# Patient Record
Sex: FEMALE | Race: WHITE | NOT HISPANIC OR LATINO | Employment: UNEMPLOYED | URBAN - NONMETROPOLITAN AREA
[De-identification: names, ages, dates, MRNs, and addresses within clinical notes are randomized per-mention and may not be internally consistent; named-entity substitution may affect disease eponyms.]

---

## 2017-03-31 RX ORDER — BLOOD-GLUCOSE METER
KIT MISCELLANEOUS
Qty: 50 EACH | Refills: 5 | Status: SHIPPED | OUTPATIENT
Start: 2017-03-31 | End: 2017-09-14

## 2017-09-19 ENCOUNTER — APPOINTMENT (OUTPATIENT)
Dept: GENERAL RADIOLOGY | Facility: HOSPITAL | Age: 62
End: 2017-09-19

## 2017-09-19 ENCOUNTER — APPOINTMENT (OUTPATIENT)
Dept: CT IMAGING | Facility: HOSPITAL | Age: 62
End: 2017-09-19

## 2017-09-19 ENCOUNTER — HOSPITAL ENCOUNTER (EMERGENCY)
Facility: HOSPITAL | Age: 62
Discharge: ANOTHER HOSPITAL | End: 2017-09-19
Attending: EMERGENCY MEDICINE | Admitting: EMERGENCY MEDICINE

## 2017-09-19 VITALS
TEMPERATURE: 98.9 F | HEART RATE: 75 BPM | WEIGHT: 170 LBS | HEIGHT: 62 IN | BODY MASS INDEX: 31.28 KG/M2 | OXYGEN SATURATION: 95 % | RESPIRATION RATE: 23 BRPM | DIASTOLIC BLOOD PRESSURE: 53 MMHG | SYSTOLIC BLOOD PRESSURE: 114 MMHG

## 2017-09-19 DIAGNOSIS — D70.9 NEUTROPENIA, UNSPECIFIED TYPE (HCC): ICD-10-CM

## 2017-09-19 DIAGNOSIS — D61.818 PANCYTOPENIA (HCC): ICD-10-CM

## 2017-09-19 DIAGNOSIS — R18.8 OTHER ASCITES: ICD-10-CM

## 2017-09-19 DIAGNOSIS — R18.8 CIRRHOSIS OF LIVER WITH ASCITES, UNSPECIFIED HEPATIC CIRRHOSIS TYPE (HCC): Primary | ICD-10-CM

## 2017-09-19 DIAGNOSIS — K74.60 CIRRHOSIS OF LIVER WITH ASCITES, UNSPECIFIED HEPATIC CIRRHOSIS TYPE (HCC): Primary | ICD-10-CM

## 2017-09-19 DIAGNOSIS — R17 ELEVATED BILIRUBIN: ICD-10-CM

## 2017-09-19 LAB
ALBUMIN SERPL-MCNC: 2.8 G/DL (ref 3.5–5)
ALBUMIN/GLOB SERPL: 0.9 G/DL (ref 1–2)
ALP SERPL-CCNC: 128 U/L (ref 38–126)
ALT SERPL W P-5'-P-CCNC: 33 U/L (ref 13–69)
AMYLASE SERPL-CCNC: 84 U/L (ref 30–110)
ANION GAP SERPL CALCULATED.3IONS-SCNC: 11.8 MMOL/L
AST SERPL-CCNC: 36 U/L (ref 15–46)
BILIRUB SERPL-MCNC: 3.5 MG/DL (ref 0.2–1.3)
BUN BLD-MCNC: 20 MG/DL (ref 7–20)
BUN/CREAT SERPL: 18.2 (ref 7.1–23.5)
CALCIUM SPEC-SCNC: 8 MG/DL (ref 8.4–10.2)
CHLORIDE SERPL-SCNC: 109 MMOL/L (ref 98–107)
CO2 SERPL-SCNC: 20 MMOL/L (ref 26–30)
CREAT BLD-MCNC: 1.1 MG/DL (ref 0.6–1.3)
DEPRECATED RDW RBC AUTO: 65.2 FL (ref 37–54)
ERYTHROCYTE [DISTWIDTH] IN BLOOD BY AUTOMATED COUNT: 18.5 % (ref 11.5–14.5)
GFR SERPL CREATININE-BSD FRML MDRD: 51 ML/MIN/1.73
GLOBULIN UR ELPH-MCNC: 3.2 GM/DL
GLUCOSE BLD-MCNC: 247 MG/DL (ref 74–98)
HCT VFR BLD AUTO: 25.5 % (ref 37–47)
HEMOCCULT STL QL: NEGATIVE
HGB BLD-MCNC: 8.8 G/DL (ref 12–16)
LIPASE SERPL-CCNC: 397 U/L (ref 23–300)
MCH RBC QN AUTO: 33.5 PG (ref 27–31)
MCHC RBC AUTO-ENTMCNC: 34.5 G/DL (ref 30–37)
MCV RBC AUTO: 97 FL (ref 81–99)
PLATELET # BLD AUTO: 139 10*3/MM3 (ref 130–400)
PMV BLD AUTO: 9.7 FL (ref 6–12)
POTASSIUM BLD-SCNC: 3.8 MMOL/L (ref 3.5–5.1)
PROT SERPL-MCNC: 6 G/DL (ref 6.3–8.2)
RBC # BLD AUTO: 2.63 10*6/MM3 (ref 4.2–5.4)
SCAN SLIDE: NORMAL
SODIUM BLD-SCNC: 137 MMOL/L (ref 137–145)
TROPONIN I SERPL-MCNC: <0.012 NG/ML (ref 0–0.03)
WBC NRBC COR # BLD: 1.64 10*3/MM3 (ref 4.8–10.8)

## 2017-09-19 PROCEDURE — 80053 COMPREHEN METABOLIC PANEL: CPT | Performed by: EMERGENCY MEDICINE

## 2017-09-19 PROCEDURE — 85007 BL SMEAR W/DIFF WBC COUNT: CPT | Performed by: EMERGENCY MEDICINE

## 2017-09-19 PROCEDURE — 71010 HC CHEST PA OR AP: CPT

## 2017-09-19 PROCEDURE — 85025 COMPLETE CBC W/AUTO DIFF WBC: CPT | Performed by: EMERGENCY MEDICINE

## 2017-09-19 PROCEDURE — 85060 BLOOD SMEAR INTERPRETATION: CPT | Performed by: EMERGENCY MEDICINE

## 2017-09-19 PROCEDURE — 99284 EMERGENCY DEPT VISIT MOD MDM: CPT

## 2017-09-19 PROCEDURE — 82272 OCCULT BLD FECES 1-3 TESTS: CPT | Performed by: EMERGENCY MEDICINE

## 2017-09-19 PROCEDURE — 93005 ELECTROCARDIOGRAM TRACING: CPT | Performed by: EMERGENCY MEDICINE

## 2017-09-19 PROCEDURE — 82150 ASSAY OF AMYLASE: CPT | Performed by: EMERGENCY MEDICINE

## 2017-09-19 PROCEDURE — 74176 CT ABD & PELVIS W/O CONTRAST: CPT

## 2017-09-19 PROCEDURE — 83690 ASSAY OF LIPASE: CPT | Performed by: EMERGENCY MEDICINE

## 2017-09-19 PROCEDURE — 84484 ASSAY OF TROPONIN QUANT: CPT | Performed by: EMERGENCY MEDICINE

## 2017-09-19 RX ORDER — ASPIRIN 325 MG
325 TABLET ORAL ONCE
Status: COMPLETED | OUTPATIENT
Start: 2017-09-19 | End: 2017-09-19

## 2017-09-19 RX ORDER — SODIUM CHLORIDE 0.9 % (FLUSH) 0.9 %
10 SYRINGE (ML) INJECTION AS NEEDED
Status: DISCONTINUED | OUTPATIENT
Start: 2017-09-19 | End: 2017-09-20 | Stop reason: HOSPADM

## 2017-09-19 RX ORDER — NITROGLYCERIN 0.4 MG/1
0.4 TABLET SUBLINGUAL
Status: DISCONTINUED | OUTPATIENT
Start: 2017-09-19 | End: 2017-09-20 | Stop reason: HOSPADM

## 2017-09-19 RX ORDER — CARBIDOPA, LEVODOPA AND ENTACAPONE 37.5; 200; 15 MG/1; MG/1; MG/1
1 TABLET, FILM COATED ORAL 3 TIMES DAILY
COMMUNITY

## 2017-09-19 RX ADMIN — NITROGLYCERIN 0.4 MG: 0.4 TABLET SUBLINGUAL at 15:35

## 2017-09-19 RX ADMIN — ASPIRIN 325 MG ORAL TABLET 325 MG: 325 PILL ORAL at 15:35

## 2017-09-19 NOTE — ED PROVIDER NOTES
Subjective   Patient is a 61 y.o. female presenting with chest pain.   History provided by:  Patient   used: No    Chest Pain   Pain location:  L chest  Pain quality: aching    Pain radiates to:  Does not radiate  Onset quality:  Gradual  Duration:  1 month  Timing:  Intermittent  Progression:  Waxing and waning  Chronicity:  New  Context: at rest    Relieved by:  Nothing  Worsened by:  Nothing  Ineffective treatments:  None tried  Associated symptoms: anxiety    Associated symptoms: no abdominal pain, no back pain, no cough, no diaphoresis, no dizziness, no dysphagia, no fever, no headache, no heartburn, no lower extremity edema, no nausea, no near-syncope, no numbness, no palpitations, no shortness of breath, no syncope, no vomiting and no weakness    Risk factors: diabetes mellitus, hypertension and smoking    Risk factors: no high cholesterol        Review of Systems   Constitutional: Negative for chills, diaphoresis and fever.   HENT: Negative for congestion, rhinorrhea, sore throat and trouble swallowing.    Eyes: Negative for discharge and visual disturbance.   Respiratory: Negative for cough, chest tightness, shortness of breath and wheezing.    Cardiovascular: Positive for chest pain. Negative for palpitations, leg swelling, syncope and near-syncope.   Gastrointestinal: Negative for abdominal pain, constipation, diarrhea, heartburn, nausea and vomiting.   Genitourinary: Negative for dysuria, flank pain and hematuria.   Musculoskeletal: Negative for back pain, myalgias and neck pain.   Skin: Negative for color change and rash.   Neurological: Negative for dizziness, weakness, numbness and headaches.   Psychiatric/Behavioral: Negative for self-injury and suicidal ideas.       Past Medical History:   Diagnosis Date   • Diabetes mellitus    • Hypertension    • Injury of back        Allergies   Allergen Reactions   • Iodinated Diagnostic Agents Anaphylaxis   • Lisinopril Shortness Of Breath        History reviewed. No pertinent surgical history.    History reviewed. No pertinent family history.    Social History     Social History   • Marital status:      Spouse name: N/A   • Number of children: N/A   • Years of education: N/A     Social History Main Topics   • Smoking status: Light Tobacco Smoker     Types: Cigarettes   • Smokeless tobacco: None   • Alcohol use No   • Drug use: No   • Sexual activity: Not Asked     Other Topics Concern   • None     Social History Narrative   • None           Objective   Physical Exam   Constitutional: She is oriented to person, place, and time. She appears well-developed and well-nourished.   HENT:   Head: Normocephalic and atraumatic.   Nose: Nose normal.   Mouth/Throat: Oropharynx is clear and moist.   Eyes: Conjunctivae and EOM are normal. Pupils are equal, round, and reactive to light.   Neck: Normal range of motion. Neck supple.   Cardiovascular: Normal rate, regular rhythm, normal heart sounds and intact distal pulses.    Pulmonary/Chest: Effort normal and breath sounds normal. No respiratory distress. She has no wheezes. She exhibits no tenderness.   Abdominal: Soft. Bowel sounds are normal. There is no tenderness. There is no rebound and no guarding.   Musculoskeletal: Normal range of motion. She exhibits no edema, tenderness or deformity.   Neurological: She is alert and oriented to person, place, and time. No cranial nerve deficit. Coordination normal.   Skin: Skin is warm and dry. No rash noted. No erythema. No pallor.   Psychiatric: She has a normal mood and affect. Her behavior is normal. Judgment and thought content normal.   Nursing note and vitals reviewed.      Procedures         ED Course  ED Course                  MDM  Number of Diagnoses or Management Options  Cirrhosis of liver with ascites, unspecified hepatic cirrhosis type:   Elevated bilirubin:   Other ascites:   Diagnosis management comments: 61-year-old female presents emergency  Department with complaints of chest pain.  She says it's been intermittent over the last month.  Patient states that she's also had fatigue.  She does have a history of hypertension and diabetes.  Patient does smoke.  There is a family history of coronary artery disease.  Patient denies a history of coronary artery disease in herself.  Vital signs are stable.  Physical exam is unremarkable.    EKG: Ventricular rate 82, MA interval 136, QTc 435, castration 96, sinus rhythm, left axis deviation.     8:00 PM I received patient in sign out. Her labs are notable for pancytopenia with a significant neutropenia. Her CT shows large ascites and massive hepatosplenomegaly. We do not have GI coverage here and I think she needs evaluation by the same. Discussed with [facility] for transfer.        Amount and/or Complexity of Data Reviewed  Decide to obtain previous medical records or to obtain history from someone other than the patient: yes        Final diagnoses:   Cirrhosis of liver with ascites, unspecified hepatic cirrhosis type   Elevated bilirubin   Other ascites   Pancytopenia   Neutropenia, unspecified type            Nayan Jimenez MD  09/19/17 2002

## 2017-09-20 NOTE — ED NOTES
Schlera of both eyes, yellowed. Skin is slightly jaundiced as well.  states patients skin is not normally this color.      Julianne Gutierrez RN  09/19/17 1597

## 2017-09-20 NOTE — ED NOTES
2126:   MD'S CALLED REGARDING BED ASSIGNMENT FOR PT. GIVEN BED @ THIS TIME, INFORMED PT IS GOING TO [facility] . RN INFORMED OF THIS.         Margarita Trevizo  09/19/17 2131

## 2017-09-20 NOTE — ED NOTES
1946: [facility] MD'S CALLED PER DR. EDWARDS REQUESTING NENITA WILKINS, FOR TRANSFER OF NEW ONSET CIRRHOSIS.   1953: CALL SENT TO DR. EDWARDS @ THIS TIME TO SPEAK WITH RAINER CHAUHAN @  [facility].     Margarita Trevizo  09/19/17 2015

## 2017-09-26 LAB
ANISOCYTOSIS BLD QL: ABNORMAL
BASOPHILS # BLD MANUAL: 0.03 10*3/MM3 (ref 0–0.2)
BASOPHILS NFR BLD AUTO: 2 % (ref 0–2.5)
CYTOLOGIST CVX/VAG CYTO: NORMAL
EOSINOPHIL # BLD MANUAL: 0.05 10*3/MM3 (ref 0–0.7)
EOSINOPHIL NFR BLD MANUAL: 3 % (ref 0–7)
LYMPHOCYTES # BLD MANUAL: 1.21 10*3/MM3 (ref 0.6–3.4)
LYMPHOCYTES NFR BLD MANUAL: 13 % (ref 0–12)
LYMPHOCYTES NFR BLD MANUAL: 74 % (ref 10–50)
MONOCYTES # BLD AUTO: 0.21 10*3/MM3 (ref 0–0.9)
NEUTROPHILS # BLD AUTO: 0.08 10*3/MM3 (ref 2–6.9)
NEUTROPHILS NFR BLD MANUAL: 3 % (ref 37–80)
NEUTS BAND NFR BLD MANUAL: 2 % (ref 0–6)
OVALOCYTES BLD QL SMEAR: ABNORMAL
PATH INTERP BLD-IMP: NORMAL
POIKILOCYTOSIS BLD QL SMEAR: ABNORMAL
SMALL PLATELETS BLD QL SMEAR: ADEQUATE
VARIANT LYMPHS NFR BLD MANUAL: 3 % (ref 0–0)
WBC MORPH BLD: NORMAL

## 2017-09-28 ENCOUNTER — TELEPHONE (OUTPATIENT)
Dept: PRIMARY CARE CLINIC | Age: 62
End: 2017-09-28

## 2018-04-10 ENCOUNTER — HOSPITAL ENCOUNTER (OUTPATIENT)
Dept: OTHER | Age: 63
Discharge: HOME | End: 2018-04-10
Attending: PEDIATRICS | Admitting: PEDIATRICS

## 2018-04-10 ENCOUNTER — OFFICE VISIT (OUTPATIENT)
Dept: PRIMARY CARE CLINIC | Age: 63
End: 2018-04-10
Payer: COMMERCIAL

## 2018-04-10 VITALS
OXYGEN SATURATION: 100 % | DIASTOLIC BLOOD PRESSURE: 72 MMHG | HEART RATE: 69 BPM | WEIGHT: 155.4 LBS | HEIGHT: 63 IN | SYSTOLIC BLOOD PRESSURE: 154 MMHG | TEMPERATURE: 97.9 F | BODY MASS INDEX: 27.54 KG/M2

## 2018-04-10 DIAGNOSIS — K74.60 CIRRHOSIS OF LIVER WITH ASCITES, UNSPECIFIED HEPATIC CIRRHOSIS TYPE (HCC): ICD-10-CM

## 2018-04-10 DIAGNOSIS — R63.4 WEIGHT LOSS: ICD-10-CM

## 2018-04-10 DIAGNOSIS — R79.89 ELEVATED LFTS: ICD-10-CM

## 2018-04-10 DIAGNOSIS — Z13.31 POSITIVE DEPRESSION SCREENING: ICD-10-CM

## 2018-04-10 DIAGNOSIS — R10.84 GENERALIZED ABDOMINAL PAIN: ICD-10-CM

## 2018-04-10 DIAGNOSIS — R18.8 CIRRHOSIS OF LIVER WITH ASCITES, UNSPECIFIED HEPATIC CIRRHOSIS TYPE (HCC): ICD-10-CM

## 2018-04-10 DIAGNOSIS — R11.0 NAUSEA: ICD-10-CM

## 2018-04-10 DIAGNOSIS — F41.8 DEPRESSION WITH ANXIETY: ICD-10-CM

## 2018-04-10 DIAGNOSIS — E11.9 TYPE 2 DIABETES MELLITUS WITHOUT COMPLICATION, WITHOUT LONG-TERM CURRENT USE OF INSULIN (HCC): Primary | ICD-10-CM

## 2018-04-10 DIAGNOSIS — I10 ESSENTIAL HYPERTENSION: ICD-10-CM

## 2018-04-10 DIAGNOSIS — M54.42 CHRONIC BILATERAL LOW BACK PAIN WITH BILATERAL SCIATICA: ICD-10-CM

## 2018-04-10 DIAGNOSIS — Z12.31 ENCOUNTER FOR SCREENING MAMMOGRAM FOR BREAST CANCER: ICD-10-CM

## 2018-04-10 DIAGNOSIS — R19.7 DIARRHEA, UNSPECIFIED TYPE: ICD-10-CM

## 2018-04-10 DIAGNOSIS — M54.41 CHRONIC BILATERAL LOW BACK PAIN WITH BILATERAL SCIATICA: ICD-10-CM

## 2018-04-10 DIAGNOSIS — E11.9 TYPE 2 DIABETES MELLITUS WITHOUT COMPLICATION, WITHOUT LONG-TERM CURRENT USE OF INSULIN (HCC): ICD-10-CM

## 2018-04-10 DIAGNOSIS — G89.29 CHRONIC BILATERAL LOW BACK PAIN WITH BILATERAL SCIATICA: ICD-10-CM

## 2018-04-10 DIAGNOSIS — R18.8 OTHER ASCITES: ICD-10-CM

## 2018-04-10 LAB
A/G RATIO: 1 (ref 0.8–2)
ALBUMIN SERPL-MCNC: 3.5 G/DL (ref 3.4–4.8)
ALP BLD-CCNC: 202 U/L (ref 25–100)
ALT SERPL-CCNC: 24 U/L (ref 4–36)
ANION GAP SERPL CALCULATED.3IONS-SCNC: 12 MMOL/L (ref 3–16)
AST SERPL-CCNC: 38 U/L (ref 8–33)
BASOPHILS ABSOLUTE: 0 K/UL (ref 0–0.1)
BASOPHILS RELATIVE PERCENT: 1.7 %
BILIRUB SERPL-MCNC: 2.4 MG/DL (ref 0.3–1.2)
BUN BLDV-MCNC: 18 MG/DL (ref 6–20)
CALCIUM SERPL-MCNC: 8.8 MG/DL (ref 8.5–10.5)
CHLORIDE BLD-SCNC: 106 MMOL/L (ref 98–107)
CHOLESTEROL, TOTAL: 159 MG/DL (ref 0–200)
CO2: 24 MMOL/L (ref 20–30)
CREAT SERPL-MCNC: 0.9 MG/DL (ref 0.4–1.2)
CREATININE URINE: 146.4 MG/DL (ref 1.5–300)
EOSINOPHILS ABSOLUTE: 0.1 K/UL (ref 0–0.4)
EOSINOPHILS RELATIVE PERCENT: 5.9 %
GFR AFRICAN AMERICAN: >59
GFR NON-AFRICAN AMERICAN: >60
GLOBULIN: 3.6 G/DL
GLUCOSE BLD-MCNC: 137 MG/DL (ref 74–106)
HAV IGM SER IA-ACNC: NORMAL
HBA1C MFR BLD: 4.6 %
HCT VFR BLD CALC: 37.8 % (ref 37–47)
HDLC SERPL-MCNC: 45 MG/DL (ref 40–60)
HEMOGLOBIN: 12.1 G/DL (ref 11.5–16.5)
HEPATITIS B CORE IGM ANTIBODY: NORMAL
HEPATITIS B SURFACE ANTIGEN INTERPRETATION: NORMAL
HEPATITIS C ANTIBODY INTERPRETATION: NORMAL
IMMATURE GRANULOCYTES #: 0 K/UL
IMMATURE GRANULOCYTES %: 0 % (ref 0–5)
INR BLD: 1.1 (ref 0.86–1.14)
LDL CHOLESTEROL CALCULATED: 94 MG/DL
LYMPHOCYTES ABSOLUTE: 0.7 K/UL (ref 1.5–4)
LYMPHOCYTES RELATIVE PERCENT: 62.7 %
MCH RBC QN AUTO: 30.3 PG (ref 27–32)
MCHC RBC AUTO-ENTMCNC: 32 G/DL (ref 31–35)
MCV RBC AUTO: 94.5 FL (ref 80–100)
MICROALBUMIN UR-MCNC: 7 MG/DL (ref 0–22)
MICROALBUMIN/CREAT UR-RTO: 47.8 MG/G (ref 0–30)
MONOCYTES ABSOLUTE: 0.2 K/UL (ref 0.2–0.8)
MONOCYTES RELATIVE PERCENT: 18.6 %
NEUTROPHILS ABSOLUTE: 0.1 K/UL (ref 2–7.5)
NEUTROPHILS RELATIVE PERCENT: 11.1 %
PDW BLD-RTO: 15.5 % (ref 11–16)
PLATELET # BLD: 190 K/UL (ref 150–400)
PMV BLD AUTO: 10 FL (ref 6–10)
POTASSIUM SERPL-SCNC: 4.2 MMOL/L (ref 3.4–5.1)
PROTHROMBIN TIME: 11.5 SEC (ref 9.5–10.9)
RBC # BLD: 4 M/UL (ref 3.8–5.8)
SODIUM BLD-SCNC: 142 MMOL/L (ref 136–145)
TOTAL PROTEIN: 7.1 G/DL (ref 6.4–8.3)
TRIGL SERPL-MCNC: 100 MG/DL (ref 0–249)
TSH REFLEX: 1.95 UIU/ML (ref 0.35–5.5)
VLDLC SERPL CALC-MCNC: 20 MG/DL
WBC # BLD: 1.2 K/UL (ref 4–11)

## 2018-04-10 PROCEDURE — G0444 DEPRESSION SCREEN ANNUAL: HCPCS | Performed by: PEDIATRICS

## 2018-04-10 PROCEDURE — G8431 POS CLIN DEPRES SCRN F/U DOC: HCPCS | Performed by: PEDIATRICS

## 2018-04-10 PROCEDURE — 99215 OFFICE O/P EST HI 40 MIN: CPT | Performed by: PEDIATRICS

## 2018-04-10 PROCEDURE — 83036 HEMOGLOBIN GLYCOSYLATED A1C: CPT | Performed by: PEDIATRICS

## 2018-04-10 RX ORDER — SPIRONOLACTONE 50 MG/1
50 TABLET, FILM COATED ORAL 3 TIMES DAILY
Qty: 90 TABLET | Refills: 1 | Status: SHIPPED | OUTPATIENT
Start: 2018-04-10 | End: 2018-05-24 | Stop reason: SDUPTHER

## 2018-04-10 RX ORDER — FUROSEMIDE 20 MG/1
20 TABLET ORAL 3 TIMES DAILY
Qty: 90 TABLET | Refills: 1 | Status: SHIPPED | OUTPATIENT
Start: 2018-04-10 | End: 2018-05-24 | Stop reason: SDUPTHER

## 2018-04-10 RX ORDER — GABAPENTIN 300 MG/1
300 CAPSULE ORAL 3 TIMES DAILY
Qty: 90 CAPSULE | Refills: 1 | Status: SHIPPED | OUTPATIENT
Start: 2018-04-10 | End: 2018-05-24 | Stop reason: SDUPTHER

## 2018-04-10 RX ORDER — ONDANSETRON 4 MG/1
4 TABLET, FILM COATED ORAL EVERY 8 HOURS PRN
Qty: 20 TABLET | Refills: 0 | Status: SHIPPED | OUTPATIENT
Start: 2018-04-10 | End: 2018-05-08 | Stop reason: SDUPTHER

## 2018-04-10 RX ORDER — CITALOPRAM 20 MG/1
20 TABLET ORAL DAILY
Qty: 30 TABLET | Refills: 3 | Status: SHIPPED | OUTPATIENT
Start: 2018-04-10 | End: 2018-07-24 | Stop reason: ALTCHOICE

## 2018-04-10 ASSESSMENT — ENCOUNTER SYMPTOMS
SORE THROAT: 0
SHORTNESS OF BREATH: 0
ABDOMINAL DISTENTION: 1
ABDOMINAL PAIN: 1
VOMITING: 0
DIARRHEA: 1
NAUSEA: 1
WHEEZING: 0
COUGH: 0
BACK PAIN: 1
EYE DISCHARGE: 0
SINUS PRESSURE: 0

## 2018-04-10 ASSESSMENT — PATIENT HEALTH QUESTIONNAIRE - PHQ9
6. FEELING BAD ABOUT YOURSELF - OR THAT YOU ARE A FAILURE OR HAVE LET YOURSELF OR YOUR FAMILY DOWN: 1
1. LITTLE INTEREST OR PLEASURE IN DOING THINGS: 2
4. FEELING TIRED OR HAVING LITTLE ENERGY: 3
SUM OF ALL RESPONSES TO PHQ9 QUESTIONS 1 & 2: 3
7. TROUBLE CONCENTRATING ON THINGS, SUCH AS READING THE NEWSPAPER OR WATCHING TELEVISION: 0
5. POOR APPETITE OR OVEREATING: 3
8. MOVING OR SPEAKING SO SLOWLY THAT OTHER PEOPLE COULD HAVE NOTICED. OR THE OPPOSITE, BEING SO FIGETY OR RESTLESS THAT YOU HAVE BEEN MOVING AROUND A LOT MORE THAN USUAL: 0
9. THOUGHTS THAT YOU WOULD BE BETTER OFF DEAD, OR OF HURTING YOURSELF: 0
3. TROUBLE FALLING OR STAYING ASLEEP: 3
10. IF YOU CHECKED OFF ANY PROBLEMS, HOW DIFFICULT HAVE THESE PROBLEMS MADE IT FOR YOU TO DO YOUR WORK, TAKE CARE OF THINGS AT HOME, OR GET ALONG WITH OTHER PEOPLE: 0
2. FEELING DOWN, DEPRESSED OR HOPELESS: 1
SUM OF ALL RESPONSES TO PHQ QUESTIONS 1-9: 13

## 2018-04-11 ASSESSMENT — ENCOUNTER SYMPTOMS: CRAMPS: 1

## 2018-04-19 ENCOUNTER — TELEPHONE (OUTPATIENT)
Dept: PRIMARY CARE CLINIC | Age: 63
End: 2018-04-19

## 2018-04-19 ENCOUNTER — CARE COORDINATION (OUTPATIENT)
Dept: CARE COORDINATION | Age: 63
End: 2018-04-19

## 2018-04-23 PROBLEM — F41.8 DEPRESSION WITH ANXIETY: Status: ACTIVE | Noted: 2018-04-23

## 2018-04-23 PROBLEM — R10.84 GENERALIZED ABDOMINAL PAIN: Status: ACTIVE | Noted: 2018-04-23

## 2018-04-23 PROBLEM — R19.7 DIARRHEA: Status: ACTIVE | Noted: 2018-04-23

## 2018-04-23 PROBLEM — R18.8 OTHER ASCITES: Status: ACTIVE | Noted: 2018-04-23

## 2018-04-23 PROBLEM — R63.4 WEIGHT LOSS: Status: ACTIVE | Noted: 2018-04-23

## 2018-04-23 PROBLEM — M54.41 CHRONIC BILATERAL LOW BACK PAIN WITH BILATERAL SCIATICA: Status: ACTIVE | Noted: 2018-04-23

## 2018-04-23 PROBLEM — M54.42 CHRONIC BILATERAL LOW BACK PAIN WITH BILATERAL SCIATICA: Status: ACTIVE | Noted: 2018-04-23

## 2018-04-23 PROBLEM — K74.60 CIRRHOSIS OF LIVER WITH ASCITES (HCC): Status: ACTIVE | Noted: 2018-04-23

## 2018-04-23 PROBLEM — R18.8 CIRRHOSIS OF LIVER WITH ASCITES (HCC): Status: ACTIVE | Noted: 2018-04-23

## 2018-04-23 PROBLEM — G89.29 CHRONIC BILATERAL LOW BACK PAIN WITH BILATERAL SCIATICA: Status: ACTIVE | Noted: 2018-04-23

## 2018-04-23 PROBLEM — R79.89 ELEVATED LFTS: Status: ACTIVE | Noted: 2018-04-23

## 2018-04-25 ENCOUNTER — HOSPITAL ENCOUNTER (OUTPATIENT)
Dept: GENERAL RADIOLOGY | Age: 63
Discharge: HOME | End: 2018-04-25

## 2018-04-25 ENCOUNTER — OFFICE VISIT (OUTPATIENT)
Dept: PRIMARY CARE CLINIC | Age: 63
End: 2018-04-25
Payer: COMMERCIAL

## 2018-04-25 VITALS
DIASTOLIC BLOOD PRESSURE: 76 MMHG | BODY MASS INDEX: 27.83 KG/M2 | OXYGEN SATURATION: 100 % | TEMPERATURE: 97.8 F | SYSTOLIC BLOOD PRESSURE: 152 MMHG | HEART RATE: 68 BPM | WEIGHT: 154.6 LBS

## 2018-04-25 DIAGNOSIS — R10.84 GENERALIZED ABDOMINAL PAIN: ICD-10-CM

## 2018-04-25 DIAGNOSIS — R63.4 WEIGHT LOSS: ICD-10-CM

## 2018-04-25 DIAGNOSIS — R79.89 ELEVATED LFTS: ICD-10-CM

## 2018-04-25 DIAGNOSIS — F41.8 DEPRESSION WITH ANXIETY: ICD-10-CM

## 2018-04-25 DIAGNOSIS — R63.4 ABNORMAL WEIGHT LOSS: ICD-10-CM

## 2018-04-25 DIAGNOSIS — R19.7 DIARRHEA, UNSPECIFIED TYPE: ICD-10-CM

## 2018-04-25 DIAGNOSIS — R18.8 OTHER ASCITES: ICD-10-CM

## 2018-04-25 DIAGNOSIS — R18.8 CIRRHOSIS OF LIVER WITH ASCITES, UNSPECIFIED HEPATIC CIRRHOSIS TYPE (HCC): ICD-10-CM

## 2018-04-25 DIAGNOSIS — R18.8 OTHER ASCITES: Primary | ICD-10-CM

## 2018-04-25 DIAGNOSIS — K74.60 CIRRHOSIS OF LIVER WITH ASCITES, UNSPECIFIED HEPATIC CIRRHOSIS TYPE (HCC): ICD-10-CM

## 2018-04-25 PROCEDURE — 99213 OFFICE O/P EST LOW 20 MIN: CPT | Performed by: PEDIATRICS

## 2018-05-08 ENCOUNTER — OFFICE VISIT (OUTPATIENT)
Dept: PRIMARY CARE CLINIC | Age: 63
End: 2018-05-08
Payer: COMMERCIAL

## 2018-05-08 VITALS
WEIGHT: 155 LBS | BODY MASS INDEX: 29.27 KG/M2 | RESPIRATION RATE: 20 BRPM | OXYGEN SATURATION: 98 % | DIASTOLIC BLOOD PRESSURE: 66 MMHG | HEART RATE: 79 BPM | SYSTOLIC BLOOD PRESSURE: 138 MMHG | HEIGHT: 61 IN

## 2018-05-08 DIAGNOSIS — R18.8 CIRRHOSIS OF LIVER WITH ASCITES, UNSPECIFIED HEPATIC CIRRHOSIS TYPE (HCC): Primary | ICD-10-CM

## 2018-05-08 DIAGNOSIS — F41.9 ANXIETY: ICD-10-CM

## 2018-05-08 DIAGNOSIS — R11.0 NAUSEA: ICD-10-CM

## 2018-05-08 DIAGNOSIS — R10.9 ABDOMINAL PAIN, UNSPECIFIED ABDOMINAL LOCATION: ICD-10-CM

## 2018-05-08 DIAGNOSIS — R13.10 DYSPHAGIA, UNSPECIFIED TYPE: ICD-10-CM

## 2018-05-08 DIAGNOSIS — K74.60 CIRRHOSIS OF LIVER WITH ASCITES, UNSPECIFIED HEPATIC CIRRHOSIS TYPE (HCC): Primary | ICD-10-CM

## 2018-05-08 PROCEDURE — 99214 OFFICE O/P EST MOD 30 MIN: CPT | Performed by: PEDIATRICS

## 2018-05-08 RX ORDER — ALPRAZOLAM 0.5 MG/1
0.5 TABLET ORAL DAILY PRN
Qty: 15 TABLET | Refills: 0 | Status: SHIPPED | OUTPATIENT
Start: 2018-05-08 | End: 2018-05-24 | Stop reason: SDUPTHER

## 2018-05-08 RX ORDER — ONDANSETRON 4 MG/1
4 TABLET, FILM COATED ORAL EVERY 8 HOURS PRN
Qty: 20 TABLET | Refills: 0 | Status: SHIPPED | OUTPATIENT
Start: 2018-05-08 | End: 2018-05-24 | Stop reason: SDUPTHER

## 2018-05-08 ASSESSMENT — ENCOUNTER SYMPTOMS
SINUS PRESSURE: 0
SORE THROAT: 0
BACK PAIN: 0
NAUSEA: 0
ABDOMINAL PAIN: 0
EYE DISCHARGE: 0
COUGH: 0
WHEEZING: 0
SHORTNESS OF BREATH: 0
VOMITING: 0

## 2018-05-23 ASSESSMENT — ENCOUNTER SYMPTOMS
SHORTNESS OF BREATH: 0
WHEEZING: 0
BACK PAIN: 1
SORE THROAT: 0
COUGH: 0
SINUS PRESSURE: 0
ABDOMINAL PAIN: 1
VOMITING: 0
NAUSEA: 1
ABDOMINAL DISTENTION: 1
DIARRHEA: 1
EYE DISCHARGE: 0

## 2018-05-24 ENCOUNTER — OFFICE VISIT (OUTPATIENT)
Dept: PRIMARY CARE CLINIC | Age: 63
End: 2018-05-24
Payer: COMMERCIAL

## 2018-05-24 VITALS
OXYGEN SATURATION: 94 % | DIASTOLIC BLOOD PRESSURE: 78 MMHG | WEIGHT: 138 LBS | SYSTOLIC BLOOD PRESSURE: 124 MMHG | HEIGHT: 61 IN | BODY MASS INDEX: 26.06 KG/M2 | HEART RATE: 77 BPM | RESPIRATION RATE: 20 BRPM

## 2018-05-24 DIAGNOSIS — G89.29 CHRONIC BILATERAL LOW BACK PAIN WITH BILATERAL SCIATICA: ICD-10-CM

## 2018-05-24 DIAGNOSIS — R18.8 CIRRHOSIS OF LIVER WITH ASCITES, UNSPECIFIED HEPATIC CIRRHOSIS TYPE (HCC): Primary | ICD-10-CM

## 2018-05-24 DIAGNOSIS — M54.41 CHRONIC BILATERAL LOW BACK PAIN WITH BILATERAL SCIATICA: ICD-10-CM

## 2018-05-24 DIAGNOSIS — R18.8 OTHER ASCITES: ICD-10-CM

## 2018-05-24 DIAGNOSIS — M54.42 CHRONIC BILATERAL LOW BACK PAIN WITH BILATERAL SCIATICA: ICD-10-CM

## 2018-05-24 DIAGNOSIS — R11.0 NAUSEA: ICD-10-CM

## 2018-05-24 DIAGNOSIS — F41.9 ANXIETY: ICD-10-CM

## 2018-05-24 DIAGNOSIS — K74.60 CIRRHOSIS OF LIVER WITH ASCITES, UNSPECIFIED HEPATIC CIRRHOSIS TYPE (HCC): Primary | ICD-10-CM

## 2018-05-24 DIAGNOSIS — I10 ESSENTIAL HYPERTENSION: ICD-10-CM

## 2018-05-24 PROCEDURE — 99214 OFFICE O/P EST MOD 30 MIN: CPT | Performed by: PEDIATRICS

## 2018-05-24 RX ORDER — GABAPENTIN 300 MG/1
300 CAPSULE ORAL 3 TIMES DAILY
Qty: 90 CAPSULE | Refills: 1 | Status: SHIPPED | OUTPATIENT
Start: 2018-05-24 | End: 2018-07-24 | Stop reason: SDUPTHER

## 2018-05-24 RX ORDER — SPIRONOLACTONE 50 MG/1
50 TABLET, FILM COATED ORAL 3 TIMES DAILY
Qty: 90 TABLET | Refills: 1 | Status: SHIPPED | OUTPATIENT
Start: 2018-05-24 | End: 2018-07-24 | Stop reason: SDUPTHER

## 2018-05-24 RX ORDER — ALPRAZOLAM 0.5 MG/1
0.5 TABLET ORAL DAILY PRN
Qty: 30 TABLET | Refills: 1 | Status: SHIPPED | OUTPATIENT
Start: 2018-05-24 | End: 2018-06-23

## 2018-05-24 RX ORDER — ONDANSETRON 4 MG/1
4 TABLET, FILM COATED ORAL EVERY 8 HOURS PRN
Qty: 20 TABLET | Refills: 0 | Status: SHIPPED | OUTPATIENT
Start: 2018-05-24 | End: 2018-07-24 | Stop reason: ALTCHOICE

## 2018-05-24 RX ORDER — OXYCODONE HYDROCHLORIDE 5 MG/1
TABLET ORAL
Refills: 0 | COMMUNITY
Start: 2018-05-21 | End: 2018-07-24 | Stop reason: ALTCHOICE

## 2018-05-24 RX ORDER — PROMETHAZINE HYDROCHLORIDE 25 MG/1
25 TABLET ORAL EVERY 6 HOURS PRN
Qty: 12 TABLET | Refills: 0 | Status: SHIPPED | OUTPATIENT
Start: 2018-05-24 | End: 2018-07-24 | Stop reason: SINTOL

## 2018-05-24 RX ORDER — FUROSEMIDE 20 MG/1
20 TABLET ORAL 3 TIMES DAILY
Qty: 90 TABLET | Refills: 1 | Status: SHIPPED | OUTPATIENT
Start: 2018-05-24 | End: 2018-07-24 | Stop reason: SDUPTHER

## 2018-05-24 ASSESSMENT — ENCOUNTER SYMPTOMS
SORE THROAT: 0
NAUSEA: 0
COUGH: 0
ABDOMINAL DISTENTION: 1
BACK PAIN: 0
EYE DISCHARGE: 0
DIARRHEA: 1
SHORTNESS OF BREATH: 0
WHEEZING: 0
VOMITING: 0
DIARRHEA: 1
ABDOMINAL DISTENTION: 1
SINUS PRESSURE: 0
ABDOMINAL PAIN: 0

## 2018-07-24 ENCOUNTER — OFFICE VISIT (OUTPATIENT)
Dept: PRIMARY CARE CLINIC | Age: 63
End: 2018-07-24
Payer: COMMERCIAL

## 2018-07-24 VITALS
HEART RATE: 69 BPM | DIASTOLIC BLOOD PRESSURE: 66 MMHG | SYSTOLIC BLOOD PRESSURE: 136 MMHG | RESPIRATION RATE: 20 BRPM | WEIGHT: 146 LBS | HEIGHT: 61 IN | BODY MASS INDEX: 27.56 KG/M2 | OXYGEN SATURATION: 98 %

## 2018-07-24 DIAGNOSIS — F41.8 DEPRESSION WITH ANXIETY: ICD-10-CM

## 2018-07-24 DIAGNOSIS — Z23 NEED FOR PNEUMOCOCCAL VACCINATION: ICD-10-CM

## 2018-07-24 DIAGNOSIS — R18.8 OTHER ASCITES: ICD-10-CM

## 2018-07-24 DIAGNOSIS — M54.41 CHRONIC BILATERAL LOW BACK PAIN WITH BILATERAL SCIATICA: ICD-10-CM

## 2018-07-24 DIAGNOSIS — R11.0 NAUSEA: ICD-10-CM

## 2018-07-24 DIAGNOSIS — Z12.39 BREAST CANCER SCREENING: ICD-10-CM

## 2018-07-24 DIAGNOSIS — G89.29 CHRONIC BILATERAL LOW BACK PAIN WITH BILATERAL SCIATICA: ICD-10-CM

## 2018-07-24 DIAGNOSIS — I10 ESSENTIAL HYPERTENSION: Primary | ICD-10-CM

## 2018-07-24 DIAGNOSIS — K74.60 CIRRHOSIS OF LIVER WITH ASCITES, UNSPECIFIED HEPATIC CIRRHOSIS TYPE (HCC): ICD-10-CM

## 2018-07-24 DIAGNOSIS — R18.8 CIRRHOSIS OF LIVER WITH ASCITES, UNSPECIFIED HEPATIC CIRRHOSIS TYPE (HCC): ICD-10-CM

## 2018-07-24 DIAGNOSIS — Z23 NEED FOR HEPATITIS A IMMUNIZATION: ICD-10-CM

## 2018-07-24 DIAGNOSIS — M54.42 CHRONIC BILATERAL LOW BACK PAIN WITH BILATERAL SCIATICA: ICD-10-CM

## 2018-07-24 PROCEDURE — 90472 IMMUNIZATION ADMIN EACH ADD: CPT | Performed by: PEDIATRICS

## 2018-07-24 PROCEDURE — 90471 IMMUNIZATION ADMIN: CPT | Performed by: PEDIATRICS

## 2018-07-24 PROCEDURE — 90632 HEPA VACCINE ADULT IM: CPT | Performed by: PEDIATRICS

## 2018-07-24 PROCEDURE — 90670 PCV13 VACCINE IM: CPT | Performed by: PEDIATRICS

## 2018-07-24 PROCEDURE — 99213 OFFICE O/P EST LOW 20 MIN: CPT | Performed by: PEDIATRICS

## 2018-07-24 RX ORDER — GABAPENTIN 300 MG/1
300 CAPSULE ORAL 3 TIMES DAILY
Qty: 90 CAPSULE | Refills: 2 | Status: SHIPPED | OUTPATIENT
Start: 2018-07-24 | End: 2018-10-09 | Stop reason: SDUPTHER

## 2018-07-24 RX ORDER — FUROSEMIDE 20 MG/1
20 TABLET ORAL 3 TIMES DAILY
Qty: 90 TABLET | Refills: 1 | Status: SHIPPED | OUTPATIENT
Start: 2018-07-24 | End: 2018-08-30 | Stop reason: SDUPTHER

## 2018-07-24 RX ORDER — SPIRONOLACTONE 50 MG/1
50 TABLET, FILM COATED ORAL 3 TIMES DAILY
Qty: 90 TABLET | Refills: 1 | Status: SHIPPED | OUTPATIENT
Start: 2018-07-24 | End: 2018-08-30 | Stop reason: SDUPTHER

## 2018-07-24 RX ORDER — ALPRAZOLAM 0.5 MG/1
0.5 TABLET ORAL DAILY PRN
COMMUNITY
End: 2018-07-24 | Stop reason: SDUPTHER

## 2018-07-24 RX ORDER — ALPRAZOLAM 0.5 MG/1
0.5 TABLET ORAL DAILY PRN
Qty: 30 TABLET | Refills: 2 | Status: SHIPPED | OUTPATIENT
Start: 2018-07-24 | End: 2018-09-24 | Stop reason: SDUPTHER

## 2018-07-24 RX ORDER — ONDANSETRON 4 MG/1
4 TABLET, FILM COATED ORAL EVERY 8 HOURS PRN
Qty: 60 TABLET | Refills: 0 | Status: SHIPPED | OUTPATIENT
Start: 2018-07-24 | End: 2018-09-24 | Stop reason: SDUPTHER

## 2018-07-24 ASSESSMENT — ENCOUNTER SYMPTOMS
SORE THROAT: 0
NAUSEA: 0
BACK PAIN: 0
EYE DISCHARGE: 0
COUGH: 0
VOMITING: 0
SINUS PRESSURE: 0
WHEEZING: 0
SHORTNESS OF BREATH: 0
ABDOMINAL PAIN: 0

## 2018-07-24 NOTE — PROGRESS NOTES
Pt as mammogram ordered. Pt had colonoscopy May 2018 by Dr. Ivelisse Paniagua. Pt will get pneumo shot when she gets the rest of her vaccines for transplant.

## 2018-07-24 NOTE — PROGRESS NOTES
1. Have you seen another provider since your last visit? Yes    2. Have you had any other diagnostic tests since your last visit? Yes    3. Have you changed or stopped any medications since your last visit?  No

## 2018-07-31 ENCOUNTER — HOSPITAL ENCOUNTER (OUTPATIENT)
Facility: HOSPITAL | Age: 63
Discharge: HOME | End: 2018-07-31
Payer: COMMERCIAL

## 2018-07-31 ENCOUNTER — HOSPITAL ENCOUNTER (OUTPATIENT)
Dept: MAMMOGRAPHY | Facility: HOSPITAL | Age: 63
Discharge: HOME | End: 2018-07-31
Payer: COMMERCIAL

## 2018-07-31 DIAGNOSIS — Z12.39 BREAST CANCER SCREENING: ICD-10-CM

## 2018-07-31 LAB
ANION GAP SERPL CALCULATED.3IONS-SCNC: 12 MMOL/L (ref 3–16)
BUN BLDV-MCNC: 22 MG/DL (ref 6–20)
CALCIUM SERPL-MCNC: 9.1 MG/DL (ref 8.5–10.5)
CHLORIDE BLD-SCNC: 104 MMOL/L (ref 98–107)
CO2: 25 MMOL/L (ref 20–30)
CREAT SERPL-MCNC: 1.1 MG/DL (ref 0.4–1.2)
GFR AFRICAN AMERICAN: >59
GFR NON-AFRICAN AMERICAN: 50
GLUCOSE BLD-MCNC: 157 MG/DL (ref 74–106)
POTASSIUM SERPL-SCNC: 4 MMOL/L (ref 3.4–5.1)
SODIUM BLD-SCNC: 141 MMOL/L (ref 136–145)

## 2018-07-31 PROCEDURE — 80048 BASIC METABOLIC PNL TOTAL CA: CPT

## 2018-07-31 PROCEDURE — 36415 COLL VENOUS BLD VENIPUNCTURE: CPT

## 2018-07-31 PROCEDURE — 77063 BREAST TOMOSYNTHESIS BI: CPT

## 2018-08-02 ENCOUNTER — TELEPHONE (OUTPATIENT)
Dept: PRIMARY CARE CLINIC | Age: 63
End: 2018-08-02

## 2018-08-02 NOTE — TELEPHONE ENCOUNTER
----- Message from Rose Marie Cervantes DO sent at 8/1/2018 10:51 PM EDT -----  The patient's recent lab(s) and/or xray(s) were normal.  Please notify patient and let them know to return or call if any new problems.

## 2018-08-02 NOTE — TELEPHONE ENCOUNTER
Called and informed the patient of the results. Patient verbalized understanding. soft/nondistended/obese

## 2018-08-14 ENCOUNTER — OFFICE VISIT (OUTPATIENT)
Dept: PRIMARY CARE CLINIC | Age: 63
End: 2018-08-14
Payer: COMMERCIAL

## 2018-08-14 VITALS
HEART RATE: 71 BPM | SYSTOLIC BLOOD PRESSURE: 110 MMHG | WEIGHT: 150 LBS | DIASTOLIC BLOOD PRESSURE: 60 MMHG | BODY MASS INDEX: 28.34 KG/M2 | OXYGEN SATURATION: 97 %

## 2018-08-14 DIAGNOSIS — Z00.00 ENCOUNTER FOR ANNUAL PHYSICAL EXAM: Primary | ICD-10-CM

## 2018-08-14 LAB
BILIRUBIN, POC: NORMAL
BLOOD URINE, POC: NORMAL
CLARITY, POC: CLEAR
COLOR, POC: NORMAL
GLUCOSE URINE, POC: NORMAL
KETONES, POC: NORMAL
LEUKOCYTE EST, POC: NORMAL
NITRITE, POC: NORMAL
PH, POC: 5
PROTEIN, POC: NORMAL
SPECIFIC GRAVITY, POC: 1.01
UROBILINOGEN, POC: 0.2

## 2018-08-14 PROCEDURE — 99396 PREV VISIT EST AGE 40-64: CPT | Performed by: NURSE PRACTITIONER

## 2018-08-14 PROCEDURE — 81002 URINALYSIS NONAUTO W/O SCOPE: CPT | Performed by: NURSE PRACTITIONER

## 2018-08-14 PROCEDURE — 90746 HEPB VACCINE 3 DOSE ADULT IM: CPT | Performed by: NURSE PRACTITIONER

## 2018-08-14 PROCEDURE — 90471 IMMUNIZATION ADMIN: CPT | Performed by: NURSE PRACTITIONER

## 2018-08-14 ASSESSMENT — ENCOUNTER SYMPTOMS
ABDOMINAL PAIN: 0
NAUSEA: 0
COUGH: 0
EYE PAIN: 0
SHORTNESS OF BREATH: 0
VOMITING: 0
SORE THROAT: 0

## 2018-08-30 DIAGNOSIS — R18.8 CIRRHOSIS OF LIVER WITH ASCITES, UNSPECIFIED HEPATIC CIRRHOSIS TYPE (HCC): ICD-10-CM

## 2018-08-30 DIAGNOSIS — K74.60 CIRRHOSIS OF LIVER WITH ASCITES, UNSPECIFIED HEPATIC CIRRHOSIS TYPE (HCC): ICD-10-CM

## 2018-08-30 DIAGNOSIS — I10 ESSENTIAL HYPERTENSION: ICD-10-CM

## 2018-08-30 DIAGNOSIS — R18.8 OTHER ASCITES: ICD-10-CM

## 2018-08-31 RX ORDER — FUROSEMIDE 20 MG/1
20 TABLET ORAL 3 TIMES DAILY
Qty: 90 TABLET | Refills: 1 | Status: SHIPPED | OUTPATIENT
Start: 2018-08-31 | End: 2018-10-30 | Stop reason: SDUPTHER

## 2018-08-31 RX ORDER — SPIRONOLACTONE 50 MG/1
50 TABLET, FILM COATED ORAL 3 TIMES DAILY
Qty: 90 TABLET | Refills: 1 | Status: SHIPPED | OUTPATIENT
Start: 2018-08-31 | End: 2018-10-30 | Stop reason: SDUPTHER

## 2018-09-24 ENCOUNTER — OFFICE VISIT (OUTPATIENT)
Dept: PRIMARY CARE CLINIC | Age: 63
End: 2018-09-24
Payer: COMMERCIAL

## 2018-09-24 VITALS
OXYGEN SATURATION: 98 % | BODY MASS INDEX: 28.32 KG/M2 | HEART RATE: 61 BPM | DIASTOLIC BLOOD PRESSURE: 68 MMHG | RESPIRATION RATE: 16 BRPM | SYSTOLIC BLOOD PRESSURE: 130 MMHG | HEIGHT: 61 IN | WEIGHT: 150 LBS

## 2018-09-24 DIAGNOSIS — E11.9 TYPE 2 DIABETES MELLITUS WITHOUT COMPLICATION, WITHOUT LONG-TERM CURRENT USE OF INSULIN (HCC): Primary | ICD-10-CM

## 2018-09-24 DIAGNOSIS — F41.9 ANXIETY: ICD-10-CM

## 2018-09-24 DIAGNOSIS — K74.60 CIRRHOSIS OF LIVER WITH ASCITES, UNSPECIFIED HEPATIC CIRRHOSIS TYPE (HCC): ICD-10-CM

## 2018-09-24 DIAGNOSIS — R11.0 NAUSEA: ICD-10-CM

## 2018-09-24 DIAGNOSIS — R18.8 CIRRHOSIS OF LIVER WITH ASCITES, UNSPECIFIED HEPATIC CIRRHOSIS TYPE (HCC): ICD-10-CM

## 2018-09-24 LAB — HBA1C MFR BLD: 5.3 %

## 2018-09-24 PROCEDURE — 99214 OFFICE O/P EST MOD 30 MIN: CPT | Performed by: PEDIATRICS

## 2018-09-24 PROCEDURE — 83036 HEMOGLOBIN GLYCOSYLATED A1C: CPT | Performed by: PEDIATRICS

## 2018-09-24 RX ORDER — ONDANSETRON 4 MG/1
4 TABLET, FILM COATED ORAL EVERY 8 HOURS PRN
Qty: 60 TABLET | Refills: 3 | Status: SHIPPED | OUTPATIENT
Start: 2018-09-24 | End: 2018-10-30 | Stop reason: SDUPTHER

## 2018-09-24 RX ORDER — ALPRAZOLAM 0.5 MG/1
0.5 TABLET ORAL 2 TIMES DAILY PRN
Qty: 60 TABLET | Refills: 2 | Status: SHIPPED | OUTPATIENT
Start: 2018-09-24 | End: 2018-10-09 | Stop reason: SDUPTHER

## 2018-09-24 ASSESSMENT — ENCOUNTER SYMPTOMS
NAUSEA: 0
VOMITING: 0
COUGH: 0
SINUS PRESSURE: 0
WHEEZING: 0
SORE THROAT: 0
ABDOMINAL PAIN: 0
EYE DISCHARGE: 0
BACK PAIN: 0

## 2018-09-24 NOTE — PROGRESS NOTES
SUBJECTIVE:    Patient ID: Doreen Welsh is a 65 y.o. female. Chief Complaint   Patient presents with    Cirrhosis     f/u    Hypertension     f/u       HPI:    Patient's medications, allergies, past medical, surgical, social and family histories were reviewed and updated as appropriate. The patient is here to follow up. She has chronic liver disease with cirrhosis. She has not had a recent paracentesis. She says she is doing well. She has seen transplant doctor. She had a stress test.   She is concerned her diabetes may come back and wants to be retested for that. She is still waiting to be on the list for a liver transplant. She says she has needed more of her xanax due to anxiety. She also is having a lot of zofran for nausea and is about to run out of this medications. She also says she feels so tired. She has pain in her hips. She says she fell and was seen in the ER and they said they found something in her back. Mental Health Problem   The primary symptoms include dysphoric mood. The primary symptoms do not include delusions, hallucinations, bizarre behavior or disorganized speech. The current episode started more than 1 month ago. This is a chronic problem. The degree of incapacity that she is experiencing as a consequence of her illness is moderate. Sequelae of the illness include harmed interpersonal relations. Additional symptoms of the illness include insomnia, unexpected weight change and fatigue. Additional symptoms of the illness do not include agitation or abdominal pain. She does not admit to suicidal ideas. She does not have a plan to commit suicide. She does not contemplate harming herself. She has not already injured self. She does not contemplate injuring another person. She has not already  injured another person. Review of Systems   Constitutional: Positive for fatigue and unexpected weight change. Negative for chills and fever.    HENT: Negative for congestion, medical history, physical exam, medications, labs, radiographic studies and consultations. In addition, I have assessed detailed aspects of the patient's anxiety/panic disorder and/or sleep disorder, the affects of the problem on the patient's functional and psychological function, coexisting conditions, possible alternative treatment modalities, risks for addiction and abuse and risks and benefits of the given medication. After review and discussion with the patient regarding the above medication, the patient and myself have agreed the use of the controlled medication is an integral part of improving the patient's ability to perform ADL's, and sleep through the night. The over all goal would  be improvement of the patient's quality of life with the minimum and safest medication. The above information, including response and treatment goals, will be assessed and updated on a regular basis. The patient is also agreeable to have routine VIVEK reports, random urine drug screens and pill counts. The patient has agreed to only use the medication as prescribed and will notify me of any problems or change in condition that occurs. Return in about 2 months (around 11/24/2018) for anxiety, end stage liver disease.     Controlled Substances Monitoring:

## 2018-09-24 NOTE — PROGRESS NOTES
Pt states she had a colonoscopy May 14, 2018 by Dr. Eliseo Arevalo. Pt to discuss pneumo inj with provider.

## 2018-10-09 DIAGNOSIS — M54.41 CHRONIC BILATERAL LOW BACK PAIN WITH BILATERAL SCIATICA: ICD-10-CM

## 2018-10-09 DIAGNOSIS — G89.29 CHRONIC BILATERAL LOW BACK PAIN WITH BILATERAL SCIATICA: ICD-10-CM

## 2018-10-09 DIAGNOSIS — M54.42 CHRONIC BILATERAL LOW BACK PAIN WITH BILATERAL SCIATICA: ICD-10-CM

## 2018-10-09 DIAGNOSIS — F41.9 ANXIETY: ICD-10-CM

## 2018-10-09 RX ORDER — GABAPENTIN 300 MG/1
CAPSULE ORAL
Qty: 90 CAPSULE | Refills: 2 | Status: SHIPPED | OUTPATIENT
Start: 2018-10-09 | End: 2018-11-26

## 2018-10-09 RX ORDER — ALPRAZOLAM 0.5 MG/1
TABLET ORAL
Qty: 30 TABLET | Refills: 2 | Status: SHIPPED | OUTPATIENT
Start: 2018-10-09 | End: 2018-10-30 | Stop reason: SDUPTHER

## 2018-10-30 ENCOUNTER — OFFICE VISIT (OUTPATIENT)
Dept: PRIMARY CARE CLINIC | Age: 63
End: 2018-10-30
Payer: COMMERCIAL

## 2018-10-30 ENCOUNTER — HOSPITAL ENCOUNTER (OUTPATIENT)
Facility: HOSPITAL | Age: 63
Discharge: HOME | End: 2018-10-30
Payer: COMMERCIAL

## 2018-10-30 VITALS
HEIGHT: 61 IN | HEART RATE: 64 BPM | SYSTOLIC BLOOD PRESSURE: 110 MMHG | RESPIRATION RATE: 16 BRPM | OXYGEN SATURATION: 99 % | DIASTOLIC BLOOD PRESSURE: 64 MMHG | WEIGHT: 157 LBS | BODY MASS INDEX: 29.64 KG/M2

## 2018-10-30 DIAGNOSIS — R18.8 CIRRHOSIS OF LIVER WITH ASCITES, UNSPECIFIED HEPATIC CIRRHOSIS TYPE (HCC): Primary | ICD-10-CM

## 2018-10-30 DIAGNOSIS — R18.8 OTHER ASCITES: ICD-10-CM

## 2018-10-30 DIAGNOSIS — I10 ESSENTIAL HYPERTENSION: ICD-10-CM

## 2018-10-30 DIAGNOSIS — Z23 NEED FOR HEPATITIS B VACCINATION: ICD-10-CM

## 2018-10-30 DIAGNOSIS — F41.9 ANXIETY: ICD-10-CM

## 2018-10-30 DIAGNOSIS — Z23 NEED FOR INFLUENZA VACCINATION: ICD-10-CM

## 2018-10-30 DIAGNOSIS — R18.8 CIRRHOSIS OF LIVER WITH ASCITES, UNSPECIFIED HEPATIC CIRRHOSIS TYPE (HCC): ICD-10-CM

## 2018-10-30 DIAGNOSIS — Z86.39 HISTORY OF DIABETES MELLITUS: ICD-10-CM

## 2018-10-30 DIAGNOSIS — K74.60 CIRRHOSIS OF LIVER WITH ASCITES, UNSPECIFIED HEPATIC CIRRHOSIS TYPE (HCC): ICD-10-CM

## 2018-10-30 DIAGNOSIS — R11.0 NAUSEA: ICD-10-CM

## 2018-10-30 DIAGNOSIS — D72.819 LEUKOPENIA, UNSPECIFIED TYPE: ICD-10-CM

## 2018-10-30 DIAGNOSIS — K74.60 CIRRHOSIS OF LIVER WITH ASCITES, UNSPECIFIED HEPATIC CIRRHOSIS TYPE (HCC): Primary | ICD-10-CM

## 2018-10-30 LAB
A/G RATIO: 0.9 (ref 0.8–2)
ALBUMIN SERPL-MCNC: 3.5 G/DL (ref 3.4–4.8)
ALP BLD-CCNC: 209 U/L (ref 25–100)
ALT SERPL-CCNC: 23 U/L (ref 4–36)
ANION GAP SERPL CALCULATED.3IONS-SCNC: 10 MMOL/L (ref 3–16)
AST SERPL-CCNC: 32 U/L (ref 8–33)
BASOPHILS ABSOLUTE: 0 K/UL (ref 0–0.1)
BASOPHILS RELATIVE PERCENT: 1.7 %
BILIRUB SERPL-MCNC: 3.1 MG/DL (ref 0.3–1.2)
BUN BLDV-MCNC: 20 MG/DL (ref 6–20)
CALCIUM SERPL-MCNC: 9.3 MG/DL (ref 8.5–10.5)
CHLORIDE BLD-SCNC: 104 MMOL/L (ref 98–107)
CO2: 25 MMOL/L (ref 20–30)
CREAT SERPL-MCNC: 1 MG/DL (ref 0.4–1.2)
EOSINOPHILS ABSOLUTE: 0.1 K/UL (ref 0–0.4)
EOSINOPHILS RELATIVE PERCENT: 5.2 %
GFR AFRICAN AMERICAN: >59
GFR NON-AFRICAN AMERICAN: 56
GLOBULIN: 4 G/DL
GLUCOSE BLD-MCNC: 215 MG/DL (ref 74–106)
HBA1C MFR BLD: 5.3 %
HCT VFR BLD CALC: 32 % (ref 37–47)
HEMOGLOBIN: 10.6 G/DL (ref 11.5–16.5)
IMMATURE GRANULOCYTES #: 0 K/UL
IMMATURE GRANULOCYTES %: 0.9 % (ref 0–5)
LYMPHOCYTES ABSOLUTE: 0.6 K/UL (ref 1.5–4)
LYMPHOCYTES RELATIVE PERCENT: 50.9 %
MCH RBC QN AUTO: 31.9 PG (ref 27–32)
MCHC RBC AUTO-ENTMCNC: 33.1 G/DL (ref 31–35)
MCV RBC AUTO: 96.4 FL (ref 80–100)
MONOCYTES ABSOLUTE: 0.3 K/UL (ref 0.2–0.8)
MONOCYTES RELATIVE PERCENT: 21.6 %
NEUTROPHILS ABSOLUTE: 0.2 K/UL (ref 2–7.5)
NEUTROPHILS RELATIVE PERCENT: 19.7 %
PDW BLD-RTO: 16.5 % (ref 11–16)
PLATELET # BLD: 154 K/UL (ref 150–400)
PMV BLD AUTO: 9.8 FL (ref 6–10)
POTASSIUM SERPL-SCNC: 4.1 MMOL/L (ref 3.4–5.1)
RBC # BLD: 3.32 M/UL (ref 3.8–5.8)
SODIUM BLD-SCNC: 139 MMOL/L (ref 136–145)
TOTAL PROTEIN: 7.5 G/DL (ref 6.4–8.3)
WBC # BLD: 1.2 K/UL (ref 4–11)

## 2018-10-30 PROCEDURE — 90746 HEPB VACCINE 3 DOSE ADULT IM: CPT | Performed by: PEDIATRICS

## 2018-10-30 PROCEDURE — 83036 HEMOGLOBIN GLYCOSYLATED A1C: CPT

## 2018-10-30 PROCEDURE — 90688 IIV4 VACCINE SPLT 0.5 ML IM: CPT | Performed by: PEDIATRICS

## 2018-10-30 PROCEDURE — 90472 IMMUNIZATION ADMIN EACH ADD: CPT | Performed by: PEDIATRICS

## 2018-10-30 PROCEDURE — 90471 IMMUNIZATION ADMIN: CPT | Performed by: PEDIATRICS

## 2018-10-30 PROCEDURE — 99214 OFFICE O/P EST MOD 30 MIN: CPT | Performed by: PEDIATRICS

## 2018-10-30 PROCEDURE — 80053 COMPREHEN METABOLIC PANEL: CPT

## 2018-10-30 PROCEDURE — 36415 COLL VENOUS BLD VENIPUNCTURE: CPT

## 2018-10-30 PROCEDURE — 85025 COMPLETE CBC W/AUTO DIFF WBC: CPT

## 2018-10-30 RX ORDER — ONDANSETRON 4 MG/1
4 TABLET, FILM COATED ORAL EVERY 8 HOURS PRN
Qty: 60 TABLET | Refills: 3 | Status: SHIPPED | OUTPATIENT
Start: 2018-10-30 | End: 2019-02-18 | Stop reason: SDUPTHER

## 2018-10-30 RX ORDER — SPIRONOLACTONE 50 MG/1
50 TABLET, FILM COATED ORAL 3 TIMES DAILY
Qty: 90 TABLET | Refills: 3 | Status: SHIPPED | OUTPATIENT
Start: 2018-10-30 | End: 2018-10-30 | Stop reason: SDUPTHER

## 2018-10-30 RX ORDER — SPIRONOLACTONE 50 MG/1
100 TABLET, FILM COATED ORAL 2 TIMES DAILY
Qty: 120 TABLET | Refills: 3 | Status: SHIPPED | OUTPATIENT
Start: 2018-10-30 | End: 2019-01-24 | Stop reason: SDUPTHER

## 2018-10-30 RX ORDER — FUROSEMIDE 20 MG/1
40 TABLET ORAL 2 TIMES DAILY
Qty: 120 TABLET | Refills: 3 | Status: SHIPPED | OUTPATIENT
Start: 2018-10-30 | End: 2019-01-24 | Stop reason: SDUPTHER

## 2018-10-30 RX ORDER — ALPRAZOLAM 0.5 MG/1
0.5 TABLET ORAL 2 TIMES DAILY PRN
Qty: 60 TABLET | Refills: 2 | Status: SHIPPED | OUTPATIENT
Start: 2018-10-30 | End: 2018-11-29

## 2018-10-30 RX ORDER — FUROSEMIDE 20 MG/1
20 TABLET ORAL 3 TIMES DAILY
Qty: 90 TABLET | Refills: 3 | Status: SHIPPED | OUTPATIENT
Start: 2018-10-30 | End: 2018-10-30 | Stop reason: SDUPTHER

## 2018-10-30 ASSESSMENT — ENCOUNTER SYMPTOMS
ABDOMINAL PAIN: 0
EYE REDNESS: 0
NAUSEA: 0
COUGH: 0
SHORTNESS OF BREATH: 0
DIARRHEA: 0
EYE PAIN: 0
BACK PAIN: 0
CONSTIPATION: 0
VOMITING: 0
WHEEZING: 0
SORE THROAT: 0

## 2018-10-30 NOTE — PROGRESS NOTES
Pt had colonoscopy in May with Dr. Joan Santos.     Immunizations     Name Date Dose Route    Hepatitis B Adult (Engerix-B) 10/30/2018 1 mL Intramuscular    Site: Right arm    Lot: [id number]    NDC: 56581-653-99    Influenza, Quadv, 3 Years and older, IM (Fluzone 3 yrs and older or Afluria 5 yrs and older) 10/30/2018 0.5 mL Intramuscular    Site: Deltoid- Left    LotConi Other    NDC: 33758-400-88
2 times daily 120 tablet 3    gabapentin (NEURONTIN) 300 MG capsule TAKE ONE CAPSULE BY MOUTH THREE TIMES A DAY 90 capsule 2     No current facility-administered medications for this visit. During this visit the following were done:  Labs reviewed [x]    Labs ordered[x]    Radiology reports Reviewed[]    Radiology ordered[]    EKG, echo, and/or stress testreviewed []    EEG resultsreviewed  []    EEG reviewed and interpretedper myself   []    Previousprovider/old records requested  []    Previous provider Records Reviewed []    ER records Reviewed []    Hospitalrecords Reviewed []    Historyobtained From Family []    Radiologicalimages view and Interpreted per myself []      ASSESSMENT/PLAN:    Mumtaz Belcher was seen today for anxiety and immunizations. Diagnoses and all orders for this visit:    Cirrhosis of liver with ascites, unspecified hepatic cirrhosis type ([facility] [address]  -     Comprehensive Metabolic Panel; Future  -     Discontinue: spironolactone (ALDACTONE) 50 MG tablet; Take 1 tablet by mouth 3 times daily  -     Discontinue: furosemide (LASIX) 20 MG tablet; Take 1 tablet by mouth 3 times daily  -     furosemide (LASIX) 20 MG tablet; Take 2 tablets by mouth 2 times daily  -     spironolactone (ALDACTONE) 50 MG tablet; Take 2 tablets by mouth 2 times daily    Anxiety  -     ALPRAZolam (XANAX) 0.5 MG tablet; Take 1 tablet by mouth 2 times daily as needed for Sleep for up to 30 days. .    Nausea  -     ondansetron (ZOFRAN) 4 MG tablet; Take 1 tablet by mouth every 8 hours as needed for Nausea or Vomiting    Leukopenia, unspecified type  -     CBC Auto Differential; Future    History of diabetes mellitus  -     Hemoglobin A1C; Future    Need for hepatitis B vaccination  -     Hep B Vaccine Ped/Adol 3-Dose (ENGERIX-B)    Need for influenza vaccination  -     INFLUENZA, QUADV, 3 YRS AND OLDER, IM, MDV, 0.5ML (FLUZONE QUADV)    Essential hypertension  -     Discontinue: spironolactone (ALDACTONE) 50 MG tablet;  Take 1

## 2018-10-31 ENCOUNTER — TELEPHONE (OUTPATIENT)
Dept: PRIMARY CARE CLINIC | Age: 63
End: 2018-10-31

## 2018-11-14 ENCOUNTER — TELEPHONE (OUTPATIENT)
Dept: PRIMARY CARE CLINIC | Age: 63
End: 2018-11-14

## 2018-11-14 RX ORDER — AMOXICILLIN 500 MG/1
500 CAPSULE ORAL 2 TIMES DAILY
Qty: 20 CAPSULE | Refills: 0 | Status: SHIPPED | OUTPATIENT
Start: 2018-11-14 | End: 2018-11-24

## 2018-11-21 ENCOUNTER — TELEPHONE (OUTPATIENT)
Dept: PRIMARY CARE CLINIC | Age: 63
End: 2018-11-21

## 2018-11-26 ENCOUNTER — OFFICE VISIT (OUTPATIENT)
Dept: PRIMARY CARE CLINIC | Age: 63
End: 2018-11-26
Payer: COMMERCIAL

## 2018-11-26 ENCOUNTER — HOSPITAL ENCOUNTER (OUTPATIENT)
Facility: HOSPITAL | Age: 63
Discharge: HOME | End: 2018-11-26
Payer: COMMERCIAL

## 2018-11-26 VITALS
BODY MASS INDEX: 30.58 KG/M2 | DIASTOLIC BLOOD PRESSURE: 74 MMHG | HEIGHT: 61 IN | WEIGHT: 162 LBS | HEART RATE: 76 BPM | OXYGEN SATURATION: 98 % | SYSTOLIC BLOOD PRESSURE: 136 MMHG | RESPIRATION RATE: 16 BRPM

## 2018-11-26 DIAGNOSIS — R18.8 CIRRHOSIS OF LIVER WITH ASCITES, UNSPECIFIED HEPATIC CIRRHOSIS TYPE (HCC): ICD-10-CM

## 2018-11-26 DIAGNOSIS — K74.60 CIRRHOSIS OF LIVER WITH ASCITES, UNSPECIFIED HEPATIC CIRRHOSIS TYPE (HCC): ICD-10-CM

## 2018-11-26 DIAGNOSIS — M54.42 CHRONIC BILATERAL LOW BACK PAIN WITH BILATERAL SCIATICA: Primary | ICD-10-CM

## 2018-11-26 DIAGNOSIS — M54.41 CHRONIC BILATERAL LOW BACK PAIN WITH BILATERAL SCIATICA: Primary | ICD-10-CM

## 2018-11-26 DIAGNOSIS — Z23 NEED FOR PNEUMOCOCCAL VACCINATION: ICD-10-CM

## 2018-11-26 DIAGNOSIS — M54.10 RADICULOPATHY OF LEG: ICD-10-CM

## 2018-11-26 DIAGNOSIS — D64.9 ANEMIA, UNSPECIFIED TYPE: ICD-10-CM

## 2018-11-26 DIAGNOSIS — G89.29 CHRONIC BILATERAL LOW BACK PAIN WITH BILATERAL SCIATICA: Primary | ICD-10-CM

## 2018-11-26 PROCEDURE — 83540 ASSAY OF IRON: CPT

## 2018-11-26 PROCEDURE — 90732 PPSV23 VACC 2 YRS+ SUBQ/IM: CPT | Performed by: PEDIATRICS

## 2018-11-26 PROCEDURE — 36415 COLL VENOUS BLD VENIPUNCTURE: CPT

## 2018-11-26 PROCEDURE — 85025 COMPLETE CBC W/AUTO DIFF WBC: CPT

## 2018-11-26 PROCEDURE — 82746 ASSAY OF FOLIC ACID SERUM: CPT

## 2018-11-26 PROCEDURE — 82607 VITAMIN B-12: CPT

## 2018-11-26 PROCEDURE — 83550 IRON BINDING TEST: CPT

## 2018-11-26 PROCEDURE — 99214 OFFICE O/P EST MOD 30 MIN: CPT | Performed by: PEDIATRICS

## 2018-11-26 PROCEDURE — 80053 COMPREHEN METABOLIC PANEL: CPT

## 2018-11-26 PROCEDURE — 85610 PROTHROMBIN TIME: CPT

## 2018-11-26 PROCEDURE — 90471 IMMUNIZATION ADMIN: CPT | Performed by: PEDIATRICS

## 2018-11-26 RX ORDER — GABAPENTIN 600 MG/1
600 TABLET ORAL 3 TIMES DAILY
Qty: 90 TABLET | Refills: 2 | Status: SHIPPED | OUTPATIENT
Start: 2018-11-26 | End: 2019-02-18 | Stop reason: DRUGHIGH

## 2018-11-26 ASSESSMENT — ENCOUNTER SYMPTOMS
NAUSEA: 0
BACK PAIN: 1
SORE THROAT: 0
COUGH: 0
SINUS PRESSURE: 0
EYE DISCHARGE: 0
ABDOMINAL PAIN: 0
WHEEZING: 0
SHORTNESS OF BREATH: 0
VOMITING: 0

## 2018-11-26 NOTE — PROGRESS NOTES
Liver cirrhosis (HCC)     Osteoarthritis     Type II or unspecified type diabetes mellitus without mention of complication, not stated as uncontrolled     Unspecified sleep apnea        Past Surgical History:   Procedure Laterality Date    BONE MARROW BIOPSY      DILATION AND CURETTAGE OF UTERUS      DILATION AND CURETTAGE OF UTERUS  1998 & 2000    TUBAL LIGATION  1982       Family History   Problem Relation Age of Onset    High Blood Pressure Mother     Alzheimer's Disease Mother     Heart Disease Father     High Blood Pressure Father     Heart Disease Maternal Grandmother     Heart Disease Maternal Grandfather     Stroke Maternal Grandfather     Diabetes Paternal Grandmother     High Blood Pressure Brother        Social History     Social History    Marital status:      Spouse name: N/A    Number of children: N/A    Years of education: N/A     Social History Main Topics    Smoking status: Never Smoker    Smokeless tobacco: Never Used    Alcohol use No    Drug use: No    Sexual activity: Not Asked     Other Topics Concern    None     Social History Narrative    None       OBJECTIVE:    Vitals:    11/26/18 1543   BP: 136/74   Site: Right Upper Arm   Position: Sitting   Pulse: 76   Resp: 16   SpO2: 98%   Weight: 162 lb (73.5 kg)   Height: 5' 1\" (1.549 m)     Physical Exam   Constitutional: She is oriented to person, place, and time. She appears well-developed and well-nourished. No distress. HENT:   Head: Normocephalic and atraumatic. Right Ear: External ear normal.   Left Ear: External ear normal.   Nose: Nose normal.   Mouth/Throat: Oropharynx is clear and moist.   Eyes: Pupils are equal, round, and reactive to light. Conjunctivae and EOM are normal. Right eye exhibits no discharge. Left eye exhibits no discharge. No scleral icterus. Neck: Normal range of motion. Neck supple. No JVD present. No tracheal deviation present. No thyromegaly present.    Cardiovascular: Normal rate, regular rhythm, normal heart sounds and intact distal pulses. Exam reveals no gallop and no friction rub. No murmur heard. Pulmonary/Chest: Effort normal and breath sounds normal. No stridor. No respiratory distress. She has no wheezes. She has no rales. She exhibits no tenderness. Abdominal: Soft. Bowel sounds are normal. She exhibits distension. She exhibits no mass. There is no tenderness. There is no rebound and no guarding. ascites   Musculoskeletal: Normal range of motion. She exhibits no edema or tenderness. Lumbar back: She exhibits pain and spasm. She exhibits no bony tenderness, no swelling and no edema. Lymphadenopathy:     She has no cervical adenopathy. Neurological: She is alert and oriented to person, place, and time. She has normal reflexes. No cranial nerve deficit. She exhibits normal muscle tone. Coordination normal.   Reflex Scores:       Patellar reflexes are 2+ on the right side and 2+ on the left side. Achilles reflexes are 2+ on the right side and 2+ on the left side. Skin: Skin is warm and dry. No rash noted. She is not diaphoretic. Yellowing of skin and eyes   Psychiatric: She has a normal mood and affect. Nursing note and vitals reviewed.     Results in Past 30 Days  Result Component Current Result Ref Range Previous Result Ref Range   Alb 3.5 (10/30/2018) 3.4 - 4.8 g/dL Not in Time Range    Albumin/Globulin Ratio 0.9 (10/30/2018) 0.8 - 2.0 Not in Time Range    Alkaline Phosphatase 209 (H) (10/30/2018) 25 - 100 U/L Not in Time Range    ALT 23 (10/30/2018) 4 - 36 U/L Not in Time Range    AST 32 (10/30/2018) 8 - 33 U/L Not in Time Range    BUN 20 (10/30/2018) 6 - 20 mg/dL Not in Time Range    Calcium 9.3 (10/30/2018) 8.5 - 10.5 mg/dL Not in Time Range    Chloride 104 (10/30/2018) 98 - 107 mmol/L Not in Time Range    CO2 25 (10/30/2018) 20 - 30 mmol/L Not in Time Range    CREATININE 1.0 (10/30/2018) 0.4 - 1.2 mg/dL Not in Time Range    GFR  >59 the affects of the pain on the patient's functional and psychological function, coexisting conditions, possible alternative treatment modalities, risks for addiction and abuse and risks and benefits of the given medication. After review and discussion with the patient regarding the above medication, the patient and myself have agreed the use of the controlled medication is an integral part of improving the patient's ability to perform ADL's, and sleep through the night without excessive pain. The over all goal would  be improvement of the patient's quality of life with the minimum and safest medication. The above information, including response and treatment goals, will be assessed and updated on a regular basis. The patient is also agreeable to have routine VIVEK reports, random urine drug screens and pill counts. The patient has agreed to only use the medication as prescribed and will notify me of any problems or change in condition that occurs. Return in about 2 months (around 1/26/2019) for back pain, cirrhosis.     Controlled Substances Monitoring:

## 2018-11-27 ENCOUNTER — TELEPHONE (OUTPATIENT)
Dept: PRIMARY CARE CLINIC | Age: 63
End: 2018-11-27

## 2018-11-27 LAB
A/G RATIO: 0.7 (ref 0.8–2)
ALBUMIN SERPL-MCNC: 3.3 G/DL (ref 3.4–4.8)
ALP BLD-CCNC: 177 U/L (ref 25–100)
ALT SERPL-CCNC: 23 U/L (ref 4–36)
ANION GAP SERPL CALCULATED.3IONS-SCNC: 10 MMOL/L (ref 3–16)
AST SERPL-CCNC: 35 U/L (ref 8–33)
BASOPHILS ABSOLUTE: 0 K/UL (ref 0–0.1)
BASOPHILS RELATIVE PERCENT: 1 %
BILIRUB SERPL-MCNC: 3.1 MG/DL (ref 0.3–1.2)
BUN BLDV-MCNC: 19 MG/DL (ref 6–20)
CALCIUM SERPL-MCNC: 8.8 MG/DL (ref 8.5–10.5)
CHLORIDE BLD-SCNC: 104 MMOL/L (ref 98–107)
CO2: 25 MMOL/L (ref 20–30)
CREAT SERPL-MCNC: 1 MG/DL (ref 0.4–1.2)
EOSINOPHILS ABSOLUTE: 0.1 K/UL (ref 0–0.4)
EOSINOPHILS RELATIVE PERCENT: 6.3 %
FOLATE: 10.28 NG/ML
GFR AFRICAN AMERICAN: >59
GFR NON-AFRICAN AMERICAN: 56
GLOBULIN: 4.5 G/DL
GLUCOSE BLD-MCNC: 206 MG/DL (ref 74–106)
HCT VFR BLD CALC: 28.3 % (ref 37–47)
HEMOGLOBIN: 9.4 G/DL (ref 11.5–16.5)
IMMATURE GRANULOCYTES #: 0 K/UL
IMMATURE GRANULOCYTES %: 1 % (ref 0–5)
INR BLD: 1.1 (ref 0.86–1.14)
IRON: 90 UG/DL (ref 37–145)
LYMPHOCYTES ABSOLUTE: 0.6 K/UL (ref 1.5–4)
LYMPHOCYTES RELATIVE PERCENT: 65.6 %
MCH RBC QN AUTO: 32.6 PG (ref 27–32)
MCHC RBC AUTO-ENTMCNC: 33.2 G/DL (ref 31–35)
MCV RBC AUTO: 98.3 FL (ref 80–100)
MONOCYTES ABSOLUTE: 0.2 K/UL (ref 0.2–0.8)
MONOCYTES RELATIVE PERCENT: 18.8 %
NEUTROPHILS ABSOLUTE: 0.1 K/UL (ref 2–7.5)
NEUTROPHILS RELATIVE PERCENT: 7.3 %
PDW BLD-RTO: 17.3 % (ref 11–16)
PLATELET # BLD: 152 K/UL (ref 150–400)
PMV BLD AUTO: 10.4 FL (ref 6–10)
POTASSIUM SERPL-SCNC: 4.2 MMOL/L (ref 3.4–5.1)
PROTHROMBIN TIME: 11 SEC (ref 9.5–10.9)
RBC # BLD: 2.88 M/UL (ref 3.8–5.8)
SODIUM BLD-SCNC: 139 MMOL/L (ref 136–145)
TOTAL IRON BINDING CAPACITY: 199 UG/DL (ref 250–450)
TOTAL PROTEIN: 7.8 G/DL (ref 6.4–8.3)
VITAMIN B-12: 909 PG/ML (ref 211–911)
WBC # BLD: 1 K/UL (ref 4–11)

## 2018-12-20 ENCOUNTER — OFFICE VISIT (OUTPATIENT)
Dept: PRIMARY CARE CLINIC | Age: 63
End: 2018-12-20
Payer: COMMERCIAL

## 2018-12-20 VITALS
HEIGHT: 61 IN | BODY MASS INDEX: 31.15 KG/M2 | RESPIRATION RATE: 16 BRPM | OXYGEN SATURATION: 98 % | SYSTOLIC BLOOD PRESSURE: 128 MMHG | HEART RATE: 73 BPM | DIASTOLIC BLOOD PRESSURE: 70 MMHG | WEIGHT: 165 LBS

## 2018-12-20 DIAGNOSIS — Z79.899 MEDICATION MANAGEMENT: ICD-10-CM

## 2018-12-20 DIAGNOSIS — K74.60 CHRONIC LIVER DISEASE AND CIRRHOSIS (HCC): ICD-10-CM

## 2018-12-20 DIAGNOSIS — F41.8 DEPRESSION WITH ANXIETY: Primary | ICD-10-CM

## 2018-12-20 DIAGNOSIS — K76.9 CHRONIC LIVER DISEASE AND CIRRHOSIS (HCC): ICD-10-CM

## 2018-12-20 DIAGNOSIS — F41.0 PANIC: ICD-10-CM

## 2018-12-20 PROCEDURE — 99213 OFFICE O/P EST LOW 20 MIN: CPT | Performed by: PEDIATRICS

## 2018-12-20 RX ORDER — ALPRAZOLAM 0.5 MG/1
0.5 TABLET ORAL NIGHTLY PRN
Qty: 90 TABLET | Refills: 0 | Status: SHIPPED | OUTPATIENT
Start: 2018-12-20 | End: 2018-12-20

## 2018-12-20 RX ORDER — ALPRAZOLAM 0.5 MG/1
0.5 TABLET ORAL 2 TIMES DAILY
COMMUNITY
Start: 2018-12-01 | End: 2018-12-20 | Stop reason: SDUPTHER

## 2018-12-20 RX ORDER — ALPRAZOLAM 0.5 MG/1
0.5 TABLET ORAL 3 TIMES DAILY PRN
Qty: 90 TABLET | Refills: 1 | Status: SHIPPED | OUTPATIENT
Start: 2018-12-20 | End: 2019-02-18 | Stop reason: SDUPTHER

## 2018-12-20 ASSESSMENT — ENCOUNTER SYMPTOMS
BACK PAIN: 0
NAUSEA: 0
ABDOMINAL PAIN: 0
EYE DISCHARGE: 0
SORE THROAT: 0
WHEEZING: 0
COUGH: 0
SHORTNESS OF BREATH: 0
SINUS PRESSURE: 0
VOMITING: 0

## 2018-12-20 NOTE — PROGRESS NOTES
abdominal pain, nausea and vomiting. Endocrine: Negative for cold intolerance and heat intolerance. Genitourinary: Negative for dysuria, frequency and urgency. Musculoskeletal: Negative for arthralgias and back pain. Skin: Negative for rash and wound. Neurological: Negative for syncope, numbness and headaches. Hematological: Negative. Psychiatric/Behavioral: Positive for dysphoric mood. Negative for agitation, hallucinations and sleep disturbance. The patient has insomnia. The patient is not nervous/anxious.         Past Medical History:   Diagnosis Date    Arthritis, rheumatoid (HCC)     Cirrhosis ([facility] [address]     Colitis     Hypertension     IIH (idiopathic intracranial hypertension)     Liver cirrhosis (HCC)     Osteoarthritis     Type II or unspecified type diabetes mellitus without mention of complication, not stated as uncontrolled     Unspecified sleep apnea        Past Surgical History:   Procedure Laterality Date    BONE MARROW BIOPSY      DILATION AND CURETTAGE OF UTERUS      DILATION AND CURETTAGE OF UTERUS  1998 & 2000    TUBAL LIGATION  1982       Family History   Problem Relation Age of Onset    High Blood Pressure Mother     Alzheimer's Disease Mother     Heart Disease Father     High Blood Pressure Father     Heart Disease Maternal Grandmother     Heart Disease Maternal Grandfather     Stroke Maternal Grandfather     Diabetes Paternal Grandmother     High Blood Pressure Brother        Social History     Social History    Marital status:      Spouse name: N/A    Number of children: N/A    Years of education: N/A     Social History Main Topics    Smoking status: Never Smoker    Smokeless tobacco: Never Used    Alcohol use No    Drug use: No    Sexual activity: Not Asked     Other Topics Concern    None     Social History Narrative    None       OBJECTIVE:    Vitals:    12/20/18 1554   BP: 128/70   Site: Left Upper Arm   Position: Sitting   Pulse: 73   Resp: days..  -     External Referral To Psychology    Ch[facility]  -     ALPRAZolam (XANAX) 0.5 MG tablet; Take 1 tablet by mouth nightly as needed for Sleep for up to 30 days. .  -     External Referral To Psychology    Medication management  -     Urine Drug Screen; Future       Additional plan relatedto above diagnosis:    Return in about 1 month (around 1/20/2019) for anemia, anxiety.     Controlled Substances Monitoring:

## 2019-01-15 DIAGNOSIS — Z79.899 MEDICATION MANAGEMENT: ICD-10-CM

## 2019-01-19 ENCOUNTER — HOSPITAL ENCOUNTER (EMERGENCY)
Facility: HOSPITAL | Age: 64
Discharge: HOME | End: 2019-01-19
Attending: EMERGENCY MEDICINE
Payer: COMMERCIAL

## 2019-01-19 VITALS
SYSTOLIC BLOOD PRESSURE: 138 MMHG | RESPIRATION RATE: 18 BRPM | HEART RATE: 73 BPM | HEIGHT: 61 IN | DIASTOLIC BLOOD PRESSURE: 68 MMHG | WEIGHT: 162 LBS | TEMPERATURE: 98.2 F | BODY MASS INDEX: 30.58 KG/M2 | OXYGEN SATURATION: 96 %

## 2019-01-19 DIAGNOSIS — M54.32 SCIATICA OF LEFT SIDE: ICD-10-CM

## 2019-01-19 DIAGNOSIS — M79.605 LEFT LEG PAIN: Primary | ICD-10-CM

## 2019-01-19 PROCEDURE — 6360000002 HC RX W HCPCS: Performed by: EMERGENCY MEDICINE

## 2019-01-19 PROCEDURE — 99282 EMERGENCY DEPT VISIT SF MDM: CPT

## 2019-01-19 PROCEDURE — 96372 THER/PROPH/DIAG INJ SC/IM: CPT

## 2019-01-19 RX ORDER — MELOXICAM 15 MG/1
15 TABLET ORAL DAILY
Qty: 30 TABLET | Refills: 0 | Status: SHIPPED | OUTPATIENT
Start: 2019-01-19 | End: 2019-02-18

## 2019-01-19 RX ORDER — KETOROLAC TROMETHAMINE 30 MG/ML
60 INJECTION, SOLUTION INTRAMUSCULAR; INTRAVENOUS ONCE
Status: COMPLETED | OUTPATIENT
Start: 2019-01-19 | End: 2019-01-19

## 2019-01-19 RX ORDER — DEXAMETHASONE SODIUM PHOSPHATE 10 MG/ML
10 INJECTION, SOLUTION INTRAMUSCULAR; INTRAVENOUS ONCE
Status: COMPLETED | OUTPATIENT
Start: 2019-01-19 | End: 2019-01-19

## 2019-01-19 RX ADMIN — KETOROLAC TROMETHAMINE 60 MG: 30 INJECTION, SOLUTION INTRAMUSCULAR at 14:33

## 2019-01-19 RX ADMIN — DEXAMETHASONE SODIUM PHOSPHATE 10 MG: 10 INJECTION, SOLUTION INTRAMUSCULAR; INTRAVENOUS at 14:30

## 2019-01-19 ASSESSMENT — ENCOUNTER SYMPTOMS
RHINORRHEA: 0
COUGH: 0
CONSTIPATION: 0
EYE DISCHARGE: 0
SORE THROAT: 0
CHEST TIGHTNESS: 0
EYE REDNESS: 0
SINUS PRESSURE: 0
NAUSEA: 0
TROUBLE SWALLOWING: 0
DIARRHEA: 0
WHEEZING: 0
BACK PAIN: 1
ABDOMINAL PAIN: 0
SHORTNESS OF BREATH: 0
VOMITING: 0
EYE PAIN: 0

## 2019-01-19 ASSESSMENT — PAIN DESCRIPTION - FREQUENCY: FREQUENCY: CONTINUOUS

## 2019-01-19 ASSESSMENT — PAIN DESCRIPTION - ORIENTATION: ORIENTATION: RIGHT

## 2019-01-19 ASSESSMENT — PAIN SCALES - GENERAL
PAINLEVEL_OUTOF10: 10
PAINLEVEL_OUTOF10: 10

## 2019-01-19 ASSESSMENT — PAIN DESCRIPTION - LOCATION: LOCATION: LEG

## 2019-01-19 ASSESSMENT — PAIN DESCRIPTION - DESCRIPTORS: DESCRIPTORS: SHARP;STABBING

## 2019-01-19 ASSESSMENT — PAIN DESCRIPTION - PAIN TYPE: TYPE: ACUTE PAIN

## 2019-01-21 ENCOUNTER — TELEPHONE (OUTPATIENT)
Dept: PRIMARY CARE CLINIC | Age: 64
End: 2019-01-21

## 2019-01-24 DIAGNOSIS — R18.8 CIRRHOSIS OF LIVER WITH ASCITES, UNSPECIFIED HEPATIC CIRRHOSIS TYPE (HCC): ICD-10-CM

## 2019-01-24 DIAGNOSIS — K74.60 CIRRHOSIS OF LIVER WITH ASCITES, UNSPECIFIED HEPATIC CIRRHOSIS TYPE (HCC): ICD-10-CM

## 2019-01-24 DIAGNOSIS — I10 ESSENTIAL HYPERTENSION: ICD-10-CM

## 2019-01-24 RX ORDER — SPIRONOLACTONE 50 MG/1
TABLET, FILM COATED ORAL
Qty: 120 TABLET | Refills: 3 | Status: SHIPPED | OUTPATIENT
Start: 2019-01-24 | End: 2019-02-18 | Stop reason: SDUPTHER

## 2019-01-24 RX ORDER — FUROSEMIDE 20 MG/1
TABLET ORAL
Qty: 120 TABLET | Refills: 3 | Status: SHIPPED | OUTPATIENT
Start: 2019-01-24 | End: 2019-02-18 | Stop reason: SDUPTHER

## 2019-02-18 ENCOUNTER — OFFICE VISIT (OUTPATIENT)
Dept: PRIMARY CARE CLINIC | Age: 64
End: 2019-02-18
Payer: COMMERCIAL

## 2019-02-18 VITALS
HEART RATE: 69 BPM | WEIGHT: 164 LBS | SYSTOLIC BLOOD PRESSURE: 130 MMHG | RESPIRATION RATE: 16 BRPM | BODY MASS INDEX: 30.96 KG/M2 | OXYGEN SATURATION: 98 % | HEIGHT: 61 IN | DIASTOLIC BLOOD PRESSURE: 80 MMHG

## 2019-02-18 DIAGNOSIS — M54.41 CHRONIC BILATERAL LOW BACK PAIN WITH BILATERAL SCIATICA: Primary | ICD-10-CM

## 2019-02-18 DIAGNOSIS — F41.0 PANIC: ICD-10-CM

## 2019-02-18 DIAGNOSIS — G89.29 CHRONIC BILATERAL LOW BACK PAIN WITH BILATERAL SCIATICA: Primary | ICD-10-CM

## 2019-02-18 DIAGNOSIS — F41.8 DEPRESSION WITH ANXIETY: ICD-10-CM

## 2019-02-18 DIAGNOSIS — R18.8 CIRRHOSIS OF LIVER WITH ASCITES, UNSPECIFIED HEPATIC CIRRHOSIS TYPE (HCC): ICD-10-CM

## 2019-02-18 DIAGNOSIS — K76.9 CHRONIC LIVER DISEASE AND CIRRHOSIS (HCC): ICD-10-CM

## 2019-02-18 DIAGNOSIS — K74.60 CIRRHOSIS OF LIVER WITH ASCITES, UNSPECIFIED HEPATIC CIRRHOSIS TYPE (HCC): ICD-10-CM

## 2019-02-18 DIAGNOSIS — I10 ESSENTIAL HYPERTENSION: ICD-10-CM

## 2019-02-18 DIAGNOSIS — R11.0 NAUSEA: ICD-10-CM

## 2019-02-18 DIAGNOSIS — M54.42 CHRONIC BILATERAL LOW BACK PAIN WITH BILATERAL SCIATICA: Primary | ICD-10-CM

## 2019-02-18 DIAGNOSIS — K74.60 CHRONIC LIVER DISEASE AND CIRRHOSIS (HCC): ICD-10-CM

## 2019-02-18 PROCEDURE — 99214 OFFICE O/P EST MOD 30 MIN: CPT | Performed by: PEDIATRICS

## 2019-02-18 RX ORDER — MELOXICAM 7.5 MG/1
7.5 TABLET ORAL DAILY
Qty: 30 TABLET | Refills: 3 | Status: SHIPPED | OUTPATIENT
Start: 2019-02-18 | End: 2019-03-07 | Stop reason: ALTCHOICE

## 2019-02-18 RX ORDER — FUROSEMIDE 20 MG/1
40 TABLET ORAL 2 TIMES DAILY
Qty: 120 TABLET | Refills: 3 | Status: SHIPPED | OUTPATIENT
Start: 2019-02-18 | End: 2019-07-29 | Stop reason: SDUPTHER

## 2019-02-18 RX ORDER — SPIRONOLACTONE 50 MG/1
50 TABLET, FILM COATED ORAL 2 TIMES DAILY
Qty: 120 TABLET | Refills: 3 | Status: SHIPPED | OUTPATIENT
Start: 2019-02-18 | End: 2019-05-21 | Stop reason: DRUGHIGH

## 2019-02-18 RX ORDER — ALPRAZOLAM 0.5 MG/1
0.5 TABLET ORAL 3 TIMES DAILY PRN
Qty: 90 TABLET | Refills: 1 | Status: SHIPPED | OUTPATIENT
Start: 2019-02-18 | End: 2019-04-16 | Stop reason: SDUPTHER

## 2019-02-18 RX ORDER — GABAPENTIN 300 MG/1
300 CAPSULE ORAL
Refills: 0 | COMMUNITY
Start: 2019-01-24 | End: 2019-02-18 | Stop reason: SDUPTHER

## 2019-02-18 RX ORDER — ONDANSETRON 4 MG/1
4 TABLET, FILM COATED ORAL EVERY 8 HOURS PRN
Qty: 60 TABLET | Refills: 3 | Status: SHIPPED | OUTPATIENT
Start: 2019-02-18 | End: 2019-07-29 | Stop reason: SDUPTHER

## 2019-02-18 RX ORDER — GABAPENTIN 300 MG/1
300 CAPSULE ORAL 3 TIMES DAILY
Qty: 90 CAPSULE | Refills: 2 | Status: SHIPPED | OUTPATIENT
Start: 2019-02-18 | End: 2019-05-21 | Stop reason: SDUPTHER

## 2019-02-18 ASSESSMENT — ENCOUNTER SYMPTOMS
EYE DISCHARGE: 0
COUGH: 0
NAUSEA: 0
WHEEZING: 0
VOMITING: 0
SORE THROAT: 0
SINUS PRESSURE: 0
BACK PAIN: 1
ABDOMINAL PAIN: 0
ABDOMINAL DISTENTION: 1

## 2019-02-18 ASSESSMENT — PATIENT HEALTH QUESTIONNAIRE - PHQ9
SUM OF ALL RESPONSES TO PHQ9 QUESTIONS 1 & 2: 0
1. LITTLE INTEREST OR PLEASURE IN DOING THINGS: 0
SUM OF ALL RESPONSES TO PHQ QUESTIONS 1-9: 0
SUM OF ALL RESPONSES TO PHQ QUESTIONS 1-9: 0
2. FEELING DOWN, DEPRESSED OR HOPELESS: 0

## 2019-03-07 ENCOUNTER — OFFICE VISIT (OUTPATIENT)
Dept: PRIMARY CARE CLINIC | Age: 64
End: 2019-03-07
Payer: COMMERCIAL

## 2019-03-07 VITALS
RESPIRATION RATE: 16 BRPM | SYSTOLIC BLOOD PRESSURE: 130 MMHG | WEIGHT: 157 LBS | DIASTOLIC BLOOD PRESSURE: 80 MMHG | BODY MASS INDEX: 29.64 KG/M2 | HEART RATE: 78 BPM | OXYGEN SATURATION: 97 % | HEIGHT: 61 IN

## 2019-03-07 DIAGNOSIS — E11.9 TYPE 2 DIABETES MELLITUS WITHOUT COMPLICATION, WITHOUT LONG-TERM CURRENT USE OF INSULIN (HCC): Primary | ICD-10-CM

## 2019-03-07 DIAGNOSIS — R06.09 OTHER FORM OF DYSPNEA: ICD-10-CM

## 2019-03-07 DIAGNOSIS — K74.60 CIRRHOSIS OF LIVER WITH ASCITES, UNSPECIFIED HEPATIC CIRRHOSIS TYPE (HCC): ICD-10-CM

## 2019-03-07 DIAGNOSIS — K12.1 ORAL ULCER: ICD-10-CM

## 2019-03-07 DIAGNOSIS — R07.89 CHEST WALL PAIN: ICD-10-CM

## 2019-03-07 DIAGNOSIS — R18.8 CIRRHOSIS OF LIVER WITH ASCITES, UNSPECIFIED HEPATIC CIRRHOSIS TYPE (HCC): ICD-10-CM

## 2019-03-07 PROCEDURE — 99213 OFFICE O/P EST LOW 20 MIN: CPT | Performed by: PEDIATRICS

## 2019-03-07 RX ORDER — HYDROCODONE BITARTRATE AND ACETAMINOPHEN 5; 325 MG/1; MG/1
1 TABLET ORAL EVERY 4 HOURS PRN
Qty: 18 TABLET | Refills: 0 | Status: SHIPPED | OUTPATIENT
Start: 2019-03-07 | End: 2019-06-24 | Stop reason: SDUPTHER

## 2019-03-07 ASSESSMENT — ENCOUNTER SYMPTOMS
ABDOMINAL PAIN: 0
ABDOMINAL DISTENTION: 1
WHEEZING: 0
SORE THROAT: 0
COUGH: 0
SHORTNESS OF BREATH: 0
VOMITING: 0
SINUS PRESSURE: 0
EYE DISCHARGE: 0
BACK PAIN: 0
NAUSEA: 0

## 2019-04-16 DIAGNOSIS — F41.0 PANIC: ICD-10-CM

## 2019-04-16 DIAGNOSIS — K76.9 CHRONIC LIVER DISEASE AND CIRRHOSIS (HCC): ICD-10-CM

## 2019-04-16 DIAGNOSIS — K74.60 CHRONIC LIVER DISEASE AND CIRRHOSIS (HCC): ICD-10-CM

## 2019-04-16 DIAGNOSIS — F41.8 DEPRESSION WITH ANXIETY: ICD-10-CM

## 2019-04-16 RX ORDER — ALPRAZOLAM 0.5 MG/1
TABLET ORAL
Qty: 90 TABLET | Refills: 0 | Status: SHIPPED | OUTPATIENT
Start: 2019-04-16 | End: 2019-05-15 | Stop reason: SDUPTHER

## 2019-05-12 ENCOUNTER — HOSPITAL ENCOUNTER (EMERGENCY)
Facility: HOSPITAL | Age: 64
Discharge: ANOTHER HOSPITAL | End: 2019-05-12
Attending: HOSPITALIST
Payer: COMMERCIAL

## 2019-05-12 ENCOUNTER — APPOINTMENT (OUTPATIENT)
Dept: CT IMAGING | Facility: HOSPITAL | Age: 64
End: 2019-05-12
Payer: COMMERCIAL

## 2019-05-12 ENCOUNTER — APPOINTMENT (OUTPATIENT)
Dept: GENERAL RADIOLOGY | Facility: HOSPITAL | Age: 64
End: 2019-05-12
Payer: COMMERCIAL

## 2019-05-12 VITALS
WEIGHT: 150 LBS | SYSTOLIC BLOOD PRESSURE: 143 MMHG | RESPIRATION RATE: 25 BRPM | OXYGEN SATURATION: 94 % | HEART RATE: 104 BPM | BODY MASS INDEX: 28.32 KG/M2 | HEIGHT: 61 IN | DIASTOLIC BLOOD PRESSURE: 50 MMHG | TEMPERATURE: 99.3 F

## 2019-05-12 DIAGNOSIS — R07.9 CHEST PAIN, UNSPECIFIED TYPE: ICD-10-CM

## 2019-05-12 DIAGNOSIS — J98.11 COLLAPSE OF LEFT LUNG: Primary | ICD-10-CM

## 2019-05-12 DIAGNOSIS — R06.00 DYSPNEA, UNSPECIFIED TYPE: ICD-10-CM

## 2019-05-12 DIAGNOSIS — J90 RECURRENT LEFT PLEURAL EFFUSION: ICD-10-CM

## 2019-05-12 LAB
A/G RATIO: 0.8 (ref 0.8–2)
ALBUMIN SERPL-MCNC: 3.1 G/DL (ref 3.4–4.8)
ALP BLD-CCNC: 122 U/L (ref 25–100)
ALT SERPL-CCNC: 19 U/L (ref 4–36)
ANION GAP SERPL CALCULATED.3IONS-SCNC: 12 MMOL/L (ref 3–16)
APTT: 27.2 SEC (ref 22.2–27.5)
AST SERPL-CCNC: 23 U/L (ref 8–33)
BASE EXCESS ARTERIAL: -3.7 MMOL/L (ref -3–3)
BASOPHILS ABSOLUTE: 0 K/UL (ref 0–0.1)
BASOPHILS RELATIVE PERCENT: 0.3 %
BILIRUB SERPL-MCNC: 3.5 MG/DL (ref 0.3–1.2)
BUN BLDV-MCNC: 36 MG/DL (ref 6–20)
CALCIUM SERPL-MCNC: 8.7 MG/DL (ref 8.5–10.5)
CHLORIDE BLD-SCNC: 102 MMOL/L (ref 98–107)
CO2: 19 MMOL/L (ref 20–30)
CREAT SERPL-MCNC: 1.4 MG/DL (ref 0.4–1.2)
EOSINOPHILS ABSOLUTE: 0 K/UL (ref 0–0.4)
EOSINOPHILS RELATIVE PERCENT: 0.1 %
FIO2: 0.21 %
GFR AFRICAN AMERICAN: 46
GFR NON-AFRICAN AMERICAN: 38
GLOBULIN: 4 G/DL
GLUCOSE BLD-MCNC: 152 MG/DL (ref 74–106)
HCO3 ARTERIAL: 19.5 MMOL/L (ref 22–26)
HCT VFR BLD CALC: 39.2 % (ref 37–47)
HEMOGLOBIN: 12.6 G/DL (ref 11.5–16.5)
IMMATURE GRANULOCYTES #: 0 K/UL
IMMATURE GRANULOCYTES %: 0.3 % (ref 0–5)
INR BLD: 1.4 (ref 0.94–1.06)
LACTIC ACID: 4.4 MMOL/L (ref 0.4–2)
LYMPHOCYTES ABSOLUTE: 0.6 K/UL (ref 1.5–4)
LYMPHOCYTES RELATIVE PERCENT: 6.8 %
MCH RBC QN AUTO: 30.2 PG (ref 27–32)
MCHC RBC AUTO-ENTMCNC: 32.1 G/DL (ref 31–35)
MCV RBC AUTO: 94 FL (ref 80–100)
MONOCYTES ABSOLUTE: 0.8 K/UL (ref 0.2–0.8)
MONOCYTES RELATIVE PERCENT: 9.5 %
NEUTROPHILS ABSOLUTE: 7.4 K/UL (ref 2–7.5)
NEUTROPHILS RELATIVE PERCENT: 83 %
O2 SAT, ARTERIAL: 92.3 %
O2 THERAPY: ABNORMAL
PCO2 ARTERIAL: 29.7 MMHG (ref 35–45)
PDW BLD-RTO: 16.8 % (ref 11–16)
PH ARTERIAL: 7.43 (ref 7.35–7.45)
PLATELET # BLD: 168 K/UL (ref 150–400)
PMV BLD AUTO: 10.1 FL (ref 6–10)
PO2 ARTERIAL: 64.6 MMHG (ref 80–100)
POTASSIUM REFLEX MAGNESIUM: 4.4 MMOL/L (ref 3.4–5.1)
PROTHROMBIN TIME: 13.4 SEC (ref 9.4–10.6)
RBC # BLD: 4.17 M/UL (ref 3.8–5.8)
SODIUM BLD-SCNC: 133 MMOL/L (ref 136–145)
TCO2 ARTERIAL: 20.4 MMOL/L (ref 24–30)
TOTAL PROTEIN: 7.1 G/DL (ref 6.4–8.3)
TROPONIN: <0.3 NG/ML
WBC # BLD: 8.9 K/UL (ref 4–11)

## 2019-05-12 PROCEDURE — 6360000002 HC RX W HCPCS: Performed by: HOSPITALIST

## 2019-05-12 PROCEDURE — 36600 WITHDRAWAL OF ARTERIAL BLOOD: CPT

## 2019-05-12 PROCEDURE — 71250 CT THORAX DX C-: CPT

## 2019-05-12 PROCEDURE — 80053 COMPREHEN METABOLIC PANEL: CPT

## 2019-05-12 PROCEDURE — 85610 PROTHROMBIN TIME: CPT

## 2019-05-12 PROCEDURE — 96361 HYDRATE IV INFUSION ADD-ON: CPT

## 2019-05-12 PROCEDURE — 85730 THROMBOPLASTIN TIME PARTIAL: CPT

## 2019-05-12 PROCEDURE — 36415 COLL VENOUS BLD VENIPUNCTURE: CPT

## 2019-05-12 PROCEDURE — 82803 BLOOD GASES ANY COMBINATION: CPT

## 2019-05-12 PROCEDURE — 2580000003 HC RX 258: Performed by: HOSPITALIST

## 2019-05-12 PROCEDURE — 71045 X-RAY EXAM CHEST 1 VIEW: CPT

## 2019-05-12 PROCEDURE — 96374 THER/PROPH/DIAG INJ IV PUSH: CPT

## 2019-05-12 PROCEDURE — 99285 EMERGENCY DEPT VISIT HI MDM: CPT

## 2019-05-12 PROCEDURE — 87040 BLOOD CULTURE FOR BACTERIA: CPT

## 2019-05-12 PROCEDURE — 84484 ASSAY OF TROPONIN QUANT: CPT

## 2019-05-12 PROCEDURE — 85025 COMPLETE CBC W/AUTO DIFF WBC: CPT

## 2019-05-12 PROCEDURE — 83605 ASSAY OF LACTIC ACID: CPT

## 2019-05-12 PROCEDURE — 93005 ELECTROCARDIOGRAM TRACING: CPT

## 2019-05-12 RX ORDER — 0.9 % SODIUM CHLORIDE 0.9 %
1000 INTRAVENOUS SOLUTION INTRAVENOUS ONCE
Status: COMPLETED | OUTPATIENT
Start: 2019-05-12 | End: 2019-05-12

## 2019-05-12 RX ORDER — MORPHINE SULFATE 4 MG/ML
4 INJECTION, SOLUTION INTRAMUSCULAR; INTRAVENOUS EVERY 30 MIN PRN
Status: DISCONTINUED | OUTPATIENT
Start: 2019-05-12 | End: 2019-05-12 | Stop reason: HOSPADM

## 2019-05-12 RX ADMIN — MORPHINE SULFATE 4 MG: 4 INJECTION INTRAVENOUS at 13:25

## 2019-05-12 RX ADMIN — SODIUM CHLORIDE 1000 ML: 9 INJECTION, SOLUTION INTRAVENOUS at 13:25

## 2019-05-12 ASSESSMENT — PAIN DESCRIPTION - LOCATION
LOCATION: CHEST
LOCATION: CHEST

## 2019-05-12 ASSESSMENT — PAIN DESCRIPTION - ORIENTATION
ORIENTATION: LEFT
ORIENTATION: LEFT

## 2019-05-12 ASSESSMENT — PAIN DESCRIPTION - PROGRESSION
CLINICAL_PROGRESSION: GRADUALLY IMPROVING
CLINICAL_PROGRESSION: GRADUALLY WORSENING

## 2019-05-12 ASSESSMENT — PAIN DESCRIPTION - PAIN TYPE
TYPE: ACUTE PAIN
TYPE: ACUTE PAIN

## 2019-05-12 ASSESSMENT — PAIN DESCRIPTION - FREQUENCY
FREQUENCY: CONTINUOUS
FREQUENCY: CONTINUOUS

## 2019-05-12 ASSESSMENT — PAIN SCALES - GENERAL
PAINLEVEL_OUTOF10: 10
PAINLEVEL_OUTOF10: 6

## 2019-05-12 ASSESSMENT — PAIN DESCRIPTION - DESCRIPTORS
DESCRIPTORS: TIGHTNESS
DESCRIPTORS: TIGHTNESS

## 2019-05-12 ASSESSMENT — PAIN DESCRIPTION - ONSET
ONSET: GRADUAL
ONSET: GRADUAL

## 2019-05-12 NOTE — ED NOTES
Pt report given to Charge EMELIA Weber at [facility].       Marcus Marquez, EMELIA  05/12/19 7770

## 2019-05-12 NOTE — ED NOTES
Mick Salazar  93. EMS here to transport patient to [facility] ER.       Carline Jon RN  05/12/19 6169

## 2019-05-12 NOTE — ED PROVIDER NOTES
palpitations  Respiratory:  + shortness of breath, +cough, no wheezing  Gastrointestinal:  No pain, no nausea, no vomiting, no diarrhea  Musculoskeletal:  No muscle pain, no joint pain  Skin:  No rash, no easy bruising  Neurologic:  No speech problems, no headache, no extremity weakness  Psychiatric:  No anxiety  Genitourinary:  No dysuria, no hematuria    Except as noted above the remainder of the review of systems was reviewed and negative. PAST MEDICAL HISTORY     Past Medical History:   Diagnosis Date    Arthritis, rheumatoid (HCC)     Cirrhosis ([facility] [address]     Colitis     Hypertension     IIH (idiopathic intracranial hypertension)     Liver cirrhosis (HCC)     Osteoarthritis     Type II or unspecified type diabetes mellitus without mention of complication, not stated as uncontrolled     Unspecified sleep apnea          SURGICAL HISTORY       Past Surgical History:   Procedure Laterality Date    BONE MARROW BIOPSY      DILATION AND CURETTAGE OF UTERUS      DILATION AND CURETTAGE OF UTERUS  1998 & 2000    TUBAL LIGATION  1982         CURRENT MEDICATIONS       Previous Medications    ALPRAZOLAM (XANAX) 0.5 MG TABLET    TAKE ONE TABLET BY MOUTH THREE TIMES A DAY AS NEEDED FOR SLEEP OR ANXIETY    FUROSEMIDE (LASIX) 20 MG TABLET    Take 2 tablets by mouth 2 times daily    GABAPENTIN (NEURONTIN) 300 MG CAPSULE    Take 1 capsule by mouth 3 times daily for 90 days. Two caps TID.     LIDOCAINE VISCOUS (XYLOCAINE) 2 % SOLUTION    Take 15 mLs by mouth 2 times daily as needed for Irritation or Pain    ONDANSETRON (ZOFRAN) 4 MG TABLET    Take 1 tablet by mouth every 8 hours as needed for Nausea or Vomiting    SPIRONOLACTONE (ALDACTONE) 50 MG TABLET    Take 1 tablet by mouth 2 times daily       ALLERGIES     Lisinopril and Dye [iodides]    FAMILY HISTORY       Family History   Problem Relation Age of Onset    High Blood Pressure Mother     Alzheimer's Disease Mother     Heart Disease Father     High Blood Pressure Father     Heart Disease Maternal Grandmother     Heart Disease Maternal Grandfather     Stroke Maternal Grandfather     Diabetes Paternal Grandmother     High Blood Pressure Brother           SOCIAL HISTORY       Social History     Socioeconomic History    Marital status: Legally      Spouse name: None    Number of children: None    Years of education: None    Highest education level: None   Occupational History    None   Social Needs    Financial resource strain: None    Food insecurity:     Worry: None     Inability: None    Transportation needs:     Medical: None     Non-medical: None   Tobacco Use    Smoking status: Current Every Day Smoker    Smokeless tobacco: Never Used   Substance and Sexual Activity    Alcohol use: No    Drug use: No    Sexual activity: None   Lifestyle    Physical activity:     Days per week: None     Minutes per session: None    Stress: None   Relationships    Social connections:     Talks on phone: None     Gets together: None     Attends Church service: None     Active member of club or organization: None     Attends meetings of clubs or organizations: None     Relationship status: None    Intimate partner violence:     Fear of current or ex partner: None     Emotionally abused: None     Physically abused: None     Forced sexual activity: None   Other Topics Concern    None   Social History Narrative    None         PHYSICAL EXAM    (up to 7 for level 4, 8 or more for level 5)     ED Triage Vitals   BP Temp Temp src Pulse Resp SpO2 Height Weight   -- -- -- -- -- -- -- --       Physical Exam  General :Patient is awake, alert, oriented, in mild/mod resp distress  HEENT: Pupils are equally round and reactive to light, EOMI, conjunctivae clear. Oral mucosa is moist, no exudate.  Uvula is midline  Cardiac: Heart regular rate, rhythm, no murmurs, rubs, or gallops  Lungs:  Wheezing and noted to the right however there are decreased lung sounds completely throughout the left side of the chest. There is no use of accessory muscles. Chest wall: There is no tenderness to palpation over the chest wall or over ribs  Abdomen: Abdomen is soft, nontender, nondistended. There is no firm or pulsatile masses, no rebound rigidity or guarding. Musculoskeletal: 5 out of 5 strength in all 4 extremities. No focal muscle deficits are appreciated  Neuro: Motor intact, sensory intact, level of consciousness is normal, cerebellar function is normal, reflexes are grossly normal.   Dermatology: Skin is warm and dry  Psych: Mentation is grossly normal, cognition is grossly normal. Affect is appropriate. DIAGNOSTIC RESULTS     EKG: All EKG's are interpreted by the Emergency Department Physician who either signs or Co-signs this chart in the [address] a cardiologist.    The EKG interpreted by me shows sinus tachycardia. Ventricular rates 103 bpm, CT interval is 146 ms, QRS durations 178 ms, QT/QTc interval is 334/438 ms. There is poor R-wave progression. No acute T-wave inversions concerning for acute micro-ischemia. No ST elevations concerning for acute myocardial infarction. RADIOLOGY:   Non-plain film images such as CT, Ultrasound and MRI are read by the radiologist. Plain radiographic images are visualized and preliminarily interpreted by the emergency physician with the below findings:      ? Radiologist's Report Reviewed:  CT Chest WO Contrast   Final Result   1. Very large left pleural effusion with complete left lung collapse. 2. Small right pleural effusion. 3. Nodular opacity at the right lung apex favored to be scarring but follow-up is suggested. 4. Cirrhosis of the liver with splenomegaly and large volume ascites. There are also an increased number of vessels in the upper abdomen which likely reflect portosystemic collaterals from portal hypertension. 5. Mediastinal and internal mammary adenopathy nonspecific, reactive versus malignant.    6. Cholelithiasis. XR CHEST PORTABLE   Final Result   1. Complete opacification of the left hemithorax due to a large pleural effusion. 2. Small right pleural effusion.                   ED BEDSIDE ULTRASOUND:   Performed by ED Physician - none    LABS:    I have reviewed and interpreted all of the currently available lab results from this visit (ifapplicable):  Results for orders placed or performed during the hospital encounter of 05/12/19   CBC Auto Differential   Result Value Ref Range    WBC 8.9 4.0 - 11.0 K/uL    RBC 4.17 3.80 - 5.80 M/uL    Hemoglobin 12.6 11.5 - 16.5 g/dL    Hematocrit 39.2 37.0 - 47.0 %    MCV 94.0 80.0 - 100.0 fL    MCH 30.2 27.0 - 32.0 pg    MCHC 32.1 31.0 - 35.0 g/dL    RDW 16.8 (H) 11.0 - 16.0 %    Platelets 432 492 - 925 K/uL    MPV 10.1 (H) 6.0 - 10.0 fL    Neutrophils % 83.0 %    Immature Granulocytes % 0.3 0.0 - 5.0 %    Lymphocytes % 6.8 %    Monocytes % 9.5 %    Eosinophils % 0.1 %    Basophils % 0.3 %    Neutrophils # 7.4 2.0 - 7.5 K/uL    Immature Granulocytes # 0.0 K/uL    Lymphocytes # 0.6 (L) 1.5 - 4.0 K/uL    Monocytes # 0.8 0.2 - 0.8 K/uL    Eosinophils # 0.0 0.0 - 0.4 K/uL    Basophils # 0.0 0.0 - 0.1 K/uL   Comprehensive Metabolic Panel w/ Reflex to MG   Result Value Ref Range    Sodium 133 (L) 136 - 145 mmol/L    Potassium reflex Magnesium 4.4 3.4 - 5.1 mmol/L    Chloride 102 98 - 107 mmol/L    CO2 19 (L) 20 - 30 mmol/L    Anion Gap 12 3 - 16    Glucose 152 (H) 74 - 106 mg/dL    BUN 36 (H) 6 - 20 mg/dL    CREATININE 1.4 (H) 0.4 - 1.2 mg/dL    GFR Non- 38 (L) >59    GFR  46 (L) >59    Calcium 8.7 8.5 - 10.5 mg/dL    Total Protein 7.1 6.4 - 8.3 g/dL    Alb 3.1 (L) 3.4 - 4.8 g/dL    Albumin/Globulin Ratio 0.8 0.8 - 2.0    Total Bilirubin 3.5 (H) 0.3 - 1.2 mg/dL    Alkaline Phosphatase 122 (H) 25 - 100 U/L    ALT 19 4 - 36 U/L    AST 23 8 - 33 U/L    Globulin 4.0 g/dL   Troponin   Result Value Ref Range    Troponin <0.30 <0.30 ng/mL   Blood Gas, Arterial   Result Value Ref Range    pH, Arterial 7.434 7.350 - 7.450    pCO2, Arterial 29.7 (L) 35.0 - 45.0 mmHg    pO2, Arterial 64.6 (L) 80.0 - 100.0 mmHg    HCO3, Arterial 19.5 (L) 22.0 - 26.0 mmol/L    Base Excess, Arterial -3.7 (L) -3.0 - 3.0 mmol/L    O2 Sat, Arterial 92.3 >92 %    TCO2, Arterial 20.4 (L) 24.0 - 30.0 mmol/L    O2 Therapy Unknown     FIO2 0.21 Not Established %   Lactic Acid, Plasma   Result Value Ref Range    Lactic Acid 4.4 (HH) 0.4 - 2.0 mmol/L   Protime-INR   Result Value Ref Range    Protime 13.4 (H) 9.4 - 10.6 sec    INR 1.40 (H) 0.94 - 1.06   APTT   Result Value Ref Range    aPTT 27.2 22.2 - 27.5 sec        All other labs were within normal range or not returned as of this dictation. EMERGENCY DEPARTMENT COURSE and DIFFERENTIAL DIAGNOSIS/MDM:   Vitals:    Vitals:    05/12/19 1322 05/12/19 1325 05/12/19 1340 05/12/19 1345   BP:  119/66 128/73 128/73   Pulse:   103 103   Resp:    (!) 35   Temp:       TempSrc:       SpO2: 94%  95% 95%   Weight:       Height:           MEDICATIONS ADMINISTERED IN ED:  Medications   morphine injection 4 mg (4 mg Intravenous Given 5/12/19 1325)   0.9 % sodium chloride bolus (1,000 mLs Intravenous New Bag 5/12/19 1325)       After initial evaluation and examination I did have a conversation with the patient about upcoming plan, treatment and possible disposition agreeable to the time of this dictation. Patient denies we would need to perform a stat portable chest radiograph to see if this is actually another white out of the left lung where she has a pneumothorax. Patient advised that depending on the results of the upper chest radiograph she may require CT scan the chest. Patient be a fluid bolus of normal saline 1 L. We'll also offer her morphine for her pain. We will also assess a CBC, CMP, troponin, blood gas, lactic acid, blood cultures ×2, ProTime/INR, APTT and a 12-lead EKG.  Agents final disposition be determined once her radiological diagnostic studies be performed and reviewed however I do suspect this patient will need to be transferred on to a facility with higher level of care. She is agreeable with this plan. Blood work showed white count 8900, hemoglobin 12.6, Hemoccult is 39.2, platelet counts 598. Comprehensive metabolic panel was benign except for sodium 133 slightly low, CO2 of 19 also one point lower than normal, glucose 152, elevated, BUN of 36 and a creatinine of 1.4. Alk phos phosphatases 122. Albumin is also slightly low at 3.1. Total bilirubin elevated at 3.5. Troponin was nondetectable less than 0.30. Pro time was 13.4, INR was 1.4, APTT was 27.2. Lactic acid elevated at 4.4. Arterial blood gas showed a pH of 7.434, PCO2 is 29.7, PO2 was 64.6, bicarb was 19.5, base excess is -3.7. Portable chest radiograph read by radiology as complete opacification of the left hemothorax due to a large pleural effusion. Small right pleural effusion. CT scan the chest without contrast read by radiology as a large left pleural effusion with complete left lung collapse. Small right pleural effusion. Nodular opacity in the right lung apex favored to be scarring but follow-up is suggested. Cirrhosis of the liver with splenomegaly and large volume ascites. There is also an increased number of vessels in the upper abdomen which likely reflect portosystemic collaterals from portal hypertension. Mediastinal and inferior mammary adenopathy nonspecific, reactive versus malignant. Cholelithiasis. Spoke with Andi Braun  for the [facility] emergency department. She did accept the patient there for transfer under Dr. Ubaldo Mcpherson protocol. Patient's radiological diagnostic studies were coming her time of transfer.  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             CONSULTS:  None    PROCEDURES:  Procedures    CRITICAL CARE TIME    Total Critical Care time was 20 minutes, excluding separately reportable procedures. There was a high probability of clinically significant/life threatening deterioration in the patient's condition which required my urgent intervention. FINAL IMPRESSION      1. Collapse of left lung    2. Recurrent left pleural effusion    3. Dyspnea, unspecified type    4. Chest pain, unspecified type          DISPOSITION/PLAN   DISPOSITION        PATIENT REFERRED TO:  No follow-up provider specified. DISCHARGE MEDICATIONS:  New Prescriptions    No medications on file       Comment: Please note this report has been produced using speech recognition software and may contain errorsrelated to that system including errors in grammar, punctuation, and spelling, as well as words and phrases that may be inappropriate. If there are any questions or concerns please feel free to contact the dictating providerfor clarification.     Kate Chan DO  Attending Emergency Physician              Kate Chan DO  05/12/19 5069

## 2019-05-12 NOTE — ED NOTES
[facility] [address] [place] [address] [facility]  05/12/19 1794

## 2019-05-15 DIAGNOSIS — R07.89 CHEST WALL PAIN: ICD-10-CM

## 2019-05-15 DIAGNOSIS — F41.8 DEPRESSION WITH ANXIETY: ICD-10-CM

## 2019-05-15 DIAGNOSIS — F41.0 PANIC: ICD-10-CM

## 2019-05-15 DIAGNOSIS — K74.60 CHRONIC LIVER DISEASE AND CIRRHOSIS (HCC): ICD-10-CM

## 2019-05-15 DIAGNOSIS — K76.9 CHRONIC LIVER DISEASE AND CIRRHOSIS (HCC): ICD-10-CM

## 2019-05-15 RX ORDER — ALPRAZOLAM 0.5 MG/1
TABLET ORAL
Qty: 90 TABLET | Refills: 2 | Status: SHIPPED | OUTPATIENT
Start: 2019-05-15 | End: 2019-05-21 | Stop reason: SDUPTHER

## 2019-05-15 RX ORDER — HYDROCODONE BITARTRATE AND ACETAMINOPHEN 5; 325 MG/1; MG/1
1 TABLET ORAL EVERY 4 HOURS PRN
Qty: 18 TABLET | Refills: 0 | OUTPATIENT
Start: 2019-05-15 | End: 2019-05-18

## 2019-05-18 LAB — BLOOD CULTURE, ROUTINE: NORMAL

## 2019-05-20 ENCOUNTER — CARE COORDINATION (OUTPATIENT)
Dept: CARE COORDINATION | Age: 64
End: 2019-05-20

## 2019-05-21 ENCOUNTER — OFFICE VISIT (OUTPATIENT)
Dept: PRIMARY CARE CLINIC | Age: 64
End: 2019-05-21
Payer: COMMERCIAL

## 2019-05-21 VITALS
HEART RATE: 74 BPM | DIASTOLIC BLOOD PRESSURE: 60 MMHG | BODY MASS INDEX: 29.29 KG/M2 | WEIGHT: 155 LBS | RESPIRATION RATE: 16 BRPM | OXYGEN SATURATION: 98 % | SYSTOLIC BLOOD PRESSURE: 148 MMHG

## 2019-05-21 DIAGNOSIS — Z23 NEED FOR HEPATITIS A IMMUNIZATION: Primary | ICD-10-CM

## 2019-05-21 DIAGNOSIS — F41.0 PANIC: ICD-10-CM

## 2019-05-21 DIAGNOSIS — Z09 HOSPITAL DISCHARGE FOLLOW-UP: ICD-10-CM

## 2019-05-21 DIAGNOSIS — M54.42 CHRONIC BILATERAL LOW BACK PAIN WITH BILATERAL SCIATICA: ICD-10-CM

## 2019-05-21 DIAGNOSIS — M54.41 CHRONIC BILATERAL LOW BACK PAIN WITH BILATERAL SCIATICA: ICD-10-CM

## 2019-05-21 DIAGNOSIS — G89.29 CHRONIC BILATERAL LOW BACK PAIN WITH BILATERAL SCIATICA: ICD-10-CM

## 2019-05-21 DIAGNOSIS — K65.2 SBP (SPONTANEOUS BACTERIAL PERITONITIS) (HCC): ICD-10-CM

## 2019-05-21 DIAGNOSIS — K74.60 CHRONIC LIVER DISEASE AND CIRRHOSIS (HCC): ICD-10-CM

## 2019-05-21 DIAGNOSIS — K21.9 GASTROESOPHAGEAL REFLUX DISEASE WITHOUT ESOPHAGITIS: ICD-10-CM

## 2019-05-21 DIAGNOSIS — K76.9 CHRONIC LIVER DISEASE AND CIRRHOSIS (HCC): ICD-10-CM

## 2019-05-21 DIAGNOSIS — F41.8 DEPRESSION WITH ANXIETY: ICD-10-CM

## 2019-05-21 PROCEDURE — 1111F DSCHRG MED/CURRENT MED MERGE: CPT | Performed by: PEDIATRICS

## 2019-05-21 PROCEDURE — 99495 TRANSJ CARE MGMT MOD F2F 14D: CPT | Performed by: PEDIATRICS

## 2019-05-21 PROCEDURE — 90471 IMMUNIZATION ADMIN: CPT | Performed by: PEDIATRICS

## 2019-05-21 PROCEDURE — 90632 HEPA VACCINE ADULT IM: CPT | Performed by: PEDIATRICS

## 2019-05-21 RX ORDER — SPIRONOLACTONE 100 MG/1
100 TABLET, FILM COATED ORAL 2 TIMES DAILY
COMMUNITY
End: 2019-07-29 | Stop reason: SDUPTHER

## 2019-05-21 RX ORDER — SULFAMETHOXAZOLE AND TRIMETHOPRIM 800; 160 MG/1; MG/1
1 TABLET ORAL DAILY
Qty: 30 TABLET | Refills: 5 | Status: SHIPPED | OUTPATIENT
Start: 2019-05-21 | End: 2019-07-29 | Stop reason: SDUPTHER

## 2019-05-21 RX ORDER — ALPRAZOLAM 0.5 MG/1
TABLET ORAL
Qty: 90 TABLET | Refills: 2 | Status: SHIPPED | OUTPATIENT
Start: 2019-05-21 | End: 2019-09-11 | Stop reason: SDUPTHER

## 2019-05-21 RX ORDER — GABAPENTIN 300 MG/1
300 CAPSULE ORAL 3 TIMES DAILY
Qty: 90 CAPSULE | Refills: 2 | Status: SHIPPED | OUTPATIENT
Start: 2019-05-21 | End: 2019-06-24

## 2019-05-21 RX ORDER — DULOXETIN HYDROCHLORIDE 30 MG/1
30 CAPSULE, DELAYED RELEASE ORAL DAILY
Qty: 30 CAPSULE | Refills: 3 | Status: SHIPPED | OUTPATIENT
Start: 2019-05-21 | End: 2019-07-29 | Stop reason: SDUPTHER

## 2019-05-21 RX ORDER — PANTOPRAZOLE SODIUM 20 MG/1
20 TABLET, DELAYED RELEASE ORAL DAILY
Qty: 30 TABLET | Refills: 3 | Status: SHIPPED | OUTPATIENT
Start: 2019-05-21 | End: 2019-07-29 | Stop reason: SDUPTHER

## 2019-05-21 ASSESSMENT — ENCOUNTER SYMPTOMS
WHEEZING: 0
EYE DISCHARGE: 0
NAUSEA: 0
SORE THROAT: 0
VOMITING: 0
BACK PAIN: 0
SINUS PRESSURE: 0
SHORTNESS OF BREATH: 0
ABDOMINAL PAIN: 0
COUGH: 0

## 2019-05-21 NOTE — PROGRESS NOTES
reviewed- see chart. Interval history/Current status: She reports that cipro has caused her to have recurrent diarrhea. She says that [facility] told her she shouldn't be on gabapentin and xanax related to her mental status changes. She is suppose to see liver transplant team in the future but told by them she was not on the transplant list yet. Review of Systems   Constitutional: Negative for chills and fever. HENT: Negative for congestion, sinus pressure and sore throat. Eyes: Negative for discharge and visual disturbance. Respiratory: Negative for cough, shortness of breath and wheezing. Cardiovascular: Negative for chest pain and palpitations. Gastrointestinal: Negative for abdominal pain, nausea and vomiting. Endocrine: Negative for cold intolerance and heat intolerance. Genitourinary: Negative for dysuria, frequency and urgency. Musculoskeletal: Negative for arthralgias and back pain. Skin: Negative for rash and wound. Neurological: Negative for syncope, numbness and headaches. Hematological: Negative. Psychiatric/Behavioral: Negative for agitation and sleep disturbance. The patient is not nervous/anxious. Vitals:    05/21/19 1433   BP: (!) 148/60   Site: Left Upper Arm   Position: Sitting   Pulse: 74   Resp: 16   SpO2: 98%   Weight: 155 lb (70.3 kg)     Body mass index is 29.29 kg/m². Wt Readings from Last 3 Encounters:   05/21/19 155 lb (70.3 kg)   05/12/19 150 lb (68 kg)   03/07/19 157 lb (71.2 kg)     BP Readings from Last 3 Encounters:   05/21/19 (!) 148/60   05/12/19 (!) 143/50   03/07/19 130/80       Physical Exam   Constitutional: She is oriented to person, place, and time. She appears well-developed and well-nourished. No distress. HENT:   Head: Normocephalic and atraumatic. Nose: Nose normal.   Mouth/Throat: Oropharynx is clear and moist.   Eyes: Pupils are equal, round, and reactive to light. Conjunctivae and EOM are normal. Right eye exhibits no discharge. Left eye exhibits no discharge. No scleral icterus. Neck: Normal range of motion. Neck supple. No JVD present. No tracheal deviation present. No thyromegaly present. Cardiovascular: Normal rate, regular rhythm and normal heart sounds. No murmur heard. Pulmonary/Chest: Effort normal and breath sounds normal. No stridor. No respiratory distress. She has no wheezes. She has no rales. She exhibits no tenderness. Abdominal: Soft. Bowel sounds are normal. She exhibits no distension and no mass. There is no tenderness. There is no rebound and no guarding. Musculoskeletal: Normal range of motion. She exhibits no edema or tenderness. Lymphadenopathy:     She has no cervical adenopathy. Neurological: She is alert and oriented to person, place, and time. Skin: Skin is warm and dry. No rash noted. She is not diaphoretic. Psychiatric: She has a normal mood and affect. Nursing note and vitals reviewed. Assessment/Plan:  1. Need for hepatitis A immunization    - Hep A Vaccine Adult (HAVRIX)    2. SBP (spontaneous bacterial peritonitis) (HCC)    - sulfamethoxazole-trimethoprim (BACTRIM DS) 800-160 MG per tablet; Take 1 tablet by mouth daily  Dispense: 30 tablet; Refill: 5    3. Chronic bilateral low back pain with bilateral sciatica    - gabapentin (NEURONTIN) 300 MG capsule; Take 1 capsule by mouth 3 times daily for 90 days. Two caps TID  Dispense: 90 capsule; Refill: 2    4. Depression with anxiety    - ALPRAZolam (XANAX) 0.5 MG tablet; TAKE ONE TABLET BY MOUTH THREE TIMES A DAY AS NEEDED FOR SLEEP OR ANXIETY  Dispense: 90 tablet; Refill: 2    5. Panic    - ALPRAZolam (XANAX) 0.5 MG tablet; TAKE ONE TABLET BY MOUTH THREE TIMES A DAY AS NEEDED FOR SLEEP OR ANXIETY  Dispense: 90 tablet; Refill: 2    6. Chronic liver disease and cirrhosis (HCC)    - ALPRAZolam (XANAX) 0.5 MG tablet; TAKE ONE TABLET BY MOUTH THREE TIMES A DAY AS NEEDED FOR SLEEP OR ANXIETY  Dispense: 90 tablet; Refill: 2    7.  Hospital discharge follow-up    8. GERD    Protonix    9. Depression with anxiety    Start cymbalta    The patient has quit smoking. Counseled on importance of not smoking.       Medical Decision Making: moderate complexity

## 2019-05-21 NOTE — PROGRESS NOTES
Immunizations     Name Date Dose Route    Hepatitis A Adult (Havarix) 5/21/2019 1 mL Intramuscular    Site: Right arm    Lot: [id number]    NDC: 76150-963-04

## 2019-06-24 ENCOUNTER — OFFICE VISIT (OUTPATIENT)
Dept: PRIMARY CARE CLINIC | Age: 64
End: 2019-06-24
Payer: COMMERCIAL

## 2019-06-24 VITALS
BODY MASS INDEX: 28.32 KG/M2 | WEIGHT: 150 LBS | OXYGEN SATURATION: 93 % | HEART RATE: 79 BPM | DIASTOLIC BLOOD PRESSURE: 70 MMHG | RESPIRATION RATE: 16 BRPM | HEIGHT: 61 IN | SYSTOLIC BLOOD PRESSURE: 130 MMHG

## 2019-06-24 DIAGNOSIS — R18.8 CIRRHOSIS OF LIVER WITH ASCITES, UNSPECIFIED HEPATIC CIRRHOSIS TYPE (HCC): ICD-10-CM

## 2019-06-24 DIAGNOSIS — K74.60 CIRRHOSIS OF LIVER WITH ASCITES, UNSPECIFIED HEPATIC CIRRHOSIS TYPE (HCC): ICD-10-CM

## 2019-06-24 DIAGNOSIS — R06.00 DYSPNEA, UNSPECIFIED TYPE: ICD-10-CM

## 2019-06-24 DIAGNOSIS — J90 PLEURAL EFFUSION: Primary | ICD-10-CM

## 2019-06-24 DIAGNOSIS — H53.9 VISION CHANGES: ICD-10-CM

## 2019-06-24 DIAGNOSIS — G93.2 IIH (IDIOPATHIC INTRACRANIAL HYPERTENSION): ICD-10-CM

## 2019-06-24 DIAGNOSIS — R18.8 OTHER ASCITES: ICD-10-CM

## 2019-06-24 DIAGNOSIS — R07.89 CHEST WALL PAIN: ICD-10-CM

## 2019-06-24 DIAGNOSIS — M79.2 NEUROPATHIC PAIN: ICD-10-CM

## 2019-06-24 PROCEDURE — 99214 OFFICE O/P EST MOD 30 MIN: CPT | Performed by: PEDIATRICS

## 2019-06-24 RX ORDER — GABAPENTIN 400 MG/1
400 CAPSULE ORAL 4 TIMES DAILY
Qty: 120 CAPSULE | Refills: 3 | Status: SHIPPED | OUTPATIENT
Start: 2019-06-24 | End: 2019-07-29 | Stop reason: SDUPTHER

## 2019-06-24 RX ORDER — ACETAZOLAMIDE 125 MG/1
125 TABLET ORAL 3 TIMES DAILY
Qty: 90 TABLET | Refills: 3 | Status: SHIPPED | OUTPATIENT
Start: 2019-06-24 | End: 2019-07-29 | Stop reason: SDUPTHER

## 2019-06-24 RX ORDER — HYDROCODONE BITARTRATE AND ACETAMINOPHEN 5; 325 MG/1; MG/1
1 TABLET ORAL EVERY 4 HOURS PRN
Qty: 18 TABLET | Refills: 0 | Status: SHIPPED | OUTPATIENT
Start: 2019-06-24 | End: 2019-06-27

## 2019-06-24 ASSESSMENT — ENCOUNTER SYMPTOMS
NAUSEA: 0
ABDOMINAL DISTENTION: 1
SHORTNESS OF BREATH: 0
SINUS PRESSURE: 0
BACK PAIN: 0
SORE THROAT: 0
VOMITING: 0
HEMOPTYSIS: 0
EYE DISCHARGE: 0
ABDOMINAL PAIN: 0
WHEEZING: 0
COUGH: 0

## 2019-06-24 NOTE — PROGRESS NOTES
1. Have you seen another provider since your last visit? No    2. Have you had any other diagnostic tests since your last visit? No    3. Have you changed or stopped any medications since your last visit?  No    Chief Complaint   Patient presents with    Edema    Referral - General     thoracentesis and paracentesis

## 2019-06-27 ENCOUNTER — TELEPHONE (OUTPATIENT)
Dept: PRIMARY CARE CLINIC | Age: 64
End: 2019-06-27

## 2019-06-28 ENCOUNTER — TELEPHONE (OUTPATIENT)
Dept: PRIMARY CARE CLINIC | Age: 64
End: 2019-06-28

## 2019-07-12 DIAGNOSIS — F41.8 DEPRESSION WITH ANXIETY: ICD-10-CM

## 2019-07-12 DIAGNOSIS — K76.9 CHRONIC LIVER DISEASE AND CIRRHOSIS (HCC): ICD-10-CM

## 2019-07-12 DIAGNOSIS — K74.60 CHRONIC LIVER DISEASE AND CIRRHOSIS (HCC): ICD-10-CM

## 2019-07-12 DIAGNOSIS — F41.0 PANIC: ICD-10-CM

## 2019-07-12 RX ORDER — ALPRAZOLAM 0.5 MG/1
TABLET ORAL
Qty: 90 TABLET | Refills: 0 | Status: SHIPPED | OUTPATIENT
Start: 2019-07-12 | End: 2019-07-29 | Stop reason: SDUPTHER

## 2019-07-16 DIAGNOSIS — M79.2 NEUROPATHIC PAIN: Primary | ICD-10-CM

## 2019-07-16 DIAGNOSIS — K12.1 ORAL ULCER: ICD-10-CM

## 2019-07-17 RX ORDER — LIDOCAINE HYDROCHLORIDE 20 MG/ML
SOLUTION OROPHARYNGEAL
Qty: 100 ML | Refills: 0 | Status: SHIPPED | OUTPATIENT
Start: 2019-07-17 | End: 2021-04-07 | Stop reason: ALTCHOICE

## 2019-07-17 RX ORDER — GABAPENTIN 300 MG/1
CAPSULE ORAL
Qty: 90 CAPSULE | Refills: 0 | OUTPATIENT
Start: 2019-07-17 | End: 2019-08-16

## 2019-07-29 ENCOUNTER — OFFICE VISIT (OUTPATIENT)
Dept: PRIMARY CARE CLINIC | Age: 64
End: 2019-07-29
Payer: COMMERCIAL

## 2019-07-29 VITALS
BODY MASS INDEX: 27.19 KG/M2 | WEIGHT: 144 LBS | SYSTOLIC BLOOD PRESSURE: 128 MMHG | RESPIRATION RATE: 16 BRPM | OXYGEN SATURATION: 98 % | HEART RATE: 78 BPM | DIASTOLIC BLOOD PRESSURE: 70 MMHG | HEIGHT: 61 IN

## 2019-07-29 DIAGNOSIS — K21.9 GASTROESOPHAGEAL REFLUX DISEASE WITHOUT ESOPHAGITIS: ICD-10-CM

## 2019-07-29 DIAGNOSIS — I10 ESSENTIAL HYPERTENSION: ICD-10-CM

## 2019-07-29 DIAGNOSIS — G93.2 IIH (IDIOPATHIC INTRACRANIAL HYPERTENSION): ICD-10-CM

## 2019-07-29 DIAGNOSIS — R11.0 NAUSEA: ICD-10-CM

## 2019-07-29 DIAGNOSIS — M79.2 NEUROPATHIC PAIN: ICD-10-CM

## 2019-07-29 DIAGNOSIS — K74.60 CIRRHOSIS OF LIVER WITH ASCITES, UNSPECIFIED HEPATIC CIRRHOSIS TYPE (HCC): ICD-10-CM

## 2019-07-29 DIAGNOSIS — K76.9 CHRONIC LIVER DISEASE AND CIRRHOSIS (HCC): ICD-10-CM

## 2019-07-29 DIAGNOSIS — K74.60 CHRONIC LIVER DISEASE AND CIRRHOSIS (HCC): ICD-10-CM

## 2019-07-29 DIAGNOSIS — F41.8 DEPRESSION WITH ANXIETY: ICD-10-CM

## 2019-07-29 DIAGNOSIS — R18.8 CIRRHOSIS OF LIVER WITH ASCITES, UNSPECIFIED HEPATIC CIRRHOSIS TYPE (HCC): ICD-10-CM

## 2019-07-29 DIAGNOSIS — F41.0 PANIC: ICD-10-CM

## 2019-07-29 DIAGNOSIS — K65.2 SBP (SPONTANEOUS BACTERIAL PERITONITIS) (HCC): ICD-10-CM

## 2019-07-29 PROCEDURE — 99214 OFFICE O/P EST MOD 30 MIN: CPT | Performed by: PEDIATRICS

## 2019-07-29 RX ORDER — SULFAMETHOXAZOLE AND TRIMETHOPRIM 800; 160 MG/1; MG/1
1 TABLET ORAL DAILY
Qty: 30 TABLET | Refills: 5 | Status: SHIPPED | OUTPATIENT
Start: 2019-07-29 | End: 2019-09-11

## 2019-07-29 RX ORDER — FUROSEMIDE 20 MG/1
40 TABLET ORAL 2 TIMES DAILY
Qty: 120 TABLET | Refills: 3 | Status: SHIPPED | OUTPATIENT
Start: 2019-07-29 | End: 2019-12-30

## 2019-07-29 RX ORDER — SPIRONOLACTONE 100 MG/1
100 TABLET, FILM COATED ORAL 2 TIMES DAILY
Qty: 60 TABLET | Refills: 3 | Status: SHIPPED | OUTPATIENT
Start: 2019-07-29 | End: 2020-06-04 | Stop reason: SDUPTHER

## 2019-07-29 RX ORDER — PANTOPRAZOLE SODIUM 20 MG/1
20 TABLET, DELAYED RELEASE ORAL DAILY
Qty: 30 TABLET | Refills: 3 | Status: SHIPPED | OUTPATIENT
Start: 2019-07-29 | End: 2019-10-02

## 2019-07-29 RX ORDER — ONDANSETRON 4 MG/1
4 TABLET, FILM COATED ORAL EVERY 8 HOURS PRN
Qty: 60 TABLET | Refills: 3 | Status: SHIPPED | OUTPATIENT
Start: 2019-07-29 | End: 2019-11-26 | Stop reason: SDUPTHER

## 2019-07-29 RX ORDER — DULOXETIN HYDROCHLORIDE 30 MG/1
30 CAPSULE, DELAYED RELEASE ORAL DAILY
Qty: 30 CAPSULE | Refills: 3 | Status: SHIPPED | OUTPATIENT
Start: 2019-07-29 | End: 2019-12-05 | Stop reason: SDUPTHER

## 2019-07-29 RX ORDER — ACETAZOLAMIDE 125 MG/1
125 TABLET ORAL 3 TIMES DAILY
Qty: 90 TABLET | Refills: 3 | Status: SHIPPED | OUTPATIENT
Start: 2019-07-29 | End: 2019-10-08 | Stop reason: SDUPTHER

## 2019-07-29 RX ORDER — GABAPENTIN 400 MG/1
400 CAPSULE ORAL 4 TIMES DAILY
Qty: 120 CAPSULE | Refills: 3 | Status: SHIPPED | OUTPATIENT
Start: 2019-07-29 | End: 2019-09-11

## 2019-07-29 RX ORDER — ALPRAZOLAM 0.5 MG/1
0.5 TABLET ORAL 3 TIMES DAILY PRN
Qty: 90 TABLET | Refills: 2 | Status: SHIPPED | OUTPATIENT
Start: 2019-07-29 | End: 2020-01-02 | Stop reason: SDUPTHER

## 2019-07-29 ASSESSMENT — ENCOUNTER SYMPTOMS
SINUS PRESSURE: 0
EYE DISCHARGE: 0
NAUSEA: 0
VOMITING: 0
SORE THROAT: 0
WHEEZING: 0
SHORTNESS OF BREATH: 0
BACK PAIN: 0
COUGH: 0
ABDOMINAL PAIN: 0

## 2019-07-29 NOTE — PROGRESS NOTES
SUBJECTIVE:    Patient ID: Marie Ramirez is a 59 y.o. female. Chief Complaint   Patient presents with    Diabetes     f/u    Hypertension     f/u    Cirrhosis     f/u       HPI:    Patient's medications, allergies, past medical, surgical,social and family histories were reviewed and updated as appropriate. She has been doing ok. Says she bruises easily and bleeds easily. She had 4600 ml of ascites removed 1 month ago. Says this helped quite a bit. Starting to get swelling back now. Hypertension   This is a chronic problem. The current episode started more than 1 year ago. The problem is unchanged. The problem is controlled. Associated symptoms include anxiety. Pertinent negatives include no palpitations or shortness of breath. There are no associated agents to hypertension. Risk factors for coronary artery disease include diabetes mellitus and dyslipidemia. Past treatments include diuretics. The current treatment provides significant improvement. There are no compliance problems. Back Pain   This is a chronic problem. The current episode started more than 1 year ago. The problem occurs constantly. The problem has been rapidly worsening since onset. The pain is present in the lumbar spine. The quality of the pain is described as burning, aching and stabbing. The pain radiates to the left knee, right knee, left thigh and right thigh. The pain is at a severity of 9/10. The pain is severe. The pain is the same all the time. The symptoms are aggravated by standing, sitting and twisting. Stiffness is present all day. Pertinent negatives include no abdominal pain, dysuria, fever or numbness. Mental Health Problem   The primary symptoms include dysphoric mood. The primary symptoms do not include delusions or hallucinations. Primary symptoms comment: anxiety. The current episode started more than 1 month ago. This is a chronic problem.    The degree of incapacity that she is experiencing as a consequence

## 2019-08-28 ENCOUNTER — TELEPHONE (OUTPATIENT)
Dept: PRIMARY CARE CLINIC | Age: 64
End: 2019-08-28

## 2019-08-29 NOTE — TELEPHONE ENCOUNTER
Call patient to come in for pill count and UDS also an appt so we can discuss any change in her medication

## 2019-09-11 ENCOUNTER — HOSPITAL ENCOUNTER (OUTPATIENT)
Facility: HOSPITAL | Age: 64
Discharge: HOME | End: 2019-09-11
Payer: COMMERCIAL

## 2019-09-11 ENCOUNTER — OFFICE VISIT (OUTPATIENT)
Dept: PRIMARY CARE CLINIC | Age: 64
End: 2019-09-11
Payer: COMMERCIAL

## 2019-09-11 VITALS
BODY MASS INDEX: 30.96 KG/M2 | SYSTOLIC BLOOD PRESSURE: 138 MMHG | RESPIRATION RATE: 16 BRPM | WEIGHT: 164 LBS | DIASTOLIC BLOOD PRESSURE: 60 MMHG | HEART RATE: 80 BPM | HEIGHT: 61 IN | OXYGEN SATURATION: 98 %

## 2019-09-11 DIAGNOSIS — M54.41 CHRONIC BILATERAL LOW BACK PAIN WITH BILATERAL SCIATICA: ICD-10-CM

## 2019-09-11 DIAGNOSIS — Z12.39 BREAST CANCER SCREENING: Primary | ICD-10-CM

## 2019-09-11 DIAGNOSIS — K74.60 CIRRHOSIS OF LIVER WITH ASCITES, UNSPECIFIED HEPATIC CIRRHOSIS TYPE (HCC): ICD-10-CM

## 2019-09-11 DIAGNOSIS — I10 ESSENTIAL HYPERTENSION: ICD-10-CM

## 2019-09-11 DIAGNOSIS — M54.42 CHRONIC BILATERAL LOW BACK PAIN WITH BILATERAL SCIATICA: ICD-10-CM

## 2019-09-11 DIAGNOSIS — M79.2 NEUROPATHIC PAIN: ICD-10-CM

## 2019-09-11 DIAGNOSIS — G89.29 CHRONIC BILATERAL LOW BACK PAIN WITH BILATERAL SCIATICA: ICD-10-CM

## 2019-09-11 DIAGNOSIS — L03.113 CELLULITIS OF RIGHT HAND: ICD-10-CM

## 2019-09-11 DIAGNOSIS — R18.8 CIRRHOSIS OF LIVER WITH ASCITES, UNSPECIFIED HEPATIC CIRRHOSIS TYPE (HCC): ICD-10-CM

## 2019-09-11 LAB
A/G RATIO: 1 (ref 0.8–2)
ALBUMIN SERPL-MCNC: 3.2 G/DL (ref 3.4–4.8)
ALP BLD-CCNC: 173 U/L (ref 25–100)
ALT SERPL-CCNC: 18 U/L (ref 4–36)
AMMONIA: 79 MCG/DL (ref 19–87)
ANION GAP SERPL CALCULATED.3IONS-SCNC: 7 MMOL/L (ref 3–16)
AST SERPL-CCNC: 35 U/L (ref 8–33)
BASOPHILS ABSOLUTE: 0 K/UL (ref 0–0.1)
BASOPHILS RELATIVE PERCENT: 0.9 %
BILIRUB SERPL-MCNC: 4.4 MG/DL (ref 0.3–1.2)
BUN BLDV-MCNC: 21 MG/DL (ref 6–20)
CALCIUM SERPL-MCNC: 8.4 MG/DL (ref 8.5–10.5)
CHLORIDE BLD-SCNC: 110 MMOL/L (ref 98–107)
CO2: 24 MMOL/L (ref 20–30)
CREAT SERPL-MCNC: 1.1 MG/DL (ref 0.4–1.2)
EOSINOPHILS ABSOLUTE: 0.1 K/UL (ref 0–0.4)
EOSINOPHILS RELATIVE PERCENT: 6.1 %
GFR AFRICAN AMERICAN: >59
GFR NON-AFRICAN AMERICAN: 50
GLOBULIN: 3.1 G/DL
GLUCOSE BLD-MCNC: 108 MG/DL (ref 74–106)
HBA1C MFR BLD: 4.6 %
HCT VFR BLD CALC: 28.6 % (ref 37–47)
HEMOGLOBIN: 9.8 G/DL (ref 11.5–16.5)
IMMATURE GRANULOCYTES #: 0 K/UL
IMMATURE GRANULOCYTES %: 0 % (ref 0–5)
INR BLD: 1.1 (ref 0.9–1.1)
LYMPHOCYTES ABSOLUTE: 0.5 K/UL (ref 1.5–4)
LYMPHOCYTES RELATIVE PERCENT: 43 %
MCH RBC QN AUTO: 32.5 PG (ref 27–32)
MCHC RBC AUTO-ENTMCNC: 34.3 G/DL (ref 31–35)
MCV RBC AUTO: 94.7 FL (ref 80–100)
MONOCYTES ABSOLUTE: 0.2 K/UL (ref 0.2–0.8)
MONOCYTES RELATIVE PERCENT: 20.2 %
NEUTROPHILS ABSOLUTE: 0.3 K/UL (ref 2–7.5)
NEUTROPHILS RELATIVE PERCENT: 29.8 %
PDW BLD-RTO: 16.3 % (ref 11–16)
PLATELET # BLD: 145 K/UL (ref 150–400)
PMV BLD AUTO: 9 FL (ref 6–10)
POTASSIUM SERPL-SCNC: 4.2 MMOL/L (ref 3.4–5.1)
PROTHROMBIN TIME: 11.1 SEC (ref 8.9–10.7)
RBC # BLD: 3.02 M/UL (ref 3.8–5.8)
SODIUM BLD-SCNC: 141 MMOL/L (ref 136–145)
TOTAL PROTEIN: 6.3 G/DL (ref 6.4–8.3)
WBC # BLD: 1.1 K/UL (ref 4–11)

## 2019-09-11 PROCEDURE — 99214 OFFICE O/P EST MOD 30 MIN: CPT | Performed by: PEDIATRICS

## 2019-09-11 PROCEDURE — 80053 COMPREHEN METABOLIC PANEL: CPT

## 2019-09-11 PROCEDURE — 82140 ASSAY OF AMMONIA: CPT

## 2019-09-11 PROCEDURE — 36415 COLL VENOUS BLD VENIPUNCTURE: CPT

## 2019-09-11 PROCEDURE — 85025 COMPLETE CBC W/AUTO DIFF WBC: CPT

## 2019-09-11 PROCEDURE — 83036 HEMOGLOBIN GLYCOSYLATED A1C: CPT

## 2019-09-11 PROCEDURE — 85610 PROTHROMBIN TIME: CPT

## 2019-09-11 RX ORDER — CLINDAMYCIN HYDROCHLORIDE 300 MG/1
300 CAPSULE ORAL 3 TIMES DAILY
Qty: 30 CAPSULE | Refills: 0 | Status: SHIPPED | OUTPATIENT
Start: 2019-09-11 | End: 2019-09-21

## 2019-09-11 RX ORDER — GABAPENTIN 300 MG/1
300 CAPSULE ORAL 4 TIMES DAILY
Qty: 120 CAPSULE | Refills: 2 | Status: SHIPPED | OUTPATIENT
Start: 2019-09-11 | End: 2019-10-02

## 2019-09-11 ASSESSMENT — ENCOUNTER SYMPTOMS
SHORTNESS OF BREATH: 0
SINUS PRESSURE: 0
ABDOMINAL DISTENTION: 1
EYE DISCHARGE: 0
WHEEZING: 0

## 2019-09-11 NOTE — PROGRESS NOTES
Surgical History:   Procedure Laterality Date    BONE MARROW BIOPSY      DILATION AND CURETTAGE OF UTERUS      DILATION AND CURETTAGE OF UTERUS   &     TUBAL LIGATION  1982       Family History   Problem Relation Age of Onset    High Blood Pressure Mother     Alzheimer's Disease Mother     Heart Disease Father     High Blood Pressure Father     Heart Disease Maternal Grandmother     Heart Disease Maternal Grandfather     Stroke Maternal Grandfather     Diabetes Paternal Grandmother     High Blood Pressure Brother        Social History     Socioeconomic History    Marital status: Legally      Spouse name: None    Number of children: None    Years of education: None    Highest education level: None   Occupational History    None   Social Needs    Financial resource strain: None    Food insecurity:     Worry: None     Inability: None    Transportation needs:     Medical: None     Non-medical: None   Tobacco Use    Smoking status: Former Smoker     Packs/day: 0.10     Years: 1.00     Pack years: 0.10     Types: Cigarettes     Last attempt to quit: 2019     Years since quittin.3    Smokeless tobacco: Never Used   Substance and Sexual Activity    Alcohol use: No    Drug use: No    Sexual activity: None   Lifestyle    Physical activity:     Days per week: None     Minutes per session: None    Stress: None   Relationships    Social connections:     Talks on phone: None     Gets together: None     Attends [employer] service: None     Active member of club or organization: None     Attends meetings of clubs or organizations: None     Relationship status: None    Intimate partner violence:     Fear of current or ex partner: None     Emotionally abused: None     Physically abused: None     Forced sexual activity: None   Other Topics Concern    None   Social History Narrative    None       OBJECTIVE:    Vitals:    19 1524   BP: 138/60   Site: Left Upper Arm sulfADIAZINE (SILVADENE) 1 % cream Apply topically daily. 50 g 0    gabapentin (NEURONTIN) 300 MG capsule Take 1 capsule by mouth 4 times daily for 90 days. 120 capsule 2    furosemide (LASIX) 20 MG tablet Take 2 tablets by mouth 2 times daily 120 tablet 3    ondansetron (ZOFRAN) 4 MG tablet Take 1 tablet by mouth every 8 hours as needed for Nausea or Vomiting 60 tablet 3    pantoprazole (PROTONIX) 20 MG tablet Take 1 tablet by mouth daily 30 tablet 3    DULoxetine (CYMBALTA) 30 MG extended release capsule Take 1 capsule by mouth daily 30 capsule 3    spironolactone (ALDACTONE) 100 MG tablet Take 1 tablet by mouth 2 times daily 60 tablet 3    acetaZOLAMIDE (DIAMOX) 125 MG tablet Take 1 tablet by mouth 3 times daily 90 tablet 3    ALPRAZolam (XANAX) 0.5 MG tablet Take 1 tablet by mouth 3 times daily as needed for Sleep for up to 30 days. 90 tablet 2    lidocaine viscous hcl (XYLOCAINE) 2 % SOLN solution TAKE 15 MLS (1 TABLESPOONFUL) ONCE DAILY AS NEEDED FOR IRRITATION OR PAIN 100 mL 0     No current facility-administered medications for this visit. During this visit the following were done:  Labs reviewed [x]    Labs ordered[x]    Radiology reports Reviewed[]    Radiology ordered[]    EKG, echo, and/or stress testreviewed []    EEG resultsreviewed  []    EEG reviewed and interpretedper myself   []    Previousprovider/old records requested  []    Previous provider Records Reviewed []    ER records Reviewed []    Hospitalrecords Reviewed []    Historyobtained From Family []    Radiologicalimages view and Interpreted per myself []      ASSESSMENT/PLAN:    Shiraz Martines was seen today for hypertension, gastroesophageal reflux, cirrhosis, pain and other. Diagnoses and all orders for this visit:    Breast cancer screening  -     [facility] DIGITAL SCREEN W OR WO CAD BILATERAL; Future    Cirrhosis of liver with ascites, unspecified hepatic cirrhosis type ([facility] [address]  -     Comprehensive Metabolic Panel;  Future  -     CBC

## 2019-09-11 NOTE — PROGRESS NOTES
1. Have you seen another provider since your last visit? Yes    2. Have you had any other diagnostic tests since your last visit? Paracentesis. 3. Have you changed or stopped any medications since your last visit? No      Chief Complaint   Patient presents with    Hypertension     f/u    Gastroesophageal Reflux    Cirrhosis    Pain    Other     places on right hand, swelled, red. hx of staph       Will get eye note.   Will order mammogram.

## 2019-09-12 ENCOUNTER — TELEPHONE (OUTPATIENT)
Dept: PRIMARY CARE CLINIC | Age: 64
End: 2019-09-12

## 2019-09-12 NOTE — TELEPHONE ENCOUNTER
----- Message from Shirley Carrasquillo DO sent at 9/11/2019  8:20 PM EDT -----  Call the patient and tell her to stop the Bactrim. This could be contributing to her low blood count. She should come back in this weekend and have a wound check. She also needs to make an appt with hepatology so they can discuss her medication regimen. WBC is very low, which means you are at increased risk of infection.  Your labs also showed that you have anemia and decreased kidney filtration though this has improved.   Your bleeding risk numbers look a slight bit better than last time we check.  Keep any follow up appts.  Come to the doctor with any sign of infection.

## 2019-09-16 ENCOUNTER — APPOINTMENT (OUTPATIENT)
Dept: GENERAL RADIOLOGY | Facility: HOSPITAL | Age: 64
End: 2019-09-16
Payer: COMMERCIAL

## 2019-09-16 ENCOUNTER — HOSPITAL ENCOUNTER (EMERGENCY)
Facility: HOSPITAL | Age: 64
Discharge: ANOTHER HOSPITAL | End: 2019-09-16
Attending: HOSPITALIST
Payer: COMMERCIAL

## 2019-09-16 VITALS
TEMPERATURE: 99.4 F | HEART RATE: 73 BPM | HEIGHT: 61 IN | DIASTOLIC BLOOD PRESSURE: 47 MMHG | RESPIRATION RATE: 24 BRPM | BODY MASS INDEX: 30.96 KG/M2 | SYSTOLIC BLOOD PRESSURE: 129 MMHG | OXYGEN SATURATION: 96 % | WEIGHT: 164 LBS

## 2019-09-16 DIAGNOSIS — J90: ICD-10-CM

## 2019-09-16 DIAGNOSIS — K74.60 LIVER CIRRHOSIS SECONDARY TO NASH (HCC): ICD-10-CM

## 2019-09-16 DIAGNOSIS — R18.8 OTHER ASCITES: Primary | ICD-10-CM

## 2019-09-16 DIAGNOSIS — K75.81 LIVER CIRRHOSIS SECONDARY TO NASH (HCC): ICD-10-CM

## 2019-09-16 DIAGNOSIS — J18.9 PNEUMONITIS: ICD-10-CM

## 2019-09-16 LAB
A/G RATIO: 0.9 (ref 0.8–2)
ALBUMIN SERPL-MCNC: 3 G/DL (ref 3.4–4.8)
ALP BLD-CCNC: 181 U/L (ref 25–100)
ALT SERPL-CCNC: 17 U/L (ref 4–36)
AMMONIA: 120 MCG/DL (ref 19–87)
ANION GAP SERPL CALCULATED.3IONS-SCNC: 7 MMOL/L (ref 3–16)
APTT: 29.3 SEC (ref 22.9–31.3)
AST SERPL-CCNC: 35 U/L (ref 8–33)
BASOPHILS ABSOLUTE: 0 K/UL (ref 0–0.1)
BASOPHILS RELATIVE PERCENT: 0.8 %
BILIRUB SERPL-MCNC: 3.4 MG/DL (ref 0.3–1.2)
BUN BLDV-MCNC: 15 MG/DL (ref 6–20)
CALCIUM SERPL-MCNC: 8.4 MG/DL (ref 8.5–10.5)
CHLORIDE BLD-SCNC: 108 MMOL/L (ref 98–107)
CO2: 23 MMOL/L (ref 20–30)
CREAT SERPL-MCNC: 1 MG/DL (ref 0.4–1.2)
EOSINOPHILS ABSOLUTE: 0.1 K/UL (ref 0–0.4)
EOSINOPHILS RELATIVE PERCENT: 6.5 %
GFR AFRICAN AMERICAN: >59
GFR NON-AFRICAN AMERICAN: 56
GLOBULIN: 3.4 G/DL
GLUCOSE BLD-MCNC: 161 MG/DL (ref 74–106)
HCT VFR BLD CALC: 27.4 % (ref 37–47)
HEMOGLOBIN: 9.4 G/DL (ref 11.5–16.5)
IMMATURE GRANULOCYTES #: 0 K/UL
IMMATURE GRANULOCYTES %: 0 % (ref 0–5)
INR BLD: 1.1 (ref 0.9–1.1)
LYMPHOCYTES ABSOLUTE: 0.4 K/UL (ref 1.5–4)
LYMPHOCYTES RELATIVE PERCENT: 29.8 %
MCH RBC QN AUTO: 32.4 PG (ref 27–32)
MCHC RBC AUTO-ENTMCNC: 34.3 G/DL (ref 31–35)
MCV RBC AUTO: 94.5 FL (ref 80–100)
MONOCYTES ABSOLUTE: 0.2 K/UL (ref 0.2–0.8)
MONOCYTES RELATIVE PERCENT: 15.3 %
NEUTROPHILS ABSOLUTE: 0.6 K/UL (ref 2–7.5)
NEUTROPHILS RELATIVE PERCENT: 47.6 %
PDW BLD-RTO: 16.9 % (ref 11–16)
PLATELET # BLD: 157 K/UL (ref 150–400)
PMV BLD AUTO: 9.6 FL (ref 6–10)
POTASSIUM REFLEX MAGNESIUM: 4 MMOL/L (ref 3.4–5.1)
PRO-BNP: 363 PG/ML (ref 0–1800)
PROTHROMBIN TIME: 11 SEC (ref 8.9–10.7)
RBC # BLD: 2.9 M/UL (ref 3.8–5.8)
SODIUM BLD-SCNC: 138 MMOL/L (ref 136–145)
TOTAL PROTEIN: 6.4 G/DL (ref 6.4–8.3)
TROPONIN: <0.3 NG/ML
WBC # BLD: 1.2 K/UL (ref 4–11)

## 2019-09-16 PROCEDURE — 71045 X-RAY EXAM CHEST 1 VIEW: CPT

## 2019-09-16 PROCEDURE — 96365 THER/PROPH/DIAG IV INF INIT: CPT

## 2019-09-16 PROCEDURE — 85730 THROMBOPLASTIN TIME PARTIAL: CPT

## 2019-09-16 PROCEDURE — 6370000000 HC RX 637 (ALT 250 FOR IP): Performed by: HOSPITALIST

## 2019-09-16 PROCEDURE — 83880 ASSAY OF NATRIURETIC PEPTIDE: CPT

## 2019-09-16 PROCEDURE — 36415 COLL VENOUS BLD VENIPUNCTURE: CPT

## 2019-09-16 PROCEDURE — 99284 EMERGENCY DEPT VISIT MOD MDM: CPT

## 2019-09-16 PROCEDURE — 6360000002 HC RX W HCPCS: Performed by: HOSPITALIST

## 2019-09-16 PROCEDURE — 85025 COMPLETE CBC W/AUTO DIFF WBC: CPT

## 2019-09-16 PROCEDURE — 80053 COMPREHEN METABOLIC PANEL: CPT

## 2019-09-16 PROCEDURE — 96375 TX/PRO/DX INJ NEW DRUG ADDON: CPT

## 2019-09-16 PROCEDURE — 82140 ASSAY OF AMMONIA: CPT

## 2019-09-16 PROCEDURE — 84484 ASSAY OF TROPONIN QUANT: CPT

## 2019-09-16 PROCEDURE — 85610 PROTHROMBIN TIME: CPT

## 2019-09-16 PROCEDURE — 2580000003 HC RX 258: Performed by: HOSPITALIST

## 2019-09-16 PROCEDURE — 93005 ELECTROCARDIOGRAM TRACING: CPT

## 2019-09-16 RX ORDER — MORPHINE SULFATE 2 MG/ML
2 INJECTION, SOLUTION INTRAMUSCULAR; INTRAVENOUS EVERY 30 MIN PRN
Status: DISCONTINUED | OUTPATIENT
Start: 2019-09-16 | End: 2019-09-17 | Stop reason: HOSPADM

## 2019-09-16 RX ORDER — OXYCODONE HYDROCHLORIDE 5 MG/1
5 TABLET ORAL ONCE
Status: COMPLETED | OUTPATIENT
Start: 2019-09-16 | End: 2019-09-16

## 2019-09-16 RX ADMIN — MORPHINE SULFATE 2 MG: 2 INJECTION, SOLUTION INTRAMUSCULAR; INTRAVENOUS at 20:08

## 2019-09-16 RX ADMIN — CEFTRIAXONE 1 G: 1 INJECTION, POWDER, FOR SOLUTION INTRAMUSCULAR; INTRAVENOUS at 18:20

## 2019-09-16 RX ADMIN — OXYCODONE HYDROCHLORIDE 5 MG: 5 TABLET ORAL at 17:40

## 2019-09-16 ASSESSMENT — PAIN SCALES - GENERAL
PAINLEVEL_OUTOF10: 10
PAINLEVEL_OUTOF10: 8
PAINLEVEL_OUTOF10: 5
PAINLEVEL_OUTOF10: 10

## 2019-09-16 ASSESSMENT — PAIN DESCRIPTION - DESCRIPTORS: DESCRIPTORS: SHARP;DULL

## 2019-09-16 ASSESSMENT — PAIN DESCRIPTION - ORIENTATION: ORIENTATION: LOWER

## 2019-09-16 ASSESSMENT — PAIN DESCRIPTION - PAIN TYPE
TYPE: ACUTE PAIN
TYPE: ACUTE PAIN

## 2019-09-16 ASSESSMENT — PAIN - FUNCTIONAL ASSESSMENT: PAIN_FUNCTIONAL_ASSESSMENT: 0-10

## 2019-09-16 ASSESSMENT — PAIN DESCRIPTION - LOCATION
LOCATION: ABDOMEN
LOCATION: BACK

## 2019-09-16 NOTE — ED PROVIDER NOTES
times daily for 10 days    DULOXETINE (CYMBALTA) 30 MG EXTENDED RELEASE CAPSULE    Take 1 capsule by mouth daily    FUROSEMIDE (LASIX) 20 MG TABLET    Take 2 tablets by mouth 2 times daily    GABAPENTIN (NEURONTIN) 300 MG CAPSULE    Take 1 capsule by mouth 4 times daily for 90 days. LIDOCAINE VISCOUS HCL (XYLOCAINE) 2 % SOLN SOLUTION    TAKE 15 MLS (1 TABLESPOONFUL) ONCE DAILY AS NEEDED FOR IRRITATION OR PAIN    ONDANSETRON (ZOFRAN) 4 MG TABLET    Take 1 tablet by mouth every 8 hours as needed for Nausea or Vomiting    PANTOPRAZOLE (PROTONIX) 20 MG TABLET    Take 1 tablet by mouth daily    SILVER SULFADIAZINE (SILVADENE) 1 % CREAM    Apply topically daily.     SPIRONOLACTONE (ALDACTONE) 100 MG TABLET    Take 1 tablet by mouth 2 times daily       ALLERGIES     Lisinopril; Ciprofloxacin; and Dye [iodides]    FAMILY HISTORY       Family History   Problem Relation Age of Onset    High Blood Pressure Mother     Alzheimer's Disease Mother     Heart Disease Father     High Blood Pressure Father     Heart Disease Maternal Grandmother     Heart Disease Maternal Grandfather     Stroke Maternal Grandfather     Diabetes Paternal Grandmother     High Blood Pressure Brother           SOCIAL HISTORY       Social History     Socioeconomic History    Marital status: Legally      Spouse name: None    Number of children: None    Years of education: None    Highest education level: None   Occupational History    None   Social Needs    Financial resource strain: None    Food insecurity:     Worry: None     Inability: None    Transportation needs:     Medical: None     Non-medical: None   Tobacco Use    Smoking status: Former Smoker     Packs/day: 0.10     Years: 1.00     Pack years: 0.10     Types: Cigarettes     Last attempt to quit: 2019     Years since quittin.3    Smokeless tobacco: Never Used   Substance and Sexual Activity    Alcohol use: No    Drug use: No    Sexual activity: None Chloride 108 (H) 98 - 107 mmol/L    CO2 23 20 - 30 mmol/L    Anion Gap 7 3 - 16    Glucose 161 (H) 74 - 106 mg/dL    BUN 15 6 - 20 mg/dL    CREATININE 1.0 0.4 - 1.2 mg/dL    GFR Non-African American 56 (L) >59    GFR African American >59 >59    Calcium 8.4 (L) 8.5 - 10.5 mg/dL    Total Protein 6.4 6.4 - 8.3 g/dL    Alb 3.0 (L) 3.4 - 4.8 g/dL    Albumin/Globulin Ratio 0.9 0.8 - 2.0    Total Bilirubin 3.4 (H) 0.3 - 1.2 mg/dL    Alkaline Phosphatase 181 (H) 25 - 100 U/L    ALT 17 4 - 36 U/L    AST 35 (H) 8 - 33 U/L    Globulin 3.4 g/dL   Troponin   Result Value Ref Range    Troponin <0.30 <0.30 ng/mL   Brain Natriuretic Peptide   Result Value Ref Range    Pro- 0 - 1,800 pg/mL   Protime-INR   Result Value Ref Range    Protime 11.0 (H) 8.9 - 10.7 sec    INR 1.10 0.90 - 1.10   APTT   Result Value Ref Range    aPTT 29.3 22.9 - 31.3 sec   Ammonia   Result Value Ref Range    Ammonia 120 (H) 19 - 87 mcg/dL        All other labs were within normal range or not returned as of this dictation. EMERGENCY DEPARTMENT COURSE and DIFFERENTIAL DIAGNOSIS/MDM:   Vitals:    Vitals:    09/16/19 1915 09/16/19 1930 09/16/19 1945 09/16/19 2000   BP: (!) 154/69 (!) 132/59 (!) 144/66 (!) 148/57   Pulse: 81 77 84 78   Resp: 17 29 22 22   Temp:       TempSrc:       SpO2: 95% 94% 95% 95%   Weight:       Height:           MEDICATIONS ADMINISTERED IN ED:  Medications   morphine (PF) injection 2 mg (2 mg Intravenous Given 9/16/19 2008)   oxyCODONE (ROXICODONE) immediate release tablet 5 mg (5 mg Oral Given 9/16/19 1740)   cefTRIAXone (ROCEPHIN) 1 g IVPB in 50 mL D5W minibag (0 g Intravenous Stopped 9/16/19 1850)       After initial evaluation and examination I did have a conversation with the patient and her family about the upcoming plan, treatment and possible disposition stable agreeable to the time of this dictation. Patient advised that we will give her Roxicodone 5 mg for the pain from the cough is radiating into her back.  She'll also threatening deterioration in the patient's condition which required my urgent intervention. FINAL IMPRESSION      1. Other ascites    2. Medium-sized pleural effusion    3. Pneumonitis    4. Liver cirrhosis secondary to RE[facility]Z ([facility] [address]          DISPOSITION/PLAN   DISPOSITION        PATIENT REFERRED TO:  No follow-up provider specified. DISCHARGE MEDICATIONS:  New Prescriptions    No medications on file       Comment: Please note this report has been produced using speech recognition software and may contain errorsrelated to that system including errors in grammar, punctuation, and spelling, as well as words and phrases that may be inappropriate. If there are any questions or concerns please feel free to contact the dictating providerfor clarification.     Darline Tan DO  Attending Emergency Physician              Darline Tan DO  09/16/19 2017

## 2019-09-16 NOTE — ED NOTES
Patient stated that hand hurt from IV, Dr. Poonam Rivers gave me permission to unhook the IV. Patient stated that it feels better and I wrapped a warm blanket over her hand.      Angelo Nelson  09/16/19 1951

## 2019-09-23 ENCOUNTER — CARE COORDINATION (OUTPATIENT)
Dept: CARE COORDINATION | Age: 64
End: 2019-09-23

## 2019-10-02 ENCOUNTER — OFFICE VISIT (OUTPATIENT)
Dept: PRIMARY CARE CLINIC | Age: 64
End: 2019-10-02
Payer: COMMERCIAL

## 2019-10-02 ENCOUNTER — HOSPITAL ENCOUNTER (OUTPATIENT)
Facility: HOSPITAL | Age: 64
Discharge: HOME | End: 2019-10-02
Payer: COMMERCIAL

## 2019-10-02 VITALS
WEIGHT: 162 LBS | HEIGHT: 61 IN | HEART RATE: 86 BPM | SYSTOLIC BLOOD PRESSURE: 138 MMHG | RESPIRATION RATE: 16 BRPM | BODY MASS INDEX: 30.58 KG/M2 | OXYGEN SATURATION: 98 % | DIASTOLIC BLOOD PRESSURE: 68 MMHG

## 2019-10-02 DIAGNOSIS — M79.2 NEUROPATHIC PAIN: ICD-10-CM

## 2019-10-02 DIAGNOSIS — G89.29 CHRONIC BILATERAL LOW BACK PAIN WITH BILATERAL SCIATICA: ICD-10-CM

## 2019-10-02 DIAGNOSIS — K74.60 CIRRHOSIS OF LIVER WITH ASCITES, UNSPECIFIED HEPATIC CIRRHOSIS TYPE (HCC): ICD-10-CM

## 2019-10-02 DIAGNOSIS — R18.8 CIRRHOSIS OF LIVER WITH ASCITES, UNSPECIFIED HEPATIC CIRRHOSIS TYPE (HCC): ICD-10-CM

## 2019-10-02 DIAGNOSIS — L03.113 CELLULITIS OF RIGHT HAND: ICD-10-CM

## 2019-10-02 DIAGNOSIS — M54.41 CHRONIC BILATERAL LOW BACK PAIN WITH BILATERAL SCIATICA: ICD-10-CM

## 2019-10-02 DIAGNOSIS — E11.9 TYPE 2 DIABETES MELLITUS WITHOUT COMPLICATION, WITHOUT LONG-TERM CURRENT USE OF INSULIN (HCC): ICD-10-CM

## 2019-10-02 DIAGNOSIS — K76.82 HEPATIC ENCEPHALOPATHY: ICD-10-CM

## 2019-10-02 DIAGNOSIS — M54.42 CHRONIC BILATERAL LOW BACK PAIN WITH BILATERAL SCIATICA: ICD-10-CM

## 2019-10-02 DIAGNOSIS — E11.9 TYPE 2 DIABETES MELLITUS WITHOUT COMPLICATION, WITHOUT LONG-TERM CURRENT USE OF INSULIN (HCC): Primary | ICD-10-CM

## 2019-10-02 PROCEDURE — 82043 UR ALBUMIN QUANTITATIVE: CPT

## 2019-10-02 PROCEDURE — 82570 ASSAY OF URINE CREATININE: CPT

## 2019-10-02 PROCEDURE — 99214 OFFICE O/P EST MOD 30 MIN: CPT | Performed by: PEDIATRICS

## 2019-10-02 RX ORDER — GABAPENTIN 300 MG/1
600 CAPSULE ORAL 3 TIMES DAILY
Qty: 180 CAPSULE | Refills: 2 | Status: SHIPPED | OUTPATIENT
Start: 2019-10-02 | End: 2020-01-02 | Stop reason: SDUPTHER

## 2019-10-02 ASSESSMENT — ENCOUNTER SYMPTOMS
ABDOMINAL DISTENTION: 1
SINUS PRESSURE: 0
EYE DISCHARGE: 0
COUGH: 0
ABDOMINAL PAIN: 0
WHEEZING: 0
BACK PAIN: 0
SORE THROAT: 0
VOMITING: 0
SHORTNESS OF BREATH: 0
NAUSEA: 0

## 2019-10-03 ENCOUNTER — TELEPHONE (OUTPATIENT)
Dept: PRIMARY CARE CLINIC | Age: 64
End: 2019-10-03

## 2019-10-03 DIAGNOSIS — R18.8 CIRRHOSIS OF LIVER WITH ASCITES, UNSPECIFIED HEPATIC CIRRHOSIS TYPE (HCC): ICD-10-CM

## 2019-10-03 DIAGNOSIS — K74.60 CIRRHOSIS OF LIVER WITH ASCITES, UNSPECIFIED HEPATIC CIRRHOSIS TYPE (HCC): ICD-10-CM

## 2019-10-03 DIAGNOSIS — K76.82 HEPATIC ENCEPHALOPATHY: ICD-10-CM

## 2019-10-03 LAB
CREATININE URINE: 212.8 MG/DL (ref 1.5–300)
MICROALBUMIN UR-MCNC: 2.7 MG/DL (ref 0–22)
MICROALBUMIN/CREAT UR-RTO: 12.7 MG/G (ref 0–30)

## 2019-10-08 DIAGNOSIS — G93.2 IIH (IDIOPATHIC INTRACRANIAL HYPERTENSION): ICD-10-CM

## 2019-10-08 RX ORDER — ACETAZOLAMIDE 125 MG/1
TABLET ORAL
Qty: 90 TABLET | Refills: 3 | Status: SHIPPED | OUTPATIENT
Start: 2019-10-08 | End: 2020-04-21 | Stop reason: SDUPTHER

## 2019-10-09 ENCOUNTER — TELEPHONE (OUTPATIENT)
Dept: PRIMARY CARE CLINIC | Age: 64
End: 2019-10-09

## 2019-11-06 ENCOUNTER — TELEPHONE (OUTPATIENT)
Dept: PRIMARY CARE CLINIC | Age: 64
End: 2019-11-06

## 2019-11-23 ENCOUNTER — APPOINTMENT (OUTPATIENT)
Dept: ULTRASOUND IMAGING | Facility: HOSPITAL | Age: 64
End: 2019-11-23

## 2019-11-23 ENCOUNTER — HOSPITAL ENCOUNTER (EMERGENCY)
Facility: HOSPITAL | Age: 64
Discharge: HOME | End: 2019-11-23
Attending: EMERGENCY MEDICINE | Admitting: EMERGENCY MEDICINE

## 2019-11-23 VITALS
HEART RATE: 67 BPM | OXYGEN SATURATION: 98 % | BODY MASS INDEX: 29.44 KG/M2 | RESPIRATION RATE: 18 BRPM | DIASTOLIC BLOOD PRESSURE: 74 MMHG | SYSTOLIC BLOOD PRESSURE: 152 MMHG | HEIGHT: 62 IN | WEIGHT: 160 LBS | TEMPERATURE: 98.2 F

## 2019-11-23 DIAGNOSIS — T80.1XXA PHLEBITIS AFTER INFUSION, INITIAL ENCOUNTER: Primary | ICD-10-CM

## 2019-11-23 DIAGNOSIS — I80.9 PHLEBITIS AFTER INFUSION, INITIAL ENCOUNTER: Primary | ICD-10-CM

## 2019-11-23 PROCEDURE — 93971 EXTREMITY STUDY: CPT

## 2019-11-23 PROCEDURE — 99282 EMERGENCY DEPT VISIT SF MDM: CPT

## 2019-11-23 NOTE — ED PROVIDER NOTES
-- Message is from the [facility]--    Referral Request  Name of Specialist: ROD THOMAS   Provider's specialty: Podiatry  Reason for referral: FOLLOW UP APPOINTMENT 5/28/19    Is this a NEW request?: yes      Referral ordered by: TOYA CALI      Insurance type:    Payor: [employer]AGE O - [facility] [employer] / Plan: [employer] MEDICARE ADVANTAGE HMO - [facility] [employer] / Product Type: [employer] Risk      Preferred Delivery Method   Call when ready for pickup - phone number to notify: WILL CALL WITH FAX NUMBER         Alternative phone number:     Turnaround time given to caller:   \"This message will be sent to [state Provider's full name]. The clinical team will return your call as soon as they review your message. Typically, it takes 3 business days to process referral requests.\"   Subjective   64-year-old female presents with swelling in her left arm, she had an infiltrated IV to days ago at [facility].  She is been having some swelling in the left arm by the left elbow, it feels tight especially with bending the elbow.         History provided by:  Patient   used: No        Review of Systems   Musculoskeletal:        Left arm swelling   All other systems reviewed and are negative.      Past Medical History:   Diagnosis Date   • Cirrhosis (CMS/HCC)    • Diabetes mellitus (CMS/HCC)    • Hypertension    • Injury of back        Allergies   Allergen Reactions   • Iodinated Diagnostic Agents Anaphylaxis   • Lisinopril Shortness Of Breath       History reviewed. No pertinent surgical history.    History reviewed. No pertinent family history.    Social History     Socioeconomic History   • Marital status:      Spouse name: Not on file   • Number of children: Not on file   • Years of education: Not on file   • Highest education level: Not on file   Tobacco Use   • Smoking status: Light Tobacco Smoker     Types: Cigarettes   Substance and Sexual Activity   • Alcohol use: No   • Drug use: No           Objective   Physical Exam   Constitutional: She is oriented to person, place, and time. She appears well-developed and well-nourished.   HENT:   Head: Normocephalic and atraumatic.   Eyes: EOM are normal.   Neck: Normal range of motion. Neck supple.   Cardiovascular: Normal rate and regular rhythm.   Pulmonary/Chest: Effort normal and breath sounds normal.   Abdominal: Soft. Bowel sounds are normal.   Musculoskeletal: Normal range of motion.   Neurological: She is alert and oriented to person, place, and time. She has normal reflexes.   Skin: Skin is warm and dry.   Psychiatric: She has a normal mood and affect. Her behavior is normal.   Nursing note and vitals reviewed.      Procedures           ED Course                  MDM  Number of Diagnoses or Management  Options  Phlebitis after infusion, initial encounter: new and requires workup     Amount and/or Complexity of Data Reviewed  Tests in the radiology section of CPT®: reviewed    Risk of Complications, Morbidity, and/or Mortality  Presenting problems: minimal  Diagnostic procedures: minimal  Management options: minimal    Patient Progress  Patient progress: stable      Final diagnoses:   Phlebitis after infusion, initial encounter              Chris Dunaway Jr., PA-C  11/23/19 1825

## 2019-11-26 DIAGNOSIS — R11.0 NAUSEA: ICD-10-CM

## 2019-11-27 RX ORDER — ONDANSETRON 4 MG/1
TABLET, FILM COATED ORAL
Qty: 60 TABLET | Refills: 3 | Status: SHIPPED | OUTPATIENT
Start: 2019-11-27 | End: 2020-03-23 | Stop reason: SDUPTHER

## 2019-12-10 ENCOUNTER — APPOINTMENT (OUTPATIENT)
Dept: CT IMAGING | Facility: HOSPITAL | Age: 64
End: 2019-12-10
Payer: COMMERCIAL

## 2019-12-10 ENCOUNTER — HOSPITAL ENCOUNTER (EMERGENCY)
Facility: HOSPITAL | Age: 64
Discharge: HOME | End: 2019-12-10
Attending: EMERGENCY MEDICINE
Payer: COMMERCIAL

## 2019-12-10 VITALS
OXYGEN SATURATION: 95 % | HEART RATE: 67 BPM | SYSTOLIC BLOOD PRESSURE: 120 MMHG | TEMPERATURE: 99 F | DIASTOLIC BLOOD PRESSURE: 61 MMHG | RESPIRATION RATE: 16 BRPM | BODY MASS INDEX: 30.21 KG/M2 | WEIGHT: 160 LBS | HEIGHT: 61 IN

## 2019-12-10 DIAGNOSIS — M54.50 ACUTE BILATERAL LOW BACK PAIN WITHOUT SCIATICA: Primary | ICD-10-CM

## 2019-12-10 LAB
BILIRUBIN URINE: ABNORMAL
BLOOD, URINE: NEGATIVE
CLARITY: CLEAR
COLOR: ABNORMAL
GLUCOSE URINE: NEGATIVE MG/DL
KETONES, URINE: ABNORMAL MG/DL
LEUKOCYTE ESTERASE, URINE: NEGATIVE
MICROSCOPIC EXAMINATION: ABNORMAL
NITRITE, URINE: NEGATIVE
PH UA: 6 (ref 5–8)
PROTEIN UA: NEGATIVE MG/DL
SPECIFIC GRAVITY UA: 1.02 (ref 1–1.03)
URINE REFLEX TO CULTURE: ABNORMAL
URINE TYPE: ABNORMAL
UROBILINOGEN, URINE: 4 E.U./DL

## 2019-12-10 PROCEDURE — 96375 TX/PRO/DX INJ NEW DRUG ADDON: CPT

## 2019-12-10 PROCEDURE — 96374 THER/PROPH/DIAG INJ IV PUSH: CPT

## 2019-12-10 PROCEDURE — 72131 CT LUMBAR SPINE W/O DYE: CPT

## 2019-12-10 PROCEDURE — 6360000002 HC RX W HCPCS: Performed by: EMERGENCY MEDICINE

## 2019-12-10 PROCEDURE — 81003 URINALYSIS AUTO W/O SCOPE: CPT

## 2019-12-10 PROCEDURE — 99283 EMERGENCY DEPT VISIT LOW MDM: CPT

## 2019-12-10 RX ORDER — MORPHINE SULFATE 10 MG/ML
6 INJECTION, SOLUTION INTRAMUSCULAR; INTRAVENOUS ONCE
Status: COMPLETED | OUTPATIENT
Start: 2019-12-10 | End: 2019-12-10

## 2019-12-10 RX ORDER — DEXAMETHASONE SODIUM PHOSPHATE 10 MG/ML
10 INJECTION INTRAMUSCULAR; INTRAVENOUS ONCE
Status: COMPLETED | OUTPATIENT
Start: 2019-12-10 | End: 2019-12-10

## 2019-12-10 RX ADMIN — DEXAMETHASONE SODIUM PHOSPHATE 10 MG: 10 INJECTION INTRAMUSCULAR; INTRAVENOUS at 01:30

## 2019-12-10 RX ADMIN — MORPHINE SULFATE 6 MG: 10 INJECTION INTRAVENOUS at 01:30

## 2019-12-10 ASSESSMENT — PAIN SCALES - GENERAL: PAINLEVEL_OUTOF10: 10

## 2019-12-28 DIAGNOSIS — K74.60 CIRRHOSIS OF LIVER WITH ASCITES, UNSPECIFIED HEPATIC CIRRHOSIS TYPE (HCC): ICD-10-CM

## 2019-12-28 DIAGNOSIS — R18.8 CIRRHOSIS OF LIVER WITH ASCITES, UNSPECIFIED HEPATIC CIRRHOSIS TYPE (HCC): ICD-10-CM

## 2019-12-28 DIAGNOSIS — I10 ESSENTIAL HYPERTENSION: ICD-10-CM

## 2019-12-30 RX ORDER — FUROSEMIDE 20 MG/1
TABLET ORAL
Qty: 120 TABLET | Refills: 3 | Status: SHIPPED | OUTPATIENT
Start: 2019-12-30 | End: 2020-04-21 | Stop reason: SDUPTHER

## 2020-01-02 ENCOUNTER — OFFICE VISIT (OUTPATIENT)
Dept: PRIMARY CARE CLINIC | Age: 65
End: 2020-01-02
Payer: COMMERCIAL

## 2020-01-02 VITALS
BODY MASS INDEX: 28.51 KG/M2 | OXYGEN SATURATION: 98 % | WEIGHT: 151 LBS | HEIGHT: 61 IN | HEART RATE: 73 BPM | SYSTOLIC BLOOD PRESSURE: 136 MMHG | DIASTOLIC BLOOD PRESSURE: 80 MMHG | RESPIRATION RATE: 16 BRPM

## 2020-01-02 LAB — HBA1C MFR BLD: 5.1 %

## 2020-01-02 PROCEDURE — 83036 HEMOGLOBIN GLYCOSYLATED A1C: CPT | Performed by: PEDIATRICS

## 2020-01-02 PROCEDURE — 99214 OFFICE O/P EST MOD 30 MIN: CPT | Performed by: PEDIATRICS

## 2020-01-02 RX ORDER — HYDROCODONE BITARTRATE AND ACETAMINOPHEN 5; 325 MG/1; MG/1
1 TABLET ORAL EVERY 8 HOURS PRN
Qty: 45 TABLET | Refills: 0 | Status: SHIPPED | OUTPATIENT
Start: 2020-01-02 | End: 2020-02-01

## 2020-01-02 RX ORDER — ALPRAZOLAM 0.5 MG/1
0.5 TABLET ORAL 3 TIMES DAILY PRN
Qty: 90 TABLET | Refills: 2 | Status: SHIPPED | OUTPATIENT
Start: 2020-01-02 | End: 2020-03-23 | Stop reason: SDUPTHER

## 2020-01-02 RX ORDER — GABAPENTIN 300 MG/1
600 CAPSULE ORAL 3 TIMES DAILY
Qty: 180 CAPSULE | Refills: 2 | Status: SHIPPED | OUTPATIENT
Start: 2020-01-02 | End: 2020-03-23 | Stop reason: SDUPTHER

## 2020-01-02 ASSESSMENT — ENCOUNTER SYMPTOMS
SHORTNESS OF BREATH: 1
NAUSEA: 1
SORE THROAT: 0
ABDOMINAL PAIN: 0
COUGH: 0
BACK PAIN: 0
ABDOMINAL DISTENTION: 1
WHEEZING: 0
EYE DISCHARGE: 0
SINUS PRESSURE: 0
VOMITING: 0

## 2020-01-02 NOTE — PROGRESS NOTES
symptoms include dysphoric mood. The primary symptoms do not include delusions or hallucinations. Primary symptoms comment: anxiety. The current episode started more than 1 month ago. This is a chronic problem. The degree of incapacity that she is experiencing as a consequence of her illness is severe. Sequelae of the illness include harmed interpersonal relations and an inability to care for self. Additional symptoms of the illness include anhedonia, insomnia, feelings of worthlessness, attention impairment and distractible. Additional symptoms of the illness do not include agitation, headaches or abdominal pain. She does not admit to suicidal ideas. She does not have a plan to commit suicide. She does not contemplate harming herself. She has not already injured self. She does not contemplate injuring another person. She has not already  injured another person. Risk factors that are present for mental illness include a history of mental illness. Review of Systems   Constitutional: Negative for chills and fever. HENT: Negative for congestion, sinus pressure and sore throat. Eyes: Negative for discharge and visual disturbance. Respiratory: Positive for shortness of breath. Negative for cough and wheezing. Cardiovascular: Negative for chest pain and palpitations. Gastrointestinal: Positive for abdominal distention and nausea. Negative for abdominal pain and vomiting. Endocrine: Negative for cold intolerance and heat intolerance. Genitourinary: Negative for dysuria, frequency and urgency. Musculoskeletal: Negative for arthralgias and back pain. Skin: Negative for rash and wound. Neurological: Negative for syncope, numbness and headaches. Hematological: Negative. Psychiatric/Behavioral: Positive for dysphoric mood. Negative for agitation, hallucinations and sleep disturbance. The patient has insomnia. The patient is not nervous/anxious.         Past Medical History:   Diagnosis Date    Arthritis, rheumatoid (HCC)     Cirrhosis ([facility] [address]     Colitis     Hypertension     IIH (idiopathic intracranial hypertension)     Liver cirrhosis (HCC)     Osteoarthritis     Type II or unspecified type diabetes mellitus without mention of complication, not stated as uncontrolled     Unspecified sleep apnea        Past Surgical History:   Procedure Laterality Date    BONE MARROW BIOPSY      DILATION AND CURETTAGE OF UTERUS      DILATION AND CURETTAGE OF UTERUS   &     TUBAL LIGATION  1982       Family History   Problem Relation Age of Onset    High Blood Pressure Mother     Alzheimer's Disease Mother     Heart Disease Father     High Blood Pressure Father     Heart Disease Maternal Grandmother     Heart Disease Maternal Grandfather     Stroke Maternal Grandfather     Diabetes Paternal Grandmother     High Blood Pressure Brother        Social History     Socioeconomic History    Marital status: Legally      Spouse name: None    Number of children: None    Years of education: None    Highest education level: None   Occupational History    None   Social Needs    Financial resource strain: None    Food insecurity:     Worry: None     Inability: None    Transportation needs:     Medical: None     Non-medical: None   Tobacco Use    Smoking status: Former Smoker     Packs/day: 0.10     Years: 1.00     Pack years: 0.10     Types: Cigarettes     Last attempt to quit: 2019     Years since quittin.6    Smokeless tobacco: Never Used   Substance and Sexual Activity    Alcohol use: No    Drug use: No    Sexual activity: None   Lifestyle    Physical activity:     Days per week: None     Minutes per session: None    Stress: None   Relationships    Social connections:     Talks on phone: None     Gets together: None     Attends Nondenominational service: None     Active member of club or organization: None     Attends meetings of clubs or organizations: None     Relationship Hemoglobin A1C (%)   Date Value   09/11/2019 4.6     Microscopic Examination (no units)   Date Value   12/10/2019 Not Indicated     LDL Calculated (mg/dL)   Date Value   04/10/2018 94         Lab Results   Component Value Date    WBC 1.2 (L) 09/16/2019    HGB 9.4 (L) 09/16/2019    HCT 27.4 (L) 09/16/2019    MCV 94.5 09/16/2019     09/16/2019    LYMPHOPCT 29.8 09/16/2019    RBC 2.90 (L) 09/16/2019    MCH 32.4 (H) 09/16/2019    MCHC 34.3 09/16/2019    RDW 16.9 (H) 09/16/2019       Lab Results   Component Value Date     09/16/2019    K 4.0 09/16/2019     (H) 09/16/2019    CO2 23 09/16/2019    BUN 15 09/16/2019    CREATININE 1.0 09/16/2019    GLUCOSE 161 (H) 09/16/2019    CALCIUM 8.4 (L) 09/16/2019    PROT 6.4 09/16/2019    LABALBU 3.0 (L) 09/16/2019    BILITOT 3.4 (H) 09/16/2019    ALKPHOS 181 (H) 09/16/2019    AST 35 (H) 09/16/2019    ALT 17 09/16/2019    LABGLOM 56 (L) 09/16/2019    GFRAA >59 09/16/2019    AGRATIO 0.9 09/16/2019    GLOB 3.4 09/16/2019       Lab Results   Component Value Date    TSH 1.46 10/29/2015       Current Outpatient Medications   Medication Sig Dispense Refill    HYDROcodone-acetaminophen (LORTAB) 5-325 MG per tablet Take 1 tablet by mouth every 8 hours as needed for Pain for up to 30 days. Intended supply: 3 days. Take lowest dose possible to manage pain 45 tablet 0    ALPRAZolam (XANAX) 0.5 MG tablet Take 1 tablet by mouth 3 times daily as needed for Sleep for up to 30 days. 90 tablet 2    gabapentin (NEURONTIN) 300 MG capsule Take 2 capsules by mouth 3 times daily for 90 days.  180 capsule 2    silver sulfADIAZINE (SILVADENE) 1 % cream APPLY TO AFFECTED AREA EVERY DAY 50 g 0    furosemide (LASIX) 20 MG tablet TAKE TWO TABLETS BY MOUTH TWO TIMES A  tablet 3    DULoxetine (CYMBALTA) 30 MG extended release capsule TAKE ONE CAPSULE BY MOUTH EVERY DAY 30 capsule 3    ondansetron (ZOFRAN) 4 MG tablet TAKE ONE TABLET BY MOUTH EVERY 8 HOURS AS NEEDED FOR NAUSEA AND VOMITING 60 tablet 3    acetaZOLAMIDE (DIAMOX) 125 MG tablet TAKE ONE TABLET BY MOUTH THREE TIMES A DAY 90 tablet 3    rifaximin (XIFAXAN) 550 MG tablet Take 1 tablet by mouth 2 times daily 15 tablet 0    Sulfamethoxazole-Trimethoprim (SMZ-TMP DS PO) Take by mouth      spironolactone (ALDACTONE) 100 MG tablet Take 1 tablet by mouth 2 times daily 60 tablet 3    lidocaine viscous hcl (XYLOCAINE) 2 % SOLN solution TAKE 15 MLS (1 TABLESPOONFUL) ONCE DAILY AS NEEDED FOR IRRITATION OR PAIN 100 mL 0     No current facility-administered medications for this visit. During this visit the following were done:  Labs reviewed []    Labs ordered[]    Radiology reports Reviewed[]    Radiology ordered[]    EKG, echo, and/or stress testreviewed []    EEG resultsreviewed  []    EEG reviewed and interpretedper myself   []    Previousprovider/old records requested  []    Previous provider Records Reviewed []    ER records Reviewed []    Hospitalrecords Reviewed []    Historyobtained From Family []    Radiologicalimages view and Interpreted per myself []      ASSESSMENT/PLAN:    Daysi Guerrero was seen today for diabetes, hypertension and cirrhosis. Diagnoses and all orders for this visit:    Type 2 diabetes mellitus without complication, without long-term current use of insulin (HCC)  -     POCT glycosylated hemoglobin (Hb A1C)    Depression with anxiety  -     ALPRAZolam (XANAX) 0.5 MG tablet; Take 1 tablet by mouth 3 times daily as needed for Sleep for up to 30 days. Panic  -     ALPRAZolam (XANAX) 0.5 MG tablet; Take 1 tablet by mouth 3 times daily as needed for Sleep for up to 30 days. Chronic liver disease and cirrhosis (HCC)  -     ALPRAZolam (XANAX) 0.5 MG tablet; Take 1 tablet by mouth 3 times daily as needed for Sleep for up to 30 days. Chronic bilateral low back pain with bilateral sciatica  -     HYDROcodone-acetaminophen (LORTAB) 5-325 MG per tablet;  Take 1 tablet by mouth every 8 hours as needed for Pain for up to 30 days. Intended supply: 3 days. Take lowest dose possible to manage pain  -     gabapentin (NEURONTIN) 300 MG capsule; Take 2 capsules by mouth 3 times daily for 90 days. Neuropathic pain  -     gabapentin (NEURONTIN) 300 MG capsule; Take 2 capsules by mouth 3 times daily for 90 days. Cellulitis of right hand  -     silver sulfADIAZINE (SILVADENE) 1 % cream; APPLY TO AFFECTED AREA EVERY DAY       Additional plan relatedto above diagnosis:    Return in about 3 months (around 4/2/2020) for chronic conditions.     Controlled Substances Monitoring:

## 2020-01-02 NOTE — PATIENT INSTRUCTIONS
· Keep a list of your medicines with you. List all of the prescription medicines, nonprescription medicines, supplements, natural remedies, and vitamins that you take. Tell your healthcare providers who treat you about all of the products you are taking. Your provider can provide you with a form to keep track of them. Just ask. · Follow the directions that come with your medicine, including information about food or alcohol. Make sure you know how and when to take your medicine. Do not take more or less than you are supposed to take. · Keep all medicines out of the reach of children. · Store medicines according to the directions on the label. · Monitor yourself. Learn to know how your body reacts to your new medicine and keep track of how it makes you feel before attempting (If your provider has allowed you to do so) to drive or go to work. · Seek emergency medical attention if you think you have used too much of this medicine. An overdose of any prescription medicine can be fatal. Overdose symptoms may include extreme drowsiness, muscle weakness, confusion, cold and clammy skin, pinpoint pupils, shallow breathing, slow heart rate, fainting, or coma. · Don't share prescription medicines with others, even when they seem to have the same symptoms. What may be good for you may be harmful to others. · If you are no longer taking a prescribed medication and you have pills left please take your pills out of their original containers. Mix crushed pills with an undesirable substance, such as cat litter or used coffee grounds. Put the mixture into a disposable container with a lid, such as an empty margarine tub, or into a sealable bag. Cover up or remove any of your personal information on the empty containers by covering it with black permanent marker or duct tape. Place the sealed container with the mixture, and the empty drug containers, in the trash.    · If you use a medication that is in the form of a patch, dispose of used patches by folding them in half so that the sticky sides meet, and then flushing them down a toilet. They should not be placed in the household trash where children or pets can find them. · If you have any questions, ask your provider or pharmacist for more information. · Be sure to keep all appointments for provider visits or tests. We are committed to providing you with the best care possible. In order to help us achieve these goals please remember to bring all medications, herbal products, and over the counter supplements with you to each visit. If your provider has ordered testing for you, please be sure to follow up with our office if you have not received results within 7 days after the testing took place. *If you receive a survey after visiting one of our offices, please take time to share your experience concerning your physician office visit. These surveys are confidential and no health information about you is shared. We are eager to improve for you and we are counting on your feedback to help make that happen. ips to Help You Stop Smoking       Cigarette smoking is a preventable cause of death in the [place]. If you have thought about quitting but haven't been able to, here are some reasons why you should and some ways to do it. Here's Why   Quitting smoking now can decrease your risk of getting smoking-related illnesses like:   Heart disease   Stroke   Several types of cancer, including:   Lung   Mouth   Esophagus   Larynx   Bladder   Pancreas   Kidney   Chronic lung diseases:   Bronchitis   Emphysema   Asthma   Cataracts   Macular degeneration   Thyroid conditions   Hearing loss   Erectile dysfunction   Dementia   Osteoporosis   Here's How   Once you've decided to quit smoking, set your target quit date a few weeks away.  In the time leading up to your quit day, try some of these ideas offered by the [address] [place] [address] [place] to help you successfully quit smoking. For the best results, work with your doctor. Together, you can test your lung function and compare the results to those of a nonsmoking person. The results can be given to you as your lung age. Finding out your lung age right after having the test done may help you to stop smoking. Your doctor can also discuss with you all of your options and refer you to smoking-cessation support groups. You may wish to use nicotine replacement (gum, patches, inhaler) or one of the prescription medications that have been shown to increase quit rates and prolong abstinence from smoking. But whatever you and your doctor decide on these matters, it will still be you who decides when an how to quit. Here are some techniques:   Switch Brands   Switch to a brand you find distasteful. Change to a brand that is low in tar and nicotine a couple of weeks before your target quit date. This will help change your smoking behavior. However, do not smoke more cigarettes, inhale them more often or more deeply, or place your fingertips over the holes in the filters. All of these actions will increase your nicotine intake, and the idea is to get your body used to functioning without nicotine. Cut Down the Number of Cigarettes You Smoke   Smoke only half of each cigarette. Each day, postpone the lighting of your first cigarette by one hour. Decide you'll only smoke during odd or even hours of the day. Decide beforehand how many cigarettes you'll smoke during the day. For each additional cigarette, give a dollar to your favorite leticia. Change your eating habits to help you cut down. For example, drink milk, which many people consider incompatible with smoking. End meals or snacks with something that won't lead to a cigarette. Reach for a glass of juice instead of a cigarette for a \"pick-me-up. \"   Remember: Cutting down can help you quit, but it's not a substitute for quitting.  If you're down to about seven cigarettes a day, it's time to set your target quit date, and get ready to stick to it. Don't Smoke \"Automatically\"   Smoke only those cigarettes you really want. Catch yourself before you light up a cigarette out of pure habit. Don't empty your ashtrays. This will remind you of how many cigarettes you've smoked each day, and the sight and the smell of stale cigarettes butts will be very unpleasant. Make yourself aware of each cigarette by using the opposite hand or putting cigarettes in an unfamiliar location or a different pocket to break the automatic reach. If you light up many times during the day without even thinking about it, try to look in a mirror each time you put a match to your cigarette. You may decide you don't need it. Make Smoking Inconvenient   Stop buying cigarettes by the carton. Wait until one pack is empty before you buy another. Stop carrying cigarettes with you at home or at work. Make them difficult to get to. Make Smoking Unpleasant   Smoke only under circumstances that aren't especially pleasurable for you. If you like to smoke with others, smoke alone. Turn your chair to an empty corner and focus only on the cigarette you are smoking and all its many negative effects. Collect all your cigarette butts in one large glass container as a visual reminder of the filth made by smoking. Just Before Quitting   Practice going without cigarettes. Don't think of never smoking again. Think of quitting in terms of one day at a time . Tell yourself you won't smoke today, and then don't. Clean your clothes to rid them of the cigarette smell, which can linger a long time. On the Day You Quit   Throw away all your cigarettes and matches. Hide your lighters and ashtrays. Visit the dentist and have your teeth cleaned to get rid of tobacco stains. Notice how nice they look and resolve to keep them that way.    Make a list of things you'd like to buy for yourself or someone else. Estimate the cost in terms of packs of cigarettes, and put the money aside to buy these presents. Keep very busy on the big day. Go to the movies, exercise, take long walks, or go bike riding. Remind your family and friends that this is your quit date, and ask them to help you over the rough spots of the first couple of days and weeks. Buy yourself a treat or do something special to celebrate. Telephone and Internet Support   Telephone, web-, and computer-based programs can offer you the support that you need to quit and to stay smoke-free. You can find many programs online, like the American Lung Association's [place] from Smoking . Immediately After Quitting   Develop a clean, fresh, nonsmoking environment around yourselfat work and at home. Buy yourself flowersyou may be surprised how much you can enjoy their scent now. The first few days after you quit, spend as much free time as possible in places where smoking isn't allowed, such as [address] [place] [address], museums, theaters, department stores, and churches. Drink large quantities of water and fruit juice (but avoid sodas that contain caffeine). Try to avoid alcohol, coffee, and other beverages that you associate with cigarette smoking. Strike up conversation instead of a match for a cigarette. If you miss the sensation of having a cigarette in your hand, play with something elsea pencil, a paper clip, a marble. If you miss having something in your mouth, try toothpicks or a fake cigarette. Thank you for requesting your Continuity of Care Document (CCD) electronically. Please follow the instructions below to securely access your online medical record. [employer] allows you to send messages to your doctor, view your test results, renew your prescriptions, schedule appointments, and more. How Do I Access my CCD? In your Internet browser, go to [email or web address]   Enter your user name and password Click on My medical Record  --> Download Summary --> Enter Password --> Download --> Save or Open Document    Additional Information  If you have questions, please contact your physician practice where you receive care. Remember, [employer] is NOT to be used for urgent needs. For medical emergencies, dial 911.

## 2020-01-03 ENCOUNTER — TELEPHONE (OUTPATIENT)
Dept: PRIMARY CARE CLINIC | Age: 65
End: 2020-01-03

## 2020-01-03 NOTE — TELEPHONE ENCOUNTER
Norco not covered on insurance pharmacy told her it would need to be PA'd. Informed we do not PA narcotics she asked if it could be switched to something else?

## 2020-02-01 ENCOUNTER — APPOINTMENT (OUTPATIENT)
Dept: GENERAL RADIOLOGY | Facility: HOSPITAL | Age: 65
End: 2020-02-01
Payer: COMMERCIAL

## 2020-02-01 ENCOUNTER — HOSPITAL ENCOUNTER (EMERGENCY)
Facility: HOSPITAL | Age: 65
Discharge: ANOTHER HOSPITAL | End: 2020-02-02
Attending: EMERGENCY MEDICINE
Payer: COMMERCIAL

## 2020-02-01 LAB
A/G RATIO: 1 (ref 0.8–2)
ALBUMIN SERPL-MCNC: 3.4 G/DL (ref 3.4–4.8)
ALP BLD-CCNC: 171 U/L (ref 25–100)
ALT SERPL-CCNC: 18 U/L (ref 4–36)
AMMONIA: 88 MCG/DL (ref 19–87)
ANION GAP SERPL CALCULATED.3IONS-SCNC: 11 MMOL/L (ref 3–16)
AST SERPL-CCNC: 35 U/L (ref 8–33)
BASOPHILS ABSOLUTE: 0 K/UL (ref 0–0.1)
BASOPHILS RELATIVE PERCENT: 2 %
BILIRUB SERPL-MCNC: 4 MG/DL (ref 0.3–1.2)
BUN BLDV-MCNC: 17 MG/DL (ref 6–20)
CALCIUM SERPL-MCNC: 8.2 MG/DL (ref 8.5–10.5)
CHLORIDE BLD-SCNC: 105 MMOL/L (ref 98–107)
CO2: 21 MMOL/L (ref 20–30)
CREAT SERPL-MCNC: 1 MG/DL (ref 0.4–1.2)
EOSINOPHILS ABSOLUTE: 0.2 K/UL (ref 0–0.4)
EOSINOPHILS RELATIVE PERCENT: 11.8 %
GFR AFRICAN AMERICAN: >59
GFR NON-AFRICAN AMERICAN: 56
GLOBULIN: 3.3 G/DL
GLUCOSE BLD-MCNC: 140 MG/DL (ref 74–106)
HCT VFR BLD CALC: 30.8 % (ref 37–47)
HEMOGLOBIN: 10.4 G/DL (ref 11.5–16.5)
IMMATURE GRANULOCYTES #: 0 K/UL
IMMATURE GRANULOCYTES %: 0.7 % (ref 0–5)
INR BLD: 1.1 (ref 0.9–1.1)
LACTIC ACID: 4.1 MMOL/L (ref 0.4–2)
LIPASE: 58 U/L (ref 5.6–51.3)
LYMPHOCYTES ABSOLUTE: 0.5 K/UL (ref 1.5–4)
LYMPHOCYTES RELATIVE PERCENT: 34 %
MCH RBC QN AUTO: 33.4 PG (ref 27–32)
MCHC RBC AUTO-ENTMCNC: 33.8 G/DL (ref 31–35)
MCV RBC AUTO: 99 FL (ref 80–100)
MONOCYTES ABSOLUTE: 0.4 K/UL (ref 0.2–0.8)
MONOCYTES RELATIVE PERCENT: 26.8 %
NEUTROPHILS ABSOLUTE: 0.4 K/UL (ref 2–7.5)
NEUTROPHILS RELATIVE PERCENT: 24.7 %
PDW BLD-RTO: 18.7 % (ref 11–16)
PLATELET # BLD: 200 K/UL (ref 150–400)
PMV BLD AUTO: 9.4 FL (ref 6–10)
POTASSIUM REFLEX MAGNESIUM: 3.1 MMOL/L (ref 3.4–5.1)
PROTHROMBIN TIME: 11.1 SEC (ref 8.9–10.7)
RBC # BLD: 3.11 M/UL (ref 3.8–5.8)
SODIUM BLD-SCNC: 137 MMOL/L (ref 136–145)
TOTAL PROTEIN: 6.7 G/DL (ref 6.4–8.3)
TROPONIN: <0.3 NG/ML
WBC # BLD: 1.5 K/UL (ref 4–11)

## 2020-02-01 PROCEDURE — 84484 ASSAY OF TROPONIN QUANT: CPT

## 2020-02-01 PROCEDURE — 93005 ELECTROCARDIOGRAM TRACING: CPT

## 2020-02-01 PROCEDURE — 96375 TX/PRO/DX INJ NEW DRUG ADDON: CPT

## 2020-02-01 PROCEDURE — 71045 X-RAY EXAM CHEST 1 VIEW: CPT

## 2020-02-01 PROCEDURE — 83735 ASSAY OF MAGNESIUM: CPT

## 2020-02-01 PROCEDURE — 83880 ASSAY OF NATRIURETIC PEPTIDE: CPT

## 2020-02-01 PROCEDURE — 36415 COLL VENOUS BLD VENIPUNCTURE: CPT

## 2020-02-01 PROCEDURE — 99284 EMERGENCY DEPT VISIT MOD MDM: CPT

## 2020-02-01 PROCEDURE — 6360000002 HC RX W HCPCS: Performed by: EMERGENCY MEDICINE

## 2020-02-01 PROCEDURE — 83605 ASSAY OF LACTIC ACID: CPT

## 2020-02-01 PROCEDURE — 85610 PROTHROMBIN TIME: CPT

## 2020-02-01 PROCEDURE — 82140 ASSAY OF AMMONIA: CPT

## 2020-02-01 PROCEDURE — 80053 COMPREHEN METABOLIC PANEL: CPT

## 2020-02-01 PROCEDURE — 83690 ASSAY OF LIPASE: CPT

## 2020-02-01 PROCEDURE — 85025 COMPLETE CBC W/AUTO DIFF WBC: CPT

## 2020-02-01 RX ORDER — ONDANSETRON 2 MG/ML
4 INJECTION INTRAMUSCULAR; INTRAVENOUS EVERY 30 MIN PRN
Status: DISCONTINUED | OUTPATIENT
Start: 2020-02-01 | End: 2020-02-02 | Stop reason: HOSPADM

## 2020-02-01 RX ORDER — FENTANYL CITRATE 50 UG/ML
25 INJECTION, SOLUTION INTRAMUSCULAR; INTRAVENOUS ONCE
Status: COMPLETED | OUTPATIENT
Start: 2020-02-01 | End: 2020-02-01

## 2020-02-01 RX ADMIN — ONDANSETRON 4 MG: 2 INJECTION INTRAMUSCULAR; INTRAVENOUS at 23:42

## 2020-02-01 RX ADMIN — FENTANYL CITRATE 25 MCG: 50 INJECTION INTRAMUSCULAR; INTRAVENOUS at 23:43

## 2020-02-01 ASSESSMENT — PAIN SCALES - GENERAL: PAINLEVEL_OUTOF10: 10

## 2020-02-01 ASSESSMENT — PAIN DESCRIPTION - PAIN TYPE: TYPE: ACUTE PAIN

## 2020-02-01 ASSESSMENT — PAIN DESCRIPTION - LOCATION: LOCATION: CHEST

## 2020-02-01 ASSESSMENT — PAIN DESCRIPTION - DESCRIPTORS: DESCRIPTORS: DULL;SHARP

## 2020-02-01 ASSESSMENT — PAIN DESCRIPTION - FREQUENCY: FREQUENCY: CONTINUOUS

## 2020-02-01 ASSESSMENT — PAIN DESCRIPTION - ORIENTATION: ORIENTATION: RIGHT

## 2020-02-02 VITALS
HEART RATE: 76 BPM | RESPIRATION RATE: 27 BRPM | DIASTOLIC BLOOD PRESSURE: 47 MMHG | OXYGEN SATURATION: 95 % | SYSTOLIC BLOOD PRESSURE: 110 MMHG | HEIGHT: 61 IN | WEIGHT: 150 LBS | BODY MASS INDEX: 28.32 KG/M2 | TEMPERATURE: 98.3 F

## 2020-02-02 LAB
BILIRUBIN URINE: NEGATIVE
BLOOD, URINE: NEGATIVE
CLARITY: CLEAR
COLOR: YELLOW
GLUCOSE URINE: NEGATIVE MG/DL
KETONES, URINE: NEGATIVE MG/DL
LACTIC ACID: 1.8 MMOL/L (ref 0.4–2)
LEUKOCYTE ESTERASE, URINE: NEGATIVE
MAGNESIUM: 1.9 MG/DL (ref 1.7–2.4)
MICROSCOPIC EXAMINATION: NORMAL
NITRITE, URINE: NEGATIVE
PH UA: 5 (ref 5–8)
PRO-BNP: 302 PG/ML (ref 0–1800)
PROTEIN UA: NEGATIVE MG/DL
SPECIFIC GRAVITY UA: 1.01 (ref 1–1.03)
TROPONIN: <0.3 NG/ML
URINE REFLEX TO CULTURE: NORMAL
URINE TYPE: NORMAL
UROBILINOGEN, URINE: 1 E.U./DL

## 2020-02-02 PROCEDURE — 6370000000 HC RX 637 (ALT 250 FOR IP): Performed by: EMERGENCY MEDICINE

## 2020-02-02 PROCEDURE — 2580000003 HC RX 258: Performed by: EMERGENCY MEDICINE

## 2020-02-02 PROCEDURE — 36415 COLL VENOUS BLD VENIPUNCTURE: CPT

## 2020-02-02 PROCEDURE — 96365 THER/PROPH/DIAG IV INF INIT: CPT

## 2020-02-02 PROCEDURE — 81003 URINALYSIS AUTO W/O SCOPE: CPT

## 2020-02-02 PROCEDURE — 96376 TX/PRO/DX INJ SAME DRUG ADON: CPT

## 2020-02-02 PROCEDURE — 83605 ASSAY OF LACTIC ACID: CPT

## 2020-02-02 PROCEDURE — 84484 ASSAY OF TROPONIN QUANT: CPT

## 2020-02-02 PROCEDURE — 6360000002 HC RX W HCPCS: Performed by: EMERGENCY MEDICINE

## 2020-02-02 PROCEDURE — 87040 BLOOD CULTURE FOR BACTERIA: CPT

## 2020-02-02 RX ORDER — POTASSIUM CHLORIDE 20 MEQ/1
20 TABLET, EXTENDED RELEASE ORAL ONCE
Status: COMPLETED | OUTPATIENT
Start: 2020-02-02 | End: 2020-02-02

## 2020-02-02 RX ORDER — FENTANYL CITRATE 50 UG/ML
25 INJECTION, SOLUTION INTRAMUSCULAR; INTRAVENOUS ONCE
Status: COMPLETED | OUTPATIENT
Start: 2020-02-02 | End: 2020-02-02

## 2020-02-02 RX ADMIN — FENTANYL CITRATE 25 MCG: 50 INJECTION INTRAMUSCULAR; INTRAVENOUS at 04:11

## 2020-02-02 RX ADMIN — POTASSIUM CHLORIDE 20 MEQ: 1500 TABLET, EXTENDED RELEASE ORAL at 02:01

## 2020-02-02 RX ADMIN — PIPERACILLIN AND TAZOBACTAM 3.38 G: 3; .375 INJECTION, POWDER, FOR SOLUTION INTRAVENOUS at 01:15

## 2020-02-02 RX ADMIN — FENTANYL CITRATE 25 MCG: 50 INJECTION INTRAMUSCULAR; INTRAVENOUS at 01:14

## 2020-02-02 ASSESSMENT — HEART SCORE: ECG: 0

## 2020-02-02 ASSESSMENT — PAIN SCALES - GENERAL: PAINLEVEL_OUTOF10: 8

## 2020-02-02 NOTE — ED TRIAGE NOTES
Pt has been having severe rt side cp since thurs, some soa, no n/v, pt shaking, hx of cirrhosis of liver and is having fluid removed weekly from abd.  Pt  Also jaundice

## 2020-02-02 NOTE — ED PROVIDER NOTES
Supple. No meningismus. Trachea midline. No JVD. No bruits. HEART: RRR. Radial pulses 2+. Heart without murmur. LUNGS: Respirations unlabored. CTAB  ABDOMEN: Abdomen is distended from ascites. Non-tender. Decreased bowel sounds. No guarding or rebound. No CVAT. EXTREMITIES: No acute deformities. Hand  are equal.  No joint swelling. No lower extremity calf pain or edema. SKIN: Warm and dry. NEUROLOGICAL: Moves all 4 extremities spontaneously. No focal motor or sensory abnormalities of the upper or lower extremities. PSYCHIATRIC: Normal mood.     I have reviewed and interpreted all of the currently available lab results from this visit (if applicable):  Results for orders placed or performed during the hospital encounter of 02/01/20   CBC Auto Differential   Result Value Ref Range    WBC 1.5 (L) 4.0 - 11.0 K/uL    RBC 3.11 (L) 3.80 - 5.80 M/uL    Hemoglobin 10.4 (L) 11.5 - 16.5 g/dL    Hematocrit 30.8 (L) 37.0 - 47.0 %    MCV 99.0 80.0 - 100.0 fL    MCH 33.4 (H) 27.0 - 32.0 pg    MCHC 33.8 31.0 - 35.0 g/dL    RDW 18.7 (H) 11.0 - 16.0 %    Platelets 866 200 - 808 K/uL    MPV 9.4 6.0 - 10.0 fL    Neutrophils % 24.7 %    Immature Granulocytes % 0.7 0.0 - 5.0 %    Lymphocytes % 34.0 %    Monocytes % 26.8 %    Eosinophils % 11.8 %    Basophils % 2.0 %    Neutrophils Absolute 0.4 (L) 2.0 - 7.5 K/uL    Immature Granulocytes # 0.0 K/uL    Lymphocytes Absolute 0.5 (L) 1.5 - 4.0 K/uL    Monocytes Absolute 0.4 0.2 - 0.8 K/uL    Eosinophils Absolute 0.2 0.0 - 0.4 K/uL    Basophils Absolute 0.0 0.0 - 0.1 K/uL   Comprehensive Metabolic Panel w/ Reflex to MG   Result Value Ref Range    Sodium 137 136 - 145 mmol/L    Potassium reflex Magnesium 3.1 (L) 3.4 - 5.1 mmol/L    Chloride 105 98 - 107 mmol/L    CO2 21 20 - 30 mmol/L    Anion Gap 11 3 - 16    Glucose 140 (H) 74 - 106 mg/dL    BUN 17 6 - 20 mg/dL    CREATININE 1.0 0.4 - 1.2 mg/dL    GFR Non-African American 56 (L) >59    GFR  >59 >59

## 2020-02-02 NOTE — ED NOTES
Dr merino returned call, talked with dr Daniel Cottrell and declined admission,  md's called for possible transfer     Jesse Kong RN  02/02/20 0053

## 2020-02-02 NOTE — ED NOTES
Pt's repeat labs drawn and pt freda well, pt repositioned for comfort, states heplock in left ac is hurting.  Iv flushes easily, no bleeding noted at site, dressing intact and clean, arm propped for comfort     Vik Robb RN  02/02/20 0178

## 2020-02-06 LAB
BLOOD CULTURE, ROUTINE: NORMAL
BLOOD CULTURE, ROUTINE: NORMAL

## 2020-02-11 ENCOUNTER — CARE COORDINATION (OUTPATIENT)
Dept: CARE COORDINATION | Age: 65
End: 2020-02-11

## 2020-02-11 NOTE — CARE COORDINATION
[facility] Transitions Initial Follow Up Call    Call within 2 business days of discharge: Yes     Patient: Jonathan Tinajero Patient : 1955 MRN: <[id number]>    Last Discharge [facility]       Complaint Diagnosis Description Type Department Provider    20 Chest Pain Chest pain, unspecified type . .. ED (TRANSFER) [address] [place] [facility] Yumiko Garcia MD   [address]   2/10/20   Decompensated liver disease       RARS: Readmission Risk Score: 0       Spoke with: Attempted HFU call, unsuccessful. Unable to leave message. Needs a fu appt.      Discharge department/facility: [facility]    Non-face-to-face services provided:  Obtained and reviewed discharge summary and/or continuity of care documents    Follow Up  Future Appointments   Date Time Provider Fly Michael   2020  3:00 PM Berto Black, [address] [facility]] [facility]       Leesa Vidal RN

## 2020-03-23 ENCOUNTER — TELEPHONE (OUTPATIENT)
Dept: PRIMARY CARE CLINIC | Age: 65
End: 2020-03-23

## 2020-03-23 RX ORDER — ALPRAZOLAM 0.5 MG/1
0.5 TABLET ORAL 3 TIMES DAILY PRN
Qty: 90 TABLET | Refills: 2 | Status: CANCELLED | OUTPATIENT
Start: 2020-03-23 | End: 2020-04-22

## 2020-03-23 RX ORDER — ALPRAZOLAM 0.5 MG/1
0.5 TABLET ORAL 3 TIMES DAILY PRN
Qty: 90 TABLET | Refills: 2 | Status: SHIPPED | OUTPATIENT
Start: 2020-03-23 | End: 2020-03-25 | Stop reason: SDUPTHER

## 2020-03-23 RX ORDER — GABAPENTIN 300 MG/1
600 CAPSULE ORAL 3 TIMES DAILY
Qty: 180 CAPSULE | Refills: 2 | Status: CANCELLED | OUTPATIENT
Start: 2020-03-23 | End: 2020-06-21

## 2020-03-23 RX ORDER — GABAPENTIN 300 MG/1
600 CAPSULE ORAL 3 TIMES DAILY
Qty: 180 CAPSULE | Refills: 2 | Status: SHIPPED | OUTPATIENT
Start: 2020-03-23 | End: 2020-03-25 | Stop reason: SDUPTHER

## 2020-03-23 RX ORDER — ONDANSETRON 4 MG/1
TABLET, FILM COATED ORAL
Qty: 60 TABLET | Refills: 3 | Status: CANCELLED | OUTPATIENT
Start: 2020-03-23

## 2020-03-23 RX ORDER — ONDANSETRON 4 MG/1
4 TABLET, FILM COATED ORAL EVERY 8 HOURS PRN
Qty: 60 TABLET | Refills: 3 | Status: SHIPPED | OUTPATIENT
Start: 2020-03-23 | End: 2020-04-21 | Stop reason: SDUPTHER

## 2020-03-23 NOTE — TELEPHONE ENCOUNTER
Called to reschedule patient she stated she would be out of medications soon.   Xanax, zofran gabapentin patient also stated she would like to go up on her xanax

## 2020-03-23 NOTE — TELEPHONE ENCOUNTER
Sent xanax, gabapentin and zofran, no increase of xanax, she would need to see psychiatry for any increase in dose.

## 2020-03-24 ENCOUNTER — TELEPHONE (OUTPATIENT)
Dept: PRIMARY CARE CLINIC | Age: 65
End: 2020-03-24

## 2020-03-24 NOTE — TELEPHONE ENCOUNTER
Ginger Citizen called stating that their gabeprntin medication was not received by pharmacy Please call her to advise.     Last Appt:  1/2/2020  Next Appt:   6/1/2020  Preferred Pharmacy: was originally sent to [facility]/ pt has prefers [facility] as new pharmacy

## 2020-03-25 RX ORDER — GABAPENTIN 300 MG/1
600 CAPSULE ORAL 3 TIMES DAILY
Qty: 180 CAPSULE | Refills: 2 | Status: SHIPPED | OUTPATIENT
Start: 2020-03-25 | End: 2020-04-21 | Stop reason: SDUPTHER

## 2020-03-25 RX ORDER — ALPRAZOLAM 0.5 MG/1
TABLET ORAL
Qty: 90 TABLET | OUTPATIENT
Start: 2020-03-25

## 2020-03-25 RX ORDER — ALPRAZOLAM 0.5 MG/1
0.5 TABLET ORAL 3 TIMES DAILY PRN
Qty: 90 TABLET | Refills: 2 | Status: SHIPPED | OUTPATIENT
Start: 2020-03-25 | End: 2020-04-21

## 2020-04-20 ENCOUNTER — TELEPHONE (OUTPATIENT)
Dept: PRIMARY CARE CLINIC | Age: 65
End: 2020-04-20

## 2020-04-21 ENCOUNTER — VIRTUAL VISIT (OUTPATIENT)
Dept: PRIMARY CARE CLINIC | Age: 65
End: 2020-04-21
Payer: COMMERCIAL

## 2020-04-21 PROCEDURE — 99214 OFFICE O/P EST MOD 30 MIN: CPT | Performed by: PEDIATRICS

## 2020-04-21 RX ORDER — ACETAZOLAMIDE 125 MG/1
125 TABLET ORAL 3 TIMES DAILY
Qty: 90 TABLET | Refills: 3 | Status: SHIPPED | OUTPATIENT
Start: 2020-04-21 | End: 2021-04-07 | Stop reason: ALTCHOICE

## 2020-04-21 RX ORDER — ONDANSETRON 4 MG/1
4 TABLET, FILM COATED ORAL EVERY 8 HOURS PRN
Qty: 60 TABLET | Refills: 3 | Status: SHIPPED | OUTPATIENT
Start: 2020-04-21 | End: 2021-04-07 | Stop reason: ALTCHOICE

## 2020-04-21 RX ORDER — HYDROCODONE BITARTRATE AND ACETAMINOPHEN 5; 325 MG/1; MG/1
1 TABLET ORAL EVERY 8 HOURS PRN
Qty: 90 TABLET | Refills: 0 | Status: SHIPPED | OUTPATIENT
Start: 2020-04-21 | End: 2020-04-22 | Stop reason: SDUPTHER

## 2020-04-21 RX ORDER — ALPRAZOLAM 1 MG/1
1 TABLET ORAL 3 TIMES DAILY PRN
Qty: 90 TABLET | Refills: 2 | Status: SHIPPED | OUTPATIENT
Start: 2020-04-21 | End: 2020-06-04 | Stop reason: SDUPTHER

## 2020-04-21 RX ORDER — FUROSEMIDE 20 MG/1
20 TABLET ORAL 3 TIMES DAILY
Qty: 90 TABLET | Refills: 3 | Status: SHIPPED | OUTPATIENT
Start: 2020-04-21 | End: 2020-06-04 | Stop reason: SDUPTHER

## 2020-04-21 RX ORDER — ESCITALOPRAM OXALATE 20 MG/1
20 TABLET ORAL DAILY
Qty: 30 TABLET | Refills: 3 | Status: SHIPPED | OUTPATIENT
Start: 2020-04-21 | End: 2020-06-04

## 2020-04-21 RX ORDER — GABAPENTIN 300 MG/1
600 CAPSULE ORAL 4 TIMES DAILY
Qty: 210 CAPSULE | Refills: 2 | Status: SHIPPED | OUTPATIENT
Start: 2020-04-21 | End: 2020-06-16

## 2020-04-21 ASSESSMENT — ENCOUNTER SYMPTOMS
SHORTNESS OF BREATH: 0
SORE THROAT: 0
DIARRHEA: 1
SINUS PRESSURE: 0
ABDOMINAL PAIN: 1
EYE DISCHARGE: 0
COUGH: 0
NAUSEA: 0
BACK PAIN: 1
WHEEZING: 0
VOMITING: 0

## 2020-04-21 NOTE — PROGRESS NOTES
8 hours as needed for Pain for up to 30 days. Yes Ginger Garber, DO   gabapentin (NEURONTIN) 300 MG capsule Take 2 capsules by mouth 4 times daily for 90 days. Yes Ginger Garber, DO   ALPRAZolam (XANAX) 1 MG tablet Take 1 tablet by mouth 3 times daily as needed for Sleep or Anxiety for up to 30 days.  Yes Ginger Garber, DO   escitalopram (LEXAPRO) 20 MG tablet Take 1 tablet by mouth daily Yes Ginger Garber, DO   acetaZOLAMIDE (DIAMOX) 125 MG tablet Take 1 tablet by mouth 3 times daily Yes Ginger Garber, DO   furosemide (LASIX) 20 MG tablet Take 1 tablet by mouth 3 times daily Yes Ginger Garber, DO   ondansetron (ZOFRAN) 4 MG tablet Take 1 tablet by mouth every 8 hours as needed for Nausea or Vomiting Yes Ginger Garber, DO   rifaximin (XIFAXAN) 550 MG tablet Take 1 tablet by mouth 2 times daily  Jolene eBll, DO   Sulfamethoxazole-Trimethoprim (SMZ-TMP DS PO) Take by mouth  Historical Provider, MD   spironolactone (ALDACTONE) 100 MG tablet Take 1 tablet by mouth 2 times daily  Jolene Bell DO   lidocaine viscous hcl (XYLOCAINE) 2 % SOLN solution TAKE 15 MLS (1 TABLESPOONFUL) ONCE DAILY AS NEEDED FOR IRRITATION OR PAIN  Beth Sands MD       Social History     Tobacco Use    Smoking status: Current Some Day Smoker     Packs/day: 0.10     Years: 1.00     Pack years: 0.10     Types: Cigarettes     Last attempt to quit: 2019     Years since quittin.9    Smokeless tobacco: Never Used   Substance Use Topics    Alcohol use: No    Drug use: No        Allergies   Allergen Reactions    Lisinopril Other (See Comments)     Chest pain    Ciprofloxacin Other (See Comments)     AMS    Dye [Iodides]    ,   Past Medical History:   Diagnosis Date    Arthritis, rheumatoid (HCC)     Cirrhosis ([address]     Colitis     Diabetes mellitus [address] [address]     Hypertension     IIH (idiopathic intracranial hypertension)     Liver cirrhosis (HCC)     Osteoarthritis     Unspecified sleep apnea    ,   Past Surgical History: Abnormal-       Neurological:        [x] No Facial Asymmetry (Cranial nerve 7 motor function) (limited exam to video visit)          [x] No gaze palsy        [] Abnormal-         Skin:        [] No significant exanthematous lesions or discoloration noted on facial skin         [] Abnormal-            Psychiatric:       [x] Normal Affect [x] No Hallucinations        [x] Abnormal-  Crying, depressed mood    Other pertinent observable physical exam findings-     ASSESSMENT/PLAN:  1. Chronic bilateral low back pain with bilateral sciatica    - HYDROcodone-acetaminophen (LORTAB) 5-325 MG per tablet; Take 1 tablet by mouth every 8 hours as needed for Pain for up to 30 days. Dispense: 90 tablet; Refill: 0  - gabapentin (NEURONTIN) 300 MG capsule; Take 2 capsules by mouth 4 times daily for 90 days. Dispense: 210 capsule; Refill: 2    2. Neuropathic pain    - HYDROcodone-acetaminophen (LORTAB) 5-325 MG per tablet; Take 1 tablet by mouth every 8 hours as needed for Pain for up to 30 days. Dispense: 90 tablet; Refill: 0  - gabapentin (NEURONTIN) 300 MG capsule; Take 2 capsules by mouth 4 times daily for 90 days. Dispense: 210 capsule; Refill: 2    3. Depression with anxiety    - ALPRAZolam (XANAX) 1 MG tablet; Take 1 tablet by mouth 3 times daily as needed for Sleep or Anxiety for up to 30 days. Dispense: 90 tablet; Refill: 2  - escitalopram (LEXAPRO) 20 MG tablet; Take 1 tablet by mouth daily  Dispense: 30 tablet; Refill: 3    4. IIH (idiopathic intracranial hypertension)    - acetaZOLAMIDE (DIAMOX) 125 MG tablet; Take 1 tablet by mouth 3 times daily  Dispense: 90 tablet; Refill: 3    5. Essential hypertension    - furosemide (LASIX) 20 MG tablet; Take 1 tablet by mouth 3 times daily  Dispense: 90 tablet; Refill: 3    6. Cirrhosis of liver with ascites, unspecified hepatic cirrhosis type (HCC)    - furosemide (LASIX) 20 MG tablet; Take 1 tablet by mouth 3 times daily  Dispense: 90 tablet; Refill: 3    7.  Nausea    -

## 2020-04-22 ENCOUNTER — TELEPHONE (OUTPATIENT)
Dept: PRIMARY CARE CLINIC | Age: 65
End: 2020-04-22

## 2020-04-22 RX ORDER — HYDROCODONE BITARTRATE AND ACETAMINOPHEN 5; 325 MG/1; MG/1
1 TABLET ORAL EVERY 8 HOURS PRN
Qty: 45 TABLET | Refills: 0 | Status: SHIPPED | OUTPATIENT
Start: 2020-04-22 | End: 2020-05-13 | Stop reason: SDUPTHER

## 2020-04-22 RX ORDER — GABAPENTIN 300 MG/1
600 CAPSULE ORAL 4 TIMES DAILY
Qty: 210 CAPSULE | Refills: 2 | OUTPATIENT
Start: 2020-04-22 | End: 2020-07-21

## 2020-04-24 ENCOUNTER — TELEPHONE (OUTPATIENT)
Dept: PRIMARY CARE CLINIC | Age: 65
End: 2020-04-24

## 2020-04-28 NOTE — TELEPHONE ENCOUNTER
I want this patient to see a hepatologist/GI and see if they will order this.   I dont think that my plan is appropriate unless she is following with some specialist.

## 2020-04-28 NOTE — TELEPHONE ENCOUNTER
Pt given fax number and stated she would call them in [address] for them to take care of the order.

## 2020-05-07 RX ORDER — SULFAMETHOXAZOLE AND TRIMETHOPRIM 800; 160 MG/1; MG/1
TABLET ORAL
Qty: 30 TABLET | OUTPATIENT
Start: 2020-05-07

## 2020-05-13 RX ORDER — HYDROCODONE BITARTRATE AND ACETAMINOPHEN 5; 325 MG/1; MG/1
1 TABLET ORAL EVERY 8 HOURS PRN
Qty: 45 TABLET | Refills: 0 | Status: SHIPPED | OUTPATIENT
Start: 2020-05-13 | End: 2020-06-04 | Stop reason: SDUPTHER

## 2020-05-20 RX ORDER — SULFAMETHOXAZOLE AND TRIMETHOPRIM 800; 160 MG/1; MG/1
1 TABLET ORAL DAILY
Qty: 30 TABLET | Refills: 5 | OUTPATIENT
Start: 2020-05-20

## 2020-05-20 NOTE — TELEPHONE ENCOUNTER
Bactrim can cause blood dyscrasia.  (low and abnormal effects on blood cells) This medication - if she needs for long term use should be written by either a hepatologist or at least a GI or infectious disease specialist.

## 2020-06-04 ENCOUNTER — VIRTUAL VISIT (OUTPATIENT)
Dept: PRIMARY CARE CLINIC | Age: 65
End: 2020-06-04
Payer: COMMERCIAL

## 2020-06-04 PROCEDURE — 99214 OFFICE O/P EST MOD 30 MIN: CPT | Performed by: PEDIATRICS

## 2020-06-04 RX ORDER — FUROSEMIDE 20 MG/1
20 TABLET ORAL 2 TIMES DAILY
Qty: 60 TABLET | Refills: 3 | Status: SHIPPED | OUTPATIENT
Start: 2020-06-04 | End: 2021-04-07 | Stop reason: ALTCHOICE

## 2020-06-04 RX ORDER — SPIRONOLACTONE 100 MG/1
100 TABLET, FILM COATED ORAL 2 TIMES DAILY
Qty: 60 TABLET | Refills: 3 | Status: SHIPPED | OUTPATIENT
Start: 2020-06-04 | End: 2021-04-07 | Stop reason: ALTCHOICE

## 2020-06-04 RX ORDER — ALPRAZOLAM 1 MG/1
1 TABLET ORAL 3 TIMES DAILY PRN
Qty: 90 TABLET | Refills: 2 | Status: SHIPPED | OUTPATIENT
Start: 2020-06-04 | End: 2020-06-22 | Stop reason: SDUPTHER

## 2020-06-04 RX ORDER — HYDROCODONE BITARTRATE AND ACETAMINOPHEN 5; 325 MG/1; MG/1
1 TABLET ORAL EVERY 8 HOURS PRN
Qty: 45 TABLET | Refills: 0 | Status: SHIPPED | OUTPATIENT
Start: 2020-06-04 | End: 2020-06-19

## 2020-06-04 ASSESSMENT — ENCOUNTER SYMPTOMS
ABDOMINAL DISTENTION: 1
SINUS PRESSURE: 0
NAUSEA: 0
SHORTNESS OF BREATH: 1
BACK PAIN: 0
WHEEZING: 0
VOMITING: 0
ABDOMINAL PAIN: 0
SORE THROAT: 0
COUGH: 0
EYE DISCHARGE: 0

## 2020-06-04 NOTE — PROGRESS NOTES
for worsening conditions or problems, and seek emergency medical treatment and/or call 911 if deemed necessary. Patient identification was verified at the start of the visit: Yes    Total time spent on this encounter: 22    Services were provided through a video synchronous discussion virtually to substitute for in-person clinic visit. Patient and provider were located at their individual homes. --Miriam Nazario DO on 6/4/2020 at 4:47 PM    An electronic signature was used to authenticate this note.

## 2020-06-16 RX ORDER — GABAPENTIN 300 MG/1
600 CAPSULE ORAL 4 TIMES DAILY
Qty: 210 CAPSULE | Refills: 2 | Status: SHIPPED | OUTPATIENT
Start: 2020-06-16 | End: 2020-06-22 | Stop reason: SDUPTHER

## 2020-06-17 ENCOUNTER — OFFICE VISIT (OUTPATIENT)
Dept: PRIMARY CARE CLINIC | Age: 65
End: 2020-06-17
Payer: COMMERCIAL

## 2020-06-17 PROCEDURE — 99211 OFF/OP EST MAY X REQ PHY/QHP: CPT | Performed by: NURSE PRACTITIONER

## 2020-06-19 ENCOUNTER — TELEPHONE (OUTPATIENT)
Dept: PRIMARY CARE CLINIC | Age: 65
End: 2020-06-19

## 2020-06-22 ENCOUNTER — VIRTUAL VISIT (OUTPATIENT)
Dept: PRIMARY CARE CLINIC | Age: 65
End: 2020-06-22
Payer: COMMERCIAL

## 2020-06-22 PROCEDURE — 99214 OFFICE O/P EST MOD 30 MIN: CPT | Performed by: PEDIATRICS

## 2020-06-22 RX ORDER — GABAPENTIN 300 MG/1
600 CAPSULE ORAL 4 TIMES DAILY
Qty: 210 CAPSULE | Refills: 2 | Status: SHIPPED | OUTPATIENT
Start: 2020-06-22 | End: 2020-07-28 | Stop reason: SDUPTHER

## 2020-06-22 RX ORDER — HYDROCODONE BITARTRATE AND ACETAMINOPHEN 5; 325 MG/1; MG/1
1 TABLET ORAL EVERY 8 HOURS PRN
Qty: 90 TABLET | Refills: 0 | Status: SHIPPED | OUTPATIENT
Start: 2020-06-22 | End: 2020-07-28 | Stop reason: SDUPTHER

## 2020-06-22 RX ORDER — CYANOCOBALAMIN 1000 UG/ML
1000 INJECTION INTRAMUSCULAR; SUBCUTANEOUS ONCE
Status: DISCONTINUED | OUTPATIENT
Start: 2020-06-22 | End: 2021-11-04

## 2020-06-22 RX ORDER — ALPRAZOLAM 1 MG/1
1 TABLET ORAL 3 TIMES DAILY PRN
Qty: 90 TABLET | Refills: 2 | Status: SHIPPED | OUTPATIENT
Start: 2020-06-22 | End: 2020-07-22

## 2020-06-22 ASSESSMENT — ENCOUNTER SYMPTOMS
SINUS PRESSURE: 0
SHORTNESS OF BREATH: 1
WHEEZING: 0
EYE DISCHARGE: 0
COUGH: 0
BACK PAIN: 0
ABDOMINAL PAIN: 0
NAUSEA: 0
SORE THROAT: 0
VOMITING: 0

## 2020-06-22 NOTE — PROGRESS NOTES
frequency and urgency. Musculoskeletal: Negative for arthralgias and back pain. Skin: Negative for rash and wound. Neurological: Negative for syncope, numbness and headaches. Hematological: Negative. Psychiatric/Behavioral: Positive for dysphoric mood. Negative for agitation, hallucinations and sleep disturbance. The patient has insomnia. The patient is not nervous/anxious. Prior to Visit Medications    Medication Sig Taking? Authorizing Provider   HYDROcodone-acetaminophen (LORTAB) 5-325 MG per tablet Take 1 tablet by mouth every 8 hours as needed for Pain for up to 30 days. Yes Annie Nelson, DO   ALPRAZolam (XANAX) 1 MG tablet Take 1 tablet by mouth 3 times daily as needed for Sleep or Anxiety for up to 30 days. Yes Annie Nelson, DO   gabapentin (NEURONTIN) 300 MG capsule Take 2 capsules by mouth 4 times daily for 90 days. Yes Annie Nelson, DO   spironolactone (ALDACTONE) 100 MG tablet Take 1 tablet by mouth 2 times daily  Jolene Justice, DO   furosemide (LASIX) 20 MG tablet Take 1 tablet by mouth 2 times daily  Jolene Justice, DO   rifaximin (XIFAXAN) 550 MG tablet Take 1 tablet by mouth 2 times daily  Jolene Justice, DO   acetaZOLAMIDE (DIAMOX) 125 MG tablet Take 1 tablet by mouth 3 times daily  Jolene Justice, DO   ondansetron (ZOFRAN) 4 MG tablet Take 1 tablet by mouth every 8 hours as needed for Nausea or Vomiting  Jolene Ray, DO   Magic Mouthwash (MIRACLE MOUTHWASH) Swish and spit 5 mLs 4 times daily as needed for Irritation  Jolene Ray, DO   silver sulfADIAZINE (SILVADENE) 1 % cream Apply topically daily.   Annie Nelson, DO   lidocaine viscous hcl (XYLOCAINE) 2 % SOLN solution TAKE 15 MLS (1 TABLESPOONFUL) ONCE DAILY AS NEEDED FOR IRRITATION OR PAIN  Dandy Gomez MD       Social History     Tobacco Use    Smoking status: Current Some Day Smoker     Packs/day: 0.10     Years: 1.00     Pack years: 0.10     Types: Cigarettes     Last attempt to quit: 5/11/2019     Years since quitting:

## 2020-07-08 RX ORDER — ALPRAZOLAM 0.5 MG/1
TABLET ORAL
Qty: 90 TABLET | OUTPATIENT
Start: 2020-07-08

## 2020-07-09 ENCOUNTER — TELEPHONE (OUTPATIENT)
Dept: PRIMARY CARE CLINIC | Age: 65
End: 2020-07-09

## 2020-07-09 ENCOUNTER — HOSPITAL ENCOUNTER (OUTPATIENT)
Dept: MAMMOGRAPHY | Facility: HOSPITAL | Age: 65
Discharge: HOME | End: 2020-07-09
Payer: COMMERCIAL

## 2020-07-09 PROCEDURE — 77067 SCR MAMMO BI INCL CAD: CPT

## 2020-07-09 NOTE — TELEPHONE ENCOUNTER
Ana Rosa Ospina requests that  A nurse return their call. The best time to reach her is Anytime. Thank you.

## 2020-07-15 ENCOUNTER — TELEPHONE (OUTPATIENT)
Dept: PRIMARY CARE CLINIC | Age: 65
End: 2020-07-15

## 2020-07-20 ENCOUNTER — HOSPITAL ENCOUNTER (OUTPATIENT)
Dept: MAMMOGRAPHY | Facility: HOSPITAL | Age: 65
Discharge: HOME | End: 2020-07-20
Payer: COMMERCIAL

## 2020-07-20 ENCOUNTER — HOSPITAL ENCOUNTER (OUTPATIENT)
Dept: ULTRASOUND IMAGING | Facility: HOSPITAL | Age: 65
Discharge: HOME | End: 2020-07-20
Payer: COMMERCIAL

## 2020-07-20 PROCEDURE — 77066 DX MAMMO INCL CAD BI: CPT

## 2020-07-20 PROCEDURE — 76642 ULTRASOUND BREAST LIMITED: CPT

## 2020-07-22 ENCOUNTER — TELEPHONE (OUTPATIENT)
Dept: PRIMARY CARE CLINIC | Age: 65
End: 2020-07-22

## 2020-07-28 ENCOUNTER — OFFICE VISIT (OUTPATIENT)
Dept: PRIMARY CARE CLINIC | Age: 65
End: 2020-07-28
Payer: COMMERCIAL

## 2020-07-28 VITALS
SYSTOLIC BLOOD PRESSURE: 150 MMHG | BODY MASS INDEX: 30.4 KG/M2 | HEIGHT: 61 IN | OXYGEN SATURATION: 90 % | DIASTOLIC BLOOD PRESSURE: 62 MMHG | WEIGHT: 161 LBS | RESPIRATION RATE: 18 BRPM | TEMPERATURE: 98.2 F | HEART RATE: 83 BPM

## 2020-07-28 PROBLEM — Z01.810 HIGH RISK SURGERY, PRE-OPERATIVE CARDIOVASCULAR EXAMINATION: Status: ACTIVE | Noted: 2020-07-01

## 2020-07-28 PROBLEM — Z01.818 PRE-TRANSPLANT EVALUATION FOR LIVER TRANSPLANT: Status: ACTIVE | Noted: 2020-07-01

## 2020-07-28 PROCEDURE — 99214 OFFICE O/P EST MOD 30 MIN: CPT | Performed by: PEDIATRICS

## 2020-07-28 RX ORDER — ALPRAZOLAM 1 MG/1
1 TABLET ORAL 3 TIMES DAILY PRN
Qty: 90 TABLET | Refills: 3 | Status: SHIPPED | OUTPATIENT
Start: 2020-07-28 | End: 2020-11-25

## 2020-07-28 RX ORDER — PANTOPRAZOLE SODIUM 20 MG/1
20 TABLET, DELAYED RELEASE ORAL DAILY
Qty: 30 TABLET | Refills: 3 | Status: SHIPPED | OUTPATIENT
Start: 2020-07-28 | End: 2021-05-19 | Stop reason: ALTCHOICE

## 2020-07-28 RX ORDER — ALPRAZOLAM 1 MG/1
1 TABLET ORAL 3 TIMES DAILY PRN
COMMUNITY
End: 2020-07-28 | Stop reason: SDUPTHER

## 2020-07-28 RX ORDER — HYDROCODONE BITARTRATE AND ACETAMINOPHEN 5; 325 MG/1; MG/1
1 TABLET ORAL EVERY 8 HOURS PRN
Qty: 90 TABLET | Refills: 0 | Status: SHIPPED | OUTPATIENT
Start: 2020-07-28 | End: 2020-08-27

## 2020-07-28 RX ORDER — PROMETHAZINE HYDROCHLORIDE 25 MG/1
25 TABLET ORAL 3 TIMES DAILY PRN
Qty: 60 TABLET | Refills: 3 | Status: SHIPPED | OUTPATIENT
Start: 2020-07-28 | End: 2020-08-27

## 2020-07-28 RX ORDER — GABAPENTIN 300 MG/1
600 CAPSULE ORAL 4 TIMES DAILY
Qty: 240 CAPSULE | Refills: 3 | Status: SHIPPED | OUTPATIENT
Start: 2020-07-28 | End: 2021-04-07 | Stop reason: ALTCHOICE

## 2020-07-28 ASSESSMENT — ENCOUNTER SYMPTOMS
SHORTNESS OF BREATH: 0
ABDOMINAL DISTENTION: 1
EYE DISCHARGE: 0
ABDOMINAL PAIN: 0
SORE THROAT: 0
COUGH: 0
VOMITING: 0
SINUS PRESSURE: 0
WHEEZING: 0
BACK PAIN: 0
NAUSEA: 0

## 2020-07-28 NOTE — PROGRESS NOTES
Chief Complaint   Patient presents with    Diabetes    Hypertension    Cirrhosis       Have you seen any other physician or provider since your last visit yes -     Have you had any other diagnostic tests since your last visit?  yes - fluid removal    Have you changed or stopped any medications since your last visit? no

## 2020-07-28 NOTE — PROGRESS NOTES
SUBJECTIVE:    Patient ID: Zacarias Borja is a 62 y.o. female. Chief Complaint   Patient presents with    Diabetes    Hypertension    Cirrhosis       HPI:    Patient's medications, allergies, past medical, surgical,social and family histories were reviewed and updated as appropriate. She is here to follow up on multiple problems. Most are associated with her chronic cirrhosis. She is following up with specialist in [place]. She reports that he MELD score went back up to 14. She is seeing nutrition and is on a special diet. She is having paracentesis every week. She is getting set up for a thoracentesis. She says they told her the lungs were getting full of fluids right worse then left. She says she they have been taking off 4-7 L of fluids from her abd. Diabetes   Hypoglycemia symptoms include nervousness/anxiousness. Pertinent negatives for hypoglycemia include no headaches. Pertinent negatives for diabetes include no chest pain. Hypertension   Pertinent negatives include no chest pain, headaches, palpitations or shortness of breath. Review of Systems   Constitutional: Negative for chills and fever. HENT: Negative for congestion, sinus pressure and sore throat. Eyes: Negative for discharge and visual disturbance. Respiratory: Negative for cough, shortness of breath and wheezing. Cardiovascular: Negative for chest pain and palpitations. Gastrointestinal: Positive for abdominal distention. Negative for abdominal pain, nausea and vomiting. Endocrine: Negative for cold intolerance and heat intolerance. Genitourinary: Negative for dysuria, frequency and urgency. Musculoskeletal: Negative for arthralgias and back pain. Skin: Negative for rash and wound. Neurological: Negative for syncope, numbness and headaches. Hematological: Negative. Psychiatric/Behavioral: Positive for dysphoric mood. Negative for agitation and sleep disturbance.  The patient is Relationship status: None    Intimate partner violence     Fear of current or ex partner: None     Emotionally abused: None     Physically abused: None     Forced sexual activity: None   Other Topics Concern    None   Social History Narrative    None       OBJECTIVE:    Vitals:    07/28/20 1306   BP: (!) 160/70   Site: Left Upper Arm   Position: Sitting   Pulse: 83   Resp: 18   Temp: 98.2 °F (36.8 °C)   TempSrc: Temporal   SpO2: 90%   Weight: 161 lb (73 kg)   Height: 5' 1\" (1.549 m)     Physical Exam  Vitals signs and nursing note reviewed. Constitutional:       General: She is not in acute distress. Appearance: She is well-developed. She is not diaphoretic. HENT:      Head: Normocephalic and atraumatic. Right Ear: External ear normal.      Left Ear: External ear normal.      Nose: Nose normal.   Eyes:      General: No scleral icterus. Right eye: No discharge. Left eye: No discharge. Conjunctiva/sclera: Conjunctivae normal.      Pupils: Pupils are equal, round, and reactive to light. Neck:      Musculoskeletal: Normal range of motion and neck supple. Thyroid: No thyromegaly. Vascular: No JVD. Trachea: No tracheal deviation. Cardiovascular:      Rate and Rhythm: Normal rate and regular rhythm. Heart sounds: No murmur. No friction rub. Gallop present. Pulmonary:      Effort: Pulmonary effort is normal. No respiratory distress. Breath sounds: No stridor. Rales present. No wheezing. Chest:      Chest wall: No tenderness. Abdominal:      General: Bowel sounds are normal. There is distension. Palpations: Abdomen is soft. There is no mass. Tenderness: There is no abdominal tenderness. There is no guarding or rebound. Musculoskeletal: Normal range of motion. General: No tenderness. Lymphadenopathy:      Cervical: No cervical adenopathy. Skin:     General: Skin is warm and dry. Findings: No rash.    Neurological:      Mental Status: She is alert and oriented to person, place, and time. Cranial Nerves: No cranial nerve deficit. Motor: No abnormal muscle tone. Coordination: Coordination normal.      Deep Tendon Reflexes: Reflexes are normal and symmetric. No results found for requested labs within last 30 days. Hemoglobin A1C (%)   Date Value   01/02/2020 5.1     Microscopic Examination (no units)   Date Value   02/02/2020 Not Indicated     LDL Calculated (mg/dL)   Date Value   04/10/2018 94         Lab Results   Component Value Date    WBC 1.5 (L) 02/01/2020    HGB 10.4 (L) 02/01/2020    HCT 30.8 (L) 02/01/2020    MCV 99.0 02/01/2020     02/01/2020    LYMPHOPCT 34.0 02/01/2020    RBC 3.11 (L) 02/01/2020    MCH 33.4 (H) 02/01/2020    MCHC 33.8 02/01/2020    RDW 18.7 (H) 02/01/2020       Lab Results   Component Value Date     02/01/2020    K 3.1 (L) 02/01/2020     02/01/2020    CO2 21 02/01/2020    BUN 17 02/01/2020    CREATININE 1.0 02/01/2020    GLUCOSE 140 (H) 02/01/2020    CALCIUM 8.2 (L) 02/01/2020    PROT 6.7 02/01/2020    LABALBU 3.4 02/01/2020    BILITOT 4.0 (H) 02/01/2020    ALKPHOS 171 (H) 02/01/2020    AST 35 (H) 02/01/2020    ALT 18 02/01/2020    LABGLOM 56 (L) 02/01/2020    GFRAA >59 02/01/2020    AGRATIO 1.0 02/01/2020    GLOB 3.3 02/01/2020       Lab Results   Component Value Date    TSH 1.46 10/29/2015       Current Outpatient Medications   Medication Sig Dispense Refill    LACTULOSE PO Take by mouth      cyanocobalamin ([employer] VITAMIN B12) 1000 MCG tablet Take 1 tablet by mouth daily 30 tablet 3    promethazine (PHENERGAN) 25 MG tablet Take 1 tablet by mouth 3 times daily as needed for Nausea 60 tablet 3    Calcium Carb-Cholecalciferol (OYSC[employer] D) 250-125 MG-UNIT TABS Take 2 tablets by mouth daily 60 tablet 3    gabapentin (NEURONTIN) 300 MG capsule Take 2 capsules by mouth 4 times daily for 90 days.  240 capsule 3    HYDROcodone-acetaminophen (LORTAB) 5-325 MG per tablet Take 1 tablet by mouth every 8 hours as needed for Pain for up to 30 days. 90 tablet 0    ALPRAZolam (XANAX) 1 MG tablet Take 1 tablet by mouth 3 times daily as needed for Sleep or Anxiety for up to 120 days. 90 tablet 3    HYDROcodone-acetaminophen (LORTAB) 5-325 MG per tablet Take 1 tablet by mouth every 8 hours as needed for Pain for up to 30 days. 90 tablet 0    pantoprazole (PROTONIX) 20 MG tablet Take 1 tablet by mouth daily 30 tablet 3    spironolactone (ALDACTONE) 100 MG tablet Take 1 tablet by mouth 2 times daily 60 tablet 3    furosemide (LASIX) 20 MG tablet Take 1 tablet by mouth 2 times daily 60 tablet 3    acetaZOLAMIDE (DIAMOX) 125 MG tablet Take 1 tablet by mouth 3 times daily 90 tablet 3    ondansetron (ZOFRAN) 4 MG tablet Take 1 tablet by mouth every 8 hours as needed for Nausea or Vomiting 60 tablet 3    Magic Mouthwash (MIRACLE MOUTHWASH) Swish and spit 5 mLs 4 times daily as needed for Irritation 240 mL 2    silver sulfADIAZINE (SILVADENE) 1 % cream Apply topically daily.  50 g 2    lidocaine viscous hcl (XYLOCAINE) 2 % SOLN solution TAKE 15 MLS (1 TABLESPOONFUL) ONCE DAILY AS NEEDED FOR IRRITATION OR PAIN 100 mL 0     Current Facility-Administered Medications   Medication Dose Route Frequency Provider Last Rate Last Dose    cyanocobalamin injection 1,000 mcg  1,000 mcg Intramuscular Once [employer], DO            During this visit the following were done:  Labs reviewed []    Labs ordered[]    Radiology reports Reviewed[]    Radiology ordered[]    EKG, echo, and/or stress testreviewed []    EEG resultsreviewed  []    EEG reviewed and interpretedper myself   []    Previousprovider/old records requested  []    Previous provider Records Reviewed []    ER records Reviewed []    Hospitalrecords Reviewed []    Historyobtained From Family []    Radiologicalimages view and Interpreted per myself []      ASSESSMENT/PLAN:    George Tai was seen today for diabetes, hypertension and cirrhosis. Diagnoses and all orders for this visit:    Vitamin B12 deficiency  -     cyanocobalamin ([employer] VITAMIN B12) 1000 MCG tablet; Take 1 tablet by mouth daily    Nausea  -     promethazine (PHENERGAN) 25 MG tablet; Take 1 tablet by mouth 3 times daily as needed for Nausea    Age related osteoporosis, unspecified pathological fracture presence  -     Calcium Carb-Cholecalciferol (OYSCO D) 250-125 MG-UNIT TABS; Take 2 tablets by mouth daily    Chronic bilateral low back pain with bilateral sciatica  -     gabapentin (NEURONTIN) 300 MG capsule; Take 2 capsules by mouth 4 times daily for 90 days.  -     HYDROcodone-acetaminophen (LORTAB) 5-325 MG per tablet; Take 1 tablet by mouth every 8 hours as needed for Pain for up to 30 days.  -     HYDROcodone-acetaminophen (LORTAB) 5-325 MG per tablet; Take 1 tablet by mouth every 8 hours as needed for Pain for up to 30 days. Neuropathic pain  -     gabapentin (NEURONTIN) 300 MG capsule; Take 2 capsules by mouth 4 times daily for 90 days.  -     HYDROcodone-acetaminophen (LORTAB) 5-325 MG per tablet; Take 1 tablet by mouth every 8 hours as needed for Pain for up to 30 days.  -     HYDROcodone-acetaminophen (LORTAB) 5-325 MG per tablet; Take 1 tablet by mouth every 8 hours as needed for Pain for up to 30 days. Anxiety  -     ALPRAZolam (XANAX) 1 MG tablet; Take 1 tablet by mouth 3 times daily as needed for Sleep or Anxiety for up to 120 days. Chronic bilateral low back pain with sciatica, sciatica laterality unspecified    Gastroesophageal reflux disease, esophagitis presence not specified  -     pantoprazole (PROTONIX) 20 MG tablet; Take 1 tablet by mouth daily         Additional plan relatedto above diagnosis:    Return in about 3 months (around 10/28/2020) for chronic conditions.     Controlled Substances Monitoring:

## 2020-08-27 PROBLEM — Z01.810 HIGH RISK SURGERY, PRE-OPERATIVE CARDIOVASCULAR EXAMINATION: Status: RESOLVED | Noted: 2020-07-01 | Resolved: 2020-08-27

## 2020-09-29 RX ORDER — PROMETHAZINE HYDROCHLORIDE 25 MG/1
TABLET ORAL
Qty: 60 TABLET | Refills: 3 | OUTPATIENT
Start: 2020-09-29

## 2021-01-19 ENCOUNTER — TELEPHONE (OUTPATIENT)
Dept: PRIMARY CARE CLINIC | Age: 66
End: 2021-01-19

## 2021-01-19 NOTE — TELEPHONE ENCOUNTER
Lisa Blake called to request appointment with Dr Nicky Montanez. If approved, please call her to schedule. Thank you. *Lisa Blake was a patient of dr merino and now wants to transfer to Dr Akanksha Shea.

## 2021-03-08 ENCOUNTER — HOSPITAL ENCOUNTER (OUTPATIENT)
Facility: HOSPITAL | Age: 66
Discharge: HOME | End: 2021-03-08
Payer: COMMERCIAL

## 2021-03-08 ENCOUNTER — TELEPHONE (OUTPATIENT)
Dept: PRIMARY CARE CLINIC | Age: 66
End: 2021-03-08

## 2021-03-08 LAB
ALBUMIN SERPL-MCNC: 3.2 G/DL (ref 3.4–4.8)
ALP BLD-CCNC: 214 U/L (ref 25–100)
ALT SERPL-CCNC: 9 U/L (ref 4–36)
ANION GAP SERPL CALCULATED.3IONS-SCNC: 10 MMOL/L (ref 3–16)
AST SERPL-CCNC: 19 U/L (ref 8–33)
BASOPHILS ABSOLUTE: 0 K/UL (ref 0–0.1)
BASOPHILS RELATIVE PERCENT: 0.9 %
BILIRUB SERPL-MCNC: 1.3 MG/DL (ref 0.3–1.2)
BILIRUBIN DIRECT: 0.5 MG/DL (ref 0–0.2)
BILIRUBIN, INDIRECT: 0.8 MG/DL (ref 0.2–0.8)
BUN BLDV-MCNC: 34 MG/DL (ref 6–20)
CALCIUM SERPL-MCNC: 9.1 MG/DL (ref 8.5–10.5)
CHLORIDE BLD-SCNC: 103 MMOL/L (ref 98–107)
CO2: 31 MMOL/L (ref 20–30)
CREAT SERPL-MCNC: 1.4 MG/DL (ref 0.4–1.2)
EOSINOPHILS ABSOLUTE: 0.1 K/UL (ref 0–0.4)
EOSINOPHILS RELATIVE PERCENT: 3.5 %
GFR AFRICAN AMERICAN: 46
GFR NON-AFRICAN AMERICAN: 38
GLUCOSE BLD-MCNC: 146 MG/DL (ref 74–106)
HCT VFR BLD CALC: 26 % (ref 37–47)
HEMOGLOBIN: 8.1 G/DL (ref 11.5–16.5)
IMMATURE GRANULOCYTES #: 0 K/UL
IMMATURE GRANULOCYTES %: 0.3 % (ref 0–5)
LYMPHOCYTES ABSOLUTE: 0.4 K/UL (ref 1.5–4)
LYMPHOCYTES RELATIVE PERCENT: 11.7 %
MCH RBC QN AUTO: 27.6 PG (ref 27–32)
MCHC RBC AUTO-ENTMCNC: 31.2 G/DL (ref 31–35)
MCV RBC AUTO: 88.7 FL (ref 80–100)
MONOCYTES ABSOLUTE: 0.3 K/UL (ref 0.2–0.8)
MONOCYTES RELATIVE PERCENT: 7.9 %
NEUTROPHILS ABSOLUTE: 2.6 K/UL (ref 2–7.5)
NEUTROPHILS RELATIVE PERCENT: 75.7 %
PDW BLD-RTO: 16.3 % (ref 11–16)
PHOSPHORUS: 4.3 MG/DL (ref 2.5–4.5)
PLATELET # BLD: 189 K/UL (ref 150–400)
PMV BLD AUTO: 9.1 FL (ref 6–10)
POTASSIUM SERPL-SCNC: 3.9 MMOL/L (ref 3.4–5.1)
RBC # BLD: 2.93 M/UL (ref 3.8–5.8)
SODIUM BLD-SCNC: 144 MMOL/L (ref 136–145)
TOTAL PROTEIN: 6.8 G/DL (ref 6.4–8.3)
WBC # BLD: 3.4 K/UL (ref 4–11)

## 2021-03-08 PROCEDURE — 80069 RENAL FUNCTION PANEL: CPT

## 2021-03-08 PROCEDURE — 80076 HEPATIC FUNCTION PANEL: CPT

## 2021-03-08 PROCEDURE — 36415 COLL VENOUS BLD VENIPUNCTURE: CPT

## 2021-03-08 PROCEDURE — 80158 DRUG ASSAY CYCLOSPORINE: CPT

## 2021-03-08 PROCEDURE — 85025 COMPLETE CBC W/AUTO DIFF WBC: CPT

## 2021-03-12 LAB — CYCLOSPORINE A: 123.3 NG/ML

## 2021-03-16 ENCOUNTER — HOSPITAL ENCOUNTER (OUTPATIENT)
Facility: HOSPITAL | Age: 66
Discharge: HOME | End: 2021-03-16
Payer: COMMERCIAL

## 2021-03-16 LAB
ALBUMIN SERPL-MCNC: 3.5 G/DL (ref 3.4–4.8)
ALP BLD-CCNC: 215 U/L (ref 25–100)
ALT SERPL-CCNC: 12 U/L (ref 4–36)
ANION GAP SERPL CALCULATED.3IONS-SCNC: 11 MMOL/L (ref 3–16)
AST SERPL-CCNC: 19 U/L (ref 8–33)
BASOPHILS ABSOLUTE: 0 K/UL (ref 0–0.1)
BASOPHILS RELATIVE PERCENT: 1 %
BILIRUB SERPL-MCNC: 1.6 MG/DL (ref 0.3–1.2)
BILIRUBIN DIRECT: 0.7 MG/DL (ref 0–0.2)
BILIRUBIN, INDIRECT: 0.9 MG/DL (ref 0.2–0.8)
BUN BLDV-MCNC: 31 MG/DL (ref 6–20)
CALCIUM SERPL-MCNC: 9 MG/DL (ref 8.5–10.5)
CHLORIDE BLD-SCNC: 105 MMOL/L (ref 98–107)
CO2: 27 MMOL/L (ref 20–30)
CREAT SERPL-MCNC: 2 MG/DL (ref 0.4–1.2)
EOSINOPHILS ABSOLUTE: 0.1 K/UL (ref 0–0.4)
EOSINOPHILS RELATIVE PERCENT: 1.9 %
GFR AFRICAN AMERICAN: 30
GFR NON-AFRICAN AMERICAN: 25
GLUCOSE BLD-MCNC: 91 MG/DL (ref 74–106)
HCT VFR BLD CALC: 26 % (ref 37–47)
HEMOGLOBIN: 8.2 G/DL (ref 11.5–16.5)
IMMATURE GRANULOCYTES #: 0 K/UL
IMMATURE GRANULOCYTES %: 0.3 % (ref 0–5)
LYMPHOCYTES ABSOLUTE: 0.5 K/UL (ref 1.5–4)
LYMPHOCYTES RELATIVE PERCENT: 15.1 %
MCH RBC QN AUTO: 28 PG (ref 27–32)
MCHC RBC AUTO-ENTMCNC: 31.5 G/DL (ref 31–35)
MCV RBC AUTO: 88.7 FL (ref 80–100)
MONOCYTES ABSOLUTE: 0.2 K/UL (ref 0.2–0.8)
MONOCYTES RELATIVE PERCENT: 7.1 %
NEUTROPHILS ABSOLUTE: 2.3 K/UL (ref 2–7.5)
NEUTROPHILS RELATIVE PERCENT: 74.6 %
PDW BLD-RTO: 17.2 % (ref 11–16)
PHOSPHORUS: 5.1 MG/DL (ref 2.5–4.5)
PLATELET # BLD: 223 K/UL (ref 150–400)
PMV BLD AUTO: 9.2 FL (ref 6–10)
POTASSIUM SERPL-SCNC: 4.7 MMOL/L (ref 3.4–5.1)
RBC # BLD: 2.93 M/UL (ref 3.8–5.8)
SODIUM BLD-SCNC: 143 MMOL/L (ref 136–145)
TOTAL PROTEIN: 6.7 G/DL (ref 6.4–8.3)
WBC # BLD: 3.2 K/UL (ref 4–11)

## 2021-03-16 PROCEDURE — 80158 DRUG ASSAY CYCLOSPORINE: CPT

## 2021-03-16 PROCEDURE — 80076 HEPATIC FUNCTION PANEL: CPT

## 2021-03-16 PROCEDURE — 80069 RENAL FUNCTION PANEL: CPT

## 2021-03-16 PROCEDURE — 85025 COMPLETE CBC W/AUTO DIFF WBC: CPT

## 2021-03-16 PROCEDURE — 36415 COLL VENOUS BLD VENIPUNCTURE: CPT

## 2021-03-19 LAB — CYCLOSPORINE A: 104.1 NG/ML

## 2021-03-24 ENCOUNTER — HOSPITAL ENCOUNTER (OUTPATIENT)
Facility: HOSPITAL | Age: 66
Discharge: HOME | End: 2021-03-24
Payer: COMMERCIAL

## 2021-03-24 LAB
ALBUMIN SERPL-MCNC: 3.4 G/DL (ref 3.4–4.8)
ALP BLD-CCNC: 174 U/L (ref 25–100)
ALT SERPL-CCNC: 13 U/L (ref 4–36)
ANION GAP SERPL CALCULATED.3IONS-SCNC: 10 MMOL/L (ref 3–16)
AST SERPL-CCNC: 23 U/L (ref 8–33)
BASOPHILS ABSOLUTE: 0 K/UL (ref 0–0.1)
BASOPHILS RELATIVE PERCENT: 0.5 %
BILIRUB SERPL-MCNC: 1.8 MG/DL (ref 0.3–1.2)
BILIRUBIN DIRECT: 0.7 MG/DL (ref 0–0.2)
BILIRUBIN, INDIRECT: 1.1 MG/DL (ref 0.2–0.8)
BUN BLDV-MCNC: 42 MG/DL (ref 6–20)
CALCIUM SERPL-MCNC: 9 MG/DL (ref 8.5–10.5)
CHLORIDE BLD-SCNC: 101 MMOL/L (ref 98–107)
CO2: 30 MMOL/L (ref 20–30)
CREAT SERPL-MCNC: 1.8 MG/DL (ref 0.4–1.2)
EOSINOPHILS ABSOLUTE: 0.1 K/UL (ref 0–0.4)
EOSINOPHILS RELATIVE PERCENT: 2.3 %
GFR AFRICAN AMERICAN: 34
GFR NON-AFRICAN AMERICAN: 28
GLUCOSE BLD-MCNC: 174 MG/DL (ref 74–106)
HCT VFR BLD CALC: 24.4 % (ref 37–47)
HEMOGLOBIN: 7.8 G/DL (ref 11.5–16.5)
IMMATURE GRANULOCYTES #: 0 K/UL
IMMATURE GRANULOCYTES %: 0.3 % (ref 0–5)
LYMPHOCYTES ABSOLUTE: 0.5 K/UL (ref 1.5–4)
LYMPHOCYTES RELATIVE PERCENT: 11.5 %
MCH RBC QN AUTO: 28.5 PG (ref 27–32)
MCHC RBC AUTO-ENTMCNC: 32 G/DL (ref 31–35)
MCV RBC AUTO: 89.1 FL (ref 80–100)
MONOCYTES ABSOLUTE: 0.3 K/UL (ref 0.2–0.8)
MONOCYTES RELATIVE PERCENT: 8.7 %
NEUTROPHILS ABSOLUTE: 3 K/UL (ref 2–7.5)
NEUTROPHILS RELATIVE PERCENT: 76.7 %
PDW BLD-RTO: 17.9 % (ref 11–16)
PHOSPHORUS: 3.8 MG/DL (ref 2.5–4.5)
PLATELET # BLD: 183 K/UL (ref 150–400)
PMV BLD AUTO: 8.5 FL (ref 6–10)
POTASSIUM SERPL-SCNC: 3.5 MMOL/L (ref 3.4–5.1)
RBC # BLD: 2.74 M/UL (ref 3.8–5.8)
SODIUM BLD-SCNC: 141 MMOL/L (ref 136–145)
TOTAL PROTEIN: 6.6 G/DL (ref 6.4–8.3)
WBC # BLD: 3.9 K/UL (ref 4–11)

## 2021-03-24 PROCEDURE — 80076 HEPATIC FUNCTION PANEL: CPT

## 2021-03-24 PROCEDURE — 80069 RENAL FUNCTION PANEL: CPT

## 2021-03-24 PROCEDURE — 85025 COMPLETE CBC W/AUTO DIFF WBC: CPT

## 2021-03-24 PROCEDURE — 80158 DRUG ASSAY CYCLOSPORINE: CPT

## 2021-03-24 PROCEDURE — 36415 COLL VENOUS BLD VENIPUNCTURE: CPT

## 2021-03-26 LAB — CYCLOSPORINE A: 75.1 NG/ML

## 2021-04-06 ENCOUNTER — HOSPITAL ENCOUNTER (OUTPATIENT)
Facility: HOSPITAL | Age: 66
Discharge: HOME | End: 2021-04-06
Payer: COMMERCIAL

## 2021-04-06 LAB
ALBUMIN SERPL-MCNC: 3.2 G/DL (ref 3.4–4.8)
ALP BLD-CCNC: 166 U/L (ref 25–100)
ALT SERPL-CCNC: 13 U/L (ref 4–36)
ANION GAP SERPL CALCULATED.3IONS-SCNC: 8 MMOL/L (ref 3–16)
AST SERPL-CCNC: 18 U/L (ref 8–33)
BASOPHILS ABSOLUTE: 0 K/UL (ref 0–0.1)
BASOPHILS RELATIVE PERCENT: 0.5 %
BILIRUB SERPL-MCNC: 1.3 MG/DL (ref 0.3–1.2)
BILIRUBIN DIRECT: 0.5 MG/DL (ref 0–0.2)
BILIRUBIN, INDIRECT: 0.8 MG/DL (ref 0.2–0.8)
BUN BLDV-MCNC: 35 MG/DL (ref 6–20)
CALCIUM SERPL-MCNC: 8.7 MG/DL (ref 8.5–10.5)
CHLORIDE BLD-SCNC: 107 MMOL/L (ref 98–107)
CO2: 28 MMOL/L (ref 20–30)
CREAT SERPL-MCNC: 1.4 MG/DL (ref 0.4–1.2)
EOSINOPHILS ABSOLUTE: 0.1 K/UL (ref 0–0.4)
EOSINOPHILS RELATIVE PERCENT: 1.9 %
GFR AFRICAN AMERICAN: 46
GFR NON-AFRICAN AMERICAN: 38
GLUCOSE BLD-MCNC: 122 MG/DL (ref 74–106)
HCT VFR BLD CALC: 25 % (ref 37–47)
HEMOGLOBIN: 7.8 G/DL (ref 11.5–16.5)
IMMATURE GRANULOCYTES #: 0 K/UL
IMMATURE GRANULOCYTES %: 0.3 % (ref 0–5)
LYMPHOCYTES ABSOLUTE: 0.7 K/UL (ref 1.5–4)
LYMPHOCYTES RELATIVE PERCENT: 17.6 %
MCH RBC QN AUTO: 28.7 PG (ref 27–32)
MCHC RBC AUTO-ENTMCNC: 31.2 G/DL (ref 31–35)
MCV RBC AUTO: 91.9 FL (ref 80–100)
MONOCYTES ABSOLUTE: 0.2 K/UL (ref 0.2–0.8)
MONOCYTES RELATIVE PERCENT: 5.9 %
NEUTROPHILS ABSOLUTE: 2.8 K/UL (ref 2–7.5)
NEUTROPHILS RELATIVE PERCENT: 73.8 %
PDW BLD-RTO: 18.6 % (ref 11–16)
PHOSPHORUS: 3.5 MG/DL (ref 2.5–4.5)
PLATELET # BLD: 214 K/UL (ref 150–400)
PMV BLD AUTO: 8.8 FL (ref 6–10)
POTASSIUM SERPL-SCNC: 4.7 MMOL/L (ref 3.4–5.1)
RBC # BLD: 2.72 M/UL (ref 3.8–5.8)
SODIUM BLD-SCNC: 143 MMOL/L (ref 136–145)
TOTAL PROTEIN: 6.7 G/DL (ref 6.4–8.3)
WBC # BLD: 3.8 K/UL (ref 4–11)

## 2021-04-06 PROCEDURE — 80076 HEPATIC FUNCTION PANEL: CPT

## 2021-04-06 PROCEDURE — 36415 COLL VENOUS BLD VENIPUNCTURE: CPT

## 2021-04-06 PROCEDURE — 85025 COMPLETE CBC W/AUTO DIFF WBC: CPT

## 2021-04-06 PROCEDURE — 80069 RENAL FUNCTION PANEL: CPT

## 2021-04-06 PROCEDURE — 80158 DRUG ASSAY CYCLOSPORINE: CPT

## 2021-04-07 ENCOUNTER — OFFICE VISIT (OUTPATIENT)
Dept: PRIMARY CARE CLINIC | Age: 66
End: 2021-04-07
Payer: COMMERCIAL

## 2021-04-07 VITALS
WEIGHT: 119.4 LBS | OXYGEN SATURATION: 97 % | DIASTOLIC BLOOD PRESSURE: 62 MMHG | SYSTOLIC BLOOD PRESSURE: 138 MMHG | BODY MASS INDEX: 22.54 KG/M2 | HEIGHT: 61 IN | HEART RATE: 72 BPM | TEMPERATURE: 97 F | RESPIRATION RATE: 18 BRPM

## 2021-04-07 DIAGNOSIS — N18.30 CHRONIC RENAL FAILURE, STAGE 3 (MODERATE), UNSPECIFIED WHETHER STAGE 3A OR 3B CKD (HCC): ICD-10-CM

## 2021-04-07 DIAGNOSIS — I10 ESSENTIAL HYPERTENSION: ICD-10-CM

## 2021-04-07 DIAGNOSIS — D64.9 ANEMIA, UNSPECIFIED TYPE: ICD-10-CM

## 2021-04-07 DIAGNOSIS — Z86.39 HISTORY OF DIABETES MELLITUS: ICD-10-CM

## 2021-04-07 DIAGNOSIS — I48.91 ATRIAL FIBRILLATION, UNSPECIFIED TYPE (HCC): ICD-10-CM

## 2021-04-07 DIAGNOSIS — R91.1 LUNG NODULE: ICD-10-CM

## 2021-04-07 DIAGNOSIS — F41.9 ANXIETY: ICD-10-CM

## 2021-04-07 DIAGNOSIS — Z87.891 PERSONAL HISTORY OF NICOTINE DEPENDENCE: ICD-10-CM

## 2021-04-07 DIAGNOSIS — Z94.4 LIVER TRANSPLANT RECIPIENT (HCC): Primary | ICD-10-CM

## 2021-04-07 LAB — HBA1C MFR BLD: 4.5 %

## 2021-04-07 PROCEDURE — 83036 HEMOGLOBIN GLYCOSYLATED A1C: CPT | Performed by: INTERNAL MEDICINE

## 2021-04-07 PROCEDURE — 99406 BEHAV CHNG SMOKING 3-10 MIN: CPT | Performed by: INTERNAL MEDICINE

## 2021-04-07 PROCEDURE — 99214 OFFICE O/P EST MOD 30 MIN: CPT | Performed by: INTERNAL MEDICINE

## 2021-04-07 RX ORDER — ONDANSETRON 4 MG/1
4 TABLET, FILM COATED ORAL EVERY 6 HOURS PRN
COMMUNITY
Start: 2021-03-09 | End: 2021-05-19 | Stop reason: ALTCHOICE

## 2021-04-07 RX ORDER — AMLODIPINE BESYLATE 10 MG/1
10 TABLET ORAL DAILY
COMMUNITY
Start: 2021-03-01 | End: 2021-05-19 | Stop reason: ALTCHOICE

## 2021-04-07 RX ORDER — MYCOPHENOLATE MOFETIL 250 MG/1
500 CAPSULE ORAL 2 TIMES DAILY
COMMUNITY
Start: 2021-03-09 | End: 2022-09-15

## 2021-04-07 RX ORDER — DULOXETIN HYDROCHLORIDE 20 MG/1
20 CAPSULE, DELAYED RELEASE ORAL DAILY
Qty: 30 CAPSULE | Refills: 1 | Status: SHIPPED | OUTPATIENT
Start: 2021-04-07 | End: 2021-05-03

## 2021-04-07 RX ORDER — AMOXICILLIN 250 MG
1 CAPSULE ORAL 2 TIMES DAILY
COMMUNITY
Start: 2021-03-22 | End: 2021-05-19 | Stop reason: ALTCHOICE

## 2021-04-07 RX ORDER — ATORVASTATIN CALCIUM 40 MG/1
40 TABLET, FILM COATED ORAL NIGHTLY
Status: ON HOLD | COMMUNITY
Start: 2021-03-22 | End: 2022-03-15 | Stop reason: HOSPADM

## 2021-04-07 RX ORDER — DRONABINOL 5 MG/1
5 CAPSULE ORAL
COMMUNITY
End: 2021-04-07

## 2021-04-07 RX ORDER — POLYVINYL ALCOHOL 14 MG/ML
1 SOLUTION/ DROPS OPHTHALMIC 4 TIMES DAILY PRN
COMMUNITY
Start: 2021-03-01 | End: 2021-05-19 | Stop reason: ALTCHOICE

## 2021-04-07 RX ORDER — POLYETHYLENE GLYCOL 3350 17 G/17G
17 POWDER, FOR SOLUTION ORAL 2 TIMES DAILY
COMMUNITY
Start: 2021-03-01 | End: 2021-05-19 | Stop reason: ALTCHOICE

## 2021-04-07 RX ORDER — ERGOCALCIFEROL 1.25 MG/1
CAPSULE ORAL
COMMUNITY
Start: 2021-01-13 | End: 2022-09-15

## 2021-04-07 RX ORDER — CYCLOSPORINE 25 MG/1
CAPSULE, LIQUID FILLED ORAL
COMMUNITY
Start: 2021-03-30

## 2021-04-07 RX ORDER — TORSEMIDE 20 MG/1
10 TABLET ORAL DAILY
COMMUNITY
Start: 2021-03-22

## 2021-04-07 RX ORDER — ASPIRIN 81 MG/1
81 TABLET ORAL
COMMUNITY
Start: 2021-03-01 | End: 2021-05-19 | Stop reason: ALTCHOICE

## 2021-04-07 RX ORDER — ACETAMINOPHEN 325 MG/1
975 TABLET ORAL EVERY 8 HOURS PRN
COMMUNITY
Start: 2021-03-01 | End: 2021-04-07

## 2021-04-07 ASSESSMENT — ENCOUNTER SYMPTOMS
ABDOMINAL PAIN: 0
EYE DISCHARGE: 0
VOMITING: 0
WHEEZING: 0
SINUS PRESSURE: 0
NAUSEA: 0
COUGH: 0
SHORTNESS OF BREATH: 0
SORE THROAT: 0
BACK PAIN: 0

## 2021-04-07 NOTE — PROGRESS NOTES
SUBJECTIVE:    Patient ID: Kristan Musa is a 60 y. o.female. Chief Complaint   Patient presents with    Established New Doctor     HPI:  Patient is here today to establish care. She is a liver transplant recipient. She is followed by . She is on immunosuppressant medication. Patient also has A. Fib. She is on ASA 81 mg as well as Eliquis. She does have minimal bruising on her arms and hands. She denies any CP or palpations. She is followed by cardiology. Blood pressure has been stable. Patient also with a prior history of chronic renal failure. She continued to have good urine output. She reported that she is not using anything but was prescribed for her. Patient with anemia. She reports she has struggled with this for a long time during her illness with her liver. Her last Hgb yesterday was 7.8. She denies any active bleeding or melena. Patient was diabetic in the past per her report. She did take oral medication and insulin. She lost a significant amount of weight with her illness and she has no longer required medication for this. Patient's medications, allergies, past medical, surgical, social and family histories were reviewed and updated as appropriate in the electronic medical record/chart.         Outpatient Medications Marked as Taking for the 4/7/21 encounter (Office Visit) with Jami Fan MD   Medication Sig Dispense Refill    mycophenolate (CELLCEPT) 250 MG capsule Take 500 mg by mouth 2 times daily      cycloSPORINE modified (NEORAL) 25 MG capsule Take 4 capsules (100mg) PO q am and take 4 capsules (100 mg) POq pm      amLODIPine (NORVASC) 10 MG tablet Take 10 mg by mouth daily      apixaban (ELIQUIS) 2.5 MG TABS tablet Take 2.5 mg by mouth 2 times daily      atorvastatin (LIPITOR) 40 MG tablet Take 40 mg by mouth nightly      ondansetron (ZOFRAN) 4 MG tablet Take 4 mg by mouth every 6 hours as needed      polyvinyl alcohol (LIQUIFILM TEARS) 1.4 % ophthalmic solution Apply 1 drop to eye 4 times daily as needed      aspirin 81 MG EC tablet Take 81 mg by mouth daily (with breakfast)      acetaminophen (TYLENOL) 325 MG tablet Take 975 mg by mouth every 8 hours as needed      senna-docusate (PERICOLACE) 8.6-50 MG per tablet Take 1 tablet by mouth 2 times daily      torsemide (DEMADEX) 20 MG tablet Take 20 mg by mouth daily      vitamin D (ERGOCALCIFEROL) 1.25 MG (91036 UT) CAPS capsule TAKE ONE CAPSULE BY MOUTH ONCE A WEEK.  metoprolol tartrate (LOPRESSOR) 25 MG tablet Take 12.5 mg by mouth 2 times daily      polyethylene glycol (GLYCOLAX) 17 GM/SCOOP powder Take 17 g by mouth 2 times daily      dronabinol (MARINOL) 5 MG capsule Take 5 mg by mouth Daily with lunch.  pantoprazole (PROTONIX) 20 MG tablet Take 1 tablet by mouth daily 30 tablet 3        Review of Systems   Constitutional: Positive for fatigue and unexpected weight change. Negative for chills and fever. HENT: Negative for congestion, sinus pressure and sore throat. Eyes: Negative for discharge and visual disturbance. Respiratory: Negative for cough, shortness of breath and wheezing. Cardiovascular: Negative for chest pain and palpitations. Gastrointestinal: Negative for abdominal pain, nausea and vomiting. Endocrine: Negative for cold intolerance and heat intolerance. Genitourinary: Negative for dysuria, frequency and urgency. Musculoskeletal: Positive for arthralgias. Negative for back pain. Skin: Negative for rash and wound. Neurological: Negative for syncope, numbness and headaches. Hematological: Bruises/bleeds easily. Psychiatric/Behavioral: Negative for agitation and sleep disturbance. The patient is not nervous/anxious.         Past Medical History:   Diagnosis Date    Arthritis, rheumatoid (HCC)     [facility]osis ([facility]     Colitis     [facility]     Hypertension     IIH (idiopathic intracranial hypertension)     Liver cirrhosis (HCC)     Osteoarthritis  Unspecified sleep apnea      Past Surgical History:   Procedure Laterality Date    BONE MARROW BIOPSY      DILATION AND CURETTAGE OF UTERUS      DILATION AND CURETTAGE OF UTERUS   &     LIVER BIOPSY      TUBAL LIGATION  1982      Family History   Problem Relation Age of Onset    High Blood Pressure Mother     Alzheimer's Disease Mother     Heart Disease Father     High Blood Pressure Father     Heart Disease Maternal Grandmother     Heart Disease Maternal Grandfather     Stroke Maternal Grandfather     Diabetes Paternal Grandmother     High Blood Pressure Brother       Social History     Tobacco Use   Smoking Status Current Some Day Smoker    Packs/day: 0.10    Years: 1.00    Pack years: 0.10    Types: Cigarettes    Last attempt to quit: 2019    Years since quittin.9   Smokeless Tobacco Never Used       OBJECTIVE:   Wt Readings from Last 3 Encounters:   21 119 lb 6.4 oz (54.2 kg)   20 161 lb (73 kg)   20 150 lb (68 kg)     BP Readings from Last 3 Encounters:   21 138/62   20 (!) 150/62   20 (!) 110/47       /62   Pulse 72   Temp 97 °F (36.1 °C)   Resp 18   Ht 5' 1\" (1.549 m)   Wt 119 lb 6.4 oz (54.2 kg)   SpO2 97%   BMI 22.56 kg/m²      Physical Exam  Vitals signs and nursing note reviewed. Constitutional:       Appearance: Normal appearance. She is well-developed. HENT:      Head: Normocephalic and atraumatic. Right Ear: External ear normal.      Left Ear: External ear normal.      Nose: Nose normal.      Mouth/Throat:      Mouth: Mucous membranes are moist.      Pharynx: Oropharynx is clear. Eyes:      Conjunctiva/sclera: Conjunctivae normal.      Pupils: Pupils are equal, round, and reactive to light. Neck:      Musculoskeletal: Neck supple. No neck rigidity or muscular tenderness. Thyroid: No thyromegaly. Vascular: No JVD. Cardiovascular:      Rate and Rhythm: Normal rate and regular rhythm. Heart sounds: Normal heart sounds. Pulmonary:      Effort: Pulmonary effort is normal.      Breath sounds: Normal breath sounds. No wheezing or rales. Abdominal:      General: Bowel sounds are normal. There is no distension. Palpations: Abdomen is soft. Tenderness: There is no abdominal tenderness. Musculoskeletal:         General: No tenderness. Right lower leg: No edema. Left lower leg: No edema. Skin:     Findings: Bruising (arms and hands) present. No erythema or rash. Neurological:      General: No focal deficit present. Mental Status: She is alert and oriented to person, place, and time. Psychiatric:         Behavior: Behavior normal.         Judgment: Judgment normal.         Lab Results   Component Value Date     04/06/2021    K 4.7 04/06/2021    K 3.1 02/01/2020     04/06/2021    CL 99.0 06/01/2012    CO2 28 04/06/2021    GLUCOSE 122 04/06/2021    BUN 35 04/06/2021    CREATININE 1.4 04/06/2021    CREATININE 0.9 06/01/2012    CALCIUM 8.7 04/06/2021    PROT 6.7 04/06/2021    LABALBU 3.2 04/06/2021    LABALBU 3.9 06/01/2012    BILITOT 1.3 04/06/2021    ALT 13 04/06/2021    AST 18 04/06/2021       Hemoglobin A1C (%)   Date Value   01/02/2020 5.1     Microscopic Examination (no units)   Date Value   02/02/2020 Not Indicated     LDL Calculated (mg/dL)   Date Value   04/10/2018 94         Lab Results   Component Value Date    WBC 3.8 04/06/2021    NEUTROABS 2.8 04/06/2021    HGB 7.8 04/06/2021    HCT 25.0 04/06/2021    HCT 44.3 06/01/2012    MCV 91.9 04/06/2021     04/06/2021     06/01/2012       Lab Results   Component Value Date    TSH 1.46 10/29/2015       ASSESSMENT/PLAN:     1. Liver transplant recipient [facility])  I had long conversation with the patient regarding the importance of being very compliant with transplant team recommendations. Discussed the importance of periodic and frequent blood monitoring.   Discussed the importance of contact me or going to the emergency room with any issues regarding signs or symptoms of infection. Proceed with labs as ordered. Patient to continue recommendations regarding frequent blood work from transplant team.    - Ferritin; Future  - Folate; Future  - Iron and TIBC; Future  - Vitamin B12; Future  - Vitamin D 25 Hydroxy; Future    2. Atrial fibrillation, unspecified type ([address]  Sinus rhythm on exam. Will cont on meds. I had a long discussion with the patient regarding the risk/benefit from anticoagulation. Patient is aware of that. 3. Anemia, unspecified type  Proceed with anemia work-up. Hemoglobin is low. Discussed with the patient that may need to have EGDs and colonoscopy. Patient to discuss with her transplant team.    - Ferritin; Future  - Folate; Future  - Iron and TIBC; Future  - Vitamin B12; Future  - Vitamin D 25 Hydroxy; Future    4. Chronic renal failure, stage 3 (moderate), unspecified whether stage 3a or 3b CKD  Baseline. I have advised her to avoid any nephrotoxic agents. I am going to monitor renal function periodically. I will make sure that the blood pressure and other medical problems are under good control. Will refer to nephrology if the renal function should begin to deteriorate. Creatinine will be checked today in few weeks. Last Creatinine is   Lab Results   Component Value Date    CREATININE 1.4 (H) 04/06/2021       - Ferritin; Future  - Folate; Future  - Iron and TIBC; Future  - Vitamin B12; Future  - Vitamin D 25 Hydroxy; Future    5. Essential hypertension  BP is stable. I have advised her on low-sodium diet, exercise and weight control. I am going to continue current medication. Will monitor her renal function every few months, have advised her to check blood pressure frequently and to keep a record of this. - Ferritin; Future  - Folate; Future  - Iron and TIBC; Future  - Vitamin B12; Future  - Vitamin D 25 Hydroxy; Future    6.  History of diabetes mellitus  A1c was checked today and came back at 4.5. No need for any medication at this time. - POCT glycosylated hemoglobin (Hb A1C)    7. Anxiety  I am going to treat the patient with Cymbalta. I advised the patient to seek counseling. I will reassess current condition every few months. Patient to call or RTC if experienced any deterioration of Sx.      8. Lung nodule  Patient with a prior history of lung nodule. Recent CT scan was done Feb 2021 at [facility] and showed  1.  Diffuse smooth septal thickening and lung fields consistent interstitial pulmonary edema. 2.  Interval development of airspace opacity the left upper lobe with patchy areas of airspace opacity in the remainder of the lung fields, findings may represent alveolar pulmonary edema. Superimposed pneumonia is possible. 3.  Chronic loculated pleural effusion with associated lower lobe atelectasis with round atelectasis morphology. 4.  Enlarged main pulmonary artery, although nonspecific may represent pulmonary arterial hypertension. Will benefit from repeat imaging studies in 3 to 6 months. I had a long discussion with her regarding the risk of smoking especialy with her other medical problems. I have discussed with patient several treatment options (program, nicotine patches and other meds) to help her quit smoking. Patient is not interested at this time (working on her own). Spent 3 minutes      Orders Placed This Encounter   Medications    DULoxetine (CYMBALTA) 20 MG extended release capsule     Sig: Take 1 capsule by mouth daily     Dispense:  30 capsule     Refill:  1      Written by Alana Mckee, acting as a scribe for Dr. Angela Gardner on 4/7/2021 at 2:54 PM.     I, Dr. Rosa Hernandez, personally performed the services described in the documentation as scribed by Abigail Cabello CMA, in my presence and it is both accurate and complete.

## 2021-04-07 NOTE — PROGRESS NOTES
Chief Complaint   Patient presents with   Karoline Lee       Have you seen any other physician or provider since your last visit yes -     Have you had any other diagnostic tests since your last visit? yes -     Have you changed or stopped any medications since your last visit? yes - updated        Pt states she was prior  pt she goes to [facility] in [place] due to had transplant so she has to see them also she has no immune system she states. She wanted to see  today to see if he will give her back her Gabapentin, and Xanax. Also pt is requesting some pain medicine today. ..

## 2021-04-08 LAB — CYCLOSPORINE A: 99.8 NG/ML

## 2021-04-13 ENCOUNTER — HOSPITAL ENCOUNTER (OUTPATIENT)
Facility: HOSPITAL | Age: 66
Discharge: HOME | End: 2021-04-13
Payer: COMMERCIAL

## 2021-04-13 LAB
ALBUMIN SERPL-MCNC: 3.6 G/DL (ref 3.4–4.8)
ALP BLD-CCNC: 160 U/L (ref 25–100)
ALT SERPL-CCNC: 16 U/L (ref 4–36)
ANION GAP SERPL CALCULATED.3IONS-SCNC: 8 MMOL/L (ref 3–16)
AST SERPL-CCNC: 22 U/L (ref 8–33)
BASOPHILS ABSOLUTE: 0 K/UL (ref 0–0.1)
BASOPHILS RELATIVE PERCENT: 0.4 %
BILIRUB SERPL-MCNC: 1.3 MG/DL (ref 0.3–1.2)
BILIRUBIN DIRECT: 0.5 MG/DL (ref 0–0.2)
BILIRUBIN, INDIRECT: 0.8 MG/DL (ref 0.2–0.8)
BUN BLDV-MCNC: 35 MG/DL (ref 6–20)
CALCIUM SERPL-MCNC: 9.5 MG/DL (ref 8.5–10.5)
CHLORIDE BLD-SCNC: 104 MMOL/L (ref 98–107)
CO2: 30 MMOL/L (ref 20–30)
CREAT SERPL-MCNC: 1.6 MG/DL (ref 0.4–1.2)
EOSINOPHILS ABSOLUTE: 0.1 K/UL (ref 0–0.4)
EOSINOPHILS RELATIVE PERCENT: 3.3 %
GFR AFRICAN AMERICAN: 39
GFR NON-AFRICAN AMERICAN: 32
GLUCOSE BLD-MCNC: 105 MG/DL (ref 74–106)
HCT VFR BLD CALC: 28.2 % (ref 37–47)
HEMOGLOBIN: 8.9 G/DL (ref 11.5–16.5)
IMMATURE GRANULOCYTES #: 0 K/UL
IMMATURE GRANULOCYTES %: 0.4 % (ref 0–5)
LYMPHOCYTES ABSOLUTE: 0.6 K/UL (ref 1.5–4)
LYMPHOCYTES RELATIVE PERCENT: 20.7 %
MCH RBC QN AUTO: 29.5 PG (ref 27–32)
MCHC RBC AUTO-ENTMCNC: 31.6 G/DL (ref 31–35)
MCV RBC AUTO: 93.4 FL (ref 80–100)
MONOCYTES ABSOLUTE: 0.2 K/UL (ref 0.2–0.8)
MONOCYTES RELATIVE PERCENT: 6.3 %
NEUTROPHILS ABSOLUTE: 1.9 K/UL (ref 2–7.5)
NEUTROPHILS RELATIVE PERCENT: 68.9 %
PDW BLD-RTO: 18.2 % (ref 11–16)
PHOSPHORUS: 5.1 MG/DL (ref 2.5–4.5)
PLATELET # BLD: 191 K/UL (ref 150–400)
PMV BLD AUTO: 9.4 FL (ref 6–10)
POTASSIUM SERPL-SCNC: 4.9 MMOL/L (ref 3.4–5.1)
RBC # BLD: 3.02 M/UL (ref 3.8–5.8)
SODIUM BLD-SCNC: 142 MMOL/L (ref 136–145)
TOTAL PROTEIN: 6.7 G/DL (ref 6.4–8.3)
WBC # BLD: 2.7 K/UL (ref 4–11)

## 2021-04-13 PROCEDURE — 36415 COLL VENOUS BLD VENIPUNCTURE: CPT

## 2021-04-13 PROCEDURE — 80158 DRUG ASSAY CYCLOSPORINE: CPT

## 2021-04-13 PROCEDURE — 80069 RENAL FUNCTION PANEL: CPT

## 2021-04-13 PROCEDURE — 80076 HEPATIC FUNCTION PANEL: CPT

## 2021-04-13 PROCEDURE — 85025 COMPLETE CBC W/AUTO DIFF WBC: CPT

## 2021-04-15 LAB — CYCLOSPORINE A: 108.1 NG/ML

## 2021-04-21 ENCOUNTER — HOSPITAL ENCOUNTER (OUTPATIENT)
Facility: HOSPITAL | Age: 66
Discharge: HOME | End: 2021-04-21
Payer: COMMERCIAL

## 2021-04-21 LAB
ALBUMIN SERPL-MCNC: 3.7 G/DL (ref 3.4–4.8)
ALP BLD-CCNC: 157 U/L (ref 25–100)
ALT SERPL-CCNC: 12 U/L (ref 4–36)
AMYLASE: 53 U/L (ref 20–104)
ANION GAP SERPL CALCULATED.3IONS-SCNC: 8 MMOL/L (ref 3–16)
AST SERPL-CCNC: 18 U/L (ref 8–33)
BASOPHILS ABSOLUTE: 0 K/UL (ref 0–0.1)
BASOPHILS RELATIVE PERCENT: 0.6 %
BILIRUB SERPL-MCNC: 1.5 MG/DL (ref 0.3–1.2)
BILIRUBIN DIRECT: 0.6 MG/DL (ref 0–0.2)
BILIRUBIN, INDIRECT: 0.9 MG/DL (ref 0.2–0.8)
BUN BLDV-MCNC: 32 MG/DL (ref 6–20)
CALCIUM SERPL-MCNC: 9.7 MG/DL (ref 8.5–10.5)
CHLORIDE BLD-SCNC: 106 MMOL/L (ref 98–107)
CO2: 29 MMOL/L (ref 20–30)
CREAT SERPL-MCNC: 1.5 MG/DL (ref 0.4–1.2)
EOSINOPHILS ABSOLUTE: 0.1 K/UL (ref 0–0.4)
EOSINOPHILS RELATIVE PERCENT: 3.4 %
GFR AFRICAN AMERICAN: 42
GFR NON-AFRICAN AMERICAN: 35
GLUCOSE BLD-MCNC: 96 MG/DL (ref 74–106)
HCT VFR BLD CALC: 31.4 % (ref 37–47)
HEMOGLOBIN: 10 G/DL (ref 11.5–16.5)
IMMATURE GRANULOCYTES #: 0 K/UL
IMMATURE GRANULOCYTES %: 0.3 % (ref 0–5)
LIPASE: 14 U/L (ref 5.6–51.3)
LYMPHOCYTES ABSOLUTE: 0.7 K/UL (ref 1.5–4)
LYMPHOCYTES RELATIVE PERCENT: 22.2 %
MCH RBC QN AUTO: 29.2 PG (ref 27–32)
MCHC RBC AUTO-ENTMCNC: 31.8 G/DL (ref 31–35)
MCV RBC AUTO: 91.5 FL (ref 80–100)
MONOCYTES ABSOLUTE: 0.2 K/UL (ref 0.2–0.8)
MONOCYTES RELATIVE PERCENT: 6.8 %
NEUTROPHILS ABSOLUTE: 2.2 K/UL (ref 2–7.5)
NEUTROPHILS RELATIVE PERCENT: 66.7 %
PDW BLD-RTO: 16.4 % (ref 11–16)
PHOSPHORUS: 5.1 MG/DL (ref 2.5–4.5)
PLATELET # BLD: 207 K/UL (ref 150–400)
PMV BLD AUTO: 9.2 FL (ref 6–10)
POTASSIUM SERPL-SCNC: 4.5 MMOL/L (ref 3.4–5.1)
RBC # BLD: 3.43 M/UL (ref 3.8–5.8)
SODIUM BLD-SCNC: 143 MMOL/L (ref 136–145)
TOTAL PROTEIN: 7 G/DL (ref 6.4–8.3)
WBC # BLD: 3.2 K/UL (ref 4–11)

## 2021-04-21 PROCEDURE — 36415 COLL VENOUS BLD VENIPUNCTURE: CPT

## 2021-04-21 PROCEDURE — 82150 ASSAY OF AMYLASE: CPT

## 2021-04-21 PROCEDURE — 80076 HEPATIC FUNCTION PANEL: CPT

## 2021-04-21 PROCEDURE — 85025 COMPLETE CBC W/AUTO DIFF WBC: CPT

## 2021-04-21 PROCEDURE — 80069 RENAL FUNCTION PANEL: CPT

## 2021-04-21 PROCEDURE — 80158 DRUG ASSAY CYCLOSPORINE: CPT

## 2021-04-21 PROCEDURE — 83690 ASSAY OF LIPASE: CPT

## 2021-04-23 LAB — CYCLOSPORINE A: 146.2 NG/ML

## 2021-04-25 LAB
CMV DNA QNT PCR: <2.6 LOG CPY/ML
CMV DNA QUANTATATIVE INTERPRETATION: <2.4 LOG IU/ML
CMV DNA QUANTATATIVE INTERPRETATION: NOT DETECTED
CMV DNA QUANTITATIVE: <227 IU/ML
CMV SOURCE: NORMAL
CMVQ COPY/ML: <390 CPY/ML

## 2021-04-26 ENCOUNTER — TELEPHONE (OUTPATIENT)
Dept: PRIMARY CARE CLINIC | Age: 66
End: 2021-04-26

## 2021-04-28 ENCOUNTER — HOSPITAL ENCOUNTER (OUTPATIENT)
Facility: HOSPITAL | Age: 66
Discharge: HOME | End: 2021-04-28
Payer: COMMERCIAL

## 2021-04-28 LAB
ALBUMIN SERPL-MCNC: 3.6 G/DL (ref 3.4–4.8)
ALP BLD-CCNC: 148 U/L (ref 25–100)
ALT SERPL-CCNC: 11 U/L (ref 4–36)
ANION GAP SERPL CALCULATED.3IONS-SCNC: 10 MMOL/L (ref 3–16)
AST SERPL-CCNC: 22 U/L (ref 8–33)
BASOPHILS ABSOLUTE: 0 K/UL (ref 0–0.1)
BASOPHILS RELATIVE PERCENT: 0.9 %
BILIRUB SERPL-MCNC: 1.3 MG/DL (ref 0.3–1.2)
BILIRUBIN DIRECT: 0.5 MG/DL (ref 0–0.2)
BILIRUBIN, INDIRECT: 0.8 MG/DL (ref 0.2–0.8)
BUN BLDV-MCNC: 38 MG/DL (ref 6–20)
CALCIUM SERPL-MCNC: 9.2 MG/DL (ref 8.5–10.5)
CHLORIDE BLD-SCNC: 101 MMOL/L (ref 98–107)
CO2: 28 MMOL/L (ref 20–30)
CREAT SERPL-MCNC: 1.5 MG/DL (ref 0.4–1.2)
EOSINOPHILS ABSOLUTE: 0.2 K/UL (ref 0–0.4)
EOSINOPHILS RELATIVE PERCENT: 5.5 %
GFR AFRICAN AMERICAN: 42
GFR NON-AFRICAN AMERICAN: 35
GLUCOSE BLD-MCNC: 166 MG/DL (ref 74–106)
HCT VFR BLD CALC: 30.4 % (ref 37–47)
HEMOGLOBIN: 9.5 G/DL (ref 11.5–16.5)
IMMATURE GRANULOCYTES #: 0 K/UL
IMMATURE GRANULOCYTES %: 0.3 % (ref 0–5)
LYMPHOCYTES ABSOLUTE: 0.7 K/UL (ref 1.5–4)
LYMPHOCYTES RELATIVE PERCENT: 21.4 %
MCH RBC QN AUTO: 29.5 PG (ref 27–32)
MCHC RBC AUTO-ENTMCNC: 31.3 G/DL (ref 31–35)
MCV RBC AUTO: 94.4 FL (ref 80–100)
MONOCYTES ABSOLUTE: 0.2 K/UL (ref 0.2–0.8)
MONOCYTES RELATIVE PERCENT: 6.4 %
NEUTROPHILS ABSOLUTE: 2.3 K/UL (ref 2–7.5)
NEUTROPHILS RELATIVE PERCENT: 65.5 %
PDW BLD-RTO: 15.8 % (ref 11–16)
PHOSPHORUS: 4.1 MG/DL (ref 2.5–4.5)
PLATELET # BLD: 207 K/UL (ref 150–400)
PMV BLD AUTO: 9.8 FL (ref 6–10)
POTASSIUM SERPL-SCNC: 4.1 MMOL/L (ref 3.4–5.1)
RBC # BLD: 3.22 M/UL (ref 3.8–5.8)
SODIUM BLD-SCNC: 139 MMOL/L (ref 136–145)
TOTAL PROTEIN: 6.7 G/DL (ref 6.4–8.3)
WBC # BLD: 3.5 K/UL (ref 4–11)

## 2021-04-28 PROCEDURE — 80069 RENAL FUNCTION PANEL: CPT

## 2021-04-28 PROCEDURE — 80158 DRUG ASSAY CYCLOSPORINE: CPT

## 2021-04-28 PROCEDURE — 80076 HEPATIC FUNCTION PANEL: CPT

## 2021-04-28 PROCEDURE — 36415 COLL VENOUS BLD VENIPUNCTURE: CPT

## 2021-04-28 PROCEDURE — 85025 COMPLETE CBC W/AUTO DIFF WBC: CPT

## 2021-04-29 ENCOUNTER — HOSPITAL ENCOUNTER (OUTPATIENT)
Dept: ULTRASOUND IMAGING | Facility: HOSPITAL | Age: 66
Discharge: HOME | End: 2021-04-29
Payer: COMMERCIAL

## 2021-04-29 ENCOUNTER — HOSPITAL ENCOUNTER (OUTPATIENT)
Dept: CT IMAGING | Facility: HOSPITAL | Age: 66
Discharge: HOME | End: 2021-04-29
Payer: COMMERCIAL

## 2021-04-29 DIAGNOSIS — Z94.4 LIVER REPLACED BY TRANSPLANT (HCC): ICD-10-CM

## 2021-04-29 DIAGNOSIS — R10.13 ABDOMINAL PAIN, EPIGASTRIC: ICD-10-CM

## 2021-04-29 PROCEDURE — 76705 ECHO EXAM OF ABDOMEN: CPT

## 2021-04-29 PROCEDURE — 93975 VASCULAR STUDY: CPT

## 2021-04-30 LAB — CYCLOSPORINE A: 137.2 NG/ML

## 2021-05-03 RX ORDER — DULOXETIN HYDROCHLORIDE 20 MG/1
20 CAPSULE, DELAYED RELEASE ORAL DAILY
Qty: 30 CAPSULE | Refills: 1 | Status: SHIPPED | OUTPATIENT
Start: 2021-05-03 | End: 2021-05-19 | Stop reason: SINTOL

## 2021-05-05 ENCOUNTER — HOSPITAL ENCOUNTER (OUTPATIENT)
Facility: HOSPITAL | Age: 66
Discharge: HOME | End: 2021-05-05
Payer: COMMERCIAL

## 2021-05-05 LAB
ALBUMIN SERPL-MCNC: 3.7 G/DL (ref 3.4–4.8)
ANION GAP SERPL CALCULATED.3IONS-SCNC: 11 MMOL/L (ref 3–16)
BASOPHILS ABSOLUTE: 0 K/UL (ref 0–0.1)
BASOPHILS RELATIVE PERCENT: 0.5 %
BUN BLDV-MCNC: 36 MG/DL (ref 6–20)
CALCIUM SERPL-MCNC: 9.5 MG/DL (ref 8.5–10.5)
CHLORIDE BLD-SCNC: 103 MMOL/L (ref 98–107)
CO2: 28 MMOL/L (ref 20–30)
CREAT SERPL-MCNC: 1.6 MG/DL (ref 0.4–1.2)
EOSINOPHILS ABSOLUTE: 0.1 K/UL (ref 0–0.4)
EOSINOPHILS RELATIVE PERCENT: 2.7 %
GFR AFRICAN AMERICAN: 39
GFR NON-AFRICAN AMERICAN: 32
GLUCOSE BLD-MCNC: 196 MG/DL (ref 74–106)
HCT VFR BLD CALC: 30.1 % (ref 37–47)
HEMOGLOBIN: 9.9 G/DL (ref 11.5–16.5)
IMMATURE GRANULOCYTES #: 0 K/UL
IMMATURE GRANULOCYTES %: 0.5 % (ref 0–5)
LYMPHOCYTES ABSOLUTE: 0.8 K/UL (ref 1.5–4)
LYMPHOCYTES RELATIVE PERCENT: 20.4 %
MCH RBC QN AUTO: 29.4 PG (ref 27–32)
MCHC RBC AUTO-ENTMCNC: 32.9 G/DL (ref 31–35)
MCV RBC AUTO: 89.3 FL (ref 80–100)
MONOCYTES ABSOLUTE: 0.2 K/UL (ref 0.2–0.8)
MONOCYTES RELATIVE PERCENT: 5.9 %
NEUTROPHILS ABSOLUTE: 2.6 K/UL (ref 2–7.5)
NEUTROPHILS RELATIVE PERCENT: 70 %
PDW BLD-RTO: 15.2 % (ref 11–16)
PHOSPHORUS: 4.3 MG/DL (ref 2.5–4.5)
PLATELET # BLD: 180 K/UL (ref 150–400)
PMV BLD AUTO: 9 FL (ref 6–10)
POTASSIUM SERPL-SCNC: 4.4 MMOL/L (ref 3.4–5.1)
RBC # BLD: 3.37 M/UL (ref 3.8–5.8)
SODIUM BLD-SCNC: 142 MMOL/L (ref 136–145)
WBC # BLD: 3.7 K/UL (ref 4–11)

## 2021-05-05 PROCEDURE — 36415 COLL VENOUS BLD VENIPUNCTURE: CPT

## 2021-05-05 PROCEDURE — 80069 RENAL FUNCTION PANEL: CPT

## 2021-05-05 PROCEDURE — 80158 DRUG ASSAY CYCLOSPORINE: CPT

## 2021-05-05 PROCEDURE — 85025 COMPLETE CBC W/AUTO DIFF WBC: CPT

## 2021-05-07 LAB — CYCLOSPORINE A: 118.2 NG/ML

## 2021-05-14 ENCOUNTER — HOSPITAL ENCOUNTER (OUTPATIENT)
Facility: HOSPITAL | Age: 66
Discharge: HOME | End: 2021-05-14
Payer: COMMERCIAL

## 2021-05-14 ENCOUNTER — HOSPITAL ENCOUNTER (OUTPATIENT)
Dept: CT IMAGING | Facility: HOSPITAL | Age: 66
Discharge: HOME | End: 2021-05-14
Payer: COMMERCIAL

## 2021-05-14 DIAGNOSIS — Z94.4 TRANSPLANTED LIVER (HCC): ICD-10-CM

## 2021-05-14 DIAGNOSIS — R10.13 ABDOMINAL PAIN, EPIGASTRIC: ICD-10-CM

## 2021-05-14 DIAGNOSIS — R10.11 ABDOMINAL PAIN, RIGHT UPPER QUADRANT: ICD-10-CM

## 2021-05-14 DIAGNOSIS — B99.8 IMMUNOSUPPRESSION-RELATED INFECTIOUS DISEASE (HCC): ICD-10-CM

## 2021-05-14 DIAGNOSIS — D84.9 IMMUNOSUPPRESSION-RELATED INFECTIOUS DISEASE (HCC): ICD-10-CM

## 2021-05-14 LAB
ALBUMIN SERPL-MCNC: 3.6 G/DL (ref 3.4–4.8)
ALP BLD-CCNC: 149 U/L (ref 25–100)
ALT SERPL-CCNC: 10 U/L (ref 4–36)
ANION GAP SERPL CALCULATED.3IONS-SCNC: 9 MMOL/L (ref 3–16)
AST SERPL-CCNC: 13 U/L (ref 8–33)
BASOPHILS ABSOLUTE: 0 K/UL (ref 0–0.1)
BASOPHILS RELATIVE PERCENT: 0.8 %
BILIRUB SERPL-MCNC: 1.2 MG/DL (ref 0.3–1.2)
BILIRUBIN DIRECT: 0.4 MG/DL (ref 0–0.2)
BILIRUBIN, INDIRECT: 0.8 MG/DL (ref 0.2–0.8)
BUN BLDV-MCNC: 39 MG/DL (ref 6–20)
CALCIUM SERPL-MCNC: 9.4 MG/DL (ref 8.5–10.5)
CHLORIDE BLD-SCNC: 105 MMOL/L (ref 98–107)
CO2: 29 MMOL/L (ref 20–30)
CREAT SERPL-MCNC: 1.6 MG/DL (ref 0.4–1.2)
EOSINOPHILS ABSOLUTE: 0.2 K/UL (ref 0–0.4)
EOSINOPHILS RELATIVE PERCENT: 4 %
GFR AFRICAN AMERICAN: 39
GFR NON-AFRICAN AMERICAN: 32
GLUCOSE BLD-MCNC: 128 MG/DL (ref 74–106)
HCT VFR BLD CALC: 30.6 % (ref 37–47)
HEMOGLOBIN: 9.9 G/DL (ref 11.5–16.5)
IMMATURE GRANULOCYTES #: 0 K/UL
IMMATURE GRANULOCYTES %: 0.5 % (ref 0–5)
LYMPHOCYTES ABSOLUTE: 1 K/UL (ref 1.5–4)
LYMPHOCYTES RELATIVE PERCENT: 26.7 %
MCH RBC QN AUTO: 29.1 PG (ref 27–32)
MCHC RBC AUTO-ENTMCNC: 32.4 G/DL (ref 31–35)
MCV RBC AUTO: 90 FL (ref 80–100)
MONOCYTES ABSOLUTE: 0.3 K/UL (ref 0.2–0.8)
MONOCYTES RELATIVE PERCENT: 8.3 %
NEUTROPHILS ABSOLUTE: 2.2 K/UL (ref 2–7.5)
NEUTROPHILS RELATIVE PERCENT: 59.7 %
PDW BLD-RTO: 15.6 % (ref 11–16)
PHOSPHORUS: 3.7 MG/DL (ref 2.5–4.5)
PLATELET # BLD: 217 K/UL (ref 150–400)
PMV BLD AUTO: 9.1 FL (ref 6–10)
POTASSIUM SERPL-SCNC: 4.8 MMOL/L (ref 3.4–5.1)
RBC # BLD: 3.4 M/UL (ref 3.8–5.8)
SODIUM BLD-SCNC: 143 MMOL/L (ref 136–145)
TOTAL PROTEIN: 6.9 G/DL (ref 6.4–8.3)
WBC # BLD: 3.7 K/UL (ref 4–11)

## 2021-05-14 PROCEDURE — 36415 COLL VENOUS BLD VENIPUNCTURE: CPT

## 2021-05-14 PROCEDURE — 85025 COMPLETE CBC W/AUTO DIFF WBC: CPT

## 2021-05-14 PROCEDURE — 74150 CT ABDOMEN W/O CONTRAST: CPT

## 2021-05-14 PROCEDURE — 80158 DRUG ASSAY CYCLOSPORINE: CPT

## 2021-05-14 PROCEDURE — 80069 RENAL FUNCTION PANEL: CPT

## 2021-05-14 PROCEDURE — 80076 HEPATIC FUNCTION PANEL: CPT

## 2021-05-17 LAB — CYCLOSPORINE A: 123.8 NG/ML

## 2021-05-19 ENCOUNTER — TELEPHONE (OUTPATIENT)
Dept: PRIMARY CARE CLINIC | Age: 66
End: 2021-05-19

## 2021-05-19 ENCOUNTER — OFFICE VISIT (OUTPATIENT)
Dept: PRIMARY CARE CLINIC | Age: 66
End: 2021-05-19
Payer: COMMERCIAL

## 2021-05-19 VITALS
DIASTOLIC BLOOD PRESSURE: 70 MMHG | SYSTOLIC BLOOD PRESSURE: 134 MMHG | HEART RATE: 57 BPM | OXYGEN SATURATION: 98 % | TEMPERATURE: 97.4 F | WEIGHT: 117.6 LBS | RESPIRATION RATE: 18 BRPM | BODY MASS INDEX: 22.22 KG/M2

## 2021-05-19 DIAGNOSIS — I10 ESSENTIAL HYPERTENSION: ICD-10-CM

## 2021-05-19 DIAGNOSIS — D64.9 ANEMIA, UNSPECIFIED TYPE: ICD-10-CM

## 2021-05-19 DIAGNOSIS — F41.9 ANXIETY: ICD-10-CM

## 2021-05-19 DIAGNOSIS — I48.91 ATRIAL FIBRILLATION, UNSPECIFIED TYPE (HCC): ICD-10-CM

## 2021-05-19 DIAGNOSIS — R91.1 LUNG NODULE: ICD-10-CM

## 2021-05-19 DIAGNOSIS — Z94.4 LIVER TRANSPLANT RECIPIENT (HCC): Primary | ICD-10-CM

## 2021-05-19 PROCEDURE — 99213 OFFICE O/P EST LOW 20 MIN: CPT | Performed by: INTERNAL MEDICINE

## 2021-05-19 RX ORDER — ALPRAZOLAM 0.5 MG/1
0.25 TABLET ORAL 3 TIMES DAILY PRN
COMMUNITY
Start: 2021-03-01 | End: 2021-08-09 | Stop reason: SDUPTHER

## 2021-05-19 RX ORDER — GABAPENTIN 300 MG/1
300 CAPSULE ORAL DAILY PRN
COMMUNITY
End: 2021-05-19

## 2021-05-19 RX ORDER — METOCLOPRAMIDE 5 MG/1
TABLET ORAL
COMMUNITY
Start: 2021-03-05 | End: 2021-11-04 | Stop reason: ALTCHOICE

## 2021-05-19 SDOH — ECONOMIC STABILITY: FOOD INSECURITY: WITHIN THE PAST 12 MONTHS, YOU WORRIED THAT YOUR FOOD WOULD RUN OUT BEFORE YOU GOT MONEY TO BUY MORE.: OFTEN TRUE

## 2021-05-19 SDOH — ECONOMIC STABILITY: FOOD INSECURITY: WITHIN THE PAST 12 MONTHS, THE FOOD YOU BOUGHT JUST DIDN'T LAST AND YOU DIDN'T HAVE MONEY TO GET MORE.: OFTEN TRUE

## 2021-05-19 ASSESSMENT — ENCOUNTER SYMPTOMS
BACK PAIN: 0
SHORTNESS OF BREATH: 0
NAUSEA: 0
EYE DISCHARGE: 0
WHEEZING: 0
SORE THROAT: 0
VOMITING: 0
COUGH: 0
ABDOMINAL PAIN: 0
SINUS PRESSURE: 0

## 2021-05-19 ASSESSMENT — SOCIAL DETERMINANTS OF HEALTH (SDOH): HOW HARD IS IT FOR YOU TO PAY FOR THE VERY BASICS LIKE FOOD, HOUSING, MEDICAL CARE, AND HEATING?: HARD

## 2021-05-19 NOTE — PROGRESS NOTES
Chief Complaint   Patient presents with    Atrial Fibrillation    Cirrhosis    Anemia    Chronic Renal Failure    Hypertension       Have you seen any other physician or provider since your last visit yes - [facility]     Have you had any other diagnostic tests since your last visit? yes -     Have you changed or stopped any medications since your last visit? yes - cymbalta caused side effects made her cry . Not taking aspirin, amlodipine, zofran, senna, miralax, and pantoprazole states UC wont write them for her anymore.

## 2021-05-19 NOTE — PROGRESS NOTES
SUBJECTIVE:    Patient ID: Yoandy Chandler is a 60 y. o.female. Chief Complaint   Patient presents with    Atrial Fibrillation    Cirrhosis    Anemia    Chronic Renal Failure    Hypertension         HPI:  Patient with history of liver transplant. She is followed by  for this. Per telephone encounters from  it looks like patients ASA was discontinued and possibly Amlodipine as it is managed by Nephrology and patient reports it was discontinued. She reported she has been taking her other medications as supposed to. Also has been compliant with proceeding with blood work as recommended per transplant team.  Patient denies taking anything but was prescribed for her. Patient with Afib. She is on Xarelto. She does report some easy bruising. No bleeding or melena reported. No chest pain or palpitations. Blood pressure has been stable. Patient reports that she did not do well on Cymbalta. She reports that she was crying a lot. She stopped taking it shortly after starting it. She reports she is still having anxiety and has taken Xanax in the past prescribed by . She reports that her transplant team will no longer write this for her and she has an appointment on June 10th to see psychiatry. Patient's medications, allergies, past medical, surgical, social and family histories were reviewed and updated as appropriate in the electronic medical record/chart.         Outpatient Medications Marked as Taking for the 5/19/21 encounter (Office Visit) with Gabrielle Berry MD   Medication Sig Dispense Refill    ALPRAZolam (XANAX) 0.5 MG tablet       metoclopramide (REGLAN) 5 MG tablet       mycophenolate (CELLCEPT) 250 MG capsule Take 500 mg by mouth 2 times daily      cycloSPORINE modified (NEORAL) 25 MG capsule Take 4 capsules (100mg) PO q am and take 4 capsules (100 mg) POq pm      apixaban (ELIQUIS) 2.5 MG TABS tablet Take 2.5 mg by mouth 2 times daily      atorvastatin (LIPITOR) 40 MG tablet Take 40 mg by mouth nightly      torsemide (DEMADEX) 20 MG tablet Take 20 mg by mouth daily      vitamin D (ERGOCALCIFEROL) 1.25 MG (17971 UT) CAPS capsule TAKE ONE CAPSULE BY MOUTH ONCE A WEEK.  metoprolol tartrate (LOPRESSOR) 25 MG tablet Take 12.5 mg by mouth 2 times daily          Review of Systems   Constitutional: Positive for fatigue and unexpected weight change. Negative for chills and fever. HENT: Negative for congestion, sinus pressure and sore throat. Eyes: Negative for discharge and visual disturbance. Respiratory: Negative for cough, shortness of breath and wheezing. Cardiovascular: Negative for chest pain and palpitations. Gastrointestinal: Negative for abdominal pain, nausea and vomiting. Endocrine: Negative for cold intolerance and heat intolerance. Genitourinary: Negative for dysuria, frequency and urgency. Musculoskeletal: Positive for arthralgias. Negative for back pain. Skin: Negative for rash and wound. Neurological: Negative for syncope, numbness and headaches. Hematological: Bruises/bleeds easily. Psychiatric/Behavioral: Negative for agitation, self-injury, sleep disturbance and suicidal ideas. The patient is nervous/anxious.         Past Medical History:   Diagnosis Date    Arthritis, rheumatoid (HCC)     [facility]     Colitis     Diabetes mellitus ([facility]     Hypertension     IIH (idiopathic intracranial hypertension)     Liver cirrhosis (HCC)     Osteoarthritis     Unspecified sleep apnea      Past Surgical History:   Procedure Laterality Date    BONE MARROW BIOPSY      DILATION AND CURETTAGE OF UTERUS      DILATION AND CURETTAGE OF UTERUS  1998 & 2000    LIVER BIOPSY      TUBAL LIGATION  1982      Family History   Problem Relation Age of Onset    High Blood Pressure Mother     Alzheimer's Disease Mother     Heart Disease Father     High Blood Pressure Father     Heart Disease Maternal Grandmother     Heart Disease Maternal Grandfather     Stroke Maternal Grandfather     Diabetes Paternal Grandmother     High Blood Pressure Brother       Social History     Tobacco Use   Smoking Status Current Some Day Smoker    Packs/day: 0.10    Years: 1.00    Pack years: 0.10    Types: Cigarettes    Last attempt to quit: 2019    Years since quittin.0   Smokeless Tobacco Never Used       OBJECTIVE:   Wt Readings from Last 3 Encounters:   21 117 lb 9.6 oz (53.3 kg)   21 119 lb 6.4 oz (54.2 kg)   20 161 lb (73 kg)     BP Readings from Last 3 Encounters:   21 134/70   21 138/62   20 (!) 150/62       /70   Pulse 57   Temp 97.4 °F (36.3 °C)   Resp 18   Wt 117 lb 9.6 oz (53.3 kg)   SpO2 98%   BMI 22.22 kg/m²      Physical Exam  Vitals and nursing note reviewed. Constitutional:       Appearance: Normal appearance. She is well-developed. HENT:      Head: Normocephalic and atraumatic. Right Ear: External ear normal.      Left Ear: External ear normal.      Nose: Nose normal.      Mouth/Throat:      Mouth: Mucous membranes are moist.      Pharynx: Oropharynx is clear. Eyes:      Conjunctiva/sclera: Conjunctivae normal.      Pupils: Pupils are equal, round, and reactive to light. Neck:      Thyroid: No thyromegaly. Vascular: No JVD. Cardiovascular:      Rate and Rhythm: Normal rate and regular rhythm. Heart sounds: Normal heart sounds. Pulmonary:      Effort: Pulmonary effort is normal.      Breath sounds: Normal breath sounds. No wheezing or rales. Abdominal:      General: Bowel sounds are normal. There is no distension. Palpations: Abdomen is soft. Tenderness: There is no abdominal tenderness. Musculoskeletal:         General: No tenderness. Cervical back: Neck supple. No rigidity. No muscular tenderness. Right lower leg: No edema. Left lower leg: No edema. Skin:     Findings: Bruising (arms and hands) present. No erythema or rash.    Neurological: General: No focal deficit present. Mental Status: She is alert and oriented to person, place, and time. Psychiatric:         Behavior: Behavior normal.         Judgment: Judgment normal.         Lab Results   Component Value Date     05/14/2021    K 4.8 05/14/2021    K 3.1 02/01/2020     05/14/2021    CL 99.0 06/01/2012    CO2 29 05/14/2021    GLUCOSE 128 05/14/2021    BUN 39 05/14/2021    CREATININE 1.6 05/14/2021    CREATININE 0.9 06/01/2012    CALCIUM 9.4 05/14/2021    PROT 6.9 05/14/2021    LABALBU 3.6 05/14/2021    LABALBU 3.9 06/01/2012    BILITOT 1.2 05/14/2021    ALT 10 05/14/2021    AST 13 05/14/2021       Hemoglobin A1C (%)   Date Value   04/07/2021 4.5     Microscopic Examination (no units)   Date Value   02/02/2020 Not Indicated     LDL Calculated (mg/dL)   Date Value   04/10/2018 94         Lab Results   Component Value Date    WBC 3.7 05/14/2021    NEUTROABS 2.2 05/14/2021    HGB 9.9 05/14/2021    HCT 30.6 05/14/2021    HCT 44.3 06/01/2012    MCV 90.0 05/14/2021     05/14/2021     06/01/2012       Lab Results   Component Value Date    TSH 1.46 10/29/2015       ASSESSMENT/PLAN:     1. Liver transplant recipient [facility])  I did review her recent/frequent blood work ordered by transplant team.  Discussed the importance of compliance with recommendations. Try to avoid any hepatotoxic agent. Monitor closely. Discussed the importance of notifying me, transplant team or go to emergency room with any signs of infection. Proceed with urine drug screen. - DRUG SCREEN MULTI URINE; Future    2. Atrial fibrillation, unspecified type ([address]  Sinus rhythm on exam. Will cont on meds. I had a long discussion with the patient regarding the risk/benefit from anticoagulation. Patient is aware of that. 3. Anemia, unspecified type  Mild. Proceed with anemia work-up. Further recommendation based on test result. 4. Essential hypertension  BP is stable.  I have advised her on low-sodium diet, exercise and weight control. I am going to continue current medication. Will monitor her renal function every few months, have advised her to check blood pressure frequently and to keep a record of this. 5. Lung nodule  5 x 3 mm nodule on the right upper lobe been documented per pulmonology note back in December. But I am not able to locate CT scan. I am going to proceed with a repeat CT scan. I had long conversation with the patient regarding her ongoing smoking and the importance of quitting. 6. Anxiety  I am going to continue current medication. I advised the patient to seek counseling. I will reassess current condition every few months. Patient to call or RTC if experienced any deterioration of Sx.      - DRUG SCREEN MULTI URINE; Future       Written by Jorden Geiger, acting as a scribe for Dr. Amy Abel on 5/19/2021 at 1:47 PM.     I, Dr. John Song, personally performed the services described in the documentation as scribed by Bryce Saravia CMA, in my presence and it is both accurate and complete.

## 2021-05-19 NOTE — PATIENT INSTRUCTIONS
· Keep a list of your medicines with you. List all of the prescription medicines, nonprescription medicines, supplements, natural remedies, and vitamins that you take. Tell your healthcare providers who treat you about all of the products you are taking. Your provider can provide you with a form to keep track of them. Just ask. · Follow the directions that come with your medicine, including information about food or alcohol. Make sure you know how and when to take your medicine. Do not take more or less than you are supposed to take. · Keep all medicines out of the reach of children. · Store medicines according to the directions on the label. · Monitor yourself. Learn to know how your body reacts to your new medicine and keep track of how it makes you feel before attempting (If your provider has allowed you to do so) to drive or go to work. · Seek emergency medical attention if you think you have used too much of this medicine. An overdose of any prescription medicine can be fatal. Overdose symptoms may include extreme drowsiness, muscle weakness, confusion, cold and clammy skin, pinpoint pupils, shallow breathing, slow heart rate, fainting, or coma. · Don't share prescription medicines with others, even when they seem to have the same symptoms. What may be good for you may be harmful to others. · If you are no longer taking a prescribed medication and you have pills left please take your pills out of their original containers. Mix crushed pills with an undesirable substance, such as cat litter or used coffee grounds. Put the mixture into a disposable container with a lid, such as an empty margarine tub, or into a sealable bag. Cover up or remove any of your personal information on the empty containers by covering it with black permanent marker or duct tape. Place the sealed container with the mixture, and the empty drug containers, in the trash.    · If you use a medication that is in the form of a patch, dispose of used patches by folding them in half so that the sticky sides meet, and then flushing them down a toilet. They should not be placed in the household trash where children or pets can find them. · If you have any questions, ask your provider or pharmacist for more information. · Be sure to keep all appointments for provider visits or tests. Transportation and [address]    [address] [phone]  Help with food, clothing, transportation, utilities, prescription assistance. [facility] [phone]  Senior Citizens Programs  [address] [facility],[address]  Ginette Sanders, [address] [facility]    [address] [facility] Aging and Independent Living Program.  They handle meals on wheels and senior centers.    Krunal Anis [phone] [facility] senior citizens center     [facility]   [phone] or [address] [facility]             [phone]    [facility]             [phone]    Helping [address] (Verito Mckeon)            [phone]    [facility] [phone]    [facility] senior citizens center             [address]/ [address]              [phone]   [employer] Kim[facility] [employer]     [phone]   [employer] and [address] (may visit once monthly      8-4:30)              [phone]    [facility] [address], food and utility assistance (T,W,TH [place])    Energy Assistance  [address]     [phone]     [address]   [phone]    [address]       [address] with applying for insurance coverage with Medicaid and Medicare including part D  Help with medication cost, eyeglasses or exams, hearing aides  [address].    [address] [phone] Zhanna Barnhart [address]   (Coordinating and Assisting the Reuse of Assistive Technology)  Help with durable medical equipment and assistive technology   [place] [phone]  [place]     [phone]    Domestic Violence Shelters   [place] Domestic Violence Hotline [phone]  [address] in [place] but services [place] and [place] [phone]  Quincy Durant in [place] but services [place] [phone]    [facility]   Emergency Shelter and [employer] [phone]  [employer] [phone] of [employer]  [address] [facility] [address] [facility] [address] for [facility] [phone]    National Suicide Prevention Lifeline [phone]     Available 24 hours daily in [place] and [place]  ·   ·

## 2021-05-19 NOTE — TELEPHONE ENCOUNTER
Patient had an appointment today and she forgot to ask if medicine for her cuts (Silver sulfadiazine cream) and miracle mouthwash could be sent in to the pharmacy.

## 2021-05-21 ENCOUNTER — HOSPITAL ENCOUNTER (OUTPATIENT)
Facility: HOSPITAL | Age: 66
Discharge: HOME | End: 2021-05-21
Payer: COMMERCIAL

## 2021-05-21 DIAGNOSIS — I10 ESSENTIAL HYPERTENSION: ICD-10-CM

## 2021-05-21 DIAGNOSIS — Z94.4 LIVER TRANSPLANT RECIPIENT (HCC): ICD-10-CM

## 2021-05-21 DIAGNOSIS — D64.9 ANEMIA, UNSPECIFIED TYPE: ICD-10-CM

## 2021-05-21 DIAGNOSIS — N18.30 CHRONIC RENAL FAILURE, STAGE 3 (MODERATE), UNSPECIFIED WHETHER STAGE 3A OR 3B CKD (HCC): ICD-10-CM

## 2021-05-21 LAB
ALBUMIN SERPL-MCNC: 3.4 G/DL (ref 3.4–4.8)
ALP BLD-CCNC: 133 U/L (ref 25–100)
ALT SERPL-CCNC: 9 U/L (ref 4–36)
ANION GAP SERPL CALCULATED.3IONS-SCNC: 6 MMOL/L (ref 3–16)
AST SERPL-CCNC: 14 U/L (ref 8–33)
BASOPHILS ABSOLUTE: 0 K/UL (ref 0–0.1)
BASOPHILS RELATIVE PERCENT: 0.5 %
BILIRUB SERPL-MCNC: 0.8 MG/DL (ref 0.3–1.2)
BILIRUBIN DIRECT: 0.4 MG/DL (ref 0–0.2)
BILIRUBIN, INDIRECT: 0.4 MG/DL (ref 0.2–0.8)
BUN BLDV-MCNC: 30 MG/DL (ref 6–20)
CALCIUM SERPL-MCNC: 9.1 MG/DL (ref 8.5–10.5)
CHLORIDE BLD-SCNC: 108 MMOL/L (ref 98–107)
CO2: 27 MMOL/L (ref 20–30)
CREAT SERPL-MCNC: 1.3 MG/DL (ref 0.4–1.2)
EOSINOPHILS ABSOLUTE: 0.2 K/UL (ref 0–0.4)
EOSINOPHILS RELATIVE PERCENT: 4.9 %
FERRITIN: 124.3 NG/ML (ref 22–322)
FOLATE: 7.99 NG/ML
GFR AFRICAN AMERICAN: 50
GFR NON-AFRICAN AMERICAN: 41
GLUCOSE BLD-MCNC: 105 MG/DL (ref 74–106)
HCT VFR BLD CALC: 28.7 % (ref 37–47)
HEMOGLOBIN: 9.3 G/DL (ref 11.5–16.5)
IMMATURE GRANULOCYTES #: 0 K/UL
IMMATURE GRANULOCYTES %: 0.3 % (ref 0–5)
IRON SATURATION: 19 % (ref 15–50)
IRON: 48 UG/DL (ref 37–145)
LYMPHOCYTES ABSOLUTE: 0.6 K/UL (ref 1.5–4)
LYMPHOCYTES RELATIVE PERCENT: 16.1 %
MCH RBC QN AUTO: 30 PG (ref 27–32)
MCHC RBC AUTO-ENTMCNC: 32.4 G/DL (ref 31–35)
MCV RBC AUTO: 92.6 FL (ref 80–100)
MONOCYTES ABSOLUTE: 0.2 K/UL (ref 0.2–0.8)
MONOCYTES RELATIVE PERCENT: 6 %
NEUTROPHILS ABSOLUTE: 2.7 K/UL (ref 2–7.5)
NEUTROPHILS RELATIVE PERCENT: 72.2 %
PDW BLD-RTO: 16.1 % (ref 11–16)
PHOSPHORUS: 3.8 MG/DL (ref 2.5–4.5)
PLATELET # BLD: 166 K/UL (ref 150–400)
PMV BLD AUTO: 9.3 FL (ref 6–10)
POTASSIUM SERPL-SCNC: 4.9 MMOL/L (ref 3.4–5.1)
RBC # BLD: 3.1 M/UL (ref 3.8–5.8)
SODIUM BLD-SCNC: 141 MMOL/L (ref 136–145)
TOTAL IRON BINDING CAPACITY: 251 UG/DL (ref 250–450)
TOTAL PROTEIN: 6.2 G/DL (ref 6.4–8.3)
VITAMIN B-12: 489 PG/ML (ref 211–911)
VITAMIN D 25-HYDROXY: 33.7 (ref 32–100)
WBC # BLD: 3.7 K/UL (ref 4–11)

## 2021-05-21 PROCEDURE — 83540 ASSAY OF IRON: CPT

## 2021-05-21 PROCEDURE — 80158 DRUG ASSAY CYCLOSPORINE: CPT

## 2021-05-21 PROCEDURE — 82746 ASSAY OF FOLIC ACID SERUM: CPT

## 2021-05-21 PROCEDURE — 83550 IRON BINDING TEST: CPT

## 2021-05-21 PROCEDURE — 85025 COMPLETE CBC W/AUTO DIFF WBC: CPT

## 2021-05-21 PROCEDURE — 82607 VITAMIN B-12: CPT

## 2021-05-21 PROCEDURE — 80076 HEPATIC FUNCTION PANEL: CPT

## 2021-05-21 PROCEDURE — 80069 RENAL FUNCTION PANEL: CPT

## 2021-05-21 PROCEDURE — 82728 ASSAY OF FERRITIN: CPT

## 2021-05-21 PROCEDURE — 36415 COLL VENOUS BLD VENIPUNCTURE: CPT

## 2021-05-21 PROCEDURE — 82306 VITAMIN D 25 HYDROXY: CPT

## 2021-05-24 LAB — CYCLOSPORINE A: 100.2 NG/ML

## 2021-06-01 ENCOUNTER — HOSPITAL ENCOUNTER (OUTPATIENT)
Facility: HOSPITAL | Age: 66
Discharge: HOME | End: 2021-06-01
Payer: COMMERCIAL

## 2021-06-01 LAB
ALBUMIN SERPL-MCNC: 3.5 G/DL (ref 3.4–4.8)
ALP BLD-CCNC: 123 U/L (ref 25–100)
ALT SERPL-CCNC: 12 U/L (ref 4–36)
AMYLASE: 61 U/L (ref 20–104)
ANION GAP SERPL CALCULATED.3IONS-SCNC: 8 MMOL/L (ref 3–16)
AST SERPL-CCNC: 15 U/L (ref 8–33)
BASOPHILS ABSOLUTE: 0 K/UL (ref 0–0.1)
BASOPHILS RELATIVE PERCENT: 0.9 %
BILIRUB SERPL-MCNC: 1.1 MG/DL (ref 0.3–1.2)
BILIRUBIN DIRECT: 0.5 MG/DL (ref 0–0.2)
BILIRUBIN, INDIRECT: 0.6 MG/DL (ref 0.2–0.8)
BUN BLDV-MCNC: 38 MG/DL (ref 6–20)
CALCIUM SERPL-MCNC: 9.1 MG/DL (ref 8.5–10.5)
CHLORIDE BLD-SCNC: 101 MMOL/L (ref 98–107)
CO2: 30 MMOL/L (ref 20–30)
CREAT SERPL-MCNC: 1.7 MG/DL (ref 0.4–1.2)
EOSINOPHILS ABSOLUTE: 0.1 K/UL (ref 0–0.4)
EOSINOPHILS RELATIVE PERCENT: 4.1 %
GFR AFRICAN AMERICAN: 37
GFR NON-AFRICAN AMERICAN: 30
GLUCOSE BLD-MCNC: 157 MG/DL (ref 74–106)
HCT VFR BLD CALC: 31.5 % (ref 37–47)
HEMOGLOBIN: 10 G/DL (ref 11.5–16.5)
IMMATURE GRANULOCYTES #: 0 K/UL
IMMATURE GRANULOCYTES %: 0.3 % (ref 0–5)
LIPASE: 20 U/L (ref 5.6–51.3)
LYMPHOCYTES ABSOLUTE: 0.6 K/UL (ref 1.5–4)
LYMPHOCYTES RELATIVE PERCENT: 16.2 %
MCH RBC QN AUTO: 29.3 PG (ref 27–32)
MCHC RBC AUTO-ENTMCNC: 31.7 G/DL (ref 31–35)
MCV RBC AUTO: 92.4 FL (ref 80–100)
MONOCYTES ABSOLUTE: 0.2 K/UL (ref 0.2–0.8)
MONOCYTES RELATIVE PERCENT: 6.8 %
NEUTROPHILS ABSOLUTE: 2.4 K/UL (ref 2–7.5)
NEUTROPHILS RELATIVE PERCENT: 71.7 %
PDW BLD-RTO: 15.3 % (ref 11–16)
PHOSPHORUS: 5.3 MG/DL (ref 2.5–4.5)
PLATELET # BLD: 168 K/UL (ref 150–400)
PMV BLD AUTO: 9.1 FL (ref 6–10)
POTASSIUM SERPL-SCNC: 4.4 MMOL/L (ref 3.4–5.1)
RBC # BLD: 3.41 M/UL (ref 3.8–5.8)
SODIUM BLD-SCNC: 139 MMOL/L (ref 136–145)
TOTAL PROTEIN: 6.1 G/DL (ref 6.4–8.3)
WBC # BLD: 3.4 K/UL (ref 4–11)

## 2021-06-01 PROCEDURE — 36415 COLL VENOUS BLD VENIPUNCTURE: CPT

## 2021-06-01 PROCEDURE — 80076 HEPATIC FUNCTION PANEL: CPT

## 2021-06-01 PROCEDURE — 80069 RENAL FUNCTION PANEL: CPT

## 2021-06-01 PROCEDURE — 85025 COMPLETE CBC W/AUTO DIFF WBC: CPT

## 2021-06-01 PROCEDURE — 80158 DRUG ASSAY CYCLOSPORINE: CPT

## 2021-06-01 PROCEDURE — 82150 ASSAY OF AMYLASE: CPT

## 2021-06-01 PROCEDURE — 83690 ASSAY OF LIPASE: CPT

## 2021-06-03 LAB — CYCLOSPORINE A: 133.5 NG/ML

## 2021-06-08 ENCOUNTER — HOSPITAL ENCOUNTER (OUTPATIENT)
Facility: HOSPITAL | Age: 66
Discharge: HOME | End: 2021-06-08
Payer: COMMERCIAL

## 2021-06-08 LAB
ALBUMIN SERPL-MCNC: 3.6 G/DL (ref 3.4–4.8)
ALP BLD-CCNC: 163 U/L (ref 25–100)
ALT SERPL-CCNC: 11 U/L (ref 4–36)
ANION GAP SERPL CALCULATED.3IONS-SCNC: 6 MMOL/L (ref 3–16)
AST SERPL-CCNC: 19 U/L (ref 8–33)
BASOPHILS ABSOLUTE: 0 K/UL (ref 0–0.1)
BASOPHILS RELATIVE PERCENT: 0.7 %
BILIRUB SERPL-MCNC: 0.8 MG/DL (ref 0.3–1.2)
BILIRUBIN DIRECT: 0.3 MG/DL (ref 0–0.2)
BILIRUBIN, INDIRECT: 0.5 MG/DL (ref 0.2–0.8)
BUN BLDV-MCNC: 32 MG/DL (ref 6–20)
CALCIUM SERPL-MCNC: 9.5 MG/DL (ref 8.5–10.5)
CHLORIDE BLD-SCNC: 107 MMOL/L (ref 98–107)
CO2: 31 MMOL/L (ref 20–30)
CREAT SERPL-MCNC: 1.5 MG/DL (ref 0.4–1.2)
EOSINOPHILS ABSOLUTE: 0.1 K/UL (ref 0–0.4)
EOSINOPHILS RELATIVE PERCENT: 3.9 %
GFR AFRICAN AMERICAN: 42
GFR NON-AFRICAN AMERICAN: 35
GLUCOSE BLD-MCNC: 141 MG/DL (ref 74–106)
HCT VFR BLD CALC: 30.5 % (ref 37–47)
HEMOGLOBIN: 9.9 G/DL (ref 11.5–16.5)
IMMATURE GRANULOCYTES #: 0 K/UL
IMMATURE GRANULOCYTES %: 0.4 % (ref 0–5)
LYMPHOCYTES ABSOLUTE: 0.6 K/UL (ref 1.5–4)
LYMPHOCYTES RELATIVE PERCENT: 20.7 %
MCH RBC QN AUTO: 29.6 PG (ref 27–32)
MCHC RBC AUTO-ENTMCNC: 32.5 G/DL (ref 31–35)
MCV RBC AUTO: 91.3 FL (ref 80–100)
MONOCYTES ABSOLUTE: 0.2 K/UL (ref 0.2–0.8)
MONOCYTES RELATIVE PERCENT: 7.7 %
NEUTROPHILS ABSOLUTE: 1.9 K/UL (ref 2–7.5)
NEUTROPHILS RELATIVE PERCENT: 66.6 %
PDW BLD-RTO: 14.8 % (ref 11–16)
PHOSPHORUS: 4.5 MG/DL (ref 2.5–4.5)
PLATELET # BLD: 155 K/UL (ref 150–400)
PMV BLD AUTO: 9.3 FL (ref 6–10)
POTASSIUM SERPL-SCNC: 4.2 MMOL/L (ref 3.4–5.1)
RBC # BLD: 3.34 M/UL (ref 3.8–5.8)
SODIUM BLD-SCNC: 144 MMOL/L (ref 136–145)
TOTAL PROTEIN: 6.5 G/DL (ref 6.4–8.3)
WBC # BLD: 2.9 K/UL (ref 4–11)

## 2021-06-08 PROCEDURE — 80158 DRUG ASSAY CYCLOSPORINE: CPT

## 2021-06-08 PROCEDURE — 80069 RENAL FUNCTION PANEL: CPT

## 2021-06-08 PROCEDURE — 85025 COMPLETE CBC W/AUTO DIFF WBC: CPT

## 2021-06-08 PROCEDURE — 36415 COLL VENOUS BLD VENIPUNCTURE: CPT

## 2021-06-08 PROCEDURE — 80076 HEPATIC FUNCTION PANEL: CPT

## 2021-06-11 ENCOUNTER — HOSPITAL ENCOUNTER (OUTPATIENT)
Dept: CT IMAGING | Facility: HOSPITAL | Age: 66
Discharge: HOME | End: 2021-06-11
Payer: COMMERCIAL

## 2021-06-11 DIAGNOSIS — R91.1 LUNG NODULE: ICD-10-CM

## 2021-06-11 LAB — CYCLOSPORINE A: 104.5 NG/ML

## 2021-06-11 PROCEDURE — 71250 CT THORAX DX C-: CPT

## 2021-06-16 ENCOUNTER — TELEPHONE (OUTPATIENT)
Dept: PRIMARY CARE CLINIC | Age: 66
End: 2021-06-16

## 2021-06-16 RX ORDER — PROMETHAZINE HYDROCHLORIDE 12.5 MG/1
12.5 TABLET ORAL 3 TIMES DAILY PRN
Qty: 20 TABLET | Refills: 0 | Status: SHIPPED | OUTPATIENT
Start: 2021-06-16 | End: 2021-06-23

## 2021-06-16 RX ORDER — BENZONATATE 100 MG/1
100 CAPSULE ORAL 3 TIMES DAILY PRN
Qty: 20 CAPSULE | Refills: 0 | Status: SHIPPED | OUTPATIENT
Start: 2021-06-16 | End: 2021-06-23

## 2021-06-16 NOTE — TELEPHONE ENCOUNTER
Pt called requesting a rx for phenergan and something for cough \"chronic, np\" Afraid to buy anything otc r/t liver transplant. WG

## 2021-06-18 ENCOUNTER — TELEPHONE (OUTPATIENT)
Dept: PRIMARY CARE CLINIC | Age: 66
End: 2021-06-18

## 2021-06-18 DIAGNOSIS — M54.42 CHRONIC BILATERAL LOW BACK PAIN WITH BILATERAL SCIATICA: Primary | ICD-10-CM

## 2021-06-18 DIAGNOSIS — G89.29 CHRONIC BILATERAL LOW BACK PAIN WITH BILATERAL SCIATICA: Primary | ICD-10-CM

## 2021-06-18 DIAGNOSIS — M54.41 CHRONIC BILATERAL LOW BACK PAIN WITH BILATERAL SCIATICA: Primary | ICD-10-CM

## 2021-06-18 RX ORDER — ALPRAZOLAM 0.5 MG/1
0.5 TABLET ORAL
Status: CANCELLED | OUTPATIENT
Start: 2021-06-18

## 2021-06-18 RX ORDER — HYDROCODONE BITARTRATE AND ACETAMINOPHEN 5; 325 MG/1; MG/1
1 TABLET ORAL DAILY PRN
Qty: 30 TABLET | Refills: 0 | Status: SHIPPED | OUTPATIENT
Start: 2021-06-18 | End: 2021-07-18

## 2021-06-18 NOTE — PROGRESS NOTES
Patient called requesting something for her back pain. She is having chronic low back pain. Reviewed CT scan of the lumbar spine few years ago. Patient with known degenerative disc disease. Currently prescribed gabapentin and Xanax from her psychiatrist/nephrologist per her report. We will add Norco 1 daily as needed. Reviewed recent Woody Kaushik which was appropriate. Patient did have some Wikieup from old prescription. Patient was informed regarding policy regarding being on controlled medicine prescribed through this office in regard to possible pill count, drug screen and the importance of being compliance and patient was in agreement. Prescription was sent. Need to sign medication agreement in the near future.

## 2021-06-22 DIAGNOSIS — Z94.4 LIVER TRANSPLANT RECIPIENT (HCC): ICD-10-CM

## 2021-06-22 DIAGNOSIS — F41.9 ANXIETY: ICD-10-CM

## 2021-06-28 ENCOUNTER — HOSPITAL ENCOUNTER (EMERGENCY)
Facility: HOSPITAL | Age: 66
Discharge: HOME | End: 2021-06-28
Attending: EMERGENCY MEDICINE
Payer: COMMERCIAL

## 2021-06-28 VITALS
BODY MASS INDEX: 22.09 KG/M2 | OXYGEN SATURATION: 98 % | TEMPERATURE: 98.5 F | RESPIRATION RATE: 18 BRPM | DIASTOLIC BLOOD PRESSURE: 49 MMHG | WEIGHT: 117 LBS | SYSTOLIC BLOOD PRESSURE: 150 MMHG | HEIGHT: 61 IN | HEART RATE: 61 BPM

## 2021-06-28 DIAGNOSIS — S39.012A STRAIN OF LUMBAR REGION, INITIAL ENCOUNTER: Primary | ICD-10-CM

## 2021-06-28 DIAGNOSIS — M54.32 SCIATICA OF LEFT SIDE: ICD-10-CM

## 2021-06-28 LAB
AMORPHOUS: ABNORMAL /HPF
BILIRUBIN URINE: NEGATIVE
BLOOD, URINE: NEGATIVE
CLARITY: CLEAR
COLOR: YELLOW
EPITHELIAL CELLS, UA: ABNORMAL /HPF (ref 0–5)
GLUCOSE URINE: NEGATIVE MG/DL
HYALINE CASTS: ABNORMAL /LPF (ref 0–2)
KETONES, URINE: NEGATIVE MG/DL
LEUKOCYTE ESTERASE, URINE: NEGATIVE
MICROSCOPIC EXAMINATION: YES
NITRITE, URINE: NEGATIVE
PH UA: 5 (ref 5–8)
PROTEIN UA: 100 MG/DL
RBC UA: ABNORMAL /HPF (ref 0–4)
SPECIFIC GRAVITY UA: 1.02 (ref 1–1.03)
URINE TYPE: ABNORMAL
UROBILINOGEN, URINE: 2 E.U./DL
WBC UA: ABNORMAL /HPF (ref 0–5)

## 2021-06-28 PROCEDURE — 6360000002 HC RX W HCPCS: Performed by: EMERGENCY MEDICINE

## 2021-06-28 PROCEDURE — 96372 THER/PROPH/DIAG INJ SC/IM: CPT

## 2021-06-28 PROCEDURE — 6370000000 HC RX 637 (ALT 250 FOR IP): Performed by: EMERGENCY MEDICINE

## 2021-06-28 PROCEDURE — 81001 URINALYSIS AUTO W/SCOPE: CPT

## 2021-06-28 PROCEDURE — 99282 EMERGENCY DEPT VISIT SF MDM: CPT

## 2021-06-28 RX ORDER — CYCLOBENZAPRINE HCL 5 MG
5 TABLET ORAL 3 TIMES DAILY PRN
Qty: 21 TABLET | Refills: 0 | Status: SHIPPED | OUTPATIENT
Start: 2021-06-28 | End: 2021-07-05

## 2021-06-28 RX ORDER — LIDOCAINE 4 G/G
1 PATCH TOPICAL ONCE
Status: DISCONTINUED | OUTPATIENT
Start: 2021-06-28 | End: 2021-06-28 | Stop reason: HOSPADM

## 2021-06-28 RX ORDER — LIDOCAINE 50 MG/G
1 PATCH TOPICAL DAILY
Qty: 30 PATCH | Refills: 0 | Status: SHIPPED | OUTPATIENT
Start: 2021-06-28 | End: 2021-07-28

## 2021-06-28 RX ORDER — KETOROLAC TROMETHAMINE 30 MG/ML
30 INJECTION, SOLUTION INTRAMUSCULAR; INTRAVENOUS ONCE
Status: COMPLETED | OUTPATIENT
Start: 2021-06-28 | End: 2021-06-28

## 2021-06-28 RX ORDER — GABAPENTIN 300 MG/1
300 CAPSULE ORAL 4 TIMES DAILY
COMMUNITY
End: 2022-04-12 | Stop reason: SDUPTHER

## 2021-06-28 RX ADMIN — KETOROLAC TROMETHAMINE 30 MG: 30 INJECTION, SOLUTION INTRAMUSCULAR at 10:43

## 2021-06-28 ASSESSMENT — ENCOUNTER SYMPTOMS
ABDOMINAL PAIN: 0
BACK PAIN: 1
SHORTNESS OF BREATH: 0
NAUSEA: 0
WHEEZING: 0
FACIAL SWELLING: 0
CHEST TIGHTNESS: 0
SINUS PAIN: 0
TROUBLE SWALLOWING: 0
VOMITING: 0
PHOTOPHOBIA: 0

## 2021-06-28 ASSESSMENT — PAIN SCALES - GENERAL
PAINLEVEL_OUTOF10: 10
PAINLEVEL_OUTOF10: 10

## 2021-06-28 ASSESSMENT — PAIN DESCRIPTION - FREQUENCY: FREQUENCY: CONTINUOUS

## 2021-06-28 ASSESSMENT — PAIN DESCRIPTION - LOCATION: LOCATION: BACK

## 2021-06-28 ASSESSMENT — PAIN DESCRIPTION - PAIN TYPE: TYPE: ACUTE PAIN

## 2021-06-28 ASSESSMENT — PAIN DESCRIPTION - ORIENTATION: ORIENTATION: LOWER

## 2021-06-28 ASSESSMENT — PAIN DESCRIPTION - DESCRIPTORS: DESCRIPTORS: SHARP

## 2021-06-28 NOTE — ED PROVIDER NOTES
[facility] ENCOUNTER      Pt Name: Faby Rizvi  MRN: [id number]  [place] 1955  Date of evaluation: 6/28/2021  Provider: Quoc Jaffe, [facility]       Chief Complaint   Patient presents with    Back Pain         HISTORY OF PRESENT ILLNESS   (Location/Symptom, Timing/Onset, Context/Setting, Quality, Duration, Modifying Factors, Severity)  Note limiting factors. Faby Rizvi is a 60 y.o. female who presents to the emergency department with complaint of low back pain that has been ongoing for several months but became worse in the past few days mostly in her left posterior superior iliac spine with pain radiation to her left lower extremity. Patient admits to a history of low back pain with sciatica and currently on Lortab given to her by her primary care physician. Patient admits to history of cirrhosis and had a fairly recent liver transplant and is concerned about possible toxicity to her liver with acetaminophen-containing products. Patient states that her back pain was worse last night and continued this morning making it impossible for her to have a good night rest.  She states that ambulation and weightbearing with prolonged standing tend to exacerbate her back pain. Patient denies any saddle anesthesia. HPI    Nursing Notes were reviewed. REVIEW OF SYSTEMS    (2-9 systems for level 4, 10 or more for level 5)     Review of Systems   Constitutional: Negative for activity change, appetite change, fatigue and fever. HENT: Negative for facial swelling, sinus pain and trouble swallowing. Eyes: Negative for photophobia and visual disturbance. Respiratory: Negative for chest tightness, shortness of breath and wheezing. Cardiovascular: Negative for chest pain, palpitations and leg swelling. Gastrointestinal: Negative for abdominal pain, nausea and vomiting.    Genitourinary: Negative for difficulty urinating, dysuria, flank pain, hematuria, pelvic pain and vaginal bleeding. Musculoskeletal: Positive for arthralgias, back pain, gait problem and myalgias. Negative for neck pain and neck stiffness. Skin: Negative for wound. Neurological: Negative for tremors, facial asymmetry, weakness and numbness. Hematological: Negative for adenopathy. Psychiatric/Behavioral: Negative for suicidal ideas. Except as noted above the remainder of the review of systems was reviewed and negative. PAST MEDICAL HISTORY     Past Medical History:   Diagnosis Date    Arthritis, rheumatoid ([facility]     Cirrhosis ([facility]     Colitis     Diabetes mellitus ([facility]     Hypertension     IIH (idiopathic intracranial hypertension)     Liver cirrhosis (HCC)     Osteoarthritis     Sciatica     Unspecified sleep apnea          SURGICAL HISTORY       Past Surgical History:   Procedure Laterality Date    BONE MARROW BIOPSY      DILATION AND CURETTAGE OF UTERUS      DILATION AND CURETTAGE OF UTERUS  1998 & 2000    LIVER BIOPSY      LIVER TRANSPLANT  10/24/2020    TUBAL LIGATION  1982         CURRENT MEDICATIONS       Previous Medications    ALPRAZOLAM (XANAX) 0.5 MG TABLET    Take 0.25 mg by mouth 3 times daily as needed. APIXABAN (ELIQUIS) 2.5 MG TABS TABLET    Take 2.5 mg by mouth 2 times daily    ATORVASTATIN (LIPITOR) 40 MG TABLET    Take 40 mg by mouth nightly    CYCLOSPORINE MODIFIED (NEORAL) 25 MG CAPSULE    Take 4 capsules (100mg) PO q am and take 4 capsules (100 mg) POq pm    GABAPENTIN (NEURONTIN) 300 MG CAPSULE    Take 300 mg by mouth 3 times daily. HYDROCODONE-ACETAMINOPHEN (NORCO) 5-325 MG PER TABLET    Take 1 tablet by mouth daily as needed for Pain for up to 30 days.  Intended supply: 30 days    METOCLOPRAMIDE (REGLAN) 5 MG TABLET        METOPROLOL TARTRATE (LOPRESSOR) 25 MG TABLET    Take 12.5 mg by mouth 2 times daily    MYCOPHENOLATE (CELLCEPT) 250 MG CAPSULE    Take 500 mg by mouth 2 times daily    SILVER SULFADIAZINE (SILVADENE) 1 % CREAM    Apply topically daily. TORSEMIDE (DEMADEX) 20 MG TABLET    Take 20 mg by mouth daily    VITAMIN D (ERGOCALCIFEROL) 1.25 MG (24656 UT) CAPS CAPSULE    TAKE ONE CAPSULE BY MOUTH ONCE A WEEK. ALLERGIES     Lisinopril, Ciprofloxacin, and Dye [iodides]    FAMILY HISTORY       Family History   Problem Relation Age of Onset    High Blood Pressure Mother     Alzheimer's Disease Mother     Heart Disease Father     High Blood Pressure Father     Heart Disease Maternal Grandmother     Heart Disease Maternal Grandfather     Stroke Maternal Grandfather     Diabetes Paternal Grandmother     High Blood Pressure Brother           SOCIAL HISTORY       Social History     Socioeconomic History    Marital status: Legally      Spouse name: None    Number of children: None    Years of education: None    Highest education level: None   Occupational History    None   Tobacco Use    Smoking status: Current Some Day Smoker     Packs/day: 0.10     Years: 1.00     Pack years: 0.10     Types: Cigarettes     Last attempt to quit: 2019     Years since quittin.1    Smokeless tobacco: Never Used   Substance and Sexual Activity    Alcohol use: No    Drug use: No    Sexual activity: None   Other Topics Concern    None   Social History Narrative    None     Social Determinants of Health     Financial Resource Strain: High Risk    Difficulty of Paying Living Expenses: Hard   Food Insecurity: Food Insecurity Present    Worried About Running Out of Food in the Last Year: Often true    Jacky of Food in the Last Year: Often true   Transportation Needs:     Lack of Transportation (Medical):      Lack of Transportation (Non-Medical):    Physical Activity:     Days of Exercise per Week:     Minutes of Exercise per Session:    Stress:     Feeling of Stress :    Social Connections:     Frequency of Communication with Friends and Family:     Frequency of Social Gatherings with Findings: No bruising. Neurological:      General: No focal deficit present. Mental Status: She is alert and oriented to person, place, and time. Psychiatric:         Mood and Affect: Mood normal.         Behavior: Behavior normal.         DIAGNOSTIC RESULTS     EKG: All EKG's are interpreted by the Emergency Department Physician who either signs or Co-signs this chart in the absence of a cardiologist.  No EKG    RADIOLOGY:   Non-plain film images such as CT, Ultrasound and MRI are read by the radiologist. Plain radiographic images are visualized and preliminarily interpreted by the emergency physician with the below findings:    Patient remained stable while in the emergency department. Interpretation per the Radiologist below, if available at the time of this note:    No orders to display         ED BEDSIDE ULTRASOUND:   Performed by ED Physician - none    LABS:  Labs Reviewed   URINALYSIS - Abnormal; Notable for the following components:       Result Value    Protein,  (*)     Urobilinogen, Urine 2.0 (*)     All other components within normal limits    Narrative:     Performed at:  [address] [place] [address] Laboratory  [address] [place] [address],  [place], [address]   Phone [phone]       All other labs were within normal range or not returned as of this dictation. EMERGENCY DEPARTMENT COURSE and DIFFERENTIAL DIAGNOSIS/MDM:   Vitals:    Vitals:    06/28/21 1000 06/28/21 1037   BP: 108/69    Pulse: 65    Resp: 18    Temp: 98.5 °F (36.9 °C)    TempSrc: Oral    SpO2: 99%    Weight:  117 lb (53.1 kg)   Height:  5' 1\" (1.549 m)       Patient felt improved with Lidoderm patch and IM 30 mg of Toradol.     MDM  Number of Diagnoses or Management Options  Sciatica of left side: established and improving  Strain of lumbar region, initial encounter: established and improving     Amount and/or Complexity of Data Reviewed  Clinical lab tests: reviewed  Tests > 80 MEDD -       (Please note that portions of this note were completed with a voice recognition program.  Efforts were made to edit the dictations but occasionally words are mis-transcribed.)    Aleyda Lai DO (electronically signed)  Attending Emergency Physician          Yenni Garvin DO  06/28/21 1116

## 2021-06-28 NOTE — ED NOTES
Patient with c/o bilateral low back pain x 2 weeks, patient with history of sciatica.      Celeste Baptiste RN  06/28/21 1000

## 2021-07-06 ENCOUNTER — HOSPITAL ENCOUNTER (OUTPATIENT)
Facility: HOSPITAL | Age: 66
Discharge: HOME | End: 2021-07-06
Payer: COMMERCIAL

## 2021-07-06 LAB
ALBUMIN SERPL-MCNC: 4.1 G/DL (ref 3.4–4.8)
ALP BLD-CCNC: 124 U/L (ref 25–100)
ALT SERPL-CCNC: 6 U/L (ref 4–36)
ANION GAP SERPL CALCULATED.3IONS-SCNC: 9 MMOL/L (ref 3–16)
AST SERPL-CCNC: 11 U/L (ref 8–33)
BASOPHILS ABSOLUTE: 0 K/UL (ref 0–0.1)
BASOPHILS RELATIVE PERCENT: 0.4 %
BILIRUB SERPL-MCNC: 1 MG/DL (ref 0.3–1.2)
BILIRUBIN DIRECT: 0.4 MG/DL (ref 0–0.2)
BILIRUBIN, INDIRECT: 0.6 MG/DL (ref 0.2–0.8)
BUN BLDV-MCNC: 30 MG/DL (ref 6–20)
CALCIUM SERPL-MCNC: 9.7 MG/DL (ref 8.5–10.5)
CHLORIDE BLD-SCNC: 104 MMOL/L (ref 98–107)
CO2: 27 MMOL/L (ref 20–30)
CREAT SERPL-MCNC: 1.5 MG/DL (ref 0.4–1.2)
EOSINOPHILS ABSOLUTE: 0.1 K/UL (ref 0–0.4)
EOSINOPHILS RELATIVE PERCENT: 2.8 %
GFR AFRICAN AMERICAN: 42
GFR NON-AFRICAN AMERICAN: 35
GLUCOSE BLD-MCNC: 124 MG/DL (ref 74–106)
HCT VFR BLD CALC: 32.3 % (ref 37–47)
HEMOGLOBIN: 10.7 G/DL (ref 11.5–16.5)
IMMATURE GRANULOCYTES #: 0 K/UL
IMMATURE GRANULOCYTES %: 0.2 % (ref 0–5)
LYMPHOCYTES ABSOLUTE: 0.9 K/UL (ref 1.5–4)
LYMPHOCYTES RELATIVE PERCENT: 17.1 %
MCH RBC QN AUTO: 29.2 PG (ref 27–32)
MCHC RBC AUTO-ENTMCNC: 33.1 G/DL (ref 31–35)
MCV RBC AUTO: 88 FL (ref 80–100)
MONOCYTES ABSOLUTE: 0.3 K/UL (ref 0.2–0.8)
MONOCYTES RELATIVE PERCENT: 6.5 %
NEUTROPHILS ABSOLUTE: 3.6 K/UL (ref 2–7.5)
NEUTROPHILS RELATIVE PERCENT: 73 %
PDW BLD-RTO: 14.3 % (ref 11–16)
PHOSPHORUS: 4 MG/DL (ref 2.5–4.5)
PLATELET # BLD: 195 K/UL (ref 150–400)
PMV BLD AUTO: 9.4 FL (ref 6–10)
POTASSIUM SERPL-SCNC: 4.2 MMOL/L (ref 3.4–5.1)
RBC # BLD: 3.67 M/UL (ref 3.8–5.8)
SODIUM BLD-SCNC: 140 MMOL/L (ref 136–145)
TOTAL PROTEIN: 6.9 G/DL (ref 6.4–8.3)
WBC # BLD: 5 K/UL (ref 4–11)

## 2021-07-06 PROCEDURE — 36415 COLL VENOUS BLD VENIPUNCTURE: CPT

## 2021-07-06 PROCEDURE — 80158 DRUG ASSAY CYCLOSPORINE: CPT

## 2021-07-06 PROCEDURE — 80069 RENAL FUNCTION PANEL: CPT

## 2021-07-06 PROCEDURE — 85025 COMPLETE CBC W/AUTO DIFF WBC: CPT

## 2021-07-06 PROCEDURE — 80076 HEPATIC FUNCTION PANEL: CPT

## 2021-07-08 LAB — CYCLOSPORINE A: 80 NG/ML

## 2021-07-19 DIAGNOSIS — R11.0 NAUSEA: ICD-10-CM

## 2021-07-19 RX ORDER — ONDANSETRON 4 MG/1
4 TABLET, FILM COATED ORAL EVERY 8 HOURS PRN
Qty: 60 TABLET | Refills: 3 | Status: SHIPPED | OUTPATIENT
Start: 2021-07-19 | End: 2021-11-04 | Stop reason: ALTCHOICE

## 2021-08-09 ENCOUNTER — OFFICE VISIT (OUTPATIENT)
Dept: PRIMARY CARE CLINIC | Age: 66
End: 2021-08-09
Payer: COMMERCIAL

## 2021-08-09 VITALS
HEART RATE: 64 BPM | SYSTOLIC BLOOD PRESSURE: 124 MMHG | RESPIRATION RATE: 18 BRPM | TEMPERATURE: 97 F | WEIGHT: 120 LBS | BODY MASS INDEX: 22.67 KG/M2 | DIASTOLIC BLOOD PRESSURE: 64 MMHG | OXYGEN SATURATION: 94 %

## 2021-08-09 DIAGNOSIS — D64.9 ANEMIA, UNSPECIFIED TYPE: ICD-10-CM

## 2021-08-09 DIAGNOSIS — Z94.4 LIVER TRANSPLANT RECIPIENT (HCC): Primary | ICD-10-CM

## 2021-08-09 DIAGNOSIS — I10 ESSENTIAL HYPERTENSION: ICD-10-CM

## 2021-08-09 DIAGNOSIS — Z12.31 ENCOUNTER FOR SCREENING MAMMOGRAM FOR BREAST CANCER: ICD-10-CM

## 2021-08-09 DIAGNOSIS — N18.30 CHRONIC RENAL FAILURE, STAGE 3 (MODERATE), UNSPECIFIED WHETHER STAGE 3A OR 3B CKD (HCC): ICD-10-CM

## 2021-08-09 DIAGNOSIS — I48.91 ATRIAL FIBRILLATION, UNSPECIFIED TYPE (HCC): ICD-10-CM

## 2021-08-09 PROCEDURE — 99214 OFFICE O/P EST MOD 30 MIN: CPT | Performed by: INTERNAL MEDICINE

## 2021-08-09 RX ORDER — ALPRAZOLAM 0.25 MG/1
0.25 TABLET ORAL 3 TIMES DAILY PRN
COMMUNITY
Start: 2021-06-10 | End: 2021-08-09

## 2021-08-09 RX ORDER — AMLODIPINE BESYLATE 10 MG/1
TABLET ORAL
Status: ON HOLD | COMMUNITY
Start: 2021-06-03 | End: 2022-03-15 | Stop reason: HOSPADM

## 2021-08-09 RX ORDER — TRAMADOL HYDROCHLORIDE 50 MG/1
50 TABLET ORAL EVERY 6 HOURS PRN
COMMUNITY
Start: 2021-07-19 | End: 2021-08-18

## 2021-08-09 RX ORDER — NALOXONE HYDROCHLORIDE 4 MG/.1ML
SPRAY NASAL
COMMUNITY
Start: 2021-07-19 | End: 2021-11-04

## 2021-08-09 RX ORDER — SENNOSIDES 8.6 MG
CAPSULE ORAL
Status: ON HOLD | COMMUNITY
Start: 2021-07-19 | End: 2022-03-15

## 2021-08-09 RX ORDER — POLYVINYL ALCOHOL 14 MG/ML
1 SOLUTION/ DROPS OPHTHALMIC 4 TIMES DAILY PRN
COMMUNITY
Start: 2021-07-19 | End: 2021-11-04

## 2021-08-09 RX ORDER — PROMETHAZINE HYDROCHLORIDE 12.5 MG/1
12.5 TABLET ORAL EVERY 6 HOURS PRN
COMMUNITY
Start: 2021-07-19 | End: 2022-03-29

## 2021-08-09 ASSESSMENT — ENCOUNTER SYMPTOMS
BACK PAIN: 0
ABDOMINAL PAIN: 0
EYE DISCHARGE: 0
SINUS PRESSURE: 0
SORE THROAT: 0
VOMITING: 0
WHEEZING: 0
SHORTNESS OF BREATH: 0
COUGH: 0
NAUSEA: 0

## 2021-08-09 NOTE — PROGRESS NOTES
Chief Complaint   Patient presents with    Atrial Fibrillation    Cirrhosis    Anemia    Chronic Renal Failure    Hypertension       Have you seen any other physician or provider since your last visit yes - liver transplant  ,psychiatry,cardiology    Have you had any other diagnostic tests since your last visit? no    Have you changed or stopped any medications since your last visit? yes -  Amlodipine new from card. Started phenergan, senna , tramadol new from liver dr. Pinky orantes new.

## 2021-08-09 NOTE — PROGRESS NOTES
SUBJECTIVE:    Patient ID: Renaldo Banda is a 60 y. o.female. Chief Complaint   Patient presents with    Atrial Fibrillation    Cirrhosis    Anemia    Chronic Renal Failure    Hypertension         HPI:  Patient with history of liver transplant at  due to cirrhosis. She has followed up with her transplant team since last visit. She reports she is doing well. Labs are stable. She states that in a few months she will have further scanning done with her transplant and medications adjusted at her one year anniversary of transplant. Patient with Afib. She is on Xarelto. She does report some easy bruising. No bleeding or melena reported. No chest pain or palpitations. Blood pressure has been stable. She has been following with cardiology. Patient's medications, allergies, past medical, surgical, social and family histories were reviewed and updated as appropriate in the electronic medical record/chart. Outpatient Medications Marked as Taking for the 8/9/21 encounter (Office Visit) with Timmy Islas MD   Medication Sig Dispense Refill    ALPRAZolam (XANAX) 0.25 MG tablet Take 0.25 mg by mouth 3 times daily as needed.  Sennosides (SENNA) 8.6 MG CAPS TAKE 1 TABLET BY MOUTH TWICE DAILY      amLODIPine (NORVASC) 10 MG tablet TAKE 1 TABLET BY MOUTH EVERY DAY      NARCAN 4 MG/0.1ML LIQD nasal spray       polyvinyl alcohol (LIQUIFILM TEARS) 1.4 % ophthalmic solution Apply 1 drop to eye 4 times daily as needed      promethazine (PHENERGAN) 12.5 MG tablet Take 12.5 mg by mouth every 6 hours as needed      traMADol (ULTRAM) 50 MG tablet Take 50 mg by mouth every 6 hours as needed.  ondansetron (ZOFRAN) 4 MG tablet Take 1 tablet by mouth every 8 hours as needed for Nausea or Vomiting 60 tablet 3    gabapentin (NEURONTIN) 300 MG capsule Take 300 mg by mouth 3 times daily.  silver sulfADIAZINE (SILVADENE) 1 % cream Apply topically daily.  50 g 0    metoclopramide (REGLAN) 5 MG tablet       mycophenolate (CELLCEPT) 250 MG capsule Take 500 mg by mouth 2 times daily      cycloSPORINE modified (NEORAL) 25 MG capsule Take 4 capsules (100mg) PO q am and take 4 capsules (100 mg) POq pm      apixaban (ELIQUIS) 2.5 MG TABS tablet Take 2.5 mg by mouth 2 times daily      atorvastatin (LIPITOR) 40 MG tablet Take 40 mg by mouth nightly      torsemide (DEMADEX) 20 MG tablet Take 20 mg by mouth daily      vitamin D (ERGOCALCIFEROL) 1.25 MG (24605 UT) CAPS capsule TAKE ONE CAPSULE BY MOUTH ONCE A WEEK.  metoprolol tartrate (LOPRESSOR) 25 MG tablet Take 12.5 mg by mouth 2 times daily          Review of Systems   Constitutional: Positive for fatigue. Negative for chills, fever and unexpected weight change. HENT: Negative for congestion, sinus pressure and sore throat. Eyes: Negative for discharge and visual disturbance. Respiratory: Negative for cough, shortness of breath and wheezing. Cardiovascular: Negative for chest pain and palpitations. Gastrointestinal: Negative for abdominal pain, nausea and vomiting. Endocrine: Negative for cold intolerance and heat intolerance. Genitourinary: Negative for dysuria, frequency and urgency. Musculoskeletal: Positive for arthralgias. Negative for back pain. Skin: Negative for rash and wound. Neurological: Negative for syncope, numbness and headaches. Hematological: Bruises/bleeds easily. Psychiatric/Behavioral: Negative for agitation, self-injury, sleep disturbance and suicidal ideas. The patient is nervous/anxious.         Past Medical History:   Diagnosis Date    Arthritis, rheumatoid (HCC)     [facility] [address]     Colitis     Diabetes mellitus ([facility] [address]     Hypertension     IIH (idiopathic intracranial hypertension)     Liver cirrhosis (HCC)     Osteoarthritis     Sciatica     Unspecified sleep apnea      Past Surgical History:   Procedure Laterality Date    BONE MARROW BIOPSY      DILATION AND CURETTAGE OF UTERUS      DILATION AND CURETTAGE OF UTERUS   &     LIVER BIOPSY      LIVER TRANSPLANT  10/24/2020    TUBAL LIGATION  1982      Family History   Problem Relation Age of Onset    High Blood Pressure Mother     Alzheimer's Disease Mother     Heart Disease Father     High Blood Pressure Father     Heart Disease Maternal Grandmother     Heart Disease Maternal Grandfather     Stroke Maternal Grandfather     Diabetes Paternal Grandmother     High Blood Pressure Brother       Social History     Tobacco Use   Smoking Status Current Some Day Smoker    Packs/day: 0.10    Years: 1.00    Pack years: 0.10    Types: Cigarettes    Last attempt to quit: 2019    Years since quittin.2   Smokeless Tobacco Never Used       OBJECTIVE:   Wt Readings from Last 3 Encounters:   21 120 lb (54.4 kg)   21 117 lb (53.1 kg)   21 117 lb 9.6 oz (53.3 kg)     BP Readings from Last 3 Encounters:   21 124/64   21 (!) 150/49   21 134/70       /64   Pulse 64   Temp 97 °F (36.1 °C)   Resp 18   Wt 120 lb (54.4 kg)   SpO2 94%   BMI 22.67 kg/m²      Physical Exam  Vitals and nursing note reviewed. Constitutional:       Appearance: Normal appearance. She is well-developed. HENT:      Head: Normocephalic and atraumatic. Right Ear: External ear normal.      Left Ear: External ear normal.      Nose: Nose normal.      Mouth/Throat:      Mouth: Mucous membranes are moist.      Pharynx: Oropharynx is clear. Eyes:      Conjunctiva/sclera: Conjunctivae normal.      Pupils: Pupils are equal, round, and reactive to light. Neck:      Thyroid: No thyromegaly. Vascular: No JVD. Cardiovascular:      Rate and Rhythm: Normal rate and regular rhythm. Heart sounds: Normal heart sounds. Pulmonary:      Effort: Pulmonary effort is normal.      Breath sounds: Normal breath sounds. No wheezing or rales. Abdominal:      General: Bowel sounds are normal. There is no distension.       Palpations: Abdomen is soft. Tenderness: There is no abdominal tenderness. Musculoskeletal:         General: No tenderness. Cervical back: Neck supple. No rigidity. No muscular tenderness. Right lower leg: No edema. Left lower leg: No edema. Skin:     Findings: Bruising (arms and hands) present. No erythema or rash. Neurological:      General: No focal deficit present. Mental Status: She is alert and oriented to person, place, and time. Psychiatric:         Behavior: Behavior normal.         Judgment: Judgment normal.         Lab Results   Component Value Date     07/06/2021    K 4.2 07/06/2021    K 3.1 02/01/2020     07/06/2021    CL 99.0 06/01/2012    CO2 27 07/06/2021    GLUCOSE 124 07/06/2021    BUN 30 07/06/2021    CREATININE 1.5 07/06/2021    CREATININE 0.9 06/01/2012    CALCIUM 9.7 07/06/2021    PROT 6.9 07/06/2021    LABALBU 4.1 07/06/2021    LABALBU 3.9 06/01/2012    BILITOT 1.0 07/06/2021    ALT 6 07/06/2021    AST 11 07/06/2021       Hemoglobin A1C (%)   Date Value   04/07/2021 4.5     Microscopic Examination (no units)   Date Value   06/28/2021 YES     LDL Calculated (mg/dL)   Date Value   04/10/2018 94         Lab Results   Component Value Date    WBC 5.0 07/06/2021    NEUTROABS 3.6 07/06/2021    HGB 10.7 07/06/2021    HCT 32.3 07/06/2021    HCT 44.3 06/01/2012    MCV 88.0 07/06/2021     07/06/2021     06/01/2012       Lab Results   Component Value Date    TSH 1.46 10/29/2015       ASSESSMENT/PLAN:     1. Liver transplant recipient [facility])  Try to avoid any hepatotoxic agent. Discussed the importance of following up with transplant team and follow-up with recommendations. Reviewed recent labs. Patient to continue with regular blood work as ordered. - CBC Auto Differential; Future  - Comprehensive Metabolic Panel; Future  - Ferritin; Future  - Folate; Future  - Iron and TIBC; Future  - Vitamin B12; Future    2. Essential hypertension  BP is stable.  I have advised her on low-sodium diet, exercise and weight control. I am going to continue current medication. Will monitor her renal function every few months, have advised her to check blood pressure frequently and to keep a record of this. Long conversation with her regarding smoking cessation.    - Comprehensive Metabolic Panel; Future    3. Atrial fibrillation, unspecified type ([address]  Sinus rhythm on exam. Will cont on meds. I had a long discussion with the patient regarding the risk/benefit from anticoagulation. Patient is aware of that. 4. Anemia, unspecified type  Likely due to her history of liver transplant. Proceed with monitoring hemoglobin level and check anemia work-up. Further recommendation based on test result. Continue GI prophylaxis. - CBC Auto Differential; Future  - Comprehensive Metabolic Panel; Future  - Ferritin; Future  - Folate; Future  - Iron and TIBC; Future  - Vitamin B12; Future    5. Chronic renal failure, stage 3 (moderate), unspecified whether stage 3a or 3b CKD (HCC)  Baseline. I have advised her to avoid any nephrotoxic agents. I am going to monitor renal function periodically. I will make sure that the blood pressure and other medical problems are under good control. Patient to continue follow-up with nephrology. Creatinine will be checked in few weeks. Last Creatinine is   Lab Results   Component Value Date    CREATININE 1.5 (H) 07/06/2021   Advised the patient to discuss with her hematologist the need to change tramadol due to her chronic renal failure. - CBC Auto Differential; Future  - Comprehensive Metabolic Panel; Future  - Ferritin; Future  - Folate; Future  - Iron and TIBC; Future  - Vitamin B12; Future    6. Encounter for screening mammogram for breast cancer  Check mammogram. Advised her on self breast exam on a monthly basis. - [facility] Digital Screen Bilateral [id number];  Future     Written by Steve Holguin, acting as a scribe for Dr. Mervat Nino on 8/9/2021 at 10:52 AM.     I, Dr. Bailey Wolff, personally performed the services described in the documentation as scribed by Mohsen Astudillo CMA, in my presence and it is both accurate and complete.

## 2021-08-11 ENCOUNTER — HOSPITAL ENCOUNTER (OUTPATIENT)
Facility: HOSPITAL | Age: 66
Discharge: HOME | End: 2021-08-11
Payer: COMMERCIAL

## 2021-08-11 DIAGNOSIS — N18.30 CHRONIC RENAL FAILURE, STAGE 3 (MODERATE), UNSPECIFIED WHETHER STAGE 3A OR 3B CKD (HCC): ICD-10-CM

## 2021-08-11 DIAGNOSIS — Z94.4 LIVER TRANSPLANT RECIPIENT (HCC): ICD-10-CM

## 2021-08-11 DIAGNOSIS — D64.9 ANEMIA, UNSPECIFIED TYPE: ICD-10-CM

## 2021-08-11 DIAGNOSIS — I10 ESSENTIAL HYPERTENSION: ICD-10-CM

## 2021-08-11 LAB
A/G RATIO: 1.6 (ref 0.8–2)
ALBUMIN SERPL-MCNC: 4.2 G/DL (ref 3.4–4.8)
ALP BLD-CCNC: 97 U/L (ref 25–100)
ALT SERPL-CCNC: 6 U/L (ref 4–36)
ANION GAP SERPL CALCULATED.3IONS-SCNC: 10 MMOL/L (ref 3–16)
AST SERPL-CCNC: 12 U/L (ref 8–33)
BASOPHILS ABSOLUTE: 0 K/UL (ref 0–0.1)
BASOPHILS RELATIVE PERCENT: 0.5 %
BILIRUB SERPL-MCNC: 1 MG/DL (ref 0.3–1.2)
BUN BLDV-MCNC: 25 MG/DL (ref 6–20)
CALCIUM SERPL-MCNC: 9.7 MG/DL (ref 8.5–10.5)
CHLORIDE BLD-SCNC: 104 MMOL/L (ref 98–107)
CO2: 28 MMOL/L (ref 20–30)
CREAT SERPL-MCNC: 1.4 MG/DL (ref 0.4–1.2)
EOSINOPHILS ABSOLUTE: 0.1 K/UL (ref 0–0.4)
EOSINOPHILS RELATIVE PERCENT: 2.2 %
FERRITIN: 208.4 NG/ML (ref 22–322)
FOLATE: 9.54 NG/ML
GFR AFRICAN AMERICAN: 46
GFR NON-AFRICAN AMERICAN: 38
GLOBULIN: 2.7 G/DL
GLUCOSE BLD-MCNC: 162 MG/DL (ref 74–106)
HCT VFR BLD CALC: 33.3 % (ref 37–47)
HEMOGLOBIN: 11.3 G/DL (ref 11.5–16.5)
IMMATURE GRANULOCYTES #: 0 K/UL
IMMATURE GRANULOCYTES %: 0 % (ref 0–5)
IRON SATURATION: 26 % (ref 15–50)
IRON: 68 UG/DL (ref 37–145)
LYMPHOCYTES ABSOLUTE: 0.7 K/UL (ref 1.5–4)
LYMPHOCYTES RELATIVE PERCENT: 20.1 %
MCH RBC QN AUTO: 29.5 PG (ref 27–32)
MCHC RBC AUTO-ENTMCNC: 33.9 G/DL (ref 31–35)
MCV RBC AUTO: 86.9 FL (ref 80–100)
MONOCYTES ABSOLUTE: 0.3 K/UL (ref 0.2–0.8)
MONOCYTES RELATIVE PERCENT: 7.6 %
NEUTROPHILS ABSOLUTE: 2.6 K/UL (ref 2–7.5)
NEUTROPHILS RELATIVE PERCENT: 69.6 %
PDW BLD-RTO: 14.3 % (ref 11–16)
PLATELET # BLD: 194 K/UL (ref 150–400)
PMV BLD AUTO: 9.9 FL (ref 6–10)
POTASSIUM SERPL-SCNC: 4 MMOL/L (ref 3.4–5.1)
RBC # BLD: 3.83 M/UL (ref 3.8–5.8)
SODIUM BLD-SCNC: 142 MMOL/L (ref 136–145)
TOTAL IRON BINDING CAPACITY: 257 UG/DL (ref 250–450)
TOTAL PROTEIN: 6.9 G/DL (ref 6.4–8.3)
VITAMIN B-12: 473 PG/ML (ref 211–911)
WBC # BLD: 3.7 K/UL (ref 4–11)

## 2021-08-11 PROCEDURE — 82728 ASSAY OF FERRITIN: CPT

## 2021-08-11 PROCEDURE — 36415 COLL VENOUS BLD VENIPUNCTURE: CPT

## 2021-08-11 PROCEDURE — 83540 ASSAY OF IRON: CPT

## 2021-08-11 PROCEDURE — 82746 ASSAY OF FOLIC ACID SERUM: CPT

## 2021-08-11 PROCEDURE — 82607 VITAMIN B-12: CPT

## 2021-08-11 PROCEDURE — 80053 COMPREHEN METABOLIC PANEL: CPT

## 2021-08-11 PROCEDURE — 83550 IRON BINDING TEST: CPT

## 2021-08-11 PROCEDURE — 85025 COMPLETE CBC W/AUTO DIFF WBC: CPT

## 2021-09-13 ENCOUNTER — HOSPITAL ENCOUNTER (OUTPATIENT)
Facility: HOSPITAL | Age: 66
Discharge: HOME | End: 2021-09-13
Payer: COMMERCIAL

## 2021-09-13 LAB
ALBUMIN SERPL-MCNC: 4.2 G/DL (ref 3.4–4.8)
ALP BLD-CCNC: 112 U/L (ref 25–100)
ALT SERPL-CCNC: 6 U/L (ref 4–36)
ANION GAP SERPL CALCULATED.3IONS-SCNC: 11 MMOL/L (ref 3–16)
AST SERPL-CCNC: 9 U/L (ref 8–33)
BASOPHILS ABSOLUTE: 0 K/UL (ref 0–0.1)
BASOPHILS RELATIVE PERCENT: 0.5 %
BILIRUB SERPL-MCNC: 1 MG/DL (ref 0.3–1.2)
BILIRUBIN DIRECT: 0.4 MG/DL (ref 0–0.2)
BILIRUBIN URINE: NEGATIVE
BILIRUBIN, INDIRECT: 0.6 MG/DL (ref 0.2–0.8)
BLOOD, URINE: NEGATIVE
BUN BLDV-MCNC: 24 MG/DL (ref 6–20)
CALCIUM SERPL-MCNC: 9.4 MG/DL (ref 8.5–10.5)
CHLORIDE BLD-SCNC: 106 MMOL/L (ref 98–107)
CLARITY: CLEAR
CO2: 27 MMOL/L (ref 20–30)
COLOR: YELLOW
CREAT SERPL-MCNC: 1.4 MG/DL (ref 0.4–1.2)
EOSINOPHILS ABSOLUTE: 0.1 K/UL (ref 0–0.4)
EOSINOPHILS RELATIVE PERCENT: 1.4 %
EPITHELIAL CELLS, UA: ABNORMAL /HPF (ref 0–5)
GFR AFRICAN AMERICAN: 46
GFR NON-AFRICAN AMERICAN: 38
GLUCOSE BLD-MCNC: 114 MG/DL (ref 74–106)
GLUCOSE URINE: NEGATIVE MG/DL
HCT VFR BLD CALC: 34 % (ref 37–47)
HEMOGLOBIN: 11.2 G/DL (ref 11.5–16.5)
IMMATURE GRANULOCYTES #: 0 K/UL
IMMATURE GRANULOCYTES %: 0.4 % (ref 0–5)
KETONES, URINE: NEGATIVE MG/DL
LEUKOCYTE ESTERASE, URINE: NEGATIVE
LYMPHOCYTES ABSOLUTE: 0.8 K/UL (ref 1.5–4)
LYMPHOCYTES RELATIVE PERCENT: 13.3 %
MCH RBC QN AUTO: 28.9 PG (ref 27–32)
MCHC RBC AUTO-ENTMCNC: 32.9 G/DL (ref 31–35)
MCV RBC AUTO: 87.9 FL (ref 80–100)
MICROSCOPIC EXAMINATION: YES
MONOCYTES ABSOLUTE: 0.3 K/UL (ref 0.2–0.8)
MONOCYTES RELATIVE PERCENT: 4.8 %
MUCUS: ABNORMAL /LPF
NEUTROPHILS ABSOLUTE: 4.5 K/UL (ref 2–7.5)
NEUTROPHILS RELATIVE PERCENT: 79.6 %
NITRITE, URINE: NEGATIVE
PDW BLD-RTO: 15.3 % (ref 11–16)
PH UA: 5.5 (ref 5–8)
PHOSPHORUS: 3.8 MG/DL (ref 2.5–4.5)
PLATELET # BLD: 247 K/UL (ref 150–400)
PMV BLD AUTO: 10 FL (ref 6–10)
POTASSIUM SERPL-SCNC: 4.6 MMOL/L (ref 3.4–5.1)
PROTEIN UA: 30 MG/DL
RBC # BLD: 3.87 M/UL (ref 3.8–5.8)
RBC UA: ABNORMAL /HPF (ref 0–4)
SODIUM BLD-SCNC: 144 MMOL/L (ref 136–145)
SPECIFIC GRAVITY UA: 1.02 (ref 1–1.03)
TOTAL PROTEIN: 6.7 G/DL (ref 6.4–8.3)
URINE TYPE: ABNORMAL
UROBILINOGEN, URINE: 1 E.U./DL
WBC # BLD: 5.7 K/UL (ref 4–11)
WBC UA: ABNORMAL /HPF (ref 0–5)

## 2021-09-13 PROCEDURE — 87040 BLOOD CULTURE FOR BACTERIA: CPT

## 2021-09-13 PROCEDURE — 87086 URINE CULTURE/COLONY COUNT: CPT

## 2021-09-13 PROCEDURE — 81001 URINALYSIS AUTO W/SCOPE: CPT

## 2021-09-13 PROCEDURE — 80069 RENAL FUNCTION PANEL: CPT

## 2021-09-13 PROCEDURE — 80076 HEPATIC FUNCTION PANEL: CPT

## 2021-09-13 PROCEDURE — 80158 DRUG ASSAY CYCLOSPORINE: CPT

## 2021-09-13 PROCEDURE — 85025 COMPLETE CBC W/AUTO DIFF WBC: CPT

## 2021-09-13 PROCEDURE — 36415 COLL VENOUS BLD VENIPUNCTURE: CPT

## 2021-09-14 LAB — URINE CULTURE, ROUTINE: NORMAL

## 2021-09-15 LAB — CYCLOSPORINE A: 100.7 NG/ML

## 2021-09-17 LAB
BLOOD CULTURE, ROUTINE: NORMAL
CULTURE, BLOOD 2: NORMAL

## 2021-09-30 ENCOUNTER — HOSPITAL ENCOUNTER (OUTPATIENT)
Facility: HOSPITAL | Age: 66
Discharge: HOME | End: 2021-09-30
Payer: COMMERCIAL

## 2021-09-30 ENCOUNTER — HOSPITAL ENCOUNTER (OUTPATIENT)
Dept: CT IMAGING | Facility: HOSPITAL | Age: 66
Discharge: HOME | End: 2021-09-30
Payer: COMMERCIAL

## 2021-09-30 DIAGNOSIS — Z94.4 LIVER REPLACED BY TRANSPLANT (HCC): ICD-10-CM

## 2021-09-30 DIAGNOSIS — D84.9 IMMUNOSUPPRESSION-RELATED INFECTIOUS DISEASE (HCC): ICD-10-CM

## 2021-09-30 DIAGNOSIS — R91.1 LUNG NODULE: ICD-10-CM

## 2021-09-30 DIAGNOSIS — B99.8 IMMUNOSUPPRESSION-RELATED INFECTIOUS DISEASE (HCC): ICD-10-CM

## 2021-09-30 LAB
ALBUMIN SERPL-MCNC: 4.4 G/DL (ref 3.4–4.8)
ALP BLD-CCNC: 100 U/L (ref 25–100)
ALT SERPL-CCNC: 10 U/L (ref 4–36)
ANION GAP SERPL CALCULATED.3IONS-SCNC: 13 MMOL/L (ref 3–16)
AST SERPL-CCNC: 12 U/L (ref 8–33)
BASOPHILS ABSOLUTE: 0 K/UL (ref 0–0.1)
BASOPHILS RELATIVE PERCENT: 0.6 %
BILIRUB SERPL-MCNC: 1.3 MG/DL (ref 0.3–1.2)
BILIRUBIN DIRECT: 0.4 MG/DL (ref 0–0.2)
BILIRUBIN URINE: ABNORMAL
BILIRUBIN, INDIRECT: 0.9 MG/DL (ref 0.2–0.8)
BLOOD, URINE: NEGATIVE
BUN BLDV-MCNC: 23 MG/DL (ref 6–20)
CALCIUM SERPL-MCNC: 9.8 MG/DL (ref 8.5–10.5)
CHLORIDE BLD-SCNC: 105 MMOL/L (ref 98–107)
CLARITY: CLEAR
CO2: 25 MMOL/L (ref 20–30)
COLOR: YELLOW
CREAT SERPL-MCNC: 1.5 MG/DL (ref 0.4–1.2)
EOSINOPHILS ABSOLUTE: 0.2 K/UL (ref 0–0.4)
EOSINOPHILS RELATIVE PERCENT: 2.8 %
EPITHELIAL CELLS, UA: ABNORMAL /HPF (ref 0–5)
GFR AFRICAN AMERICAN: 42
GFR NON-AFRICAN AMERICAN: 35
GLUCOSE BLD-MCNC: 104 MG/DL (ref 74–106)
GLUCOSE URINE: NEGATIVE MG/DL
HCT VFR BLD CALC: 40 % (ref 37–47)
HEMOGLOBIN: 13.1 G/DL (ref 11.5–16.5)
IMMATURE GRANULOCYTES #: 0 K/UL
IMMATURE GRANULOCYTES %: 0.2 % (ref 0–5)
KETONES, URINE: NEGATIVE MG/DL
LEUKOCYTE ESTERASE, URINE: ABNORMAL
LYMPHOCYTES ABSOLUTE: 1 K/UL (ref 1.5–4)
LYMPHOCYTES RELATIVE PERCENT: 18.6 %
MCH RBC QN AUTO: 28.9 PG (ref 27–32)
MCHC RBC AUTO-ENTMCNC: 32.8 G/DL (ref 31–35)
MCV RBC AUTO: 88.1 FL (ref 80–100)
MICROSCOPIC EXAMINATION: YES
MONOCYTES ABSOLUTE: 0.3 K/UL (ref 0.2–0.8)
MONOCYTES RELATIVE PERCENT: 6.3 %
NEUTROPHILS ABSOLUTE: 3.9 K/UL (ref 2–7.5)
NEUTROPHILS RELATIVE PERCENT: 71.5 %
NITRITE, URINE: NEGATIVE
PDW BLD-RTO: 14.5 % (ref 11–16)
PH UA: 5 (ref 5–8)
PHOSPHORUS: 5.2 MG/DL (ref 2.5–4.5)
PLATELET # BLD: 194 K/UL (ref 150–400)
PMV BLD AUTO: 10 FL (ref 6–10)
POTASSIUM SERPL-SCNC: 4.1 MMOL/L (ref 3.4–5.1)
PROTEIN UA: 30 MG/DL
RBC # BLD: 4.54 M/UL (ref 3.8–5.8)
RBC UA: ABNORMAL /HPF (ref 0–4)
SODIUM BLD-SCNC: 143 MMOL/L (ref 136–145)
SPECIFIC GRAVITY UA: >=1.03 (ref 1–1.03)
TOTAL PROTEIN: 6.9 G/DL (ref 6.4–8.3)
URINE TYPE: ABNORMAL
UROBILINOGEN, URINE: 0.2 E.U./DL
WBC # BLD: 5.4 K/UL (ref 4–11)
WBC UA: ABNORMAL /HPF (ref 0–5)

## 2021-09-30 PROCEDURE — 71250 CT THORAX DX C-: CPT

## 2021-09-30 PROCEDURE — 87040 BLOOD CULTURE FOR BACTERIA: CPT

## 2021-09-30 PROCEDURE — 80069 RENAL FUNCTION PANEL: CPT

## 2021-09-30 PROCEDURE — 87086 URINE CULTURE/COLONY COUNT: CPT

## 2021-09-30 PROCEDURE — 80076 HEPATIC FUNCTION PANEL: CPT

## 2021-09-30 PROCEDURE — 85025 COMPLETE CBC W/AUTO DIFF WBC: CPT

## 2021-09-30 PROCEDURE — 80158 DRUG ASSAY CYCLOSPORINE: CPT

## 2021-09-30 PROCEDURE — 36415 COLL VENOUS BLD VENIPUNCTURE: CPT

## 2021-09-30 PROCEDURE — 81001 URINALYSIS AUTO W/SCOPE: CPT

## 2021-10-01 LAB — URINE CULTURE, ROUTINE: NORMAL

## 2021-10-02 LAB — CYCLOSPORINE A: 110.4 NG/ML

## 2021-10-04 LAB
BLOOD CULTURE, ROUTINE: NORMAL
CULTURE, BLOOD 2: NORMAL

## 2021-10-13 ENCOUNTER — HOSPITAL ENCOUNTER (OUTPATIENT)
Dept: NON INVASIVE DIAGNOSTICS | Facility: HOSPITAL | Age: 66
Discharge: HOME | End: 2021-10-13
Payer: MEDICARE

## 2021-10-13 ENCOUNTER — HOSPITAL ENCOUNTER (OUTPATIENT)
Dept: MAMMOGRAPHY | Facility: HOSPITAL | Age: 66
Discharge: HOME | End: 2021-10-13
Payer: MEDICARE

## 2021-10-13 ENCOUNTER — HOSPITAL ENCOUNTER (OUTPATIENT)
Facility: HOSPITAL | Age: 66
Discharge: HOME | End: 2021-10-13
Payer: MEDICARE

## 2021-10-13 ENCOUNTER — OUTSIDE FACILITY SERVICE (OUTPATIENT)
Dept: CARDIOLOGY | Facility: CLINIC | Age: 66
End: 2021-10-13

## 2021-10-13 VITALS — BODY MASS INDEX: 22.09 KG/M2 | WEIGHT: 117 LBS | HEIGHT: 61 IN

## 2021-10-13 DIAGNOSIS — Z12.31 ENCOUNTER FOR SCREENING MAMMOGRAM FOR BREAST CANCER: ICD-10-CM

## 2021-10-13 LAB
A/G RATIO: 2.1 (ref 0.8–2)
ALBUMIN SERPL-MCNC: 4.6 G/DL (ref 3.4–4.8)
ALP BLD-CCNC: 113 U/L (ref 25–100)
ALT SERPL-CCNC: 10 U/L (ref 4–36)
ANION GAP SERPL CALCULATED.3IONS-SCNC: 12 MMOL/L (ref 3–16)
AST SERPL-CCNC: 14 U/L (ref 8–33)
BASOPHILS ABSOLUTE: 0 K/UL (ref 0–0.1)
BASOPHILS RELATIVE PERCENT: 0.5 %
BILIRUB SERPL-MCNC: 0.9 MG/DL (ref 0.3–1.2)
BILIRUBIN DIRECT: 0.3 MG/DL (ref 0–0.2)
BILIRUBIN URINE: NEGATIVE
BILIRUBIN, INDIRECT: 0.6 MG/DL (ref 0.2–0.8)
BLOOD, URINE: NEGATIVE
BUN BLDV-MCNC: 18 MG/DL (ref 6–20)
CALCIUM SERPL-MCNC: 9.5 MG/DL (ref 8.5–10.5)
CHLORIDE BLD-SCNC: 106 MMOL/L (ref 98–107)
CLARITY: CLEAR
CO2: 25 MMOL/L (ref 20–30)
COLOR: YELLOW
CREAT SERPL-MCNC: 1.1 MG/DL (ref 0.4–1.2)
EOSINOPHILS ABSOLUTE: 0.1 K/UL (ref 0–0.4)
EOSINOPHILS RELATIVE PERCENT: 3.2 %
EPITHELIAL CELLS, UA: NORMAL /HPF (ref 0–5)
FERRITIN: 183.7 NG/ML (ref 22–322)
FOLATE: 8.27 NG/ML
GFR AFRICAN AMERICAN: >59
GFR NON-AFRICAN AMERICAN: 50
GLOBULIN: 2.2 G/DL
GLUCOSE BLD-MCNC: 122 MG/DL (ref 74–106)
GLUCOSE URINE: NEGATIVE MG/DL
HCT VFR BLD CALC: 36.9 % (ref 37–47)
HEMOGLOBIN: 12 G/DL (ref 11.5–16.5)
IMMATURE GRANULOCYTES #: 0 K/UL
IMMATURE GRANULOCYTES %: 0.2 % (ref 0–5)
IRON: 82 UG/DL (ref 37–145)
KETONES, URINE: NEGATIVE MG/DL
LEUKOCYTE ESTERASE, URINE: NEGATIVE
LV EF: 63 %
LVEF MODALITY: NORMAL
LYMPHOCYTES ABSOLUTE: 0.9 K/UL (ref 1.5–4)
LYMPHOCYTES RELATIVE PERCENT: 21.2 %
MCH RBC QN AUTO: 29 PG (ref 27–32)
MCHC RBC AUTO-ENTMCNC: 32.5 G/DL (ref 31–35)
MCV RBC AUTO: 89.1 FL (ref 80–100)
MICROSCOPIC EXAMINATION: YES
MONOCYTES ABSOLUTE: 0.3 K/UL (ref 0.2–0.8)
MONOCYTES RELATIVE PERCENT: 6.2 %
NEUTROPHILS ABSOLUTE: 2.8 K/UL (ref 2–7.5)
NEUTROPHILS RELATIVE PERCENT: 68.7 %
NITRITE, URINE: NEGATIVE
PDW BLD-RTO: 14.5 % (ref 11–16)
PH UA: 6.5 (ref 5–8)
PHOSPHORUS: 3.9 MG/DL (ref 2.5–4.5)
PLATELET # BLD: 200 K/UL (ref 150–400)
PMV BLD AUTO: 10.1 FL (ref 6–10)
POTASSIUM SERPL-SCNC: 5 MMOL/L (ref 3.4–5.1)
PROTEIN UA: 100 MG/DL
RBC # BLD: 4.14 M/UL (ref 3.8–5.8)
RBC UA: NORMAL /HPF (ref 0–4)
SODIUM BLD-SCNC: 143 MMOL/L (ref 136–145)
SPECIFIC GRAVITY UA: 1.02 (ref 1–1.03)
TOTAL IRON BINDING CAPACITY: 278 UG/DL (ref 250–450)
TOTAL PROTEIN: 6.8 G/DL (ref 6.4–8.3)
URINE TYPE: ABNORMAL
UROBILINOGEN, URINE: 0.2 E.U./DL
VITAMIN B-12: 480 PG/ML (ref 211–911)
WBC # BLD: 4.1 K/UL (ref 4–11)
WBC UA: NORMAL /HPF (ref 0–5)

## 2021-10-13 PROCEDURE — 80158 DRUG ASSAY CYCLOSPORINE: CPT

## 2021-10-13 PROCEDURE — 80053 COMPREHEN METABOLIC PANEL: CPT

## 2021-10-13 PROCEDURE — 83550 IRON BINDING TEST: CPT

## 2021-10-13 PROCEDURE — 81001 URINALYSIS AUTO W/SCOPE: CPT

## 2021-10-13 PROCEDURE — 83540 ASSAY OF IRON: CPT

## 2021-10-13 PROCEDURE — 87086 URINE CULTURE/COLONY COUNT: CPT

## 2021-10-13 PROCEDURE — 87040 BLOOD CULTURE FOR BACTERIA: CPT

## 2021-10-13 PROCEDURE — 82607 VITAMIN B-12: CPT

## 2021-10-13 PROCEDURE — 77067 SCR MAMMO BI INCL CAD: CPT

## 2021-10-13 PROCEDURE — 82746 ASSAY OF FOLIC ACID SERUM: CPT

## 2021-10-13 PROCEDURE — 82248 BILIRUBIN DIRECT: CPT

## 2021-10-13 PROCEDURE — 93308 TTE F-UP OR LMTD: CPT | Performed by: INTERNAL MEDICINE

## 2021-10-13 PROCEDURE — 82728 ASSAY OF FERRITIN: CPT

## 2021-10-13 PROCEDURE — 36415 COLL VENOUS BLD VENIPUNCTURE: CPT

## 2021-10-13 PROCEDURE — 85025 COMPLETE CBC W/AUTO DIFF WBC: CPT

## 2021-10-13 PROCEDURE — 93306 TTE W/DOPPLER COMPLETE: CPT

## 2021-10-13 PROCEDURE — 84100 ASSAY OF PHOSPHORUS: CPT

## 2021-10-14 LAB — URINE CULTURE, ROUTINE: NORMAL

## 2021-10-15 LAB — CYCLOSPORINE A: 95.3 NG/ML

## 2021-10-17 LAB
BLOOD CULTURE, ROUTINE: NORMAL
CULTURE, BLOOD 2: NORMAL

## 2021-11-04 ENCOUNTER — OFFICE VISIT (OUTPATIENT)
Dept: PRIMARY CARE CLINIC | Age: 66
End: 2021-11-04
Payer: MEDICARE

## 2021-11-04 VITALS
WEIGHT: 124.6 LBS | OXYGEN SATURATION: 97 % | TEMPERATURE: 97.5 F | HEART RATE: 73 BPM | RESPIRATION RATE: 18 BRPM | DIASTOLIC BLOOD PRESSURE: 82 MMHG | SYSTOLIC BLOOD PRESSURE: 170 MMHG | BODY MASS INDEX: 23.54 KG/M2

## 2021-11-04 DIAGNOSIS — H61.21 IMPACTED CERUMEN OF RIGHT EAR: ICD-10-CM

## 2021-11-04 DIAGNOSIS — N18.30 CHRONIC RENAL FAILURE, STAGE 3 (MODERATE), UNSPECIFIED WHETHER STAGE 3A OR 3B CKD (HCC): ICD-10-CM

## 2021-11-04 DIAGNOSIS — I48.91 ATRIAL FIBRILLATION, UNSPECIFIED TYPE (HCC): ICD-10-CM

## 2021-11-04 DIAGNOSIS — Z94.4 LIVER TRANSPLANT RECIPIENT (HCC): ICD-10-CM

## 2021-11-04 DIAGNOSIS — I10 ESSENTIAL HYPERTENSION: Primary | ICD-10-CM

## 2021-11-04 DIAGNOSIS — L98.9 LESION OF FACE: ICD-10-CM

## 2021-11-04 DIAGNOSIS — D64.9 ANEMIA, UNSPECIFIED TYPE: ICD-10-CM

## 2021-11-04 PROCEDURE — 99214 OFFICE O/P EST MOD 30 MIN: CPT | Performed by: INTERNAL MEDICINE

## 2021-11-04 PROCEDURE — 3017F COLORECTAL CA SCREEN DOC REV: CPT | Performed by: INTERNAL MEDICINE

## 2021-11-04 PROCEDURE — 1123F ACP DISCUSS/DSCN MKR DOCD: CPT | Performed by: INTERNAL MEDICINE

## 2021-11-04 PROCEDURE — 4040F PNEUMOC VAC/ADMIN/RCVD: CPT | Performed by: INTERNAL MEDICINE

## 2021-11-04 PROCEDURE — 1090F PRES/ABSN URINE INCON ASSESS: CPT | Performed by: INTERNAL MEDICINE

## 2021-11-04 PROCEDURE — 4004F PT TOBACCO SCREEN RCVD TLK: CPT | Performed by: INTERNAL MEDICINE

## 2021-11-04 PROCEDURE — G8399 PT W/DXA RESULTS DOCUMENT: HCPCS | Performed by: INTERNAL MEDICINE

## 2021-11-04 PROCEDURE — G8484 FLU IMMUNIZE NO ADMIN: HCPCS | Performed by: INTERNAL MEDICINE

## 2021-11-04 PROCEDURE — G8420 CALC BMI NORM PARAMETERS: HCPCS | Performed by: INTERNAL MEDICINE

## 2021-11-04 PROCEDURE — G8427 DOCREV CUR MEDS BY ELIG CLIN: HCPCS | Performed by: INTERNAL MEDICINE

## 2021-11-04 RX ORDER — MIRTAZAPINE 15 MG/1
7.5 TABLET, FILM COATED ORAL NIGHTLY
Status: ON HOLD | COMMUNITY
Start: 2021-09-21 | End: 2022-03-15

## 2021-11-04 RX ORDER — ALPRAZOLAM 0.25 MG/1
TABLET ORAL
COMMUNITY
Start: 2021-10-27 | End: 2022-04-12 | Stop reason: SDUPTHER

## 2021-11-04 RX ORDER — ACETAMINOPHEN 325 MG/1
TABLET ORAL
Status: ON HOLD | COMMUNITY
Start: 2021-09-15 | End: 2022-03-15

## 2021-11-04 ASSESSMENT — ENCOUNTER SYMPTOMS
COLOR CHANGE: 1
SINUS PRESSURE: 0
BACK PAIN: 0
SHORTNESS OF BREATH: 0
ABDOMINAL PAIN: 0
NAUSEA: 0
WHEEZING: 0
COUGH: 0
VOMITING: 0
EYE DISCHARGE: 0
SORE THROAT: 0

## 2021-11-04 NOTE — PROGRESS NOTES
Chief Complaint   Patient presents with    Cirrhosis    Atrial Fibrillation    Anemia    Chronic Renal Failure    Hypertension       Have you seen any other physician or provider since your last visit yes - [facility] liver transplant      Have you had any other diagnostic tests since your last visit? no    Have you changed or stopped any medications since your last visit? Yes updated med list stopped reglan, and metoprolol , she stopped eliquis on her own. She states stared her on low dose xanax, remeron,  And tylenol at [facility].

## 2021-11-04 NOTE — PROGRESS NOTES
Chief Complaint   Patient presents with    Cirrhosis    Atrial Fibrillation    Anemia    Chronic Renal Failure    Hypertension       SUBJECTIVE:    Patient ID: Chiquita Lacey is a 73 y. o.female. HPI:  Patient has had hypertension for several years. She has been compliant with taking medications, without side effects from it. She has been following a low-sodium, is active and never exercises. Weight is up 7 pounds, compared to last visit. Her blood pressure is elevated at this time. Patient with history of liver transplant due to cirrhosis. She is being followed by  transplant team. She sees them every few months. She reports she is doing well. She has been compliant with taking her medications. No side effect reported. Also been compliant with proceeding with blood work. Patient with history of Afib. She has not been compliant with taking meds. She stopped taking Eliquis. Patient states she has noticed improvement in the bruising, bleeding. ..ect. since stopping the medication. No CP or palpitation. She reported that beta-blocker was discontinued by her cardiologist.  She was informed to start baby aspirin but she forgot to do it. Patient reports having increased weakness and dizziness with trouble walking. She has had few falls due to this. She feels it may be caused by her ears. Patient's medications, allergies, past medical, surgical, social and family histories were reviewed and updated as appropriate in electronic medical record.         Outpatient Medications Marked as Taking for the 11/4/21 encounter (Office Visit) with Guy Elizalde MD   Medication Sig Dispense Refill    ALPRAZolam (XANAX) 0.25 MG tablet TAKE 1 TABLET BY MOUTH THREE TIMES DAILY AS NEEDED - MUST LAST 30 DAYS      mirtazapine (REMERON) 15 MG tablet Take 7.5 mg by mouth nightly      acetaminophen (TYLENOL) 325 MG tablet TAKE 3 TABLETS(975 MG) BY MOUTH EVERY 8 HOURS AS NEEDED FOR FEVER OR PAIN      silver sulfADIAZINE (SILVADENE) 1 % cream Apply topically daily. 50 g 0    Sennosides (SENNA) 8.6 MG CAPS TAKE 1 TABLET BY MOUTH TWICE DAILY      amLODIPine (NORVASC) 10 MG tablet TAKE 1 TABLET BY MOUTH EVERY DAY      promethazine (PHENERGAN) 12.5 MG tablet Take 12.5 mg by mouth every 6 hours as needed      gabapentin (NEURONTIN) 300 MG capsule Take 300 mg by mouth 3 times daily.  mycophenolate (CELLCEPT) 250 MG capsule Take 500 mg by mouth 2 times daily      cycloSPORINE modified (NEORAL) 25 MG capsule Take 4 capsules (100mg) PO q am and take 4 capsules (100 mg) POq pm      atorvastatin (LIPITOR) 40 MG tablet Take 40 mg by mouth nightly      torsemide (DEMADEX) 20 MG tablet Take 20 mg by mouth daily      vitamin D (ERGOCALCIFEROL) 1.25 MG (09396 UT) CAPS capsule TAKE ONE CAPSULE BY MOUTH ONCE A WEEK. Review of Systems   Constitutional: Positive for fatigue. Negative for chills and fever. HENT: Negative for congestion, sinus pressure and sore throat. Eyes: Negative for discharge and visual disturbance. Respiratory: Negative for cough, shortness of breath and wheezing. Cardiovascular: Negative for chest pain and palpitations. Gastrointestinal: Negative for abdominal pain, nausea and vomiting. Endocrine: Negative for cold intolerance and heat intolerance. Genitourinary: Negative for dysuria, frequency and urgency. Musculoskeletal: Negative for arthralgias and back pain. Hx of fall   Skin: Positive for color change (Forehead and Nose). Negative for rash and wound. Neurological: Positive for dizziness and weakness. Negative for syncope, numbness and headaches. Hematological: Negative. Psychiatric/Behavioral: Negative for agitation and sleep disturbance. The patient is nervous/anxious.         Past Medical History:   Diagnosis Date    Arthritis, rheumatoid (HCC)     Cir[facility]osis [facility] [address]     Colitis     Diabetes mellitus ([facility] [address]     Hypertension     IIH (idiopathic intracranial hypertension)     Liver cirrhosis (HCC)     Osteoarthritis     Sciatica     Unspecified sleep apnea      Past Surgical History:   Procedure Laterality Date    BONE MARROW BIOPSY      DILATION AND CURETTAGE OF UTERUS      DILATION AND CURETTAGE OF UTERUS   &     LIVER BIOPSY      LIVER TRANSPLANT  10/24/2020    TUBAL LIGATION  1982     Family History   Problem Relation Age of Onset    High Blood Pressure Mother     Alzheimer's Disease Mother     Heart Disease Father     High Blood Pressure Father     Heart Disease Maternal Grandmother     Heart Disease Maternal Grandfather     Stroke Maternal Grandfather     Diabetes Paternal Grandmother     High Blood Pressure Brother       Social History     Tobacco Use   Smoking Status Light Tobacco Smoker    Packs/day: 0.10    Years: 1.00    Pack years: 0.10    Types: Cigarettes    Last attempt to quit: 2019    Years since quittin.4   Smokeless Tobacco Never Used       OBJECTIVE:   Wt Readings from Last 3 Encounters:   21 124 lb 9.6 oz (56.5 kg)   10/13/21 117 lb (53.1 kg)   21 120 lb (54.4 kg)     BP Readings from Last 3 Encounters:   21 (!) 170/82   21 124/64   21 (!) 150/49       BP (!) 170/82   Pulse 73   Temp 97.5 °F (36.4 °C)   Resp 18   Wt 124 lb 9.6 oz (56.5 kg)   SpO2 97%   BMI 23.54 kg/m²      Physical Exam  Vitals and nursing note reviewed. Constitutional:       Appearance: Normal appearance. She is well-developed. HENT:      Head: Normocephalic and atraumatic. Right Ear: External ear normal. There is impacted cerumen. Left Ear: External ear normal.      Nose: Nose normal.      Mouth/Throat:      Mouth: Mucous membranes are moist.      Pharynx: Oropharynx is clear. Eyes:      Conjunctiva/sclera: Conjunctivae normal.      Pupils: Pupils are equal, round, and reactive to light. Neck:      Thyroid: No thyromegaly. Vascular: No JVD.    Cardiovascular:      Rate and Rhythm: Normal rate and regular rhythm. Heart sounds: Normal heart sounds. Pulmonary:      Effort: Pulmonary effort is normal.      Breath sounds: Normal breath sounds. No wheezing or rales. Abdominal:      General: Bowel sounds are normal. There is no distension. Palpations: Abdomen is soft. Tenderness: There is no abdominal tenderness. Musculoskeletal:         General: No tenderness. Cervical back: Neck supple. No rigidity. No muscular tenderness. Right lower leg: No edema. Left lower leg: No edema. Skin:     Findings: No erythema or rash. Comments: Minimal redness to the tip of the nose. Slight brownish discoloration to the right side of the forehead. Neurological:      General: No focal deficit present. Mental Status: She is alert and oriented to person, place, and time. Psychiatric:         Behavior: Behavior normal.         Judgment: Judgment normal.         Lab Results   Component Value Date     10/13/2021    K 5.0 10/13/2021    K 3.1 02/01/2020     10/13/2021    CL 99.0 06/01/2012    CO2 25 10/13/2021    GLUCOSE 122 10/13/2021    BUN 18 10/13/2021    CREATININE 1.1 10/13/2021    CREATININE 0.9 06/01/2012    CALCIUM 9.5 10/13/2021    PROT 6.8 10/13/2021    LABALBU 4.6 10/13/2021    LABALBU 3.9 06/01/2012    BILITOT 0.9 10/13/2021    ALT 10 10/13/2021    AST 14 10/13/2021       Hemoglobin A1C (%)   Date Value   04/07/2021 4.5     Microscopic Examination (no units)   Date Value   10/13/2021 YES     LDL Calculated (mg/dL)   Date Value   04/10/2018 94         Lab Results   Component Value Date    WBC 4.1 10/13/2021    NEUTROABS 2.8 10/13/2021    HGB 12.0 10/13/2021    HCT 36.9 10/13/2021    HCT 44.3 06/01/2012    MCV 89.1 10/13/2021     10/13/2021     06/01/2012       Lab Results   Component Value Date    TSH 1.46 10/29/2015         ASSESSMENT/PLAN:     1. Essential hypertension  BP is elevated. Patient to continue Norvasc.  Patient to take 1/2 pill in the morning and the other 1/2 early afternoon, this may reduce falls at night. She is to stop fluid pill (not been taking it for the past 1-2 weeks) and only use half tab once or twice a week PRN. Patient to come by the office next week for BP check. I have advised her on low-sodium diet, exercise and weight control. I am going to continue current medication . Will monitor her renal function every few months, have advised her to check blood pressure frequently and to keep a record of this. 2. Atrial fibrillation, unspecified type ([address]  Sinus rhythm on exam.  Patient is refusing to start back on Eliquis. Seems to have postop A. fib which seems to be resolving but advised her that is difficult to tell she does not have A. fib since a lot of patients are asymptomatic. Patient to start baby aspirin which she was told about by her cardiologist but forgot to do it. We will continue to monitor. Patient to discuss again with cardiology. 3. Liver transplant recipient [facility])  Patient to continue following up with transplant team.  Discussed the importance of being very compliant with medication regimen and recommendations as well as following up blood work. 4. Anemia, unspecified type  Hemoglobin up to normal.  Continue to monitor periodically. 5. Chronic renal failure, stage 3 (moderate), unspecified whether stage 3a or 3b CKD ([address]  Kidney numbers are improving. I have advised her to avoid any nephrotoxic agents. I am going to monitor renal function periodically. I will make sure that the blood pressure and other medical problems are under good control. Will refer to nephrology if the renal function should begin to deteriorate. Creatinine will be checked months. Last Creatinine is     Lab Results   Component Value Date    CREATININE 1.1 10/13/2021     6. Lesion of face  We will continue to monitor for now.   Likely sun damage on the forehead but need to monitor the lesion on her nose.  Will proceed with referral to Dermatology    - External Referral To Dermatology    7. Impacted cerumen of right ear  I did proceed with cerumenctomy from R ear using curette w/o complication. Patient tolerated procedure well. Advised her to use sweet oil periodically. RTC on PRN basis. Orders Placed This Encounter   Medications    silver sulfADIAZINE (SILVADENE) 1 % cream     Sig: Apply topically daily. Dispense:  50 g     Refill:  0      I, Tiana Lewis MA am scribing for and in the presence of Anu Morris MD on this date of 11/04/21 at 2:49 PM    I, Dr. Anu Morris, personally performed the services described in the documentation as scribed by Tiana Lewis MA, in my presence and it is both accurate and complete.

## 2021-11-30 ENCOUNTER — HOSPITAL ENCOUNTER (OUTPATIENT)
Facility: HOSPITAL | Age: 66
Discharge: HOME | End: 2021-11-30
Payer: MEDICARE

## 2021-11-30 LAB
ALBUMIN SERPL-MCNC: 4.3 G/DL (ref 3.4–4.8)
ALP BLD-CCNC: 93 U/L (ref 25–100)
ALT SERPL-CCNC: 8 U/L (ref 4–36)
ANION GAP SERPL CALCULATED.3IONS-SCNC: 11 MMOL/L (ref 3–16)
AST SERPL-CCNC: 11 U/L (ref 8–33)
BASOPHILS ABSOLUTE: 0 K/UL (ref 0–0.1)
BASOPHILS RELATIVE PERCENT: 0.4 %
BILIRUB SERPL-MCNC: 0.9 MG/DL (ref 0.3–1.2)
BILIRUBIN DIRECT: 0.3 MG/DL (ref 0–0.2)
BILIRUBIN URINE: NEGATIVE
BILIRUBIN, INDIRECT: 0.6 MG/DL (ref 0.2–0.8)
BLOOD, URINE: NEGATIVE
BUN BLDV-MCNC: 22 MG/DL (ref 6–20)
CALCIUM SERPL-MCNC: 9.8 MG/DL (ref 8.5–10.5)
CHLORIDE BLD-SCNC: 107 MMOL/L (ref 98–107)
CLARITY: CLEAR
CO2: 24 MMOL/L (ref 20–30)
COLOR: YELLOW
CREAT SERPL-MCNC: 1.1 MG/DL (ref 0.4–1.2)
EOSINOPHILS ABSOLUTE: 0.1 K/UL (ref 0–0.4)
EOSINOPHILS RELATIVE PERCENT: 2.3 %
GFR AFRICAN AMERICAN: >59
GFR NON-AFRICAN AMERICAN: 50
GLUCOSE BLD-MCNC: 85 MG/DL (ref 74–106)
GLUCOSE URINE: NEGATIVE MG/DL
HCT VFR BLD CALC: 34.8 % (ref 37–47)
HEMOGLOBIN: 11.7 G/DL (ref 11.5–16.5)
IMMATURE GRANULOCYTES #: 0 K/UL
IMMATURE GRANULOCYTES %: 0.2 % (ref 0–5)
KETONES, URINE: NEGATIVE MG/DL
LEUKOCYTE ESTERASE, URINE: NEGATIVE
LYMPHOCYTES ABSOLUTE: 0.6 K/UL (ref 1.5–4)
LYMPHOCYTES RELATIVE PERCENT: 11.5 %
MCH RBC QN AUTO: 29.8 PG (ref 27–32)
MCHC RBC AUTO-ENTMCNC: 33.6 G/DL (ref 31–35)
MCV RBC AUTO: 88.8 FL (ref 80–100)
MICROSCOPIC EXAMINATION: YES
MONOCYTES ABSOLUTE: 0.2 K/UL (ref 0.2–0.8)
MONOCYTES RELATIVE PERCENT: 4.3 %
MUCUS: ABNORMAL /LPF
NEUTROPHILS ABSOLUTE: 4.5 K/UL (ref 2–7.5)
NEUTROPHILS RELATIVE PERCENT: 81.3 %
NITRITE, URINE: NEGATIVE
PDW BLD-RTO: 15.5 % (ref 11–16)
PH UA: 6.5 (ref 5–8)
PHOSPHORUS: 3.9 MG/DL (ref 2.5–4.5)
PLATELET # BLD: 199 K/UL (ref 150–400)
PMV BLD AUTO: 9.5 FL (ref 6–10)
POTASSIUM SERPL-SCNC: 5.1 MMOL/L (ref 3.4–5.1)
PROTEIN UA: 100 MG/DL
RBC # BLD: 3.92 M/UL (ref 3.8–5.8)
RBC UA: ABNORMAL /HPF (ref 0–4)
SODIUM BLD-SCNC: 142 MMOL/L (ref 136–145)
SPECIFIC GRAVITY UA: 1.02 (ref 1–1.03)
TOTAL PROTEIN: 6.6 G/DL (ref 6.4–8.3)
URINE TYPE: ABNORMAL
UROBILINOGEN, URINE: 0.2 E.U./DL
WBC # BLD: 5.6 K/UL (ref 4–11)
WBC UA: ABNORMAL /HPF (ref 0–5)

## 2021-11-30 PROCEDURE — 80076 HEPATIC FUNCTION PANEL: CPT

## 2021-11-30 PROCEDURE — 87086 URINE CULTURE/COLONY COUNT: CPT

## 2021-11-30 PROCEDURE — 80158 DRUG ASSAY CYCLOSPORINE: CPT

## 2021-11-30 PROCEDURE — 85025 COMPLETE CBC W/AUTO DIFF WBC: CPT

## 2021-11-30 PROCEDURE — 80069 RENAL FUNCTION PANEL: CPT

## 2021-11-30 PROCEDURE — 87040 BLOOD CULTURE FOR BACTERIA: CPT

## 2021-11-30 PROCEDURE — 81001 URINALYSIS AUTO W/SCOPE: CPT

## 2021-11-30 PROCEDURE — 36415 COLL VENOUS BLD VENIPUNCTURE: CPT

## 2021-12-01 LAB — URINE CULTURE, ROUTINE: NORMAL

## 2021-12-03 LAB — CYCLOSPORINE A: 103 NG/ML

## 2021-12-04 LAB
BLOOD CULTURE, ROUTINE: NORMAL
CULTURE, BLOOD 2: NORMAL

## 2022-03-12 ENCOUNTER — APPOINTMENT (OUTPATIENT)
Dept: CT IMAGING | Facility: HOSPITAL | Age: 67
DRG: 392 | End: 2022-03-12
Payer: MEDICARE

## 2022-03-12 ENCOUNTER — HOSPITAL ENCOUNTER (INPATIENT)
Facility: HOSPITAL | Age: 67
LOS: 2 days | Discharge: HOME | DRG: 392 | End: 2022-03-15
Attending: EMERGENCY MEDICINE | Admitting: INTERNAL MEDICINE
Payer: MEDICARE

## 2022-03-12 DIAGNOSIS — R11.2 NAUSEA AND VOMITING, INTRACTABILITY OF VOMITING NOT SPECIFIED, UNSPECIFIED VOMITING TYPE: ICD-10-CM

## 2022-03-12 DIAGNOSIS — I65.21 STENOSIS OF INTRACRANIAL PORTIONS OF RIGHT INTERNAL CAROTID ARTERY: ICD-10-CM

## 2022-03-12 DIAGNOSIS — R10.84 DIFFUSE ABDOMINAL PAIN: Primary | ICD-10-CM

## 2022-03-12 DIAGNOSIS — E86.0 DEHYDRATION: ICD-10-CM

## 2022-03-12 DIAGNOSIS — R53.1 GENERAL WEAKNESS: ICD-10-CM

## 2022-03-12 DIAGNOSIS — K52.9 ENTERITIS: ICD-10-CM

## 2022-03-12 LAB
A/G RATIO: 1.6 (ref 0.8–2)
ALBUMIN SERPL-MCNC: 4.4 G/DL (ref 3.4–4.8)
ALP BLD-CCNC: 95 U/L (ref 25–100)
ALT SERPL-CCNC: 6 U/L (ref 4–36)
AMMONIA: <17 MCG/DL (ref 19–87)
ANION GAP SERPL CALCULATED.3IONS-SCNC: 14 MMOL/L (ref 3–16)
AST SERPL-CCNC: 11 U/L (ref 8–33)
BASOPHILS ABSOLUTE: 0 K/UL (ref 0–0.1)
BASOPHILS RELATIVE PERCENT: 0.5 %
BILIRUB SERPL-MCNC: 1.7 MG/DL (ref 0.3–1.2)
BILIRUBIN URINE: ABNORMAL
BLOOD, URINE: ABNORMAL
BUN BLDV-MCNC: 24 MG/DL (ref 6–20)
CALCIUM SERPL-MCNC: 10.1 MG/DL (ref 8.5–10.5)
CHLORIDE BLD-SCNC: 103 MMOL/L (ref 98–107)
CLARITY: CLEAR
CO2: 24 MMOL/L (ref 20–30)
COLOR: ABNORMAL
CREAT SERPL-MCNC: 1.3 MG/DL (ref 0.4–1.2)
EOSINOPHILS ABSOLUTE: 0.1 K/UL (ref 0–0.4)
EOSINOPHILS RELATIVE PERCENT: 1.5 %
EPITHELIAL CELLS, UA: ABNORMAL /HPF (ref 0–5)
GFR AFRICAN AMERICAN: 50
GFR NON-AFRICAN AMERICAN: 41
GLOBULIN: 2.8 G/DL
GLUCOSE BLD-MCNC: 134 MG/DL (ref 74–106)
GLUCOSE URINE: NEGATIVE MG/DL
HCT VFR BLD CALC: 39.4 % (ref 37–47)
HEMOGLOBIN: 13.8 G/DL (ref 11.5–16.5)
HYALINE CASTS: ABNORMAL /LPF (ref 0–2)
IMMATURE GRANULOCYTES #: 0 K/UL
IMMATURE GRANULOCYTES %: 0.4 % (ref 0–5)
INR BLD: 1.02 (ref 0.88–1.11)
KETONES, URINE: 15 MG/DL
LACTIC ACID: 1.8 MMOL/L (ref 0.4–2)
LEUKOCYTE ESTERASE, URINE: NEGATIVE
LIPASE: 13 U/L (ref 5.6–51.3)
LYMPHOCYTES ABSOLUTE: 0.6 K/UL (ref 1.5–4)
LYMPHOCYTES RELATIVE PERCENT: 10.9 %
MCH RBC QN AUTO: 30.5 PG (ref 27–32)
MCHC RBC AUTO-ENTMCNC: 35 G/DL (ref 31–35)
MCV RBC AUTO: 87 FL (ref 80–100)
MICROSCOPIC EXAMINATION: YES
MONOCYTES ABSOLUTE: 0.2 K/UL (ref 0.2–0.8)
MONOCYTES RELATIVE PERCENT: 4.4 %
NEUTROPHILS ABSOLUTE: 4.5 K/UL (ref 2–7.5)
NEUTROPHILS RELATIVE PERCENT: 82.3 %
NITRITE, URINE: NEGATIVE
PDW BLD-RTO: 14.6 % (ref 11–16)
PH UA: 5.5 (ref 5–8)
PLATELET # BLD: 178 K/UL (ref 150–400)
PMV BLD AUTO: 10.8 FL (ref 6–10)
POTASSIUM REFLEX MAGNESIUM: 4 MMOL/L (ref 3.4–5.1)
PRO-BNP: 2607 PG/ML (ref 0–1800)
PROTEIN UA: >=300 MG/DL
PROTHROMBIN TIME: 12.9 SEC (ref 11.6–13.8)
RBC # BLD: 4.53 M/UL (ref 3.8–5.8)
RBC UA: ABNORMAL /HPF (ref 0–4)
SODIUM BLD-SCNC: 141 MMOL/L (ref 136–145)
SPECIFIC GRAVITY UA: >=1.03 (ref 1–1.03)
TOTAL PROTEIN: 7.2 G/DL (ref 6.4–8.3)
TROPONIN: <0.3 NG/ML
URINE REFLEX TO CULTURE: ABNORMAL
URINE TYPE: ABNORMAL
UROBILINOGEN, URINE: 1 E.U./DL
WBC # BLD: 5.5 K/UL (ref 4–11)
WBC UA: ABNORMAL /HPF (ref 0–5)

## 2022-03-12 PROCEDURE — 85025 COMPLETE CBC W/AUTO DIFF WBC: CPT

## 2022-03-12 PROCEDURE — 81001 URINALYSIS AUTO W/SCOPE: CPT

## 2022-03-12 PROCEDURE — 82140 ASSAY OF AMMONIA: CPT

## 2022-03-12 PROCEDURE — 6360000002 HC RX W HCPCS: Performed by: EMERGENCY MEDICINE

## 2022-03-12 PROCEDURE — 83880 ASSAY OF NATRIURETIC PEPTIDE: CPT

## 2022-03-12 PROCEDURE — 80053 COMPREHEN METABOLIC PANEL: CPT

## 2022-03-12 PROCEDURE — 36415 COLL VENOUS BLD VENIPUNCTURE: CPT

## 2022-03-12 PROCEDURE — C9113 INJ PANTOPRAZOLE SODIUM, VIA: HCPCS | Performed by: EMERGENCY MEDICINE

## 2022-03-12 PROCEDURE — 83690 ASSAY OF LIPASE: CPT

## 2022-03-12 PROCEDURE — 2580000003 HC RX 258: Performed by: EMERGENCY MEDICINE

## 2022-03-12 PROCEDURE — 74176 CT ABD & PELVIS W/O CONTRAST: CPT

## 2022-03-12 PROCEDURE — 85610 PROTHROMBIN TIME: CPT

## 2022-03-12 PROCEDURE — 96361 HYDRATE IV INFUSION ADD-ON: CPT

## 2022-03-12 PROCEDURE — 96365 THER/PROPH/DIAG IV INF INIT: CPT

## 2022-03-12 PROCEDURE — 96375 TX/PRO/DX INJ NEW DRUG ADDON: CPT

## 2022-03-12 PROCEDURE — 2500000003 HC RX 250 WO HCPCS: Performed by: EMERGENCY MEDICINE

## 2022-03-12 PROCEDURE — 84484 ASSAY OF TROPONIN QUANT: CPT

## 2022-03-12 PROCEDURE — 83605 ASSAY OF LACTIC ACID: CPT

## 2022-03-12 RX ORDER — LABETALOL HYDROCHLORIDE 5 MG/ML
10 INJECTION, SOLUTION INTRAVENOUS ONCE
Status: COMPLETED | OUTPATIENT
Start: 2022-03-12 | End: 2022-03-12

## 2022-03-12 RX ORDER — HYDRALAZINE HYDROCHLORIDE 20 MG/ML
10 INJECTION INTRAMUSCULAR; INTRAVENOUS ONCE
Status: COMPLETED | OUTPATIENT
Start: 2022-03-12 | End: 2022-03-12

## 2022-03-12 RX ORDER — 0.9 % SODIUM CHLORIDE 0.9 %
1000 INTRAVENOUS SOLUTION INTRAVENOUS ONCE
Status: COMPLETED | OUTPATIENT
Start: 2022-03-12 | End: 2022-03-12

## 2022-03-12 RX ORDER — MORPHINE SULFATE 4 MG/ML
4 INJECTION, SOLUTION INTRAMUSCULAR; INTRAVENOUS ONCE
Status: COMPLETED | OUTPATIENT
Start: 2022-03-12 | End: 2022-03-12

## 2022-03-12 RX ORDER — ONDANSETRON 2 MG/ML
4 INJECTION INTRAMUSCULAR; INTRAVENOUS ONCE
Status: COMPLETED | OUTPATIENT
Start: 2022-03-12 | End: 2022-03-12

## 2022-03-12 RX ADMIN — MORPHINE SULFATE 4 MG: 4 INJECTION, SOLUTION INTRAMUSCULAR; INTRAVENOUS at 20:25

## 2022-03-12 RX ADMIN — Medication 12.5 MG: at 23:05

## 2022-03-12 RX ADMIN — SODIUM CHLORIDE 40 MG: 900 INJECTION INTRAVENOUS at 20:27

## 2022-03-12 RX ADMIN — SODIUM CHLORIDE 1000 ML: 9 INJECTION, SOLUTION INTRAVENOUS at 22:46

## 2022-03-12 RX ADMIN — LABETALOL HYDROCHLORIDE 10 MG: 5 INJECTION, SOLUTION INTRAVENOUS at 23:44

## 2022-03-12 RX ADMIN — HYDRALAZINE HYDROCHLORIDE 10 MG: 20 INJECTION INTRAMUSCULAR; INTRAVENOUS at 21:44

## 2022-03-12 RX ADMIN — SODIUM CHLORIDE 1000 ML: 9 INJECTION, SOLUTION INTRAVENOUS at 20:34

## 2022-03-12 RX ADMIN — ONDANSETRON 4 MG: 2 INJECTION INTRAMUSCULAR; INTRAVENOUS at 20:24

## 2022-03-12 ASSESSMENT — PAIN DESCRIPTION - LOCATION
LOCATION: BREAST
LOCATION: ABDOMEN

## 2022-03-12 ASSESSMENT — PAIN SCALES - GENERAL
PAINLEVEL_OUTOF10: 9

## 2022-03-12 ASSESSMENT — PAIN DESCRIPTION - ORIENTATION: ORIENTATION: RIGHT

## 2022-03-13 ENCOUNTER — APPOINTMENT (OUTPATIENT)
Dept: CT IMAGING | Facility: HOSPITAL | Age: 67
DRG: 392 | End: 2022-03-13
Payer: MEDICARE

## 2022-03-13 ENCOUNTER — APPOINTMENT (OUTPATIENT)
Dept: GENERAL RADIOLOGY | Facility: HOSPITAL | Age: 67
DRG: 392 | End: 2022-03-13
Payer: MEDICARE

## 2022-03-13 PROBLEM — B37.0 ORAL THRUSH: Status: ACTIVE | Noted: 2022-03-13

## 2022-03-13 PROBLEM — R53.81 DECLINING FUNCTIONAL STATUS: Status: ACTIVE | Noted: 2022-03-13

## 2022-03-13 PROBLEM — R19.7 NAUSEA VOMITING AND DIARRHEA: Status: ACTIVE | Noted: 2022-03-13

## 2022-03-13 PROBLEM — M25.572 LEFT ANKLE PAIN: Status: ACTIVE | Noted: 2022-03-13

## 2022-03-13 PROBLEM — Z86.79 HISTORY OF ATRIAL FIBRILLATION: Status: ACTIVE | Noted: 2022-03-13

## 2022-03-13 PROBLEM — R29.6 FALLS: Status: ACTIVE | Noted: 2022-03-13

## 2022-03-13 PROBLEM — G89.29 CHRONIC PAIN: Status: ACTIVE | Noted: 2022-03-13

## 2022-03-13 PROBLEM — Z87.19 HISTORY OF CIRRHOSIS: Status: ACTIVE | Noted: 2022-03-13

## 2022-03-13 PROBLEM — R11.2 NAUSEA VOMITING AND DIARRHEA: Status: ACTIVE | Noted: 2022-03-13

## 2022-03-13 PROBLEM — R47.89 ALTERATION IN SPEECH: Status: ACTIVE | Noted: 2022-03-13
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
## 2022-03-13 PROBLEM — D64.9 ANEMIA: Status: ACTIVE | Noted: 2022-03-13

## 2022-03-13 PROBLEM — I16.0 HYPERTENSIVE URGENCY: Status: ACTIVE | Noted: 2022-03-13

## 2022-03-13 PROBLEM — R47.1 DYSARTHRIA: Status: ACTIVE | Noted: 2022-03-13

## 2022-03-13 PROBLEM — E83.42 HYPOMAGNESEMIA: Status: ACTIVE | Noted: 2022-03-13

## 2022-03-13 PROBLEM — K52.9 COLITIS: Status: ACTIVE | Noted: 2022-03-13

## 2022-03-13 LAB
A/G RATIO: 1.6 (ref 0.8–2)
ALBUMIN SERPL-MCNC: 3.5 G/DL (ref 3.4–4.8)
ALP BLD-CCNC: 69 U/L (ref 25–100)
ALT SERPL-CCNC: <5 U/L (ref 4–36)
ANION GAP SERPL CALCULATED.3IONS-SCNC: 9 MMOL/L (ref 3–16)
AST SERPL-CCNC: 9 U/L (ref 8–33)
BASOPHILS ABSOLUTE: 0 K/UL (ref 0–0.1)
BASOPHILS RELATIVE PERCENT: 0.4 %
BILIRUB SERPL-MCNC: 1.2 MG/DL (ref 0.3–1.2)
BUN BLDV-MCNC: 25 MG/DL (ref 6–20)
CALCIUM SERPL-MCNC: 8.8 MG/DL (ref 8.5–10.5)
CHLORIDE BLD-SCNC: 107 MMOL/L (ref 98–107)
CO2: 25 MMOL/L (ref 20–30)
CREAT SERPL-MCNC: 1.3 MG/DL (ref 0.4–1.2)
EOSINOPHILS ABSOLUTE: 0.1 K/UL (ref 0–0.4)
EOSINOPHILS RELATIVE PERCENT: 1.2 %
FOLATE: 13.56 NG/ML
GFR AFRICAN AMERICAN: 50
GFR NON-AFRICAN AMERICAN: 41
GLOBULIN: 2.2 G/DL
GLUCOSE BLD-MCNC: 103 MG/DL (ref 74–106)
GLUCOSE BLD-MCNC: 104 MG/DL (ref 74–106)
GLUCOSE BLD-MCNC: 147 MG/DL (ref 74–106)
GLUCOSE BLD-MCNC: 173 MG/DL (ref 74–106)
HCT VFR BLD CALC: 32.9 % (ref 37–47)
HEMOGLOBIN: 11.1 G/DL (ref 11.5–16.5)
IMMATURE GRANULOCYTES #: 0 K/UL
IMMATURE GRANULOCYTES %: 0.2 % (ref 0–5)
LYMPHOCYTES ABSOLUTE: 0.7 K/UL (ref 1.5–4)
LYMPHOCYTES RELATIVE PERCENT: 14.3 %
MAGNESIUM: 1.6 MG/DL (ref 1.7–2.4)
MCH RBC QN AUTO: 29.5 PG (ref 27–32)
MCHC RBC AUTO-ENTMCNC: 33.7 G/DL (ref 31–35)
MCV RBC AUTO: 87.5 FL (ref 80–100)
MONOCYTES ABSOLUTE: 0.3 K/UL (ref 0.2–0.8)
MONOCYTES RELATIVE PERCENT: 6.4 %
NEUTROPHILS ABSOLUTE: 3.7 K/UL (ref 2–7.5)
NEUTROPHILS RELATIVE PERCENT: 77.5 %
PDW BLD-RTO: 14.2 % (ref 11–16)
PERFORMED ON: ABNORMAL
PERFORMED ON: ABNORMAL
PERFORMED ON: NORMAL
PLATELET # BLD: 143 K/UL (ref 150–400)
PMV BLD AUTO: 10.3 FL (ref 6–10)
POTASSIUM SERPL-SCNC: 4.2 MMOL/L (ref 3.4–5.1)
RAPID INFLUENZA  B AGN: NEGATIVE
RAPID INFLUENZA A AGN: NEGATIVE
RBC # BLD: 3.76 M/UL (ref 3.8–5.8)
SARS-COV-2, NAAT: NOT DETECTED
SODIUM BLD-SCNC: 141 MMOL/L (ref 136–145)
TOTAL CK: 19 U/L (ref 26–174)
TOTAL PROTEIN: 5.7 G/DL (ref 6.4–8.3)
TSH SERPL DL<=0.05 MIU/L-ACNC: 2.59 UIU/ML (ref 0.27–4.2)
VITAMIN B-12: 314 PG/ML (ref 211–911)
WBC # BLD: 4.8 K/UL (ref 4–11)

## 2022-03-13 PROCEDURE — 6360000002 HC RX W HCPCS: Performed by: PHYSICIAN ASSISTANT

## 2022-03-13 PROCEDURE — 71045 X-RAY EXAM CHEST 1 VIEW: CPT

## 2022-03-13 PROCEDURE — 1200000000 HC SEMI PRIVATE

## 2022-03-13 PROCEDURE — 6370000000 HC RX 637 (ALT 250 FOR IP)

## 2022-03-13 PROCEDURE — 99223 1ST HOSP IP/OBS HIGH 75: CPT | Performed by: PHYSICIAN ASSISTANT

## 2022-03-13 PROCEDURE — 2580000003 HC RX 258: Performed by: PHYSICIAN ASSISTANT

## 2022-03-13 PROCEDURE — 83735 ASSAY OF MAGNESIUM: CPT

## 2022-03-13 PROCEDURE — 87804 INFLUENZA ASSAY W/OPTIC: CPT

## 2022-03-13 PROCEDURE — 6360000002 HC RX W HCPCS

## 2022-03-13 PROCEDURE — 6370000000 HC RX 637 (ALT 250 FOR IP): Performed by: PHYSICIAN ASSISTANT

## 2022-03-13 PROCEDURE — 70498 CT ANGIOGRAPHY NECK: CPT

## 2022-03-13 PROCEDURE — 80053 COMPREHEN METABOLIC PANEL: CPT

## 2022-03-13 PROCEDURE — 85025 COMPLETE CBC W/AUTO DIFF WBC: CPT

## 2022-03-13 PROCEDURE — 87635 SARS-COV-2 COVID-19 AMP PRB: CPT

## 2022-03-13 PROCEDURE — 96376 TX/PRO/DX INJ SAME DRUG ADON: CPT

## 2022-03-13 PROCEDURE — 82607 VITAMIN B-12: CPT

## 2022-03-13 PROCEDURE — 73600 X-RAY EXAM OF ANKLE: CPT

## 2022-03-13 PROCEDURE — 84443 ASSAY THYROID STIM HORMONE: CPT

## 2022-03-13 PROCEDURE — 6360000004 HC RX CONTRAST MEDICATION: Performed by: PHYSICIAN ASSISTANT

## 2022-03-13 PROCEDURE — 82746 ASSAY OF FOLIC ACID SERUM: CPT

## 2022-03-13 PROCEDURE — 99283 EMERGENCY DEPT VISIT LOW MDM: CPT

## 2022-03-13 PROCEDURE — 70496 CT ANGIOGRAPHY HEAD: CPT

## 2022-03-13 PROCEDURE — 6360000002 HC RX W HCPCS: Performed by: EMERGENCY MEDICINE

## 2022-03-13 PROCEDURE — 82550 ASSAY OF CK (CPK): CPT

## 2022-03-13 PROCEDURE — 36415 COLL VENOUS BLD VENIPUNCTURE: CPT

## 2022-03-13 PROCEDURE — 70450 CT HEAD/BRAIN W/O DYE: CPT

## 2022-03-13 RX ORDER — PANTOPRAZOLE SODIUM 40 MG/1
40 TABLET, DELAYED RELEASE ORAL
Status: DISCONTINUED | OUTPATIENT
Start: 2022-03-14 | End: 2022-03-15 | Stop reason: HOSPADM

## 2022-03-13 RX ORDER — ASPIRIN 325 MG
TABLET ORAL
Status: COMPLETED
Start: 2022-03-13 | End: 2022-03-13

## 2022-03-13 RX ORDER — ACETAMINOPHEN 325 MG/1
650 TABLET ORAL EVERY 4 HOURS PRN
Status: DISCONTINUED | OUTPATIENT
Start: 2022-03-13 | End: 2022-03-15 | Stop reason: HOSPADM

## 2022-03-13 RX ORDER — HYDRALAZINE HYDROCHLORIDE 25 MG/1
25 TABLET, FILM COATED ORAL EVERY 8 HOURS PRN
Status: DISCONTINUED | OUTPATIENT
Start: 2022-03-13 | End: 2022-03-13

## 2022-03-13 RX ORDER — CARVEDILOL 3.12 MG/1
3.12 TABLET ORAL 2 TIMES DAILY WITH MEALS
Status: DISCONTINUED | OUTPATIENT
Start: 2022-03-13 | End: 2022-03-14

## 2022-03-13 RX ORDER — HYDRALAZINE HYDROCHLORIDE 25 MG/1
25 TABLET, FILM COATED ORAL 3 TIMES DAILY PRN
Status: CANCELLED | OUTPATIENT
Start: 2022-03-13

## 2022-03-13 RX ORDER — CYCLOSPORINE 25 MG/1
75 CAPSULE, LIQUID FILLED ORAL EVERY MORNING
Status: DISCONTINUED | OUTPATIENT
Start: 2022-03-14 | End: 2022-03-15 | Stop reason: HOSPADM

## 2022-03-13 RX ORDER — ONDANSETRON 2 MG/ML
4 INJECTION INTRAMUSCULAR; INTRAVENOUS EVERY 6 HOURS PRN
Status: CANCELLED | OUTPATIENT
Start: 2022-03-13

## 2022-03-13 RX ORDER — ATORVASTATIN CALCIUM 40 MG/1
40 TABLET, FILM COATED ORAL NIGHTLY
Status: DISCONTINUED | OUTPATIENT
Start: 2022-03-13 | End: 2022-03-13

## 2022-03-13 RX ORDER — TORSEMIDE 20 MG/1
20 TABLET ORAL DAILY
Status: DISCONTINUED | OUTPATIENT
Start: 2022-03-13 | End: 2022-03-15 | Stop reason: HOSPADM

## 2022-03-13 RX ORDER — ALPRAZOLAM 0.25 MG/1
0.25 TABLET ORAL 3 TIMES DAILY PRN
Status: DISCONTINUED | OUTPATIENT
Start: 2022-03-13 | End: 2022-03-15 | Stop reason: HOSPADM

## 2022-03-13 RX ORDER — CYCLOSPORINE 25 MG/1
100 CAPSULE, LIQUID FILLED ORAL EVERY EVENING
Status: DISCONTINUED | OUTPATIENT
Start: 2022-03-13 | End: 2022-03-15 | Stop reason: HOSPADM

## 2022-03-13 RX ORDER — MAGNESIUM SULFATE IN WATER 40 MG/ML
2000 INJECTION, SOLUTION INTRAVENOUS ONCE
Status: COMPLETED | OUTPATIENT
Start: 2022-03-13 | End: 2022-03-13

## 2022-03-13 RX ORDER — DIPHENHYDRAMINE HYDROCHLORIDE 50 MG/ML
25 INJECTION INTRAMUSCULAR; INTRAVENOUS ONCE
Status: COMPLETED | OUTPATIENT
Start: 2022-03-13 | End: 2022-03-13

## 2022-03-13 RX ORDER — AMLODIPINE BESYLATE 5 MG/1
10 TABLET ORAL DAILY
Status: DISCONTINUED | OUTPATIENT
Start: 2022-03-13 | End: 2022-03-14

## 2022-03-13 RX ORDER — ONDANSETRON 2 MG/ML
4 INJECTION INTRAMUSCULAR; INTRAVENOUS ONCE
Status: COMPLETED | OUTPATIENT
Start: 2022-03-13 | End: 2022-03-13

## 2022-03-13 RX ORDER — SODIUM CHLORIDE 9 MG/ML
INJECTION, SOLUTION INTRAVENOUS CONTINUOUS
Status: CANCELLED | OUTPATIENT
Start: 2022-03-13

## 2022-03-13 RX ORDER — METRONIDAZOLE 500 MG/1
500 TABLET ORAL 3 TIMES DAILY
Status: CANCELLED | OUTPATIENT
Start: 2022-03-13

## 2022-03-13 RX ORDER — MORPHINE SULFATE 2 MG/ML
2 INJECTION, SOLUTION INTRAMUSCULAR; INTRAVENOUS EVERY 4 HOURS PRN
Status: DISCONTINUED | OUTPATIENT
Start: 2022-03-13 | End: 2022-03-14

## 2022-03-13 RX ORDER — CYCLOSPORINE 100 MG/1
100 CAPSULE, LIQUID FILLED ORAL 2 TIMES DAILY
Status: DISCONTINUED | OUTPATIENT
Start: 2022-03-13 | End: 2022-03-13

## 2022-03-13 RX ORDER — CYCLOSPORINE 25 MG/1
100 CAPSULE, LIQUID FILLED ORAL NIGHTLY
Status: DISCONTINUED | OUTPATIENT
Start: 2022-03-13 | End: 2022-03-13

## 2022-03-13 RX ORDER — MORPHINE SULFATE 2 MG/ML
2 INJECTION, SOLUTION INTRAMUSCULAR; INTRAVENOUS EVERY 4 HOURS PRN
Status: CANCELLED | OUTPATIENT
Start: 2022-03-13

## 2022-03-13 RX ORDER — GABAPENTIN 300 MG/1
300 CAPSULE ORAL 3 TIMES DAILY
Status: DISCONTINUED | OUTPATIENT
Start: 2022-03-13 | End: 2022-03-15 | Stop reason: HOSPADM

## 2022-03-13 RX ORDER — HYDRALAZINE HYDROCHLORIDE 25 MG/1
25 TABLET, FILM COATED ORAL EVERY 8 HOURS PRN
Status: DISCONTINUED | OUTPATIENT
Start: 2022-03-13 | End: 2022-03-15 | Stop reason: HOSPADM

## 2022-03-13 RX ORDER — ACETAMINOPHEN 325 MG/1
650 TABLET ORAL EVERY 4 HOURS PRN
Status: CANCELLED | OUTPATIENT
Start: 2022-03-13

## 2022-03-13 RX ORDER — DEXTROSE MONOHYDRATE 50 MG/ML
100 INJECTION, SOLUTION INTRAVENOUS PRN
Status: DISCONTINUED | OUTPATIENT
Start: 2022-03-13 | End: 2022-03-15 | Stop reason: HOSPADM

## 2022-03-13 RX ORDER — CARVEDILOL 6.25 MG/1
6.25 TABLET ORAL 2 TIMES DAILY WITH MEALS
Status: DISCONTINUED | OUTPATIENT
Start: 2022-03-13 | End: 2022-03-13

## 2022-03-13 RX ORDER — LACTOBACILLUS RHAMNOSUS GG 10B CELL
1 CAPSULE ORAL
Status: DISCONTINUED | OUTPATIENT
Start: 2022-03-14 | End: 2022-03-15 | Stop reason: HOSPADM

## 2022-03-13 RX ORDER — SODIUM CHLORIDE 9 MG/ML
INJECTION, SOLUTION INTRAVENOUS CONTINUOUS
Status: DISCONTINUED | OUTPATIENT
Start: 2022-03-13 | End: 2022-03-14

## 2022-03-13 RX ORDER — DIPHENHYDRAMINE HYDROCHLORIDE 50 MG/ML
INJECTION INTRAMUSCULAR; INTRAVENOUS
Status: COMPLETED
Start: 2022-03-13 | End: 2022-03-13

## 2022-03-13 RX ORDER — DEXTROSE MONOHYDRATE 25 G/50ML
12.5 INJECTION, SOLUTION INTRAVENOUS PRN
Status: DISCONTINUED | OUTPATIENT
Start: 2022-03-13 | End: 2022-03-15 | Stop reason: HOSPADM

## 2022-03-13 RX ORDER — AMLODIPINE BESYLATE 5 MG/1
10 TABLET ORAL DAILY
Status: CANCELLED | OUTPATIENT
Start: 2022-03-13

## 2022-03-13 RX ORDER — NICOTINE POLACRILEX 4 MG
15 LOZENGE BUCCAL PRN
Status: DISCONTINUED | OUTPATIENT
Start: 2022-03-13 | End: 2022-03-15 | Stop reason: HOSPADM

## 2022-03-13 RX ORDER — MORPHINE SULFATE 4 MG/ML
4 INJECTION, SOLUTION INTRAMUSCULAR; INTRAVENOUS ONCE
Status: COMPLETED | OUTPATIENT
Start: 2022-03-13 | End: 2022-03-13

## 2022-03-13 RX ORDER — MYCOPHENOLATE MOFETIL 250 MG/1
500 CAPSULE ORAL 2 TIMES DAILY
Status: DISCONTINUED | OUTPATIENT
Start: 2022-03-13 | End: 2022-03-15 | Stop reason: HOSPADM

## 2022-03-13 RX ORDER — ONDANSETRON 2 MG/ML
4 INJECTION INTRAMUSCULAR; INTRAVENOUS EVERY 6 HOURS PRN
Status: DISCONTINUED | OUTPATIENT
Start: 2022-03-13 | End: 2022-03-15 | Stop reason: HOSPADM

## 2022-03-13 RX ORDER — ATORVASTATIN CALCIUM 80 MG/1
80 TABLET, FILM COATED ORAL NIGHTLY
Status: DISCONTINUED | OUTPATIENT
Start: 2022-03-13 | End: 2022-03-15 | Stop reason: HOSPADM

## 2022-03-13 RX ORDER — METRONIDAZOLE 500 MG/1
500 TABLET ORAL EVERY 8 HOURS SCHEDULED
Status: DISCONTINUED | OUTPATIENT
Start: 2022-03-13 | End: 2022-03-15 | Stop reason: HOSPADM

## 2022-03-13 RX ADMIN — CYCLOSPORINE 100 MG: 25 CAPSULE, LIQUID FILLED ORAL at 17:47

## 2022-03-13 RX ADMIN — MAGNESIUM SULFATE HEPTAHYDRATE 2000 MG: 40 INJECTION, SOLUTION INTRAVENOUS at 14:40

## 2022-03-13 RX ADMIN — PIPERACILLIN AND TAZOBACTAM 3375 MG: 3; .375 INJECTION, POWDER, FOR SOLUTION INTRAVENOUS at 20:52

## 2022-03-13 RX ADMIN — DIPHENHYDRAMINE HYDROCHLORIDE 25 MG: 50 INJECTION INTRAMUSCULAR; INTRAVENOUS at 14:41

## 2022-03-13 RX ADMIN — METRONIDAZOLE 500 MG: 500 TABLET ORAL at 20:53

## 2022-03-13 RX ADMIN — PIPERACILLIN AND TAZOBACTAM 3375 MG: 3; .375 INJECTION, POWDER, FOR SOLUTION INTRAVENOUS at 04:51

## 2022-03-13 RX ADMIN — ONDANSETRON HYDROCHLORIDE 4 MG: 2 INJECTION, SOLUTION INTRAMUSCULAR; INTRAVENOUS at 09:09

## 2022-03-13 RX ADMIN — ONDANSETRON 4 MG: 2 INJECTION INTRAMUSCULAR; INTRAVENOUS at 00:44

## 2022-03-13 RX ADMIN — IOPAMIDOL 100 ML: 755 INJECTION, SOLUTION INTRAVENOUS at 15:24

## 2022-03-13 RX ADMIN — DIPHENHYDRAMINE HYDROCHLORIDE 25 MG: 50 INJECTION, SOLUTION INTRAMUSCULAR; INTRAVENOUS at 14:41

## 2022-03-13 RX ADMIN — CARVEDILOL 6.25 MG: 6.25 TABLET, FILM COATED ORAL at 12:15

## 2022-03-13 RX ADMIN — NYSTATIN 500000 UNITS: 100000 SUSPENSION ORAL at 12:15

## 2022-03-13 RX ADMIN — MYCOPHENOLATE MOFETIL 500 MG: 250 CAPSULE ORAL at 12:15

## 2022-03-13 RX ADMIN — AMLODIPINE BESYLATE 10 MG: 5 TABLET ORAL at 09:03

## 2022-03-13 RX ADMIN — ASPIRIN 325 MG ORAL TABLET 325 MG: 325 PILL ORAL at 13:50

## 2022-03-13 RX ADMIN — ATORVASTATIN CALCIUM 80 MG: 80 TABLET, FILM COATED ORAL at 20:53

## 2022-03-13 RX ADMIN — PIPERACILLIN AND TAZOBACTAM 3375 MG: 3; .375 INJECTION, POWDER, FOR SOLUTION INTRAVENOUS at 14:40

## 2022-03-13 RX ADMIN — NYSTATIN 500000 UNITS: 100000 SUSPENSION ORAL at 17:46

## 2022-03-13 RX ADMIN — ALPRAZOLAM 0.25 MG: 0.25 TABLET ORAL at 09:32

## 2022-03-13 RX ADMIN — MORPHINE SULFATE 4 MG: 4 INJECTION, SOLUTION INTRAMUSCULAR; INTRAVENOUS at 00:45

## 2022-03-13 RX ADMIN — METRONIDAZOLE 500 MG: 500 TABLET ORAL at 04:51

## 2022-03-13 RX ADMIN — SODIUM CHLORIDE: 9 INJECTION, SOLUTION INTRAVENOUS at 04:05

## 2022-03-13 RX ADMIN — MYCOPHENOLATE MOFETIL 500 MG: 250 CAPSULE ORAL at 20:52

## 2022-03-13 RX ADMIN — METRONIDAZOLE 500 MG: 500 TABLET ORAL at 17:47

## 2022-03-13 RX ADMIN — NYSTATIN 500000 UNITS: 100000 SUSPENSION ORAL at 20:52

## 2022-03-13 RX ADMIN — HYDRALAZINE HYDROCHLORIDE 25 MG: 25 TABLET, FILM COATED ORAL at 04:51

## 2022-03-13 RX ADMIN — CARVEDILOL 3.12 MG: 3.12 TABLET, FILM COATED ORAL at 17:47

## 2022-03-13 ASSESSMENT — PAIN SCALES - GENERAL: PAINLEVEL_OUTOF10: 9

## 2022-03-13 NOTE — PROGRESS NOTES
Spoke to Sung Vincent. They scheduled a call from the [facility] Stroke [facility] then called, call was transferred to [facility].

## 2022-03-13 NOTE — PROGRESS NOTES
Spoke to patient. Patient stated that she had fallen and twisted her ankle in February 7th and that this injury was still hurting her, specifically her ankle and hip.  Reported this to [employer].

## 2022-03-13 NOTE — ED NOTES
MD requested to room d/t pt's request for admission and  opposing.       Barron Oppenheim, EMELIA  03/13/22 0004

## 2022-03-13 NOTE — FLOWSHEET NOTE
Pt resting quietly in bed. Am assessment complete, respirations equal and unlabored. Pt took am medications without difficulty. Pt instructed to call out with any needs or concerns, none at this time. Reviewed plan of care with pt, verbalized understanding.  Call bell within pt reach.      03/13/22 0905   Assessment   Charting Type Shift assessment   Neurological   Neuro (WDL) WDL   [place] Coma Scale   Eye Opening 4   Best Verbal Response 5   Best Motor Response 6   [place] Coma Scale Score 15   HEENT   HEENT (WDL) X   Right Eye Impaired vision   Left Eye Impaired vision   Teeth Dentures upper   Respiratory   Respiratory (WDL) WDL   Cardiac   Cardiac (WDL) WDL   Cardiac Monitor   Telemetry Monitor On Yes   Telemetry Audible Yes   Telemetry Alarms Set Yes   Gastrointestinal   Abdominal (WDL) X   Abdomen Inspection Soft   Tenderness Soft   RUQ Bowel Sounds Active   LUQ Bowel Sounds Active   RLQ Bowel Sounds Active   LLQ Bowel Sounds Active   Peripheral Vascular   Peripheral Vascular (WDL) WDL   Edema None   RLE Neurovascular Assessment   R Pedal Pulse +1   LLE Neurovascular Assessment   L Pedal Pulse +2   Skin Color/Condition   Skin Color/Condition (WDL) X   Skin Color Pale   Skin Condition/Temp Warm;Dry   Skin Integrity   Skin Integrity (WDL) X   Skin Integrity Bruising   Location scattered   Musculoskeletal   Musculoskeletal (WDL) X   RUE Full movement   LUE Full movement   RL Extremity Weakness   LL Extremity Weakness   Genitourinary   Genitourinary (WDL) WDL   Psychosocial   Psychosocial (WDL) WDL

## 2022-03-13 NOTE — FLOWSHEET NOTE
Nursing assistant alerted RN to pt having difficulty with speech. RN to bedside pt noted to have slurred speech and some difficulty finding words. Pt is alert and oriented x 4. Kathy Anderosn PA-C to bedside.       03/13/22 1345   [place] Coma Scale   Eye Opening 4   Best Verbal Response 5  (very slurred)   Best Motor Response 6   [place] Coma Scale Score 15   NIHSS Stroke Scale   Interval Reassessment   Level of Consciousness (1a. ) 0   LOC Questions (1b. ) 0   LOC Commands (1c. ) 0   Best Gaze (2. ) 0   Visual (3. ) 0   Facial Palsy (4. ) 0   Motor Arm, Left (5a. ) 0   Motor Arm, Right (5b. ) 0   Motor Leg, Left (6a. ) 0   Motor Leg, Right (6b. ) 0   Limb Ataxia (7. ) 0   Sensory (8. ) 0   Best Language (9. ) 1   Dysarthria (10. ) 1   Rhythm Interpretation   Pulse 65

## 2022-03-13 NOTE — FLOWSHEET NOTE
03/13/22 0341   Assessment   Charting Type Admission   Neurological   Neuro (WDL) WDL   [place] Coma Scale   Eye Opening 4   Best Verbal Response 5   Best Motor Response 6   [place] Coma Scale Score 15   HEENT   HEENT (WDL) X   Right Eye Impaired vision   Left Eye Impaired vision   Teeth Dentures upper   Respiratory   Respiratory (WDL) WDL   Cardiac   Cardiac (WDL) WDL   Cardiac Monitor   Telemetry Monitor On Yes   Telemetry Audible Yes   Telemetry Alarms Set Yes   Gastrointestinal   Abdominal (WDL) X   Abdomen Inspection Soft   Last BM (including prior to admit) 03/12/22   Tenderness Soft   RUQ Bowel Sounds Active   LUQ Bowel Sounds Active   RLQ Bowel Sounds Active   LLQ Bowel Sounds Active   Peripheral Vascular   Peripheral Vascular (WDL) WDL   Edema None   Skin Color/Condition   Skin Color/Condition (WDL) X   Skin Color Jaundice;Pale   Skin Condition/Temp Warm;Dry   Skin Integrity   Skin Integrity (WDL) X   Skin Integrity Bruising   Location scattered   Preventative Dressing No   Musculoskeletal   Musculoskeletal (WDL) X   RUE Full movement   LUE Full movement   RL Extremity Weakness   LL Extremity Weakness   Genitourinary   Genitourinary (WDL) WDL   Psychosocial   Psychosocial (WDL) WDL     Patient arrived via stretcher to room 3-1. Alert and oriented x4. Lung sounds CTA, no acute distress noted. Patient oriented to room and call bell, verbalized understanding. Admission assessment complete at this time. Call bell and bedside table within reach. Bed in lowest position.

## 2022-03-13 NOTE — ED PROVIDER NOTES
[address] ENCOUNTER      Pt Name: Astrid Burgos  MRN: [id number]  YOB: 1955  Date of evaluation: 3/12/2022  Provider: Debbie Pate MD    [address] [place] [address]       Chief Complaint   Patient presents with    Nausea     Pt c/o acute onset of nausea and vomiting 2 hrs PTA, HX of liver transplant 10/24/2020; recent hx falls in Feb, didn't seek tx, since has R breast pain. HISTORY OF PRESENT ILLNESS  (Location/Symptom, Timing/Onset, Context/Setting, Quality, Duration, Modifying Factors, Severity.)   Astrid Burgos is a 73 y.o. female who presents to the emergency department with what patient describes as a several week history of intermittent nausea vomiting diarrhea associated with diffuse abdominal pain. Patient states that she is concerned she is dehydrated because she has a dry mouth and every time she stands up she feels lightheaded. Patient states that she has lost some weight secondary to decreased oral intake. Patient is a liver transplant patient that was performed at the [facility] and states that she has been on immunotherapy and has been able to take her medications as prescribed. Patient denies any chest pain shortness of breath or any focal signs or symptoms. Patient states the only reason why she is here because family had been harassing her in 4 weeks to get checked out but she declined until today. Patient is resting comfortably in the room in no acute distress conversing in full sentences nontoxic      Nursing notes were reviewed.     REVIEW OFSYSTEMS    (2-9 systems for level 4, 10 or more for level 5)   ROS:  General:  No fevers, generalized weakness  Cardiovascular:  No chest pain, no palpitations  Respiratory:  No shortness of breath, no cough, no wheezing  Gastrointestinal: Diffuse abdominal pain associated nausea vomiting diarrhea  Musculoskeletal:  No muscle pain, no joint pain  Skin:  No rash, no easy bruising  Neurologic:  No speech problems, no headache, no extremity weakness  Psychiatric:  No anxiety  Genitourinary:  No dysuria, no hematuria    Except as noted above the remainder of the review of systems was reviewed and negative. PAST MEDICAL HISTORY     Past Medical History:   Diagnosis Date    Arthritis, rheumatoid ([facility] [address]     Cirrhosis ([facility] [address]     Colitis     Diabetes mellitus ([facility] [address]     Hypertension     IIH (idiopathic intracranial hypertension)     Liver cirrhosis (HCC)     Osteoarthritis     Sciatica     Unspecified sleep apnea          SURGICAL HISTORY       Past Surgical History:   Procedure Laterality Date    BONE MARROW BIOPSY      DILATION AND CURETTAGE OF UTERUS      DILATION AND CURETTAGE OF UTERUS  1998 & 2000    LIVER BIOPSY      LIVER TRANSPLANT  10/24/2020    TUBAL LIGATION  1982         CURRENT MEDICATIONS       Previous Medications    ACETAMINOPHEN (TYLENOL) 325 MG TABLET    TAKE 3 TABLETS(975 MG) BY MOUTH EVERY 8 HOURS AS NEEDED FOR FEVER OR PAIN    ALPRAZOLAM (XANAX) 0.25 MG TABLET    TAKE 1 TABLET BY MOUTH THREE TIMES DAILY AS NEEDED - MUST LAST 30 DAYS    AMLODIPINE (NORVASC) 10 MG TABLET    TAKE 1 TABLET BY MOUTH EVERY DAY    ATORVASTATIN (LIPITOR) 40 MG TABLET    Take 40 mg by mouth nightly    CYCLOSPORINE MODIFIED (NEORAL) 25 MG CAPSULE    Take 4 capsules (100mg) PO q am and take 4 capsules (100 mg) POq pm    GABAPENTIN (NEURONTIN) 300 MG CAPSULE    Take 300 mg by mouth 3 times daily. MIRTAZAPINE (REMERON) 15 MG TABLET    Take 7.5 mg by mouth nightly    MYCOPHENOLATE (CELLCEPT) 250 MG CAPSULE    Take 500 mg by mouth 2 times daily    PROMETHAZINE (PHENERGAN) 12.5 MG TABLET    Take 12.5 mg by mouth every 6 hours as needed    SENNOSIDES (SENNA) 8.6 MG CAPS    TAKE 1 TABLET BY MOUTH TWICE DAILY    SILVER SULFADIAZINE (SILVADENE) 1 % CREAM    Apply topically daily.     TORSEMIDE (DEMADEX) 20 MG TABLET    Take 20 mg by mouth daily    VITAMIN D (ERGOCALCIFEROL) 1.25 MG (07339 UT) CAPS CAPSULE    TAKE ONE CAPSULE BY MOUTH ONCE A WEEK. ALLERGIES     Lisinopril, Ciprofloxacin, and Dye [iodides]    FAMILY HISTORY       Family History   Problem Relation Age of Onset    High Blood Pressure Mother     Alzheimer's Disease Mother     Heart Disease Father     High Blood Pressure Father     Heart Disease Maternal Grandmother     Heart Disease Maternal Grandfather     Stroke Maternal Grandfather     Diabetes Paternal Grandmother     High Blood Pressure Brother           SOCIAL HISTORY       Social History     Socioeconomic History    Marital status:      Spouse name: Not on file    Number of children: Not on file    Years of education: Not on file    Highest education level: Not on file   Occupational History    Not on file   Tobacco Use    Smoking status: Light Tobacco Smoker     Packs/day: 0.10     Years: 1.00     Pack years: 0.10     Types: Cigarettes     Last attempt to quit: 2019     Years since quittin.8    Smokeless tobacco: Never Used   Substance and Sexual Activity    Alcohol use: No    Drug use: No    Sexual activity: Not on file   Other Topics Concern    Not on file   Social History Narrative    Not on file     Social Determinants of Health     Financial Resource Strain: High Risk    Difficulty of Paying Living Expenses: Hard   Food Insecurity: Food Insecurity Present    Worried About Running Out of Food in the Last Year: Often true    Jacky of Food in the Last Year: Often true   Transportation Needs:     Lack of Transportation (Medical): Not on file    Lack of Transportation (Non-Medical):  Not on file   Physical Activity:     Days of Exercise per Week: Not on file    Minutes of Exercise per Session: Not on file   Stress:     Feeling of Stress : Not on file   Social Connections:     Frequency of Communication with Friends and Family: Not on file    Frequency of Social Gatherings with Friends and Family: Not on file    Attends Confucianism Services: Not on file    Active Member of Clubs or Organizations: Not on file    Attends Club or Organization Meetings: Not on file    Marital Status: Not on file   Intimate Partner Violence:     Fear of Current or Ex-Partner: Not on file    Emotionally Abused: Not on file    Physically Abused: Not on file    Sexually Abused: Not on file   Housing Stability:     Unable to Pay for Housing in the Last Year: Not on file    Number of [place] in the Last Year: Not on file    Unstable Housing in the Last Year: Not on file         PHYSICAL EXAM    (up to 7 for level 4, 8 or more for level 5)     ED Triage Vitals   BP Temp Temp Source Pulse Resp SpO2 Height Weight   03/12/22 1927 03/12/22 1916 03/12/22 1916 03/12/22 1916 03/12/22 1916 03/12/22 1916 03/12/22 1916 03/12/22 1916   (!) 242/96 99.1 °F (37.3 °C) Oral 85 20 99 % 5' 1\" (1.549 m) 125 lb (56.7 kg)       Physical Exam  General :Patient is awake, alert, oriented, in no acute distress, nontoxic appearing  HEENT: Pupils are equally round and reactive to light, EOMI, conjunctivae clear. Mild dry oral mucosa, no exudate. Uvula is midline  Neck: Neck is supple, full range of motion, trachea midline  Cardiac: Heart regular rate, rhythm, no murmurs, rubs, or gallops  Lungs: Lungs are clear to auscultation, there is no wheezing, rhonchi, or rales. There is no use of accessory muscles. Chest wall: There is no tenderness to palpation over the chest wall or over ribs  Abdomen: Abdomen is soft, increased bowel sounds all 4 quadrants. Diffuse abdominal pain on palpation that recreates his symptoms have brought the patient into the emergency department. Musculoskeletal: 5 out of 5 strength in all 4 extremities. No focal muscle deficits are appreciated  Neuro:  Motor intact, sensory intact, level of consciousness is normal, cerebellar function is normal, reflexes are grossly normal. No evidence of incontinence or loss of bowel or bladder function, no saddle anesthesia noted   Dermatology: Skin is warm and dry  Psych: Mentation is grossly normal, cognition is grossly normal. Affect is appropriate. DIAGNOSTIC RESULTS     EKG: All EKG's are interpreted by the Emergency Department Physician who either signs or Co-signs this chart in the [address] a cardiologist.    The EKG interpreted by me shows     RADIOLOGY:   Non-plain film images such as CT, Ultrasound and MRI are read by the radiologist. Plain radiographic images are visualized and preliminarily interpreted by the emergency physician with the below findings:      ? Radiologist's Report Reviewed:  CT ABDOMEN PELVIS WO CONTRAST Additional Contrast? None   Final Result      1. Long segment of small bowel wall thickening in the midabdomen suggesting nonspecific enteritis. Clinical correlation recommended. 2. Splenomegaly and paraesophageal varices compatible with portal venous hypertension. 3. Chronic scarring in the lower lungs with chronic atelectasis.                      ED BEDSIDE ULTRASOUND:   Performed by ED Physician - none    LABS:    I have reviewed and interpreted all of the currently available lab results from this visit (ifapplicable):  Results for orders placed or performed during the hospital encounter of 03/12/22   CBC with Auto Differential   Result Value Ref Range    WBC 5.5 4.0 - 11.0 K/uL    RBC 4.53 3.80 - 5.80 M/uL    Hemoglobin 13.8 11.5 - 16.5 g/dL    Hematocrit 39.4 37.0 - 47.0 %    MCV 87.0 80.0 - 100.0 fL    MCH 30.5 27.0 - 32.0 pg    MCHC 35.0 31.0 - 35.0 g/dL    RDW 14.6 11.0 - 16.0 %    Platelets 605 024 - 908 K/uL    MPV 10.8 (H) 6.0 - 10.0 fL    Neutrophils % 82.3 %    Immature Granulocytes % 0.4 0.0 - 5.0 %    Lymphocytes % 10.9 %    Monocytes % 4.4 %    Eosinophils % 1.5 %    Basophils % 0.5 %    Neutrophils Absolute 4.5 2.0 - 7.5 K/uL    Immature Granulocytes # 0.0 K/uL    Lymphocytes Absolute 0.6 (L) 1.5 - 4.0 K/uL    Monocytes Absolute 0.2 0.2 - 0.8 K/uL Eosinophils Absolute 0.1 0.0 - 0.4 K/uL    Basophils Absolute 0.0 0.0 - 0.1 K/uL   Comprehensive Metabolic Panel w/ Reflex to MG   Result Value Ref Range    Sodium 141 136 - 145 mmol/L    Potassium reflex Magnesium 4.0 3.4 - 5.1 mmol/L    Chloride 103 98 - 107 mmol/L    CO2 24 20 - 30 mmol/L    Anion Gap 14 3 - 16    Glucose 134 (H) 74 - 106 mg/dL    BUN 24 (H) 6 - 20 mg/dL    CREATININE 1.3 (H) 0.4 - 1.2 mg/dL    GFR Non- 41 (L) >59    GFR  50 (L) >59    Calcium 10.1 8.5 - 10.5 mg/dL    Total Protein 7.2 6.4 - 8.3 g/dL    Albumin 4.4 3.4 - 4.8 g/dL    Albumin/Globulin Ratio 1.6 0.8 - 2.0    Total Bilirubin 1.7 (H) 0.3 - 1.2 mg/dL    Alkaline Phosphatase 95 25 - 100 U/L    ALT 6 4 - 36 U/L    AST 11 8 - 33 U/L    Globulin 2.8 Not Established g/dL   Lipase   Result Value Ref Range    Lipase 13.0 5.6 - 51.3 U/L   Urinalysis with Reflex to Culture    Specimen: Urine   Result Value Ref Range    Color, UA Dark yellow Straw/Yellow    Clarity, UA Clear Clear    Glucose, Ur Negative Negative mg/dL    Bilirubin Urine SMALL (A) Negative    Ketones, Urine 15 (A) Negative mg/dL    Specific Gravity, UA >=1.030 1.005 - 1.030    Blood, Urine MODERATE (A) Negative    pH, UA 5.5 5.0 - 8.0    Protein, UA >=300 (A) Negative mg/dL    Urobilinogen, Urine 1.0 <2.0 E.U./dL    Nitrite, Urine Negative Negative    Leukocyte Esterase, Urine Negative Negative    Microscopic Examination YES     Urine Type Voided     Urine Reflex to Culture Not Indicated    Troponin   Result Value Ref Range    Troponin <0.30 <0.30 ng/mL   Brain Natriuretic Peptide   Result Value Ref Range    Pro-BNP 2,607 (H) 0 - 1,800 pg/mL   Lactic Acid   Result Value Ref Range    Lactic Acid 1.8 0.4 - 2.0 mmol/L   Ammonia   Result Value Ref Range    Ammonia <17 (L) 19 - 87 mcg/dL   Protime-INR   Result Value Ref Range    Protime 12.9 11.6 - 13.8 sec    INR 1.02 0.88 - 1.11   Microscopic Urinalysis   Result Value Ref Range    Hyaline Casts, UA 3-5 (A) 0 - 2 /LPF    WBC, UA 0-2 0 - 5 /HPF    RBC, UA 11-20 (A) 0 - 4 /HPF    Epithelial Cells, UA 6-10 (A) 0 - 5 /HPF        All other labs were within normal range or not returned as of this dictation. EMERGENCY DEPARTMENT COURSE and DIFFERENTIAL DIAGNOSIS/MDM:   Vitals:    Vitals:    03/12/22 2330 03/12/22 2340 03/12/22 2345 03/13/22 0000   BP: (!) 192/132 (!) 205/85 (!) 205/75 (!) 183/89   Pulse: 92 100 95 83   Resp: 24 25 25 23   Temp:       TempSrc:       SpO2: 100% 100% 100% 98%   Weight:       Height:           MEDICATIONS ADMINISTERED IN ED:  Medications   0.9 % sodium chloride bolus (0 mLs IntraVENous Stopped 3/12/22 2150)   pantoprazole (PROTONIX) 40 mg in sodium chloride 0.9 % 50 mL bolus (0 mg IntraVENous Stopped 3/12/22 2034)   ondansetron (ZOFRAN) injection 4 mg (4 mg IntraVENous Given 3/12/22 2024)   morphine sulfate (PF) injection 4 mg (4 mg IntraVENous Given 3/12/22 2025)   hydrALAZINE (APRESOLINE) injection 10 mg (10 mg IntraVENous Given 3/12/22 2144)   0.9 % sodium chloride bolus (0 mLs IntraVENous Stopped 3/12/22 2354)   promethazine (PHENERGAN) 12.5mg in sodium chloride 0.9% 50 mL IVPB 12.5 mg (0 mg IntraVENous Stopped 3/12/22 2339)   labetalol (NORMODYNE;TRANDATE) injection 10 mg (10 mg IntraVENous Given 3/12/22 2344)   morphine sulfate (PF) injection 4 mg (4 mg IntraVENous Given 3/13/22 0045)   ondansetron (ZOFRAN) injection 4 mg (4 mg IntraVENous Given 3/13/22 0044)       Patient was placed examination room at which time after exam was performed IV was obtained and labs are drawn. Patient was given 1 L IV normal saline bolus along with 4 mg of Zofran IV and 4 mg of Zofran IV. Patient was then taken over CT scan abdomen pelvis without contrast which showed long segment of small bowel wall thickening mid abdomen suggesting of nonspecific enteritis. Review of patient's labs reveal a GFR of 41 consistent with her mild dehydration. Creatinine is 1.3.   Troponin is negative and BNP is 2607.  White count was 5.5 thousand hemoglobin was 13.8. INR is 1.02. Urinalysis was consistent with mild dehydration with ketones present. Patient was given a second liter IV normal saline bolus secondary to her mild dehydration    The patient states that she feels a little better but feels that she needs to stay in the hospital since that this has been going on for 2 weeks. Discussed with the patient about oral rehydration that we will give medicines for her nausea and observe her. Donald Orlando was consulted by phone who graciously excepted the patient for admission for further evaluation definitive care         CONSULTS: Marjoi Sandoval was consulted by phone who graciously excepted the patient for admission for further evaluation definitive care      PROCEDURES:  Procedures    CRITICAL CARE TIME    Total Critical Care time was 0 minutes, excluding separately reportable procedures. There was a high probability of clinically significant/life threatening deterioration in the patient's condition which required my urgent intervention. FINAL IMPRESSION      1. Diffuse abdominal pain Stable   2. Nausea and vomiting, intractability of vomiting not specified, unspecified vomiting type Stable   3. Dehydration Stable   4. General weakness Stable   5. Enteritis Stable         DISPOSITION/PLAN   DISPOSITION        PATIENT REFERRED TO:  No follow-up provider specified. DISCHARGE MEDICATIONS:  New Prescriptions    No medications on file       Comment: Please note this report has been produced using speech recognition software and may contain errorsrelated to that system including errors in grammar, punctuation, and spelling, as well as words and phrases that may be inappropriate. If there are any questions or concerns please feel free to contact the dictating providerfor clarification.     Ceferino Hoyos MD  Attending Emergency Physician              Ceferino Hoyos MD  03/13/22 9137

## 2022-03-13 NOTE — H&P
mild dysarthria noted. Past Medical History:      Diagnosis Date    Arthritis, rheumatoid (HCC)     [facility] ([facility]     Colitis     Diabetes mellitus ([facility]     Hypertension     IIH (idiopathic intracranial hypertension)     Liver cirrhosis (HCC)     Osteoarthritis     Sciatica     Unspecified sleep apnea        Past Surgical History:      Procedure Laterality Date    BONE MARROW BIOPSY      DILATION AND CURETTAGE OF UTERUS      DILATION AND CURETTAGE OF UTERUS  1998 & 2000    LIVER BIOPSY      LIVER TRANSPLANT  10/24/2020    TUBAL LIGATION  1982       Medications Prior to Admission:    Prior to Admission medications    Medication Sig Start Date End Date Taking? Authorizing Provider   ALPRAZolam (XANAX) 0.25 MG tablet TAKE 1 TABLET BY MOUTH THREE TIMES DAILY AS NEEDED - MUST LAST 30 DAYS 10/27/21   Historical Provider, MD   mirtazapine (REMERON) 15 MG tablet Take 7.5 mg by mouth nightly 9/21/21 11/20/21  Historical Provider, MD   acetaminophen (TYLENOL) 325 MG tablet TAKE 3 TABLETS(975 MG) BY MOUTH EVERY 8 HOURS AS NEEDED FOR FEVER OR PAIN 9/15/21   Historical Provider, MD   silver sulfADIAZINE (SILVADENE) 1 % cream Apply topically daily. 11/4/21   Ayde Dutta MD   Sennosides (SENNA) 8.6 MG CAPS TAKE 1 TABLET BY MOUTH TWICE DAILY 7/19/21   Historical Provider, MD   amLODIPine (NORVASC) 10 MG tablet TAKE 1 TABLET BY MOUTH EVERY DAY 6/3/21   Historical Provider, MD   promethazine (PHENERGAN) 12.5 MG tablet Take 12.5 mg by mouth every 6 hours as needed 7/19/21   Historical Provider, MD   gabapentin (NEURONTIN) 300 MG capsule Take 300 mg by mouth 3 times daily.     Historical Provider, MD   mycophenolate (CELLCEPT) 250 MG capsule Take 500 mg by mouth 2 times daily 3/9/21   Historical Provider, MD   cycloSPORINE modified (NEORAL) 25 MG capsule Take 4 capsules (100mg) PO q am and take 4 capsules (100 mg) POq pm 3/30/21   Historical Provider, MD   atorvastatin (LIPITOR) 40 MG tablet Take 40 mg by mouth nightly 3/22/21   Historical Provider, MD   torsemide (DEMADEX) 20 MG tablet Take 20 mg by mouth daily 3/22/21   Historical Provider, MD   vitamin D (ERGOCALCIFEROL) 1.25 MG (71102 UT) CAPS capsule TAKE ONE CAPSULE BY MOUTH ONCE A WEEK. 1/13/21   Historical Provider, MD       Allergies:  Lisinopril, Ciprofloxacin, and Dye [iodides]    Social History:   TOBACCO:   reports that she has been smoking cigarettes. She has a 0.10 pack-year smoking history. She has never used smokeless tobacco.  ETOH:   reports no history of alcohol use. OCCUPATION:  None   Family History:       Problem Relation Age of Onset    High Blood Pressure Mother     Alzheimer's Disease Mother     Heart Disease Father     High Blood Pressure Father     Heart Disease Maternal Grandmother     Heart Disease Maternal Grandfather     Stroke Maternal Grandfather     Diabetes Paternal Grandmother     High Blood Pressure Brother        Review of system  Constitutional:  Denies fever or chills. Positive for generalized weakness, fatigue, declining functional status. Eyes:  Denies eye pain or redness  HENT:  Denies nasal congestion or sore throat. Positive for oral thrush. Respiratory:  Denies cough or shortness of breath   Cardiovascular:  Denies chest pain or edema   GI:  Admits to abdominal pain, nausea, vomiting, diarrhea   :  Denies dysuria or frequency  Musculoskeletal:  Denies acute neck pain. Positive for chronic arthralgias and neuropathy. Integument:  Denies rash or itching  Neurologic:  Denies headache, dizziness, , tingling or unilateral weakness. Positive for facial numbness. Psychiatric:  Denies acute depression or acute anxiety      Vital Signs  Temp: 97.9 °F (36.6 °C)  Pulse: 60  Resp: 18  BP: 138/78  SpO2: 98 %  O2 Device: None (Room air)       vital signs reviewed in electronic chart.     Physical exam  Constitutional:  Well developed, poor hygiene and appears disheveled, no acute distress  Eyes:  PERRL, no scleral icterus, conjunctiva normal   HENT:  Atraumatic, external ears normal, nose normal, oropharynx moist, oral thrush noted, no pharyngeal exudates. Neck- supple, no JVD, no lymphadenopathy  Respiratory:  No respiratory distress on room air, no wheezing, rales or rhonchi detected  Cardiovascular:  Normal rate, normal rhythm, no murmurs, no gallops, no rubs, no edema   GI:  Soft, nondistended, normal bowel sounds, diffusely TTP, no voluntary guarding  Musculoskeletal:  No cyanosis or obvious acute deformity. Moving all extremities. Left lateral malleolus TTP. Integument:  Warm and dry. Neurologic:  Alert & oriented x 3, dysarthric speech with word finding difficulty, no facial droop, R  strength slightly weaker than L, no tongue deviation    Psychiatric:  Speech and behavior appropriate         Lab Results   Component Value Date     03/13/2022    K 4.2 03/13/2022     03/13/2022    CO2 25 03/13/2022    BUN 25 (H) 03/13/2022    CREATININE 1.3 (H) 03/13/2022    GLUCOSE 104 03/13/2022    CALCIUM 8.8 03/13/2022    PROT 5.7 (L) 03/13/2022    LABALBU 3.5 03/13/2022    BILITOT 1.2 03/13/2022    ALKPHOS 69 03/13/2022    AST 9 03/13/2022    ALT <5 03/13/2022    LABGLOM 41 (L) 03/13/2022    GFRAA 50 (L) 03/13/2022    AGRATIO 1.6 03/13/2022    GLOB 2.2 03/13/2022           Lab Results   Component Value Date    WBC 4.8 03/13/2022    HGB 11.1 (L) 03/13/2022    HCT 32.9 (L) 03/13/2022    MCV 87.5 03/13/2022     (L) 03/13/2022     CTA NECK W WO CONTRAST   Final Result      1. Mixed atherosclerotic plaque at the left common carotid bifurcation with approximately 30% stenosis of the origin of the left internal carotid artery. 2. Mixed atherosclerotic plaque at the right common carotid bifurcation without significant stenosis of the right carotid artery. 3. Normal left vertebral artery. 4. Patent right vertebral artery with severe stenosis at its origin.             PROCEDURE: CT ANGIOGRAPHY HEAD WITH/WITHOUT CONTRAST      INDICATION: speech changes, evaluate for stroke      COMPARISON: CT head 3/13/2022. TECHNIQUE: Axial CT imaging obtained through the head prior to and following administration of IV contrast. Axial images, multiplanar reformatted images, and maximum intensity projection images were reviewed for CT angiographic technique. IV contrast: 100 mL of Isovue-370. FINDINGS:      ANTERIOR CIRCULATION: The intracranial internal carotid arteries, anterior cerebral arteries, and middle cerebral arteries demonstrate no occlusion or stenosis. No evidence for aneurysm or arteriovenous malformation. POSTERIOR CIRCULATION: Distal right vertebral artery is patent and smaller in caliber than the left vertebral artery. There is moderate stenosis of the distal right vertebral artery just prior to joining the left vertebral artery to form the basilar    artery. The left vertebral artery, basilar artery and posterior cerebral arteries demonstrate no occlusion or stenosis. No evidence for aneurysm or arteriovenous malformation. INTRACRANIAL VENOUS SYSTEM: No evidence for intracranial venous thrombosis. INTRACRANIAL HEMORRHAGE: None. VENTRICLES: Normal in size and configuration for age. BRAIN PARENCHYMA: Gray-white matter differentiation is normal. No intracranial mass effect. SKULL: No destructive osseous process or fracture. PARANASAL SINUSES / MASTOIDS: No acute sinusitis or mastoiditis. ORBITS: Normal.      EXTRACRANIAL SOFT TISSUES: Normal.      OTHER: None. IMPRESSION:      1. Patent intracranial vessels. 2. Moderate stenosis of the distal right vertebral artery with patency of both distal vertebral arteries and the basilar artery. CTA HEAD W WO CONTRAST   Final Result      1. Mixed atherosclerotic plaque at the left common carotid bifurcation with approximately 30% stenosis of the origin of the left internal carotid artery.      2. Mixed atherosclerotic plaque at the right common carotid bifurcation without significant stenosis of the right carotid artery. 3. Normal left vertebral artery. 4. Patent right vertebral artery with severe stenosis at its origin. PROCEDURE: CT ANGIOGRAPHY HEAD WITH/WITHOUT CONTRAST      INDICATION: speech changes, evaluate for stroke      COMPARISON: CT head 3/13/2022. TECHNIQUE: Axial CT imaging obtained through the head prior to and following administration of IV contrast. Axial images, multiplanar reformatted images, and maximum intensity projection images were reviewed for CT angiographic technique. IV contrast: 100 mL of Isovue-370. FINDINGS:      ANTERIOR CIRCULATION: The intracranial internal carotid arteries, anterior cerebral arteries, and middle cerebral arteries demonstrate no occlusion or stenosis. No evidence for aneurysm or arteriovenous malformation. POSTERIOR CIRCULATION: Distal right vertebral artery is patent and smaller in caliber than the left vertebral artery. There is moderate stenosis of the distal right vertebral artery just prior to joining the left vertebral artery to form the basilar    artery. The left vertebral artery, basilar artery and posterior cerebral arteries demonstrate no occlusion or stenosis. No evidence for aneurysm or arteriovenous malformation. INTRACRANIAL VENOUS SYSTEM: No evidence for intracranial venous thrombosis. INTRACRANIAL HEMORRHAGE: None. VENTRICLES: Normal in size and configuration for age. BRAIN PARENCHYMA: Gray-white matter differentiation is normal. No intracranial mass effect. SKULL: No destructive osseous process or fracture. PARANASAL SINUSES / MASTOIDS: No acute sinusitis or mastoiditis. ORBITS: Normal.      EXTRACRANIAL SOFT TISSUES: Normal.      OTHER: None. IMPRESSION:      1. Patent intracranial vessels.      2. Moderate stenosis of the distal right vertebral artery with patency of both distal vertebral arteries and the basilar artery. CT HEAD WO CONTRAST   Final Result      No acute intracranial findings. XR CHEST PORTABLE   Final Result      Ill defined increased density in the bilateral lower lungs, with small right effusion. XR ANKLE LEFT (2 VIEWS)   Final Result      Nondisplaced fracture of the distal fibula cannot be excluded. Correlate clinically. No other acute findings. CT ABDOMEN PELVIS WO CONTRAST Additional Contrast? None   Final Result      1. Long segment of small bowel wall thickening in the midabdomen suggesting nonspecific enteritis. Clinical correlation recommended. 2. Splenomegaly and paraesophageal varices compatible with portal venous hypertension. 3. Chronic scarring in the lower lungs with chronic atelectasis.                MRI BRAIN WO CONTRAST    (Results Pending)         Assessment and Plan     Active Hospital Problems    Diagnosis Date Noted    Gastroenteritis [K52.9]  - Presents due to N/V/D   - CT abd/pelv with findings suggestive of nonspecific enteritis   - IV Zosyn and PO flagyl   - stool studies ordered   - add culturelle   - IV anti emetics as needed   - NS @ 75 ml/hr   - patient is on immunosuppressive agents in setting of liver transplant   03/13/2022    Hypomagnesemia [E83.42]  - monitor and replace as needed   03/13/2022    Nausea vomiting and diarrhea [R11.2, R19.7]  - see under gastroenteritis    03/13/2022    Dysarthria [R47.1]  - 3/13 around 2:20 PM patient with slurred speech, difficulty with word finding, and diffuse facial numbness.  - Stat CT head without acute finding  -  mg ordered (patient home regimen 81 mg daily although she reports she has not taken some meds recently)   - Stat CTA neck with mixed atherosclerotic plaque at the left common carotid bifurcation with approximately 30% stenosis at the origin of the left internal carotid artery, mixed atherosclerotic plaque at the right common carotid bifurcation without significant stenosis of the right carotid artery, normal left vertebral artery, patent right vertebral artery with severe stenosis at its origin  -Stat CTA head with patent intracranial vessels, moderate stenosis of the distal right vertebral artery with patency of both distal vertebral arteries and the basilar artery  -Discussed the case with [facility] stroke navigator. Per vascular interventionist Dr. Luca Wong patient does not require immediate intervention and therefore she will be placed on the waitlist for transfer to [facility]. Hospitalist Dr. Jordana Valencia also made aware per stroke team. Dr. Luca Wong recommends ASA daily, Goal bp < 180, and MRI brain Monday. Call stroke team with any acute change. - Check lipid panel in AM. Atorvastatin increased to 80 mg.  - monitor on telemetry   - Vital signs q4h   - PT, OT, SLP consult and aspiration /fall precautions ordered    03/13/2022    Hypertensive urgency [I16.0]  - BP as high as 223/105 on arrival to ED   - home regimen amlodipine 10 mg daily   - questionable medication compliance especially with n/v as above   - received multiple IV meds in ER  - on admission resumed home amlodipine 10 mg daily and started coreg. Will titrate as needed for goal systolic BP < 968.   - vital signs ordered q4h   - prn hydralazine ordered    03/13/2022    Chronic pain [G89.29]  - hold gabapentin with clinical changes as above    03/13/2022    Falls [W19. XXXA]  - reports recurrent falls over last couple of months possibly multifactorial. Patient admits to over using xanax and gabapentin. She is not sure about glucose or BP readings at home. Questionable compliance with medications.    - fall precautions ordered   - pt/ot    03/13/2022    Left lateral ankle pain [M25.572]  - Left ankle pain after fall couple of weeks ago  - Left ankle xray 3/13 with possible nondisplaced fracture of distal fibula  - continue tylenol as needed 03/13/2022   [facility] History of cirrhosis [Z87.19]  - history peterson cirrhosis s/p liver transplant in 2020   - CT imaging with evidence of portal vein HTN and esophageal varices  - Added PPI and BB to regimen    03/13/2022    Oral thrush [B37.0]  - nystatin swish and swallow    03/13/2022    Declining functional status [R53.81]  - PT/OT   - case management consult    03/13/2022    History of atrial fibrillation [Z86.79]  - Per chart review patient has refused eliquis that was previously prescribed and instructed to take baby aspirin daily - questionable compliance with this- pt reports taking until last few days   - on telemetry   - NSR on exam   - f/u with cards as scheduled    03/13/2022    Anemia [D64.9]  - Mild   - monitor   - on gi ppx    03/13/2022    Chronic renal failure, stage 3 (moderate) ([facility] [address] [N18.30]  - Baseline Cr ~ 1.1 per chart review  - on arrival BUN 24/Cr 1.3  - suspect mild dehydration with gastroenteritis and decreased oral intake  - hydrate with NS   - monitor    11/04/2021    Liver transplant recipient [facility]) [Z94.4]  - continue home cellcept and cyclosporine   - follows with  transplant team   11/04/2021    Depression with anxiety [F41.8]  - resume home xanax (riri reviewed)   - of note, concern for benzo withdrawal as patient is very tremulous and states she has been out of xanax for several days which is consistent with riri. Also concerned that she over uses her prescribed xanax at home. 04/23/2018     The case was discussed with Dr. Chelsey Castillo by phone and plan of care reviewed    I called her  Kobe Murphy per patient's request and updated him.      LINCOLN Faarh certifies per CMS regulation for 42 CFR [phone]), that the patient may reasonably be expected to be discharged or transferred to a hospital within 96 hours after admission to [address] [place] [address]    Electronically signed by LINCOLN Farah on 3/13/2022 at 5:11 PM

## 2022-03-14 ENCOUNTER — APPOINTMENT (OUTPATIENT)
Dept: MRI IMAGING | Facility: HOSPITAL | Age: 67
DRG: 392 | End: 2022-03-14
Payer: MEDICARE

## 2022-03-14 LAB
A/G RATIO: 1.9 (ref 0.8–2)
ALBUMIN SERPL-MCNC: 3.5 G/DL (ref 3.4–4.8)
ALP BLD-CCNC: 65 U/L (ref 25–100)
ALT SERPL-CCNC: <5 U/L (ref 4–36)
ANION GAP SERPL CALCULATED.3IONS-SCNC: 12 MMOL/L (ref 3–16)
AST SERPL-CCNC: 8 U/L (ref 8–33)
BILIRUB SERPL-MCNC: 1.2 MG/DL (ref 0.3–1.2)
BUN BLDV-MCNC: 23 MG/DL (ref 6–20)
CALCIUM SERPL-MCNC: 8.4 MG/DL (ref 8.5–10.5)
CHLORIDE BLD-SCNC: 110 MMOL/L (ref 98–107)
CHOLESTEROL, TOTAL: 141 MG/DL (ref 0–200)
CO2: 20 MMOL/L (ref 20–30)
CREAT SERPL-MCNC: 1.3 MG/DL (ref 0.4–1.2)
GFR AFRICAN AMERICAN: 50
GFR NON-AFRICAN AMERICAN: 41
GLOBULIN: 1.8 G/DL
GLUCOSE BLD-MCNC: 107 MG/DL (ref 74–106)
GLUCOSE BLD-MCNC: 133 MG/DL (ref 74–106)
GLUCOSE BLD-MCNC: 161 MG/DL (ref 74–106)
GLUCOSE BLD-MCNC: 168 MG/DL (ref 74–106)
GLUCOSE BLD-MCNC: 78 MG/DL (ref 74–106)
GLUCOSE BLD-MCNC: 95 MG/DL (ref 74–106)
HBA1C MFR BLD: 3.8 %
HCT VFR BLD CALC: 31.3 % (ref 37–47)
HDLC SERPL-MCNC: 33 MG/DL (ref 40–60)
HEMOGLOBIN: 10.6 G/DL (ref 11.5–16.5)
LDL CHOLESTEROL CALCULATED: 87 MG/DL
MCH RBC QN AUTO: 29.1 PG (ref 27–32)
MCHC RBC AUTO-ENTMCNC: 33.9 G/DL (ref 31–35)
MCV RBC AUTO: 86 FL (ref 80–100)
PDW BLD-RTO: 14 % (ref 11–16)
PERFORMED ON: ABNORMAL
PERFORMED ON: NORMAL
PLATELET # BLD: 126 K/UL (ref 150–400)
PMV BLD AUTO: 10 FL (ref 6–10)
POTASSIUM SERPL-SCNC: 3.7 MMOL/L (ref 3.4–5.1)
RBC # BLD: 3.64 M/UL (ref 3.8–5.8)
SODIUM BLD-SCNC: 142 MMOL/L (ref 136–145)
TOTAL PROTEIN: 5.3 G/DL (ref 6.4–8.3)
TRIGL SERPL-MCNC: 107 MG/DL (ref 0–249)
VLDLC SERPL CALC-MCNC: 21 MG/DL
WBC # BLD: 3.3 K/UL (ref 4–11)

## 2022-03-14 PROCEDURE — 36415 COLL VENOUS BLD VENIPUNCTURE: CPT

## 2022-03-14 PROCEDURE — 6360000002 HC RX W HCPCS: Performed by: PHYSICIAN ASSISTANT

## 2022-03-14 PROCEDURE — 80053 COMPREHEN METABOLIC PANEL: CPT

## 2022-03-14 PROCEDURE — 99232 SBSQ HOSP IP/OBS MODERATE 35: CPT | Performed by: INTERNAL MEDICINE

## 2022-03-14 PROCEDURE — 1200000000 HC SEMI PRIVATE

## 2022-03-14 PROCEDURE — 2580000003 HC RX 258: Performed by: PHYSICIAN ASSISTANT

## 2022-03-14 PROCEDURE — 97530 THERAPEUTIC ACTIVITIES: CPT

## 2022-03-14 PROCEDURE — 6370000000 HC RX 637 (ALT 250 FOR IP): Performed by: PHYSICIAN ASSISTANT

## 2022-03-14 PROCEDURE — 97802 MEDICAL NUTRITION INDIV IN: CPT

## 2022-03-14 PROCEDURE — 92610 EVALUATE SWALLOWING FUNCTION: CPT

## 2022-03-14 PROCEDURE — 87449 NOS EACH ORGANISM AG IA: CPT

## 2022-03-14 PROCEDURE — 83036 HEMOGLOBIN GLYCOSYLATED A1C: CPT

## 2022-03-14 PROCEDURE — 80061 LIPID PANEL: CPT

## 2022-03-14 PROCEDURE — 70551 MRI BRAIN STEM W/O DYE: CPT

## 2022-03-14 PROCEDURE — 97161 PT EVAL LOW COMPLEX 20 MIN: CPT

## 2022-03-14 PROCEDURE — 83630 LACTOFERRIN FECAL (QUAL): CPT

## 2022-03-14 PROCEDURE — 85027 COMPLETE CBC AUTOMATED: CPT

## 2022-03-14 PROCEDURE — 87324 CLOSTRIDIUM AG IA: CPT

## 2022-03-14 PROCEDURE — 87505 NFCT AGENT DETECTION GI: CPT

## 2022-03-14 PROCEDURE — 97165 OT EVAL LOW COMPLEX 30 MIN: CPT

## 2022-03-14 RX ORDER — CARVEDILOL 12.5 MG/1
12.5 TABLET ORAL 2 TIMES DAILY WITH MEALS
Status: DISCONTINUED | OUTPATIENT
Start: 2022-03-15 | End: 2022-03-15 | Stop reason: HOSPADM

## 2022-03-14 RX ORDER — CARVEDILOL 3.12 MG/1
3.12 TABLET ORAL ONCE
Status: COMPLETED | OUTPATIENT
Start: 2022-03-14 | End: 2022-03-14

## 2022-03-14 RX ORDER — CARVEDILOL 6.25 MG/1
6.25 TABLET ORAL 2 TIMES DAILY WITH MEALS
Status: COMPLETED | OUTPATIENT
Start: 2022-03-14 | End: 2022-03-14

## 2022-03-14 RX ADMIN — METRONIDAZOLE 500 MG: 500 TABLET ORAL at 06:14

## 2022-03-14 RX ADMIN — MORPHINE SULFATE 2 MG: 2 INJECTION, SOLUTION INTRAMUSCULAR; INTRAVENOUS at 04:37

## 2022-03-14 RX ADMIN — PIPERACILLIN AND TAZOBACTAM 3375 MG: 3; .375 INJECTION, POWDER, FOR SOLUTION INTRAVENOUS at 13:34

## 2022-03-14 RX ADMIN — CARVEDILOL 3.12 MG: 3.12 TABLET, FILM COATED ORAL at 08:37

## 2022-03-14 RX ADMIN — CARVEDILOL 3.12 MG: 3.12 TABLET, FILM COATED ORAL at 09:12

## 2022-03-14 RX ADMIN — NYSTATIN 500000 UNITS: 100000 SUSPENSION ORAL at 20:44

## 2022-03-14 RX ADMIN — METRONIDAZOLE 500 MG: 500 TABLET ORAL at 20:44

## 2022-03-14 RX ADMIN — INSULIN LISPRO 1 UNITS: 100 INJECTION, SOLUTION INTRAVENOUS; SUBCUTANEOUS at 17:01

## 2022-03-14 RX ADMIN — MYCOPHENOLATE MOFETIL 500 MG: 250 CAPSULE ORAL at 08:37

## 2022-03-14 RX ADMIN — ENOXAPARIN SODIUM 40 MG: 40 INJECTION SUBCUTANEOUS at 20:44

## 2022-03-14 RX ADMIN — METRONIDAZOLE 500 MG: 500 TABLET ORAL at 13:28

## 2022-03-14 RX ADMIN — SODIUM CHLORIDE: 9 INJECTION, SOLUTION INTRAVENOUS at 11:25

## 2022-03-14 RX ADMIN — PIPERACILLIN AND TAZOBACTAM 3375 MG: 3; .375 INJECTION, POWDER, FOR SOLUTION INTRAVENOUS at 04:56

## 2022-03-14 RX ADMIN — CARVEDILOL 6.25 MG: 6.25 TABLET, FILM COATED ORAL at 17:35

## 2022-03-14 RX ADMIN — ASPIRIN 325 MG: 325 TABLET, DELAYED RELEASE ORAL at 08:36

## 2022-03-14 RX ADMIN — NYSTATIN 500000 UNITS: 100000 SUSPENSION ORAL at 08:38

## 2022-03-14 RX ADMIN — HYDRALAZINE HYDROCHLORIDE 25 MG: 25 TABLET, FILM COATED ORAL at 06:15

## 2022-03-14 RX ADMIN — NYSTATIN 500000 UNITS: 100000 SUSPENSION ORAL at 17:34

## 2022-03-14 RX ADMIN — CYCLOSPORINE 100 MG: 25 CAPSULE, LIQUID FILLED ORAL at 18:00

## 2022-03-14 RX ADMIN — Medication 1 CAPSULE: at 08:37

## 2022-03-14 RX ADMIN — ALPRAZOLAM 0.25 MG: 0.25 TABLET ORAL at 04:37

## 2022-03-14 RX ADMIN — MYCOPHENOLATE MOFETIL 500 MG: 250 CAPSULE ORAL at 20:44

## 2022-03-14 RX ADMIN — PIPERACILLIN AND TAZOBACTAM 3375 MG: 3; .375 INJECTION, POWDER, FOR SOLUTION INTRAVENOUS at 20:43

## 2022-03-14 RX ADMIN — ATORVASTATIN CALCIUM 80 MG: 80 TABLET, FILM COATED ORAL at 20:44

## 2022-03-14 RX ADMIN — CYCLOSPORINE 75 MG: 25 CAPSULE, LIQUID FILLED ORAL at 06:15

## 2022-03-14 RX ADMIN — MORPHINE SULFATE 2 MG: 2 INJECTION, SOLUTION INTRAMUSCULAR; INTRAVENOUS at 09:55

## 2022-03-14 RX ADMIN — MORPHINE SULFATE 2 MG: 2 INJECTION, SOLUTION INTRAMUSCULAR; INTRAVENOUS at 00:15

## 2022-03-14 RX ADMIN — GABAPENTIN 300 MG: 300 CAPSULE ORAL at 22:32

## 2022-03-14 RX ADMIN — PANTOPRAZOLE SODIUM 40 MG: 40 TABLET, DELAYED RELEASE ORAL at 06:15

## 2022-03-14 RX ADMIN — NYSTATIN 500000 UNITS: 100000 SUSPENSION ORAL at 13:28

## 2022-03-14 RX ADMIN — AMLODIPINE BESYLATE 10 MG: 5 TABLET ORAL at 08:36

## 2022-03-14 ASSESSMENT — PAIN SCALES - GENERAL
PAINLEVEL_OUTOF10: 10
PAINLEVEL_OUTOF10: 8
PAINLEVEL_OUTOF10: 10

## 2022-03-14 NOTE — ACP (ADVANCE CARE PLANNING)
Advance Care Planning     General Advance Care Planning (ACP) Conversation    Date of Conversation: 3/14/2022  Conducted with: Patient with Decision Making Capacity    Healthcare Decision Maker:  Primary Decision Maker: Niru Ely ([address] [place] [address]) [phone]  Secondary Decision Maker[facility]     Today we documented Decision Maker(s) consistent with Legal Next of Kin hierarchy. Content/Action Overview:   Has ACP document(s) on file - reflects the patient's care preferences  Reviewed DNR/DNI and patient elects Full Code (Attempt Resuscitation)        Length of Voluntary ACP Conversation in minutes:  <16 minutes (Non-Billable)    DEXTER Garcia Intern

## 2022-03-14 NOTE — FLOWSHEET NOTE
03/13/22 2055   Assessment   Charting Type Shift assessment   Neurological   Neuro (WDL) WDL   S[place]ago Coma Scale   Eye Opening 4   Best Verbal Response 5   Best Motor Response 6   Faustino Coma Scale Score 15   NIHSS Stroke Scale   Interval Reassessment   Level of Consciousness (1a. ) 0   LOC Questions (1b. ) 0   LOC Commands (1c. ) 0   Best Gaze (2. ) 0   Visual (3. ) 0   Facial Palsy (4. ) 0   Motor Arm, Left (5a. ) 0   Motor Arm, Right (5b. ) 0   Motor Leg, Left (6a. ) 0   Motor Leg, Right (6b. ) 0   Limb Ataxia (7. ) 0   Sensory (8. ) 0   Best Language (9. ) 0   Dysarthria (10. ) 0   HEENT   HEENT (WDL) X   Right Eye Impaired vision   Left Eye Impaired vision   Teeth Dentures upper   Respiratory   Respiratory (WDL) WDL   Cardiac   Cardiac (WDL) WDL   Cardiac Monitor   Telemetry Monitor On Yes   Telemetry Audible Yes   Telemetry Alarms Set Yes   Gastrointestinal   Abdominal (WDL) X   Abdomen Inspection Soft   Last BM (including prior to admit) 03/12/22   Tenderness Soft   RUQ Bowel Sounds Active   LUQ Bowel Sounds Active   RLQ Bowel Sounds Active   LLQ Bowel Sounds Active   Peripheral Vascular   Peripheral Vascular (WDL) WDL   Edema None   RLE Neurovascular Assessment   R Pedal Pulse +1   LLE Neurovascular Assessment   L Pedal Pulse +2   Skin Color/Condition   Skin Color/Condition (WDL) X   Skin Color Pale   Skin Condition/Temp Warm;Dry   Skin Integrity   Skin Integrity (WDL) X   Skin Integrity Bruising   Location scattered   Musculoskeletal   Musculoskeletal (WDL) X   RUE Full movement   LUE Full movement   RL Extremity Weakness   LL Extremity Weakness   Genitourinary   Genitourinary (WDL) WDL   Psychosocial   Psychosocial (WDL) WDL

## 2022-03-14 NOTE — CONSULTS
Comprehensive Nutrition Assessment    Type and Reason for Visit:  Initial,Consult,Positive Nutrition Screen    Nutrition Recommendations/Plan: Recommend to continue with current diet of Pureed foods and will add ONS BID. Pt states she feels more comfortable with pureed foods than she does liquids. Nutrition Assessment:  Pt at high risk for ongoing nutritional compromise r/t weight loss, nausea, vomiting, diarrhea, poor intakes. Malnutrition Assessment:  Malnutrition Status:  Insufficient data    Context:  Acute Illness     Findings of the 6 clinical characteristics of malnutrition:  Energy Intake:  Unable to assess  Weight Loss:  No significant weight loss     Body Fat Loss:  No significant body fat loss     Muscle Mass Loss:  Unable to assess    Fluid Accumulation:  No significant fluid accumulation     Strength:       Estimated Daily Nutrient Needs:  Energy (kcal):  9930-3749 (25-27 kcal/kg CBW); Weight Used for Energy Requirements:  Current     Protein (g):  54-65 (1-1.2 gm/kg cbw); Weight Used for Protein Requirements:  Current        Fluid (ml/day):  3017-5193; Method Used for Fluid Requirements:  1 ml/kcal      Nutrition Related Findings:  Pt presents with poor intakes, recent weight loss, s/p liver transplant (2020), hypertension, DM, CKD stage 3, anemia. NO edema or skin issues reported. Has some facial drooping, possible recent stroke, patient states she had symptoms likes this several years ago. Labs: Glu ;, BUN-23; CR-1.3; GFR-41; CK-19; HDL-33; BNP- 2607      Wounds:  None       Current Nutrition Therapies:    ADULT DIET;  Dysphagia - Pureed; 4 carb choices (60 gm/meal)    Anthropometric Measures:  · Height: 5' 1\" (154.9 cm)  · Current Body Weight: 120 lb 3.2 oz (54.5 kg)   · Admission Body Weight:      · Usual Body Weight:       · Ideal Body Weight: 105 lbs; % Ideal Body Weight 114.5 %   · BMI: 22.7  · Adjusted Body Weight:  ; No Adjustment   · Adjusted BMI:      · BMI Categories:

## 2022-03-14 NOTE — PROGRESS NOTES
[facility] called and stated no beds at [facility] and they would call us back around 12:00 today for another bed update.

## 2022-03-14 NOTE — PROGRESS NOTES
Progress Note      Subjective:   Chief complaint: nausea, vomiting, diarrhea, speech changes    Interval History:   Resting in bed today. No acute distress. Speech more clear but still seems slower than usual. MRI negative for acute stroke. Patient not having diarrhea or vomiting. Tolerating regular diet. BP fluctuating. Review of systems:   Constitutional:  Denies fever or chills. Positive for generalized weakness, fatigue, declining functional status, frequent falls. Eyes:  Denies eye pain or redness  HENT:  Denies nasal congestion or sore throat. Positive for oral thrush. Respiratory:  Denies cough or shortness of breath   Cardiovascular:  Denies chest pain or edema   GI:  Admits to abdominal pain, nausea, vomiting, diarrhea - improving  :  Denies dysuria or frequency  Musculoskeletal:  Denies acute neck pain. Positive for chronic arthralgias and neuropathy. Integument:  Denies rash or itching  Neurologic:  Denies headache, dizziness, , tingling or unilateral weakness. Positive for facial numbness. Psychiatric:  Denies acute depression or acute anxiety       Past medical history, surgical history, family history and social history reviewed and unchanged compared to H&P earlier this admission.     Medications:   Scheduled Meds:   carvedilol  6.25 mg Oral BID     [START ON 3/15/2022] carvedilol  12.5 mg Oral BID     piperacillin-tazobactam  3,375 mg IntraVENous Q8H    metroNIDAZOLE  500 mg Oral 3 times per day    mycophenolate  500 mg Oral BID    [Held by provider] torsemide  20 mg Oral Daily    pantoprazole  40 mg Oral QAM AC    [Held by provider] gabapentin  300 mg Oral TID    nystatin  5 mL Oral 4x Daily    lactobacillus  1 capsule Oral Daily with breakfast    insulin lispro  0-6 Units SubCUTAneous TID     insulin lispro  0-3 Units SubCUTAneous Nightly    aspirin  325 mg Oral Daily    cycloSPORINE modified  100 mg Oral QPM    cycloSPORINE modified  75 mg Oral QAM    atorvastatin 80 mg Oral Nightly     Continuous Infusions:   dextrose         Objective:     Vital Signs  Temp: 97.7 °F (36.5 °C)  Pulse: 59  Resp: 18  BP: 125/71  SpO2: 100 %  O2 Device: None (Room air)       Vital signs reviewed in electronic charts. Physical exam  Constitutional:  Well developed, poor hygiene and appears disheveled, no acute distress  Eyes:  PERRL, no scleral icterus, conjunctiva normal   HENT:  Atraumatic, external ears normal, nose normal, oropharynx moist, oral thrush noted, no pharyngeal exudates. Neck- supple, no JVD, no lymphadenopathy  Respiratory:  No respiratory distress on room air, no wheezing, rales or rhonchi detected  Cardiovascular:  Normal rate, normal rhythm, no murmurs, no gallops, no rubs, no edema   GI:  Soft, nondistended, normal bowel sounds, nontender, no voluntary guarding  Musculoskeletal:  No cyanosis or obvious acute deformity. Moving all extremities. Left lateral malleolus mildly TTP. Integument:  Warm and dry. Neurologic:  Alert & oriented x 3, no dysarthria but speech is slow, no facial droop, moving all extremities     Psychiatric:  Speech and behavior appropriate        Results:     Lab Results   Component Value Date    WBC 3.3 (L) 03/14/2022    HGB 10.6 (L) 03/14/2022    HCT 31.3 (L) 03/14/2022    MCV 86.0 03/14/2022     (L) 03/14/2022       Lab Results   Component Value Date     03/14/2022    K 3.7 03/14/2022    K 4.0 03/12/2022     03/14/2022    CL 99.0 06/01/2012    CO2 20 03/14/2022    BUN 23 03/14/2022    CREATININE 1.3 03/14/2022    CREATININE 0.9 06/01/2012    GLUCOSE 78 03/14/2022    CALCIUM 8.4 03/14/2022      MRI BRAIN WO CONTRAST   Final Result      1. No acute intracranial abnormality or mass effect. 2. Mild to moderate burden scattered T2 hyperintense white matter disease which are nonspecific, may be attributed to chronic microvascular ischemia. CTA NECK W WO CONTRAST   Final Result      1.  Mixed atherosclerotic plaque at the left common carotid bifurcation with approximately 30% stenosis of the origin of the left internal carotid artery. 2. Mixed atherosclerotic plaque at the right common carotid bifurcation without significant stenosis of the right carotid artery. 3. Normal left vertebral artery. 4. Patent right vertebral artery with severe stenosis at its origin. PROCEDURE: CT ANGIOGRAPHY HEAD WITH/WITHOUT CONTRAST      INDICATION: speech changes, evaluate for stroke      COMPARISON: CT head 3/13/2022. TECHNIQUE: Axial CT imaging obtained through the head prior to and following administration of IV contrast. Axial images, multiplanar reformatted images, and maximum intensity projection images were reviewed for CT angiographic technique. IV contrast: 100 mL of Isovue-370. FINDINGS:      ANTERIOR CIRCULATION: The intracranial internal carotid arteries, anterior cerebral arteries, and middle cerebral arteries demonstrate no occlusion or stenosis. No evidence for aneurysm or arteriovenous malformation. POSTERIOR CIRCULATION: Distal right vertebral artery is patent and smaller in caliber than the left vertebral artery. There is moderate stenosis of the distal right vertebral artery just prior to joining the left vertebral artery to form the basilar    artery. The left vertebral artery, basilar artery and posterior cerebral arteries demonstrate no occlusion or stenosis. No evidence for aneurysm or arteriovenous malformation. INTRACRANIAL VENOUS SYSTEM: No evidence for intracranial venous thrombosis. INTRACRANIAL HEMORRHAGE: None. VENTRICLES: Normal in size and configuration for age. BRAIN PARENCHYMA: Gray-white matter differentiation is normal. No intracranial mass effect. SKULL: No destructive osseous process or fracture. PARANASAL SINUSES / MASTOIDS: No acute sinusitis or mastoiditis. ORBITS: Normal.      EXTRACRANIAL SOFT TISSUES: Normal.      OTHER: None. IMPRESSION:      1. Patent intracranial vessels. 2. Moderate stenosis of the distal right vertebral artery with patency of both distal vertebral arteries and the basilar artery. CTA HEAD W WO CONTRAST   Final Result      1. Mixed atherosclerotic plaque at the left common carotid bifurcation with approximately 30% stenosis of the origin of the left internal carotid artery. 2. Mixed atherosclerotic plaque at the right common carotid bifurcation without significant stenosis of the right carotid artery. 3. Normal left vertebral artery. 4. Patent right vertebral artery with severe stenosis at its origin. PROCEDURE: CT ANGIOGRAPHY HEAD WITH/WITHOUT CONTRAST      INDICATION: speech changes, evaluate for stroke      COMPARISON: CT head 3/13/2022. TECHNIQUE: Axial CT imaging obtained through the head prior to and following administration of IV contrast. Axial images, multiplanar reformatted images, and maximum intensity projection images were reviewed for CT angiographic technique. IV contrast: 100 mL of Isovue-370. FINDINGS:      ANTERIOR CIRCULATION: The intracranial internal carotid arteries, anterior cerebral arteries, and middle cerebral arteries demonstrate no occlusion or stenosis. No evidence for aneurysm or arteriovenous malformation. POSTERIOR CIRCULATION: Distal right vertebral artery is patent and smaller in caliber than the left vertebral artery. There is moderate stenosis of the distal right vertebral artery just prior to joining the left vertebral artery to form the basilar    artery. The left vertebral artery, basilar artery and posterior cerebral arteries demonstrate no occlusion or stenosis. No evidence for aneurysm or arteriovenous malformation. INTRACRANIAL VENOUS SYSTEM: No evidence for intracranial venous thrombosis. INTRACRANIAL HEMORRHAGE: None. VENTRICLES: Normal in size and configuration for age.       BRAIN PARENCHYMA: Gray-white matter differentiation is normal. No intracranial mass effect. SKULL: No destructive osseous process or fracture. PARANASAL SINUSES / MASTOIDS: No acute sinusitis or mastoiditis. ORBITS: Normal.      EXTRACRANIAL SOFT TISSUES: Normal.      OTHER: None. IMPRESSION:      1. Patent intracranial vessels. 2. Moderate stenosis of the distal right vertebral artery with patency of both distal vertebral arteries and the basilar artery. CT HEAD WO CONTRAST   Final Result      No acute intracranial findings. XR CHEST PORTABLE   Final Result      Ill defined increased density in the bilateral lower lungs, with small right effusion. XR ANKLE LEFT (2 VIEWS)   Final Result      Nondisplaced fracture of the distal fibula cannot be excluded. Correlate clinically. No other acute findings. CT ABDOMEN PELVIS WO CONTRAST Additional Contrast? None   Final Result      1. Long segment of small bowel wall thickening in the midabdomen suggesting nonspecific enteritis. Clinical correlation recommended. 2. Splenomegaly and paraesophageal varices compatible with portal venous hypertension. 3. Chronic scarring in the lower lungs with chronic atelectasis.                    Assessment and Plan:      Gastroenteritis [K52.9]  - Presents due to N/V/D   - CT abd/pelv with findings suggestive of nonspecific enteritis   - IV Zosyn and PO flagyl   - stool studies ordered but patient has not provided sample   - added culturelle   - IV anti emetics as needed   - IV morphine prn pain - dc'd 3/14  - NS @ 75 ml/hr - dc'd 3/14  - patient is on immunosuppressive agents in setting of liver transplant  - clinically improving and denies n/v/d today - monitor     03/13/2022    Hypomagnesemia [E83.42]  - monitor and replace as needed    03/13/2022    Nausea vomiting and diarrhea [R11.2, R19.7]  - see under gastroenteritis     03/13/2022    Dysarthria [R47.1]  - 3/13 around 2:20 PM patient with slurred speech, difficulty with word finding, and diffuse facial numbness. Stat CT head without acute finding. Daily  mg ordered (patient home regimen 81 mg daily). Increased atorvastatin to 80 mg daily. Stat CTA neck with mixed atherosclerotic plaque at the left common carotid bifurcation with approximately 30% stenosis at the origin of the left internal carotid artery, mixed atherosclerotic plaque at the right common carotid bifurcation without significant stenosis of the right carotid artery, normal left vertebral artery, patent right vertebral artery with severe stenosis at its origin. Stat CTA head with patent intracranial vessels, moderate stenosis of the distal right vertebral artery with patency of both distal vertebral arteries and the basilar artery. - I discussed the case with [facility] stroke navigator. Per vascular interventionist Dr. Chelo Oswald patient does not require immediate intervention and therefore she will be placed on the waitlist for transfer to [facility]. Hospitalist Dr. Kenneth Brito made aware per stroke team. Dr. Chelo Oswald recommends ASA daily, Goal bp < 180, and MRI brain Monday. Call stroke team with any acute change. - monitor on telemetry   - Vital signs q4h   - PT, OT, SLP consult and aspiration /fall precautions ordered  - MRI brain no acute intracranial abnormality or mass effect. Mild to moderate burden scattered T2 hyperintense white matter diseas which are nonspecific may be attributed to chronic microvascular ischemia   - clinically improved and no longer dysarthric although speech does seem slightly slow.    - likely can be discharged home with close neuro follow up as outpatient rather than transferred with negative MRI and clinical improvement     03/13/2022    Hypertensive urgency [I16.0]  - BP as high as 223/105 on arrival to ED   - home regimen amlodipine 10 mg daily   - questionable medication compliance especially with n/v as above   - received multiple IV meds in ER  - on admission resumed home amlodipine 10 mg daily and started coreg. Will titrate as needed for goal systolic BP < 793.   - vital signs ordered q4h   - prn hydralazine ordered   - stop amlodipine and titrate coreg in attempt to minimize amount of meds she takes at home on dc.    03/13/2022    Chronic pain [G89.29]  - initially held gabapentin with clinical changes as above. Resumed 3/14.     03/13/2022    Falls [Y86. XXXA]  - reports recurrent falls over last couple of months possibly multifactorial. Patient admits to over using xanax and gabapentin. She is not sure about glucose or BP readings at home but A1C only 3.8. Questionable compliance with medications.    - fall precautions ordered   - pt/ot     03/13/2022    Left lateral ankle pain [M25.572]  - Left ankle pain after fall couple of weeks ago  - Left ankle xray 3/13 with possible nondisplaced fracture of distal fibula  - continue tylenol as needed  - pt/ot   03/13/2022    History of cirrhosis [Z87.19]  - history peterson cirrhosis s/p liver transplant in 2020   - CT imaging with evidence of portal vein HTN and esophageal varices  - Added PPI and BB to regimen     03/13/2022    Oral thrush [B37.0]  - nystatin swish and swallow     03/13/2022    Declining functional status [R53.81]  - PT/OT   - case management consult     03/13/2022    History of atrial fibrillation [Z86.79]  - Per chart review patient has refused eliquis that was previously prescribed and instructed to take baby aspirin daily - questionable compliance with this- pt reports taking until last few days   - on telemetry   - NSR on exam   - f/u with cards as scheduled     03/13/2022    Anemia [D64.9]  - Mild   - monitor   - on gi ppx     03/13/2022    Chronic renal failure, stage 3 (moderate) ([facility] [address] [N18.30]  - Baseline Cr ~ 1.1 per chart review  - on arrival BUN 24/Cr 1.3  - suspect mild dehydration with gastroenteritis and decreased oral intake  - hydrate with NS   - monitor     11/04/2021    Liver transplant recipient [facility]) [Z94.4]  - continue home cellcept and cyclosporine   - follows with  transplant team    11/04/2021    Depression with anxiety [F41.8]  - resume home xanax (riri reviewed)   - of note, concern for benzo withdrawal as patient is very tremulous and states she has been out of xanax for several days which is consistent with riri. Also she admits that she takes more xanax than prescribed for her nerves. - will need prescription on dc         Patient was seen and examined with Dr. Shruthi Mar. After reviewing patient data and diagnostic testing the plan of care was established in conjunction with Dr. Shruthi Mar.     Electronically signed by LINCOLN Conner on 3/14/2022 at 3:56 PM

## 2022-03-14 NOTE — PROGRESS NOTES
Physical Therapy    Facility/Department: [facility] MED SURG  Initial Assessment    NAME: Sophie Burnett  : 1955  MRN: [id number]    Date of Service: 3/14/2022    Discharge Recommendations:  Continue to assess pending progress,Home with assist PRN        Assessment   Assessment: Pt will benefit from skilled PT while in the acute setting to address endurance and mobility to allow for return to optimal functional level. Treatment Diagnosis: Functional decline  Prognosis: Good  Decision Making: Low Complexity  REQUIRES PT FOLLOW UP: Yes  Activity Tolerance  Activity Tolerance: Patient Tolerated treatment well       Patient Diagnosis(es): The primary encounter diagnosis was Diffuse abdominal pain. Diagnoses of Nausea and vomiting, intractability of vomiting not specified, unspecified vomiting type, Dehydration, General weakness, and Enteritis were also pertinent to this visit. has a past medical history of Arthritis, rheumatoid ([address] Cirrhosis ([facility] [address] Colitis, Diabetes mellitus ([facility] [address] Hypertension, IIH (idiopathic intracranial hypertension), Liver cirrhosis ([facility] [address] Osteoarthritis, Sciatica, and Unspecified sleep apnea. has a past surgical history that includes Dilation and curettage of uterus; bone marrow biopsy; Tubal ligation (); Dilation and curettage of uterus ( & ); liver biopsy; and Liver transplant (10/24/2020). Restrictions  Restrictions/Precautions  Restrictions/Precautions: General Precautions,Contact Precautions  Required Braces or Orthoses?: No     Vision/Hearing  Vision: Impaired  Vision Exceptions: Wears glasses for reading  Hearing: Within functional limits       Subjective  General  Chart Reviewed: Yes  Patient assessed for rehabilitation services?: Yes  Referring Practitioner: Андрей Crow PA-C  Referral Date : 22  Diagnosis: Dehydration  Subjective  Subjective: Pt presents supine in bed, agreeable to PT evaluation.   Pt states she is feeling better; she came to the hospital due to nausea. She also injured her L leg in February when she fell and fractured her fibula.   Pain Screening  Patient Currently in Pain: Yes  Vital Signs  Patient Currently in Pain: Yes (\"sore\")       Orientation  Orientation  Overall Orientation Status: Within Normal Limits     Social/Functional History  Social/Functional History  Lives With: Spouse,Son (Daughter in law helps some during the week)  Type of Home: House  Home Layout: One level  Home Access: Stairs to enter with rails  Entrance Stairs - Number of Steps: 3 steps  Entrance Stairs - Rails: Both  Bathroom Shower/Tub: Tub/Shower unit  Bathroom Toilet: Standard  Bathroom Equipment: Grab bars in shower,3-in-1 commode  Bathroom Accessibility: Accessible  Home Equipment: Rolling walker,Quad cane,Cane,Standard walker  Receives Help From: Family  ADL Assistance: [address] [place] [address] Avenue: Needs assistance (family takes care of cooking / cleaning)  Homemaking Responsibilities: No  Ambulation Assistance: Independent (usually walks holding to furniture; uses walker about half of the time)  Transfer Assistance: Independent  Active : No    Objective     Observation/Palpation  Posture: Good  Palpation: grade I tenderness lateral ankle L at distal fibula  Observation: pt supine in bed, room air, NAD    AROM RLE (degrees)  RLE AROM: WNL  AROM LLE (degrees)  LLE AROM : WNL     Strength RLE  Strength RLE: WNL  Strength LLE  Strength LLE: Exception  L Hip Flexion: 4+/5  L Knee Flexion: 4+/5  L Knee Extension: 4/5  L Ankle Dorsiflexion: 4-/5     Tone RLE  RLE Tone: Normotonic  Tone LLE  LLE Tone: Normotonic     Bed mobility  Rolling to Left: Independent  Supine to Sit: Modified independent  Sit to Supine: Modified independent  Scooting: Modified independent  Transfers  Sit to Stand: Modified independent  Stand to sit: Modified independent     Ambulation  Ambulation?: Yes  Ambulation 1  Device: Rolling Walker  Assistance: Contact guard assistance  Quality of Gait: 30' in room  Gait Deviations: Slow Ena;Decreased step length;Decreased step height     Balance  Sitting - Static: Good  Sitting - Dynamic: Good  Standing - Static: Good;-  Standing - Dynamic: Fair;+        Plan   Plan  Times per week: 3-5 days per week  Times per day: Daily  Plan weeks: 3 days  Current Treatment Recommendations: Strengthening,Home Exercise Program,ROM,Safety Education & Training,Patient/Caregiver Education & Training,Equipment Evaluation, Education, & procurement  Safety Devices  Type of devices: Call light within reach,Left in bed        Goals  Short term goals  Time Frame for Short term goals: 3 days  Short term goal 1: Pt to ambulate 100 feet with RW with good safety awareness and tolerance. Short term goal 2: Pt to demonstrate good safety with all functional mobility. Short term goal 3: Pt to tolerate sitting up in a chair for 1-2 hours to improve endurance. Therapy Time   Individual Concurrent Group Co-treatment   Time In 1310         Time Out 1340         Minutes 27                 Jessica Pickett, PT       This note serves as D/C summary if patient is discharged prior to next visit.

## 2022-03-14 NOTE — PROGRESS NOTES
Medication Reconciliation  Med rec performed utilizing fill history from [email or web address] and per the pt. See notes below:    Added ASA 81mg po daily to home med list per pt. -Removed the following meds per fill history and per the pt. Remeron      Acetaminophen      -Changed the following meds per fill history and per the pt. Cyclosporine 25mg from 4 caps in the morning and then 4 caps in the evening to 3 caps in the morning then 4 caps in the evening. Torsemide 20mg from 1 tab daily to 1/2 tab daily. Changed Senna to senna-docusate formulation per fill history. Changed gabapentin frequency from TID to QID per fill history.    -Of note. I spoke with the pt to confirm all meds taken at home. Lastly, pt stated that she takes around 400mg of ibuprofen at home every night. Notified provider [facility].       Yuriy Corrigan, TravisD

## 2022-03-14 NOTE — FLOWSHEET NOTE
03/14/22 1008   Discharge Planning   [address] Children;Spouse/Significant Other;Friends/Neighbors   Current Services Prior To Admission Durable Medical Equipment   DME [address] Medications No   DME Wheelchair   Type of [address] None   Patient expects to be discharged to: [facility]   Expected Discharge Date 03/17/22   Follow Up Appointment: Best Day/Time    (Any)   Has johnathan MARTELL for WC @ DC

## 2022-03-14 NOTE — PROGRESS NOTES
Speech Language Pathology  Facility/Department: [facility] SURG   CLINICAL BEDSIDE SWALLOW EVALUATION    NAME: Kateryna Verde  : 1955  MRN: [id number]    ADMISSION DATE: 3/12/2022  ADMITTING DIAGNOSIS: has Type 2 diabetes mellitus, without long-term current use of insulin ([facility] Sleep apnea; Menopause; General weakness; IIH (idiopathic intracranial hypertension); Essential hypertension; Diffuse abdominal pain; Depression with anxiety; Cirrhosis of liver with ascites ([facility] [address] Diarrhea; Other ascites; Chronic bilateral low back pain with bilateral sciatica; Elevated LFTs; Weight loss; Pre-transplant evaluation for liver transplant; Liver transplant recipient [facility]); Chronic renal failure, stage 3 (moderate) ([facility] [address] Gastroenteritis; Hypomagnesemia; Nausea vomiting and diarrhea; Dysarthria; Hypertensive urgency; Chronic pain; Falls; Left lateral ankle pain; History of cirrhosis; Oral thrush; Declining functional status; History of atrial fibrillation; and Anemia on their problem list.  ONSET DATE: 3/14/2022    Recent Chest Xray/CT of Chest:  3/13/2022    Impression       Ill defined increased density in the bilateral lower lungs, with small right effusion. Date of Eval: 3/14/2022  Evaluating Therapist: LANDEN Nye    Current Diet level:  Current Diet : NPO  Current Liquid Diet : NPO    Primary Complaint   Patient complaints of sore upper right gums    Pain:  Pain Assessment  Pain Level: 10  Pain Location: Abdomen  Pain Orientation: Right    Reason for Referral  Kateryna Verde was referred for a bedside swallow evaluation to assess the efficiency of her swallow function, identify signs and symptoms of aspiration and make recommendations regarding safe dietary consistencies, effective compensatory strategies, and safe eating environment.     Impression  Dysphagia Diagnosis: Moderate oral stage dysphagia  Dysphagia Impression : Patient presents with no upper or lower denititon with complaints of upper gum soreness,impairing patient ability to masticate solid consistencies appropriately. Per medical chart patient recieving Nystatin for thrush. Patient trialed with soft solids, puree, thin liquids (with straw and cup). Patient presents with prolonged and decreased mastication with soft solids with decreased A/P oral transit and mild to mod oral resdue. Patient clears with liquid wash with no signs of aspiration. Patient trialed with puree and thin liquids with no oral residue or overt signs of aspiration. Dysphagia Outcome Severity Scale:  (Patient presents with moderate oral dysphagia secondary to no upper or lower denition and oral thrush)     Treatment Plan  Requires SLP Intervention: Yes  Duration/Frequency of Treatment: 3- 5 x week x 4 weeks  D/C Recommendations: Home with intermittent assistance    Recommended Diet and Intervention  Diet Solids Recommendation: Dysphagia Pureed (Dysphagia I)  Liquid Consistency Recommendation: Thin  Recommended Form of Meds: PO  Recommendations: Dysphagia treatment  Therapeutic Interventions: Therapeutic PO trials with SLP;Patient/Family education    Compensatory Swallowing Strategies  Compensatory Swallowing Strategies: Alternate solids and liquids;Small bites/sips; Remain upright for 30-45 minutes after meals;Upright as possible for all oral intake    Treatment/Goals  Short-term Goals  Timeframe for Short-term Goals: 2 weeks    Goal 1: Patient will tolerate various po trials with improved oral manipulation with no oral residue or overt signs of aspiration 10/10x    Goal 2: Patient will perform lingual exercises for improved oral motor strength and ROM 10/10 x    Long-term Goals  Timeframe for Long-term Goals: 4 weeks    Goal 1: Patient will tolerate least restrictive diet with no signs of aspiration or oral dysphagia 100% accuracy    General  Chart Reviewed: Yes  Comments: Patient oriented x 4, patient expressing she was told she \"had a Stroke\",  Subjective  Subjective: Patient pleasant and cooperative  Behavior/Cognition: Alert; Cooperative;Pleasant mood  Respiratory Status: Room air  O2 Device: None (Room air)  Communication Observation: Functional  Follows Directions: Simple  Dentition: Edentulous  Patient Positioning: Upright in bed  Baseline Vocal Quality: Normal  Volitional Cough: Wet  Prior Dysphagia History: Patient expresses she has a history of swallowing problems    Vision/Hearing  Vision  Vision: Impaired  Vision Exceptions: Wears glasses for reading  Hearing  Hearing: Within functional limits    Oral Motor Deficits  Oral/Motor  Oral Motor: Exceptions to [facility]  Labial Symmetry: Abnormal symmetry right (slight right labial droop)  Lingual Strength: Reduced  Lingual Coordination: Reduced    Oral Phase Dysfunction  Oral Phase  Oral Phase: Exceptions  Oral Phase Dysfunction  Impaired Mastication: Thin;Puree; Soft Solid; Thin - straw; Thin - cup  Decreased Anterior to Posterior Transit: Soft solid  Lingual/Palatal Residue: Soft solid  Oral Phase  Oral Phase - Comment: Patient presents with no upper or lower dentition, complaints of right upper gum soreness. Patient trialed with soft solids, puree, ice chip and thin liquids (with cup and straw). Patient demonstrates with prolonged and decreased mastication, A/P oral transit, decreased lingual strength and ROM , with oral residue noted with soft solid consistencies. Patient demonstrates with adequate break of bolus , A/P oral transit with puree and thin liquid consistencies with no oral residue,     Indicators of Pharyngeal Phase Dysfunction   Pharyngeal Phase  Pharyngeal Phase: WNL  Pharyngeal Phase   Pharyngeal: Patient presents with immediate dry swallow initiation upon command, Patient trialed with soft solids, puree and thin liquid consistencies (with cup and straw).  Patient presents with no overt signs of aspiration with po trials, regular consistency not assessed due to patient complaints of upper gums being sore with soft solid trials. Prognosis  Prognosis  Prognosis for safe diet advancement: good  Individuals consulted  Consulted and agree with results and recommendations: Patient;RN    Education  Patient Education: Patient education with general swallow precautions and aspiration risks  Patient Education Response: Verbalizes understanding  Safety Devices in place: Yes  Type of devices: Bed alarm in place;Call light within reach; Left in bed       Therapy Time  SLP Individual Minutes  Time In: 9222  Time Out: [address]  Minutes: [address] [place], [place]  3/14/2022 10:08 AM

## 2022-03-14 NOTE — FLOWSHEET NOTE
03/14/22 0910   Assessment   Charting Type Shift assessment   Neurological   Neuro (WDL) WDL   [place] Coma Scale   Eye Opening 4   Best Verbal Response 5   Best Motor Response 6   Faust[place] Coma Scale Score 15   HEENT   HEENT (WDL) X   Right Eye Impaired vision   Left Eye Impaired vision   Teeth Dentures upper   Respiratory   Respiratory (WDL) WDL   Cardiac   Cardiac (WDL) WDL   Cardiac Monitor   Telemetry Monitor On Yes   Telemetry Audible Yes   Telemetry Alarms Set Yes   Gastrointestinal   Abdominal (WDL) X   Abdomen Inspection Soft   Tenderness Soft   RUQ Bowel Sounds Active   LUQ Bowel Sounds Active   RLQ Bowel Sounds Active   LLQ Bowel Sounds Active   Peripheral Vascular   Peripheral Vascular (WDL) WDL   Edema None   RLE Neurovascular Assessment   R Pedal Pulse +2   LLE Neurovascular Assessment   L Pedal Pulse +2   Skin Color/Condition   Skin Color/Condition (WDL) X   Skin Integrity   Skin Integrity (WDL) X   Skin Integrity Bruising   Location scattered   Preventative Dressing No   Musculoskeletal   Musculoskeletal (WDL) X   RUE Full movement   LUE Full movement   RL Extremity Weakness   LL Extremity Weakness   Genitourinary   Genitourinary (WDL) WDL   Psychosocial   Psychosocial (WD) WD   Had MRI this am.  NPO. Morphine given for pain all over.

## 2022-03-14 NOTE — PROGRESS NOTES
Occupational Therapy   Occupational Therapy Initial Assessment  Date: 3/14/2022   Patient Name: Sharifa Newsome  MRN: [id number]     : 1955    Date of Service: 3/14/2022    Discharge Recommendations:  Home with Home health OT  OT Equipment Recommendations  Equipment Needed: No    Assessment   Performance deficits / Impairments: Decreased high-level IADLs;Decreased balance;Decreased endurance;Decreased strength  Assessment: Pt agreeable to OT services. Spouse present during evaluation. Pt come to sit at EOB without difficulty. Pt donned socks in supine position. Pt maintained sitting balance without difficulty. Pt AROM WFL MMT BUE grossly 4-<>4/5. Pt come to stand at EOB with SBA and ambulated in room using RW SBA. Pt returned to bed. Nurse present in room to administer medications. Pt will benefit from skilled OT services while IP. HHS referral upon DC. Prognosis: Good  Decision Making: Low Complexity  REQUIRES OT FOLLOW UP: Yes  Activity Tolerance  Activity Tolerance: Patient Tolerated treatment well           Patient Diagnosis(es): The primary encounter diagnosis was Diffuse abdominal pain. Diagnoses of Nausea and vomiting, intractability of vomiting not specified, unspecified vomiting type, Dehydration, General weakness, and Enteritis were also pertinent to this visit. has a past medical history of Arthritis, rheumatoid ([address] Cirrhosis ([address] Colitis, Diabetes mellitus ([facility] [address] Hypertension, IIH (idiopathic intracranial hypertension), Liver cirrhosis ([facility] [address] Osteoarthritis, Sciatica, and Unspecified sleep apnea. has a past surgical history that includes Dilation and curettage of uterus; bone marrow biopsy; Tubal ligation (); Dilation and curettage of uterus ( & ); liver biopsy; and Liver transplant (10/24/2020).            Restrictions  Restrictions/Precautions  Restrictions/Precautions: General Precautions,Contact Precautions  Required Braces or Orthoses?: No    Subjective   General  Chart Reviewed: Yes  Patient assessed for rehabilitation services?: Yes  Family / Caregiver Present: Yes  Referring Practitioner: Kathy Anderson PA-C  Diagnosis: Gastroenteritis  Subjective  Subjective: Pt reports recent falls at home. Pt states she has been sick with nausea and vomiting and family insisted she come to hospital. Pt agreeable to OT services.   Patient Currently in Pain: Yes  Vital Signs  Patient Currently in Pain: Yes  Social/Functional History  Social/Functional History  Lives With: Spouse,Son (Daughter in law helps some during the week)  Type of Home: House  Home Layout: One level  Home Access: Stairs to enter with rails  Entrance Stairs - Number of Steps: 3 steps  Entrance Stairs - Rails: Both  Bathroom Shower/Tub: Tub/Shower unit  Bathroom Toilet: Standard  Bathroom Equipment: Grab bars in shower,3-in-1 commode  Bathroom Accessibility: Accessible  Home Equipment: Rolling walker,Quad cane,Cane,Standard walker  Receives Help From: Family  ADL Assistance: Independent  Homemaking Assistance: Needs assistance (family takes care of cooking / cleaning)  Homemaking Responsibilities: No  Ambulation Assistance: Independent (usually walks holding to furniture; uses walker about half of the time)  Transfer Assistance: Independent  Active : No       Objective   Vision: Impaired  Vision Exceptions: Wears glasses for reading  Hearing: Within functional limits    Orientation  Overall Orientation Status: Within Functional Limits  Observation/Palpation  Posture: Good  Palpation: grade I tenderness lateral ankle L at distal fibula  Observation: pt supine in bed, room air, NAD  Balance  Sitting Balance: Independent  Standing Balance: Stand by assistance  Functional Mobility  Functional - Mobility Device: Rolling Walker  Activity: Other  Assist Level: Stand by assistance  Functional Mobility Comments: 40 feet  ADL  LE Dressing: Independent  Tone RUE  RUE Tone: Normotonic  Tone LUE  LUE Tone: Normotonic  Coordination  Movements Are Fluid And Coordinated: Yes     Bed mobility  Rolling to Left: Independent  Supine to Sit: Modified independent  Sit to Supine: Modified independent  Scooting: Modified independent  Transfers  Stand Pivot Transfers: Stand by assistance  Sit to stand: Stand by assistance     Cognition  Overall Cognitive Status: WFL                 LUE AROM (degrees)  LUE AROM : WFL  RUE AROM (degrees)  RUE AROM : WFL  LUE Strength  L Shoulder Flex: 4-/5  L Elbow Flex: 4/5  L Elbow Ext: 4/5  L Hand General: 4-/5  RUE Strength  R Shoulder Flex: 4-/5  R Elbow Flex: 4/5  R Elbow Ext: 4/5  R Hand General: 4-/5                   Plan   Plan  Times per week: 3-5  Times per day: Daily  Plan weeks: 1  Current Treatment Recommendations: Strengthening,Balance Training,Functional Mobility Training,Endurance Training,Self-Care / ADL    Goals  Short term goals  Time Frame for Short term goals: 1 week  Short term goal 1: Pt to complete bathing with MOD I. Short term goal 2: Pt to complete dressing with MOD I  Short term goal 3: pt to tolerate standing at sink with no LOB to complete hygiene/grooming with MOD I>  Short term goal 4: Pt to complete toileting with MOD I. Short term goal 5: Pt to tolerate x15 minutes of activity to increase functional activity tolerance       Therapy Time   Individual Concurrent Group Co-treatment   Time In 0113         Time Out 0140         Minutes 27              This note serves as a DC summary in the event of pt discharge.      Karen Leal OTR/L

## 2022-03-15 VITALS
DIASTOLIC BLOOD PRESSURE: 78 MMHG | SYSTOLIC BLOOD PRESSURE: 154 MMHG | TEMPERATURE: 98.2 F | BODY MASS INDEX: 22.7 KG/M2 | HEIGHT: 61 IN | OXYGEN SATURATION: 98 % | RESPIRATION RATE: 18 BRPM | WEIGHT: 120.25 LBS | HEART RATE: 61 BPM

## 2022-03-15 PROBLEM — I65.21 STENOSIS OF INTRACRANIAL PORTIONS OF RIGHT INTERNAL CAROTID ARTERY: Status: ACTIVE | Noted: 2022-03-15

## 2022-03-15 LAB
ANION GAP SERPL CALCULATED.3IONS-SCNC: 11 MMOL/L (ref 3–16)
BUN BLDV-MCNC: 24 MG/DL (ref 6–20)
C DIFF TOXIN/ANTIGEN: NORMAL
CALCIUM SERPL-MCNC: 8.8 MG/DL (ref 8.5–10.5)
CHLORIDE BLD-SCNC: 108 MMOL/L (ref 98–107)
CO2: 21 MMOL/L (ref 20–30)
CREAT SERPL-MCNC: 1.4 MG/DL (ref 0.4–1.2)
GFR AFRICAN AMERICAN: 46
GFR NON-AFRICAN AMERICAN: 38
GLUCOSE BLD-MCNC: 118 MG/DL (ref 74–106)
GLUCOSE BLD-MCNC: 140 MG/DL (ref 74–106)
GLUCOSE BLD-MCNC: 81 MG/DL (ref 74–106)
HCT VFR BLD CALC: 30.2 % (ref 37–47)
HEMOGLOBIN: 10.5 G/DL (ref 11.5–16.5)
MAGNESIUM: 2 MG/DL (ref 1.7–2.4)
MCH RBC QN AUTO: 31.8 PG (ref 27–32)
MCHC RBC AUTO-ENTMCNC: 34.8 G/DL (ref 31–35)
MCV RBC AUTO: 91.5 FL (ref 80–100)
PDW BLD-RTO: 15.4 % (ref 11–16)
PERFORMED ON: ABNORMAL
PERFORMED ON: ABNORMAL
PLATELET # BLD: 133 K/UL (ref 150–400)
PMV BLD AUTO: 11 FL (ref 6–10)
POTASSIUM SERPL-SCNC: 3.6 MMOL/L (ref 3.4–5.1)
RBC # BLD: 3.3 M/UL (ref 3.8–5.8)
SODIUM BLD-SCNC: 140 MMOL/L (ref 136–145)
WBC # BLD: 3.8 K/UL (ref 4–11)

## 2022-03-15 PROCEDURE — 6360000002 HC RX W HCPCS: Performed by: PHYSICIAN ASSISTANT

## 2022-03-15 PROCEDURE — 2580000003 HC RX 258: Performed by: PHYSICIAN ASSISTANT

## 2022-03-15 PROCEDURE — 92526 ORAL FUNCTION THERAPY: CPT

## 2022-03-15 PROCEDURE — 36415 COLL VENOUS BLD VENIPUNCTURE: CPT

## 2022-03-15 PROCEDURE — 6370000000 HC RX 637 (ALT 250 FOR IP): Performed by: PHYSICIAN ASSISTANT

## 2022-03-15 PROCEDURE — 99239 HOSP IP/OBS DSCHRG MGMT >30: CPT | Performed by: INTERNAL MEDICINE

## 2022-03-15 PROCEDURE — 85027 COMPLETE CBC AUTOMATED: CPT

## 2022-03-15 PROCEDURE — 80048 BASIC METABOLIC PNL TOTAL CA: CPT

## 2022-03-15 PROCEDURE — 83735 ASSAY OF MAGNESIUM: CPT

## 2022-03-15 RX ORDER — ATORVASTATIN CALCIUM 80 MG/1
80 TABLET, FILM COATED ORAL NIGHTLY
Qty: 30 TABLET | Refills: 3 | Status: SHIPPED | OUTPATIENT
Start: 2022-03-15 | End: 2022-09-15

## 2022-03-15 RX ORDER — DIPHENHYDRAMINE HYDROCHLORIDE AND LIDOCAINE HYDROCHLORIDE AND ALUMINUM HYDROXIDE AND MAGNESIUM HYDRO
5 KIT 4 TIMES DAILY PRN
Qty: 200 ML | Refills: 0 | Status: SHIPPED | OUTPATIENT
Start: 2022-03-15 | End: 2022-03-25

## 2022-03-15 RX ORDER — CARVEDILOL 12.5 MG/1
12.5 TABLET ORAL 2 TIMES DAILY WITH MEALS
Qty: 60 TABLET | Refills: 3 | Status: SHIPPED | OUTPATIENT
Start: 2022-03-15 | End: 2022-09-15

## 2022-03-15 RX ORDER — SENNA AND DOCUSATE SODIUM 50; 8.6 MG/1; MG/1
1 TABLET, FILM COATED ORAL 2 TIMES DAILY
COMMUNITY
End: 2022-09-15

## 2022-03-15 RX ORDER — PANTOPRAZOLE SODIUM 40 MG/1
40 TABLET, DELAYED RELEASE ORAL
Qty: 30 TABLET | Refills: 3 | Status: SHIPPED | OUTPATIENT
Start: 2022-03-16 | End: 2022-09-15

## 2022-03-15 RX ORDER — LACTOBACILLUS RHAMNOSUS GG 10B CELL
1 CAPSULE ORAL
Qty: 30 CAPSULE | Refills: 0 | Status: SHIPPED | OUTPATIENT
Start: 2022-03-16 | End: 2022-09-15

## 2022-03-15 RX ORDER — ASPIRIN 81 MG/1
81 TABLET ORAL DAILY
Status: ON HOLD | COMMUNITY
End: 2022-03-15 | Stop reason: HOSPADM

## 2022-03-15 RX ORDER — HYDRALAZINE HYDROCHLORIDE 25 MG/1
25 TABLET, FILM COATED ORAL EVERY 8 HOURS PRN
Qty: 90 TABLET | Refills: 3 | Status: SHIPPED | OUTPATIENT
Start: 2022-03-15 | End: 2022-09-15

## 2022-03-15 RX ORDER — DIPHENHYDRAMINE HYDROCHLORIDE AND LIDOCAINE HYDROCHLORIDE AND ALUMINUM HYDROXIDE AND MAGNESIUM HYDRO
5 KIT 4 TIMES DAILY PRN
Status: DISCONTINUED | OUTPATIENT
Start: 2022-03-15 | End: 2022-03-15 | Stop reason: HOSPADM

## 2022-03-15 RX ORDER — METRONIDAZOLE 500 MG/1
500 TABLET ORAL EVERY 8 HOURS SCHEDULED
Qty: 21 TABLET | Refills: 0 | Status: SHIPPED | OUTPATIENT
Start: 2022-03-15 | End: 2022-03-22

## 2022-03-15 RX ADMIN — ALPRAZOLAM 0.25 MG: 0.25 TABLET ORAL at 03:43

## 2022-03-15 RX ADMIN — GABAPENTIN 300 MG: 300 CAPSULE ORAL at 14:50

## 2022-03-15 RX ADMIN — NYSTATIN 500000 UNITS: 100000 SUSPENSION ORAL at 08:23

## 2022-03-15 RX ADMIN — CARVEDILOL 12.5 MG: 12.5 TABLET, FILM COATED ORAL at 08:23

## 2022-03-15 RX ADMIN — PIPERACILLIN AND TAZOBACTAM 3375 MG: 3; .375 INJECTION, POWDER, FOR SOLUTION INTRAVENOUS at 13:19

## 2022-03-15 RX ADMIN — PANTOPRAZOLE SODIUM 40 MG: 40 TABLET, DELAYED RELEASE ORAL at 05:51

## 2022-03-15 RX ADMIN — HYDRALAZINE HYDROCHLORIDE 25 MG: 25 TABLET, FILM COATED ORAL at 01:12

## 2022-03-15 RX ADMIN — CYCLOSPORINE 75 MG: 25 CAPSULE, LIQUID FILLED ORAL at 05:50

## 2022-03-15 RX ADMIN — ASPIRIN 325 MG: 325 TABLET, DELAYED RELEASE ORAL at 08:23

## 2022-03-15 RX ADMIN — PIPERACILLIN AND TAZOBACTAM 3375 MG: 3; .375 INJECTION, POWDER, FOR SOLUTION INTRAVENOUS at 05:47

## 2022-03-15 RX ADMIN — METRONIDAZOLE 500 MG: 500 TABLET ORAL at 14:50

## 2022-03-15 RX ADMIN — NYSTATIN 500000 UNITS: 100000 SUSPENSION ORAL at 13:13

## 2022-03-15 RX ADMIN — DIPHENHYDRAMINE HYDROCHLORIDE AND LIDOCAINE HYDROCHLORIDE AND ALUMINUM HYDROXIDE AND MAGNESIUM HYDRO 5 ML: KIT at 13:12

## 2022-03-15 RX ADMIN — INSULIN LISPRO 1 UNITS: 100 INJECTION, SOLUTION INTRAVENOUS; SUBCUTANEOUS at 11:04

## 2022-03-15 RX ADMIN — MYCOPHENOLATE MOFETIL 500 MG: 250 CAPSULE ORAL at 08:23

## 2022-03-15 RX ADMIN — METRONIDAZOLE 500 MG: 500 TABLET ORAL at 05:51

## 2022-03-15 RX ADMIN — GABAPENTIN 300 MG: 300 CAPSULE ORAL at 08:24

## 2022-03-15 RX ADMIN — Medication 1 CAPSULE: at 08:23

## 2022-03-15 NOTE — DISCHARGE SUMMARY
Discharge Summary      Patient ID: Ramesh Clemens      Patient's PCP: Javier Munoz MD    Admit Date: 3/12/2022     Discharge Date:  3/15/2022    Admitting Provider: Javier Munoz MD    Discharging Provider: LINCOLN Mullins     Reason for this admission:   Gastroenteritis    Discharge Diagnoses: Active Hospital Problems    Diagnosis Date Noted    Gastroenteritis [K52.9] 03/13/2022    Hypomagnesemia [E83.42] 03/13/2022    Nausea vomiting and diarrhea [R11.2, R19.7] 03/13/2022    Dysarthria [R47.1] 03/13/2022    Hypertensive urgency [I16.0] 03/13/2022    Chronic pain [G89.29] 03/13/2022    Falls [W19. XXXA] 03/13/2022    Left lateral ankle pain [M25.572] 03/13/2022    History of cirrhosis [Z87.19] 03/13/2022    Oral thrush [B37.0] 03/13/2022    Declining functional status [R53.81] 03/13/2022    History of atrial fibrillation [Z86.79] 03/13/2022    Anemia [D64.9] 03/13/2022    Chronic renal failure, stage 3 (moderate) ([address] [N18.30] 11/04/2021    Liver transplant recipient [facility]) [Z94.4] 11/04/2021    Depression with anxiety [F41.8] 04/23/2018    Diffuse abdominal pain [R10.84] 04/23/2018    Type 2 diabetes mellitus, without long-term current use of insulin ([facility] [address] [E11.9] 03/13/2014    General weakness [R53.1] 03/13/2014       Procedures:  MRI BRAIN WO CONTRAST   Final Result      1. No acute intracranial abnormality or mass effect. 2. Mild to moderate burden scattered T2 hyperintense white matter disease which are nonspecific, may be attributed to chronic microvascular ischemia. CTA NECK W WO CONTRAST   Final Result      1. Mixed atherosclerotic plaque at the left common carotid bifurcation with approximately 30% stenosis of the origin of the left internal carotid artery. 2. Mixed atherosclerotic plaque at the right common carotid bifurcation without significant stenosis of the right carotid artery. 3. Normal left vertebral artery.    4. Patent right vertebral artery with severe stenosis at its origin. PROCEDURE: CT ANGIOGRAPHY HEAD WITH/WITHOUT CONTRAST      INDICATION: speech changes, evaluate for stroke      COMPARISON: CT head 3/13/2022. TECHNIQUE: Axial CT imaging obtained through the head prior to and following administration of IV contrast. Axial images, multiplanar reformatted images, and maximum intensity projection images were reviewed for CT angiographic technique. IV contrast: 100 mL of Isovue-370. FINDINGS:      ANTERIOR CIRCULATION: The intracranial internal carotid arteries, anterior cerebral arteries, and middle cerebral arteries demonstrate no occlusion or stenosis. No evidence for aneurysm or arteriovenous malformation. POSTERIOR CIRCULATION: Distal right vertebral artery is patent and smaller in caliber than the left vertebral artery. There is moderate stenosis of the distal right vertebral artery just prior to joining the left vertebral artery to form the basilar    artery. The left vertebral artery, basilar artery and posterior cerebral arteries demonstrate no occlusion or stenosis. No evidence for aneurysm or arteriovenous malformation. INTRACRANIAL VENOUS SYSTEM: No evidence for intracranial venous thrombosis. INTRACRANIAL HEMORRHAGE: None. VENTRICLES: Normal in size and configuration for age. BRAIN PARENCHYMA: Gray-white matter differentiation is normal. No intracranial mass effect. SKULL: No destructive osseous process or fracture. PARANASAL SINUSES / MASTOIDS: No acute sinusitis or mastoiditis. ORBITS: Normal.      EXTRACRANIAL SOFT TISSUES: Normal.      OTHER: None. IMPRESSION:      1. Patent intracranial vessels. 2. Moderate stenosis of the distal right vertebral artery with patency of both distal vertebral arteries and the basilar artery. CTA HEAD W WO CONTRAST   Final Result      1.  Mixed atherosclerotic plaque at the left common carotid bifurcation with approximately 30% stenosis of the origin of the left internal carotid artery. 2. Mixed atherosclerotic plaque at the right common carotid bifurcation without significant stenosis of the right carotid artery. 3. Normal left vertebral artery. 4. Patent right vertebral artery with severe stenosis at its origin. PROCEDURE: CT ANGIOGRAPHY HEAD WITH/WITHOUT CONTRAST      INDICATION: speech changes, evaluate for stroke      COMPARISON: CT head 3/13/2022. TECHNIQUE: Axial CT imaging obtained through the head prior to and following administration of IV contrast. Axial images, multiplanar reformatted images, and maximum intensity projection images were reviewed for CT angiographic technique. IV contrast: 100 mL of Isovue-370. FINDINGS:      ANTERIOR CIRCULATION: The intracranial internal carotid arteries, anterior cerebral arteries, and middle cerebral arteries demonstrate no occlusion or stenosis. No evidence for aneurysm or arteriovenous malformation. POSTERIOR CIRCULATION: Distal right vertebral artery is patent and smaller in caliber than the left vertebral artery. There is moderate stenosis of the distal right vertebral artery just prior to joining the left vertebral artery to form the basilar    artery. The left vertebral artery, basilar artery and posterior cerebral arteries demonstrate no occlusion or stenosis. No evidence for aneurysm or arteriovenous malformation. INTRACRANIAL VENOUS SYSTEM: No evidence for intracranial venous thrombosis. INTRACRANIAL HEMORRHAGE: None. VENTRICLES: Normal in size and configuration for age. BRAIN PARENCHYMA: Gray-white matter differentiation is normal. No intracranial mass effect. SKULL: No destructive osseous process or fracture. PARANASAL SINUSES / MASTOIDS: No acute sinusitis or mastoiditis. ORBITS: Normal.      EXTRACRANIAL SOFT TISSUES: Normal.      OTHER: None. IMPRESSION:      1. Patent intracranial vessels.      2. Moderate stenosis of the distal right vertebral artery with patency of both distal vertebral arteries and the basilar artery. CT HEAD WO CONTRAST   Final Result      No acute intracranial findings. XR CHEST PORTABLE   Final Result      Ill defined increased density in the bilateral lower lungs, with small right effusion. XR ANKLE LEFT (2 VIEWS)   Final Result      Nondisplaced fracture of the distal fibula cannot be excluded. Correlate clinically. No other acute findings. CT ABDOMEN PELVIS WO CONTRAST Additional Contrast? None   Final Result      1. Long segment of small bowel wall thickening in the midabdomen suggesting nonspecific enteritis. Clinical correlation recommended. 2. Splenomegaly and paraesophageal varices compatible with portal venous hypertension. 3. Chronic scarring in the lower lungs with chronic atelectasis. Consults:   IP CONSULT TO DIETITIAN  PT OT  Case management  Pharmacy      Briefly:   43-year-old female with multiple comorbidities admitted for acute gastroenteritis, frequent falls and generalized weakness. Hospital course complicated by possible TIA. Hospital course:  43-year-old female with past medical history of Kati Hopes cirrhosis status post liver transplant in 2020, uncontrolled hypertension, diabetes mellitus type 2, CKD 3, anemia, atrial fibrillation who was admitted for acute gastroenteritis, frequent falls and generalized weakness. Hospital course complicated by episode of slurred speech and facial numbness on 3/14. Stat CT head without acute findings.   Stat CT neck with mixed atherosclerotic plaque at the left common carotid bifurcation with approximately 30% stenosis at the origin of the left internal carotid artery, mixed atherosclerotic plaque at the right common carotid bifurcation without significant stenosis of the right carotid artery, normal left vertebral artery, patent right vertebral artery with severe stenosis at its origin. Stat CTA head with patent intracranial vessels, moderate stenosis of the distal right vertebral artery with patency of both distal vertebral arteries and the basilar artery. Case was discussed with [facility] stroke navigator. Per vascular interventionalists, Dr. Cynthia Tipton, patient did not require immediate intervention. Her home aspirin was increased from 81 mg daily to 325 mg daily. Stroke team also recommended goal SBP less than 180. MRI brain was obtained on 3/14 and negative for any acute findings. Since her neurologic symptoms have resolved and there is no immediate need for transfer, outpatient appointment will be arranged with neurology. For patient's GI symptoms, those have improved and she is now tolerating a regular diet. Patient feeling much better and eager to be discharged home today. Active Hospital Problems    Diagnosis Date Noted    Gastroenteritis [K52.9] 03/13/2022    Hypomagnesemia [E83.42] 03/13/2022    Nausea vomiting and diarrhea [R11.2, R19.7] 03/13/2022    Dysarthria [R47.1] 03/13/2022    Hypertensive urgency [I16.0] 03/13/2022    Chronic pain [G89.29] 03/13/2022    Falls [W19. XXXA] 03/13/2022    Left lateral ankle pain [M25.572] 03/13/2022    History of cirrhosis [Z87.19] 03/13/2022    Oral thrush [B37.0] 03/13/2022    Declining functional status [R53.81] 03/13/2022    History of atrial fibrillation [Z86.79] 03/13/2022    Anemia [D64.9] 03/13/2022    Chronic renal failure, stage 3 (moderate) ([address] [N18.30] 11/04/2021    Liver transplant recipient [facility]) [Z94.4] 11/04/2021    Depression with anxiety [F41.8] 04/23/2018    Diffuse abdominal pain [R10.84] 04/23/2018    Type 2 diabetes mellitus, without long-term current use of insulin ([address] [E11.9] 03/13/2014    General weakness [R53.1] 03/13/2014       Disposition: home    Discharged Condition: Stable    Vital Signs  Temp: 98.2 °F (36.8 °C)  Pulse: 61  Resp: 18  BP: (!) 154/78  SpO2: 98 %  O2 Device: None (Room air)       Vital signs reviewed in electronic chart. Physical exam  Constitutional:  Well developed, well nourished, no acute distress. Eyes:  PERRL, conjunctiva normal, sclera without icterus. HENT:  Atraumatic, external ears normal, nose normal, oropharynx moist, no pharyngeal exudates. Neck- supple, no JVD, no lymphadenopathy. Respiratory:  No respiratory distress, no wheezing, rales or rhonchi detected. Cardiovascular:  Normal rate, normal rhythm, no murmurs, no gallops, no rubs. GI:  Soft, nondistended, normal bowel sounds, nontender, no hepatosplenomegaly appreciated. Musculoskeletal:  No edema, cyanosis or obvious acute deformity. Moving all extremities. Integument:  Warm and dry. No rash. Neurologic:  Alert & oriented x 3, no apparent focal deficits noted. Psychiatric:  Speech and behavior appropriate. Activity: activity as tolerated  Diet: diabetic diet, low sodium, cardiac  Follow Up: Primary Care Physician in 1 week and with [facility] Stroke Team    Labs:  For convenience and continuity at follow-up the following most recent labs are provided:    CBC:   Lab Results   Component Value Date    WBC 3.8 03/15/2022    HGB 10.5 03/15/2022    HCT 30.2 03/15/2022    HCT 44.3 06/01/2012     03/15/2022     06/01/2012       RENAL:   Lab Results   Component Value Date     03/15/2022    K 3.6 03/15/2022    K 4.0 03/12/2022     03/15/2022    CL 99.0 06/01/2012    CO2 21 03/15/2022    BUN 24 03/15/2022    CREATININE 1.4 03/15/2022    CREATININE 0.9 06/01/2012         Discharge Medications:     Current Discharge Medication List           Details   lactobacillus (CULTURELLE) capsule Take 1 capsule by mouth daily (with breakfast)  Qty: 30 capsule, Refills: 0      carvedilol (COREG) 12.5 MG tablet Take 1 tablet by mouth 2 times daily (with meals)  Qty: 60 tablet, Refills: 3      metroNIDAZOLE (FLAGYL) 500 MG tablet Take 1 tablet by mouth every 8 hours for 7 days  Qty: 21 tablet, Refills: 0      DPH-Lido-AlHydr-MgHydr-Simeth (MAGIC MOUTHWASH) SUSP Swish and spit 5 mLs 4 times daily as needed for Irritation  Qty: 200 mL, Refills: 0      nystatin (MYCOSTATIN) 664040 UNIT/ML suspension Take 5 mLs by mouth 4 times daily for 31 doses  Qty: 155 mL, Refills: 0      pantoprazole (PROTONIX) 40 MG tablet Take 1 tablet by mouth every morning (before breakfast)  Qty: 30 tablet, Refills: 3      hydrALAZINE (APRESOLINE) 25 MG tablet Take 1 tablet by mouth every 8 hours as needed (systolic >135)  Qty: 90 tablet, Refills: 3              Details   aspirin 325 MG EC tablet Take 1 tablet by mouth daily  Qty: 30 tablet, Refills: 3      atorvastatin (LIPITOR) 80 MG tablet Take 1 tablet by mouth nightly  Qty: 30 tablet, Refills: 3              Details   sennosides-docusate sodium (SENOKOT-S) 8.6-50 MG tablet Take 1 tablet by mouth in the morning and at bedtime      ALPRAZolam (XANAX) 0.25 MG tablet TAKE 1 TABLET BY MOUTH THREE TIMES DAILY AS NEEDED - MUST LAST 30 DAYS      promethazine (PHENERGAN) 12.5 MG tablet Take 12.5 mg by mouth every 6 hours as needed      gabapentin (NEURONTIN) 300 MG capsule Take 300 mg by mouth 4 times daily. mycophenolate (CELLCEPT) 250 MG capsule Take 500 mg by mouth 2 times daily      cycloSPORINE modified (NEORAL) 25 MG capsule Take 3 capsules every morning and take 4 capsules every night      torsemide (DEMADEX) 20 MG tablet Take 10 mg by mouth daily       vitamin D (ERGOCALCIFEROL) 1.25 MG (54821 UT) CAPS capsule TAKE ONE CAPSULE BY MOUTH ONCE A WEEK. Patient was seen and examined by Dr. Betty Donnelly and plan of care reviewed. Treatment plan was formulated collaboratively. Signed:  Electronically signed by LINCOLN Andrew on 3/15/2022 at 1:50 PM       Thank you Nando Ferrer MD for the opportunity to be involved in this patient's care. If you have any questions or concerns please feel free to contact me at [phone].

## 2022-03-15 NOTE — PROGRESS NOTES
Speech Language Pathology  Facility/Department: [facility] MED SURG  Dysphagia Daily Treatment Note    NAME: Shirley Cox  : 1955  MRN: [id number]    Patient Diagnosis(es):   Patient Active Problem List    Diagnosis Date Noted    Gastroenteritis 2022    Hypomagnesemia 2022    Nausea vomiting and diarrhea 2022    Dysarthria 2022    Hypertensive urgency 2022    Chronic pain 2022    Falls 2022    Left lateral ankle pain 2022    History of cirrhosis 2022    Oral thrush 2022    Declining functional status 2022    History of atrial fibrillation 2022    Anemia 2022    Liver transplant recipient [facility]) 2021    Chronic renal failure, stage 3 (moderate) ([facility] [address] 2021    Pre-transplant evaluation for liver transplant 2020    Diffuse abdominal pain 2018    Depression with anxiety 2018    Cirrhosis of liver with ascites ([facility] [address] 2018    Diarrhea 2018    Other ascites 2018    Chronic bilateral low back pain with bilateral sciatica 2018    Elevated LFTs 2018    Weight loss 2018    Essential hypertension 2015    Type 2 diabetes mellitus, without long-term current use of insulin ([facility] [address] 2014    Sleep apnea 2014    Menopause 2014    General weakness 2014    IIH (idiopathic intracranial hypertension) 2014     Allergies: Allergies   Allergen Reactions    Lisinopril Other (See Comments)     Chest pain    Ciprofloxacin Other (See Comments)     AMS    Dye [Iodides]      Onset Date:  3/14/2022  Date of Service:3/15/2022  Current Diet Level:    Solid Consistency: Minced and Moist (Dysphagia II)  Liquid Consistency: Thin liquids  Pain:  Pain Assessment  Pain Level: 10  Pain Location: Abdomen  Pain Orientation: Right    Diet Tolerance:  Patient tolerating current diet level with puree with thin liquids with no signs of penetration/aspiration or oral dysphagia. P.O. Trials: Thin   yes No overt signs of aspiration   Nectar       Honey       Puree   yes No signs of oral dysphagia or overt signs of aspiration   Solid   yes Patient demonstrates with decreased breakdown of bolus with soft cookie and regular po trials     Compensatory Swallowing Strategies  Compensatory Swallowing Strategies: Alternate solids and liquids;Small bites/sips; Remain upright for 30-45 minutes after meals;Upright as possible for all oral intake     Treatment/Goals  Short-term Goals  Timeframe for Short-term Goals: 2 weeks     Goal 1: Patient will tolerate various po trials with improved oral manipulation with no oral residue or overt signs of aspiration 10/10x     Goal 2: Patient will perform lingual exercises for improved oral motor strength and ROM 10/10 x     Long-term Goals  Timeframe for Long-term Goals: 4 weeks     Goal 1: Patient will tolerate least restrictive diet with no signs of aspiration or oral dysphagia 100% accuracy    Impressions:  Patient seen for ST services upright in bed. Patient complaints of upper gum pain. ST assessed patient mouth, it appeared that patient had a cut to her upper gum. Patient was callled into room to assess patient. Patient trialed with soft cookie, patient demonstrates with tongue mashing and prolonged mastication with no oral residue observed or signs of aspiration. Patient complaints that \"it hurts to chew so I smash it up with my tongue and gums\". Patient trialed with crackers in her soup upon her request, which resulted in patient spitting out crackers due to complaints she could not \"chew them up\". Patient education with diet waiver if she refused ST diet recommendation. Patient expressed she agreed with ST diet recommendations. Patient changed to Dysphagia minced and moist. Education with dietary with diet change. Plan:  Continued daily Dysphagia treatment with goals per  plan of care.          Teresa Fry

## 2022-03-15 NOTE — PLAN OF CARE
Problem: Falls - Risk of:  Goal: Will remain free from falls  Description: Will remain free from falls  3/15/2022 1426 by Grace Clemens RN  Outcome: Completed  3/15/2022 1020 by Grace Clemens RN  Outcome: Met This Shift  Goal: Absence of physical injury  Description: Absence of physical injury  Outcome: Completed     Problem: Skin Integrity:  Goal: Will show no infection signs and symptoms  Description: Will show no infection signs and symptoms  Outcome: Completed  Goal: Absence of new skin breakdown  Description: Absence of new skin breakdown  Outcome: Completed     Problem: Pain:  Goal: Pain level will decrease  Description: Pain level will decrease  Outcome: Completed  Goal: Control of acute pain  Description: Control of acute pain  Outcome: Completed  Goal: Control of chronic pain  Description: Control of chronic pain  Outcome: Completed

## 2022-03-15 NOTE — FLOWSHEET NOTE
03/15/22 0825   Assessment   Charting Type Shift assessment   Neurological   Neuro (WDL) WDL   NIHSS Stroke Scale   Interval Reassessment   Level of Consciousness (1a. ) 0   LOC Questions (1b. ) 0   LOC Commands (1c. ) 0   Best Gaze (2. ) 0   Visual (3. ) 0   Facial Palsy (4. ) 0   Motor Arm, Left (5a. ) 0   Motor Arm, Right (5b. ) 0   Motor Leg, Left (6a. ) 0   Motor Leg, Right (6b. ) 0   Limb Ataxia (7. ) 0   Sensory (8. ) 0   Best Language (9. ) 0   Dysarthria (10. ) 0   HEENT   HEENT (WDL) X   Right Eye Impaired vision   Left Eye Impaired vision   Teeth Dentures upper   Respiratory   Respiratory (WDL) WDL   Cardiac   Cardiac (WDL) WDL   Cardiac Monitor   Telemetry Monitor On Yes   Telemetry Audible Yes   Telemetry Alarms Set Yes   Gastrointestinal   Abdominal (WDL) X   Abdomen Inspection Soft   Tenderness Soft   RUQ Bowel Sounds Active   LUQ Bowel Sounds Active   RLQ Bowel Sounds Active   LLQ Bowel Sounds Active   Peripheral Vascular   Peripheral Vascular (WDL) WDL   Edema None   RLE Neurovascular Assessment   R Pedal Pulse +2   LLE Neurovascular Assessment   L Pedal Pulse +2   Skin Color/Condition   Skin Color/Condition (WDL) X   Skin Color Pale   Skin Condition/Temp Dry; Warm   Skin Integrity   Skin Integrity (WDL) X   Skin Integrity Bruising   Location scattered   Preventative Dressing No   Musculoskeletal   Musculoskeletal (WDL) X   RUE Full movement   LUE Full movement   RL Extremity Weakness   LL Extremity Weakness   Genitourinary   Genitourinary (WDL) WDL   Psychosocial   Psychosocial (WDL) WDL   Mostly refusing pureed diet.

## 2022-03-16 ENCOUNTER — APPOINTMENT (OUTPATIENT)
Dept: CT IMAGING | Facility: HOSPITAL | Age: 67
DRG: 392 | End: 2022-03-16
Payer: MEDICARE

## 2022-03-16 ENCOUNTER — HOSPITAL ENCOUNTER (INPATIENT)
Facility: HOSPITAL | Age: 67
LOS: 6 days | Discharge: ANOTHER HOSPITAL | DRG: 392 | End: 2022-03-22
Attending: EMERGENCY MEDICINE | Admitting: INTERNAL MEDICINE
Payer: MEDICARE

## 2022-03-16 DIAGNOSIS — E87.6 HYPOKALEMIA: ICD-10-CM

## 2022-03-16 DIAGNOSIS — R53.1 GENERAL WEAKNESS: ICD-10-CM

## 2022-03-16 DIAGNOSIS — G45.9 TIA (TRANSIENT ISCHEMIC ATTACK): ICD-10-CM

## 2022-03-16 DIAGNOSIS — R11.2 INTRACTABLE VOMITING WITH NAUSEA, UNSPECIFIED VOMITING TYPE: ICD-10-CM

## 2022-03-16 DIAGNOSIS — K52.9 ENTERITIS: ICD-10-CM

## 2022-03-16 DIAGNOSIS — R10.84 GENERALIZED ABDOMINAL PAIN: Primary | ICD-10-CM

## 2022-03-16 DIAGNOSIS — E86.0 DEHYDRATION: ICD-10-CM

## 2022-03-16 PROBLEM — N39.0 UTI (URINARY TRACT INFECTION): Status: ACTIVE | Noted: 2022-03-16

## 2022-03-16 LAB
A/G RATIO: 2 (ref 0.8–2)
ALBUMIN SERPL-MCNC: 4.1 G/DL (ref 3.4–4.8)
ALP BLD-CCNC: 71 U/L (ref 25–100)
ALT SERPL-CCNC: <5 U/L (ref 4–36)
ANION GAP SERPL CALCULATED.3IONS-SCNC: 11 MMOL/L (ref 3–16)
AST SERPL-CCNC: 10 U/L (ref 8–33)
BACTERIA: ABNORMAL /HPF
BASOPHILS ABSOLUTE: 0 K/UL (ref 0–0.1)
BASOPHILS RELATIVE PERCENT: 0.2 %
BILIRUB SERPL-MCNC: 1.5 MG/DL (ref 0.3–1.2)
BILIRUBIN URINE: NEGATIVE
BLOOD, URINE: ABNORMAL
BUN BLDV-MCNC: 17 MG/DL (ref 6–20)
CALCIUM SERPL-MCNC: 9 MG/DL (ref 8.5–10.5)
CHLORIDE BLD-SCNC: 105 MMOL/L (ref 98–107)
CLARITY: CLEAR
CO2: 20 MMOL/L (ref 20–30)
COLOR: YELLOW
CREAT SERPL-MCNC: 1 MG/DL (ref 0.4–1.2)
EOSINOPHILS ABSOLUTE: 0 K/UL (ref 0–0.4)
EOSINOPHILS RELATIVE PERCENT: 0.6 %
EPITHELIAL CELLS, UA: ABNORMAL /HPF (ref 0–5)
GFR AFRICAN AMERICAN: >59
GFR NON-AFRICAN AMERICAN: 56
GI BACTERIAL PATHOGENS BY PCR: NORMAL
GLOBULIN: 2.1 G/DL
GLUCOSE BLD-MCNC: 145 MG/DL (ref 74–106)
GLUCOSE URINE: 100 MG/DL
HCT VFR BLD CALC: 34.8 % (ref 37–47)
HEMOGLOBIN: 12.5 G/DL (ref 11.5–16.5)
IMMATURE GRANULOCYTES #: 0 K/UL
IMMATURE GRANULOCYTES %: 0.4 % (ref 0–5)
KETONES, URINE: 40 MG/DL
LACTIC ACID: 1.8 MMOL/L (ref 0.4–2)
LEUKOCYTE ESTERASE, URINE: NEGATIVE
LIPASE: 14 U/L (ref 5.6–51.3)
LYMPHOCYTES ABSOLUTE: 0.4 K/UL (ref 1.5–4)
LYMPHOCYTES RELATIVE PERCENT: 8.8 %
MAGNESIUM: 1.6 MG/DL (ref 1.7–2.4)
MCH RBC QN AUTO: 31.4 PG (ref 27–32)
MCHC RBC AUTO-ENTMCNC: 35.9 G/DL (ref 31–35)
MCV RBC AUTO: 87.4 FL (ref 80–100)
MICROSCOPIC EXAMINATION: YES
MONOCYTES ABSOLUTE: 0.2 K/UL (ref 0.2–0.8)
MONOCYTES RELATIVE PERCENT: 4.6 %
NEUTROPHILS ABSOLUTE: 4.1 K/UL (ref 2–7.5)
NEUTROPHILS RELATIVE PERCENT: 85.4 %
NITRITE, URINE: NEGATIVE
PDW BLD-RTO: 14.9 % (ref 11–16)
PH UA: 6 (ref 5–8)
PLATELET # BLD: 143 K/UL (ref 150–400)
PMV BLD AUTO: 10.8 FL (ref 6–10)
POTASSIUM REFLEX MAGNESIUM: 3.2 MMOL/L (ref 3.4–5.1)
PROTEIN UA: >=300 MG/DL
RBC # BLD: 3.98 M/UL (ref 3.8–5.8)
RBC UA: ABNORMAL /HPF (ref 0–4)
SODIUM BLD-SCNC: 136 MMOL/L (ref 136–145)
SPECIFIC GRAVITY UA: 1.02 (ref 1–1.03)
TOTAL PROTEIN: 6.2 G/DL (ref 6.4–8.3)
TROPONIN: <0.3 NG/ML
URINE REFLEX TO CULTURE: ABNORMAL
URINE TYPE: ABNORMAL
UROBILINOGEN, URINE: 0.2 E.U./DL
WBC # BLD: 4.8 K/UL (ref 4–11)
WBC UA: ABNORMAL /HPF (ref 0–5)
WHITE BLOOD CELLS (WBC), STOOL: NORMAL

## 2022-03-16 PROCEDURE — 99283 EMERGENCY DEPT VISIT LOW MDM: CPT

## 2022-03-16 PROCEDURE — 1200000000 HC SEMI PRIVATE

## 2022-03-16 PROCEDURE — 85025 COMPLETE CBC W/AUTO DIFF WBC: CPT

## 2022-03-16 PROCEDURE — 81001 URINALYSIS AUTO W/SCOPE: CPT

## 2022-03-16 PROCEDURE — 2580000003 HC RX 258: Performed by: EMERGENCY MEDICINE

## 2022-03-16 PROCEDURE — 84484 ASSAY OF TROPONIN QUANT: CPT

## 2022-03-16 PROCEDURE — 96365 THER/PROPH/DIAG IV INF INIT: CPT

## 2022-03-16 PROCEDURE — 83690 ASSAY OF LIPASE: CPT

## 2022-03-16 PROCEDURE — 96368 THER/DIAG CONCURRENT INF: CPT

## 2022-03-16 PROCEDURE — 80053 COMPREHEN METABOLIC PANEL: CPT

## 2022-03-16 PROCEDURE — 6360000002 HC RX W HCPCS: Performed by: EMERGENCY MEDICINE

## 2022-03-16 PROCEDURE — 83735 ASSAY OF MAGNESIUM: CPT

## 2022-03-16 PROCEDURE — 93005 ELECTROCARDIOGRAM TRACING: CPT

## 2022-03-16 PROCEDURE — 96375 TX/PRO/DX INJ NEW DRUG ADDON: CPT

## 2022-03-16 PROCEDURE — C9113 INJ PANTOPRAZOLE SODIUM, VIA: HCPCS | Performed by: EMERGENCY MEDICINE

## 2022-03-16 PROCEDURE — 36415 COLL VENOUS BLD VENIPUNCTURE: CPT

## 2022-03-16 PROCEDURE — 83605 ASSAY OF LACTIC ACID: CPT

## 2022-03-16 PROCEDURE — 74176 CT ABD & PELVIS W/O CONTRAST: CPT

## 2022-03-16 RX ORDER — MORPHINE SULFATE 4 MG/ML
4 INJECTION, SOLUTION INTRAMUSCULAR; INTRAVENOUS ONCE
Status: COMPLETED | OUTPATIENT
Start: 2022-03-16 | End: 2022-03-16

## 2022-03-16 RX ORDER — PANTOPRAZOLE SODIUM 40 MG/10ML
INJECTION, POWDER, LYOPHILIZED, FOR SOLUTION INTRAVENOUS
Status: DISPENSED
Start: 2022-03-16 | End: 2022-03-17

## 2022-03-16 RX ORDER — POTASSIUM CHLORIDE 7.45 MG/ML
10 INJECTION INTRAVENOUS ONCE
Status: COMPLETED | OUTPATIENT
Start: 2022-03-16 | End: 2022-03-17

## 2022-03-16 RX ORDER — 0.9 % SODIUM CHLORIDE 0.9 %
1000 INTRAVENOUS SOLUTION INTRAVENOUS ONCE
Status: COMPLETED | OUTPATIENT
Start: 2022-03-16 | End: 2022-03-16

## 2022-03-16 RX ORDER — 0.9 % SODIUM CHLORIDE 0.9 %
1000 INTRAVENOUS SOLUTION INTRAVENOUS ONCE
Status: COMPLETED | OUTPATIENT
Start: 2022-03-16 | End: 2022-03-17

## 2022-03-16 RX ADMIN — CEFTRIAXONE 1000 MG: 1 INJECTION, POWDER, FOR SOLUTION INTRAMUSCULAR; INTRAVENOUS at 23:29

## 2022-03-16 RX ADMIN — MORPHINE SULFATE 4 MG: 4 INJECTION, SOLUTION INTRAMUSCULAR; INTRAVENOUS at 20:53

## 2022-03-16 RX ADMIN — Medication 12.5 MG: at 20:55

## 2022-03-16 RX ADMIN — SODIUM CHLORIDE 1000 ML: 9 INJECTION, SOLUTION INTRAVENOUS at 20:52

## 2022-03-16 RX ADMIN — MORPHINE SULFATE 4 MG: 4 INJECTION, SOLUTION INTRAMUSCULAR; INTRAVENOUS at 23:30

## 2022-03-16 RX ADMIN — SODIUM CHLORIDE 40 MG: 9 INJECTION, SOLUTION INTRAVENOUS at 21:06

## 2022-03-16 RX ADMIN — Medication 12.5 MG: at 23:02

## 2022-03-16 RX ADMIN — SODIUM CHLORIDE 1000 ML: 9 INJECTION, SOLUTION INTRAVENOUS at 23:29

## 2022-03-16 ASSESSMENT — PAIN SCALES - GENERAL
PAINLEVEL_OUTOF10: 10
PAINLEVEL_OUTOF10: 10

## 2022-03-17 PROBLEM — K75.81 LIVER CIRRHOSIS SECONDARY TO NASH (HCC): Status: ACTIVE | Noted: 2018-04-23

## 2022-03-17 PROBLEM — G45.9 TIA (TRANSIENT ISCHEMIC ATTACK): Status: ACTIVE | Noted: 2022-03-17

## 2022-03-17 LAB
A/G RATIO: 1.8 (ref 0.8–2)
ALBUMIN SERPL-MCNC: 3.4 G/DL (ref 3.4–4.8)
ALP BLD-CCNC: 58 U/L (ref 25–100)
ALT SERPL-CCNC: <5 U/L (ref 4–36)
ANION GAP SERPL CALCULATED.3IONS-SCNC: 8 MMOL/L (ref 3–16)
AST SERPL-CCNC: 9 U/L (ref 8–33)
BILIRUB SERPL-MCNC: 0.9 MG/DL (ref 0.3–1.2)
BUN BLDV-MCNC: 13 MG/DL (ref 6–20)
CALCIUM SERPL-MCNC: 8.3 MG/DL (ref 8.5–10.5)
CHLORIDE BLD-SCNC: 109 MMOL/L (ref 98–107)
CO2: 21 MMOL/L (ref 20–30)
CREAT SERPL-MCNC: 0.9 MG/DL (ref 0.4–1.2)
GFR AFRICAN AMERICAN: >59
GFR NON-AFRICAN AMERICAN: >60
GLOBULIN: 1.9 G/DL
GLUCOSE BLD-MCNC: 85 MG/DL (ref 74–106)
HCT VFR BLD CALC: 29.8 % (ref 37–47)
HEMOGLOBIN: 10.3 G/DL (ref 11.5–16.5)
MCH RBC QN AUTO: 31.3 PG (ref 27–32)
MCHC RBC AUTO-ENTMCNC: 34.6 G/DL (ref 31–35)
MCV RBC AUTO: 90.6 FL (ref 80–100)
PDW BLD-RTO: 15.1 % (ref 11–16)
PLATELET # BLD: 127 K/UL (ref 150–400)
PMV BLD AUTO: 10.5 FL (ref 6–10)
POTASSIUM REFLEX MAGNESIUM: 3.7 MMOL/L (ref 3.4–5.1)
RBC # BLD: 3.29 M/UL (ref 3.8–5.8)
SODIUM BLD-SCNC: 138 MMOL/L (ref 136–145)
TOTAL PROTEIN: 5.3 G/DL (ref 6.4–8.3)
WBC # BLD: 4.2 K/UL (ref 4–11)

## 2022-03-17 PROCEDURE — 6370000000 HC RX 637 (ALT 250 FOR IP)

## 2022-03-17 PROCEDURE — 80053 COMPREHEN METABOLIC PANEL: CPT

## 2022-03-17 PROCEDURE — 80158 DRUG ASSAY CYCLOSPORINE: CPT

## 2022-03-17 PROCEDURE — 85027 COMPLETE CBC AUTOMATED: CPT

## 2022-03-17 PROCEDURE — 36415 COLL VENOUS BLD VENIPUNCTURE: CPT

## 2022-03-17 PROCEDURE — 1200000000 HC SEMI PRIVATE

## 2022-03-17 PROCEDURE — 97161 PT EVAL LOW COMPLEX 20 MIN: CPT

## 2022-03-17 PROCEDURE — 99222 1ST HOSP IP/OBS MODERATE 55: CPT | Performed by: INTERNAL MEDICINE

## 2022-03-17 PROCEDURE — 6360000002 HC RX W HCPCS: Performed by: PHYSICIAN ASSISTANT

## 2022-03-17 PROCEDURE — 97165 OT EVAL LOW COMPLEX 30 MIN: CPT

## 2022-03-17 PROCEDURE — 6370000000 HC RX 637 (ALT 250 FOR IP): Performed by: PHYSICIAN ASSISTANT

## 2022-03-17 PROCEDURE — 2580000003 HC RX 258: Performed by: PHYSICIAN ASSISTANT

## 2022-03-17 PROCEDURE — 93005 ELECTROCARDIOGRAM TRACING: CPT

## 2022-03-17 RX ORDER — ONDANSETRON 2 MG/ML
4 INJECTION INTRAMUSCULAR; INTRAVENOUS EVERY 6 HOURS PRN
Status: DISCONTINUED | OUTPATIENT
Start: 2022-03-17 | End: 2022-03-22 | Stop reason: HOSPADM

## 2022-03-17 RX ORDER — CARVEDILOL 12.5 MG/1
12.5 TABLET ORAL 2 TIMES DAILY WITH MEALS
Status: DISCONTINUED | OUTPATIENT
Start: 2022-03-17 | End: 2022-03-22 | Stop reason: HOSPADM

## 2022-03-17 RX ORDER — SODIUM CHLORIDE 9 MG/ML
INJECTION, SOLUTION INTRAVENOUS CONTINUOUS
Status: DISCONTINUED | OUTPATIENT
Start: 2022-03-17 | End: 2022-03-18

## 2022-03-17 RX ORDER — METRONIDAZOLE 500 MG/1
500 TABLET ORAL EVERY 8 HOURS SCHEDULED
Status: DISCONTINUED | OUTPATIENT
Start: 2022-03-17 | End: 2022-03-17

## 2022-03-17 RX ORDER — PANTOPRAZOLE SODIUM 40 MG/1
40 TABLET, DELAYED RELEASE ORAL
Status: DISCONTINUED | OUTPATIENT
Start: 2022-03-17 | End: 2022-03-22 | Stop reason: HOSPADM

## 2022-03-17 RX ORDER — MYCOPHENOLATE MOFETIL 250 MG/1
500 CAPSULE ORAL 2 TIMES DAILY
Status: DISCONTINUED | OUTPATIENT
Start: 2022-03-17 | End: 2022-03-22 | Stop reason: HOSPADM

## 2022-03-17 RX ORDER — POLYETHYLENE GLYCOL 3350 17 G/17G
17 POWDER, FOR SOLUTION ORAL DAILY PRN
Status: DISCONTINUED | OUTPATIENT
Start: 2022-03-17 | End: 2022-03-22 | Stop reason: HOSPADM

## 2022-03-17 RX ORDER — ACETAMINOPHEN 325 MG/1
650 TABLET ORAL EVERY 6 HOURS PRN
Status: DISCONTINUED | OUTPATIENT
Start: 2022-03-17 | End: 2022-03-22 | Stop reason: HOSPADM

## 2022-03-17 RX ORDER — LACTOBACILLUS RHAMNOSUS GG 10B CELL
1 CAPSULE ORAL
Status: DISCONTINUED | OUTPATIENT
Start: 2022-03-17 | End: 2022-03-22 | Stop reason: HOSPADM

## 2022-03-17 RX ORDER — POTASSIUM CHLORIDE 750 MG/1
20 CAPSULE, EXTENDED RELEASE ORAL ONCE
Status: COMPLETED | OUTPATIENT
Start: 2022-03-17 | End: 2022-03-17

## 2022-03-17 RX ORDER — ERGOCALCIFEROL 1.25 MG/1
50000 CAPSULE ORAL WEEKLY
Status: DISCONTINUED | OUTPATIENT
Start: 2022-03-17 | End: 2022-03-22 | Stop reason: HOSPADM

## 2022-03-17 RX ORDER — DIPHENHYDRAMINE HYDROCHLORIDE AND LIDOCAINE HYDROCHLORIDE AND ALUMINUM HYDROXIDE AND MAGNESIUM HYDRO
5 KIT 4 TIMES DAILY PRN
Status: DISCONTINUED | OUTPATIENT
Start: 2022-03-17 | End: 2022-03-22 | Stop reason: HOSPADM

## 2022-03-17 RX ORDER — CYCLOSPORINE 25 MG/1
100 CAPSULE, LIQUID FILLED ORAL NIGHTLY
Status: DISCONTINUED | OUTPATIENT
Start: 2022-03-17 | End: 2022-03-22 | Stop reason: HOSPADM

## 2022-03-17 RX ORDER — ATORVASTATIN CALCIUM 80 MG/1
80 TABLET, FILM COATED ORAL NIGHTLY
Status: DISCONTINUED | OUTPATIENT
Start: 2022-03-17 | End: 2022-03-22 | Stop reason: HOSPADM

## 2022-03-17 RX ORDER — CYCLOSPORINE 25 MG/1
75 CAPSULE, LIQUID FILLED ORAL EVERY MORNING
Status: DISCONTINUED | OUTPATIENT
Start: 2022-03-17 | End: 2022-03-22 | Stop reason: HOSPADM

## 2022-03-17 RX ORDER — HYDRALAZINE HYDROCHLORIDE 25 MG/1
25 TABLET, FILM COATED ORAL EVERY 8 HOURS PRN
Status: DISCONTINUED | OUTPATIENT
Start: 2022-03-17 | End: 2022-03-19

## 2022-03-17 RX ORDER — ALPRAZOLAM 0.25 MG/1
0.25 TABLET ORAL 3 TIMES DAILY PRN
Status: DISCONTINUED | OUTPATIENT
Start: 2022-03-17 | End: 2022-03-20

## 2022-03-17 RX ORDER — ONDANSETRON 4 MG/1
4 TABLET, ORALLY DISINTEGRATING ORAL EVERY 8 HOURS PRN
Status: DISCONTINUED | OUTPATIENT
Start: 2022-03-17 | End: 2022-03-22 | Stop reason: HOSPADM

## 2022-03-17 RX ORDER — ACETAMINOPHEN 650 MG/1
650 SUPPOSITORY RECTAL EVERY 6 HOURS PRN
Status: DISCONTINUED | OUTPATIENT
Start: 2022-03-17 | End: 2022-03-22 | Stop reason: HOSPADM

## 2022-03-17 RX ORDER — PROCHLORPERAZINE EDISYLATE 5 MG/ML
10 INJECTION INTRAMUSCULAR; INTRAVENOUS EVERY 6 HOURS
Status: DISCONTINUED | OUTPATIENT
Start: 2022-03-17 | End: 2022-03-18

## 2022-03-17 RX ORDER — ZINC OXIDE AND DIMETHICONE 120; 10 MG/G; MG/G
CREAM TOPICAL 2 TIMES DAILY PRN
Status: DISCONTINUED | OUTPATIENT
Start: 2022-03-17 | End: 2022-03-22 | Stop reason: HOSPADM

## 2022-03-17 RX ORDER — GABAPENTIN 300 MG/1
300 CAPSULE ORAL 4 TIMES DAILY
Status: DISCONTINUED | OUTPATIENT
Start: 2022-03-17 | End: 2022-03-22 | Stop reason: HOSPADM

## 2022-03-17 RX ORDER — MAGNESIUM SULFATE IN WATER 40 MG/ML
2000 INJECTION, SOLUTION INTRAVENOUS ONCE
Status: COMPLETED | OUTPATIENT
Start: 2022-03-17 | End: 2022-03-17

## 2022-03-17 RX ADMIN — PANTOPRAZOLE SODIUM 40 MG: 40 TABLET, DELAYED RELEASE ORAL at 06:17

## 2022-03-17 RX ADMIN — PROCHLORPERAZINE EDISYLATE 10 MG: 5 INJECTION INTRAMUSCULAR; INTRAVENOUS at 06:17

## 2022-03-17 RX ADMIN — GABAPENTIN 300 MG: 300 CAPSULE ORAL at 17:40

## 2022-03-17 RX ADMIN — GABAPENTIN 300 MG: 300 CAPSULE ORAL at 20:51

## 2022-03-17 RX ADMIN — POTASSIUM CHLORIDE 20 MEQ: 750 CAPSULE, EXTENDED RELEASE ORAL at 01:22

## 2022-03-17 RX ADMIN — PROCHLORPERAZINE EDISYLATE 10 MG: 5 INJECTION INTRAMUSCULAR; INTRAVENOUS at 11:58

## 2022-03-17 RX ADMIN — GABAPENTIN 300 MG: 300 CAPSULE ORAL at 08:00

## 2022-03-17 RX ADMIN — NYSTATIN 500000 UNITS: 500000 SUSPENSION ORAL at 17:38

## 2022-03-17 RX ADMIN — CARVEDILOL 12.5 MG: 12.5 TABLET, FILM COATED ORAL at 17:40

## 2022-03-17 RX ADMIN — METRONIDAZOLE 500 MG: 500 TABLET ORAL at 01:21

## 2022-03-17 RX ADMIN — GABAPENTIN 300 MG: 300 CAPSULE ORAL at 01:22

## 2022-03-17 RX ADMIN — NYSTATIN 500000 UNITS: 500000 SUSPENSION ORAL at 01:22

## 2022-03-17 RX ADMIN — ERGOCALCIFEROL 50000 UNITS: 1.25 CAPSULE ORAL at 08:01

## 2022-03-17 RX ADMIN — NYSTATIN 500000 UNITS: 500000 SUSPENSION ORAL at 08:00

## 2022-03-17 RX ADMIN — CYCLOSPORINE 75 MG: 25 CAPSULE, LIQUID FILLED ORAL at 10:33

## 2022-03-17 RX ADMIN — MYCOPHENOLATE MOFETIL 500 MG: 250 CAPSULE ORAL at 01:22

## 2022-03-17 RX ADMIN — GABAPENTIN 300 MG: 300 CAPSULE ORAL at 12:02

## 2022-03-17 RX ADMIN — NYSTATIN 500000 UNITS: 500000 SUSPENSION ORAL at 12:02

## 2022-03-17 RX ADMIN — MICONAZOLE NITRATE: 20 CREAM TOPICAL at 17:41

## 2022-03-17 RX ADMIN — CEFTRIAXONE 1000 MG: 1 INJECTION, POWDER, FOR SOLUTION INTRAMUSCULAR; INTRAVENOUS at 23:46

## 2022-03-17 RX ADMIN — PROCHLORPERAZINE EDISYLATE 10 MG: 5 INJECTION INTRAMUSCULAR; INTRAVENOUS at 17:40

## 2022-03-17 RX ADMIN — Medication 1 CAPSULE: at 08:00

## 2022-03-17 RX ADMIN — ONDANSETRON 4 MG: 4 TABLET, ORALLY DISINTEGRATING ORAL at 08:08

## 2022-03-17 RX ADMIN — MYCOPHENOLATE MOFETIL 500 MG: 250 CAPSULE ORAL at 08:00

## 2022-03-17 RX ADMIN — NYSTATIN 500000 UNITS: 500000 SUSPENSION ORAL at 21:30

## 2022-03-17 RX ADMIN — MAGNESIUM SULFATE HEPTAHYDRATE 2000 MG: 40 INJECTION, SOLUTION INTRAVENOUS at 02:22

## 2022-03-17 RX ADMIN — ASPIRIN 325 MG: 325 TABLET, COATED ORAL at 08:00

## 2022-03-17 RX ADMIN — SODIUM CHLORIDE: 9 INJECTION, SOLUTION INTRAVENOUS at 01:09

## 2022-03-17 RX ADMIN — PROCHLORPERAZINE EDISYLATE 10 MG: 5 INJECTION INTRAMUSCULAR; INTRAVENOUS at 01:19

## 2022-03-17 RX ADMIN — METRONIDAZOLE 500 MG: 500 TABLET ORAL at 06:17

## 2022-03-17 RX ADMIN — CARVEDILOL 12.5 MG: 12.5 TABLET, FILM COATED ORAL at 09:26

## 2022-03-17 RX ADMIN — SODIUM CHLORIDE: 9 INJECTION, SOLUTION INTRAVENOUS at 19:03

## 2022-03-17 RX ADMIN — POTASSIUM CHLORIDE 10 MEQ: 10 INJECTION, SOLUTION INTRAVENOUS at 01:08

## 2022-03-17 RX ADMIN — ATORVASTATIN CALCIUM 80 MG: 80 TABLET, FILM COATED ORAL at 20:51

## 2022-03-17 RX ADMIN — ENOXAPARIN SODIUM 40 MG: 100 INJECTION SUBCUTANEOUS at 08:01

## 2022-03-17 RX ADMIN — ATORVASTATIN CALCIUM 80 MG: 80 TABLET, FILM COATED ORAL at 01:22

## 2022-03-17 RX ADMIN — ACETAMINOPHEN 650 MG: 325 TABLET, FILM COATED ORAL at 08:08

## 2022-03-17 RX ADMIN — CYCLOSPORINE 100 MG: 25 CAPSULE, LIQUID FILLED ORAL at 20:49

## 2022-03-17 ASSESSMENT — PAIN SCALES - GENERAL
PAINLEVEL_OUTOF10: 9
PAINLEVEL_OUTOF10: 8

## 2022-03-17 ASSESSMENT — PAIN DESCRIPTION - FREQUENCY
FREQUENCY: OTHER (COMMENT)
FREQUENCY_4: INTERMITTENT

## 2022-03-17 ASSESSMENT — PAIN DESCRIPTION - ONSET
ONSET_4: OTHER (COMMENT)
ONSET: SUDDEN

## 2022-03-17 ASSESSMENT — PAIN - FUNCTIONAL ASSESSMENT: PAIN_FUNCTIONAL_ASSESSMENT: ACTIVITIES ARE NOT PREVENTED

## 2022-03-17 ASSESSMENT — PAIN DESCRIPTION - INTENSITY: RATING_4: 4

## 2022-03-17 ASSESSMENT — PAIN DESCRIPTION - PROGRESSION
CLINICAL_PROGRESSION_4: NOT CHANGED
CLINICAL_PROGRESSION: RAPIDLY IMPROVING

## 2022-03-17 ASSESSMENT — PAIN DESCRIPTION - PAIN TYPE: TYPE: ACUTE PAIN

## 2022-03-17 ASSESSMENT — PAIN DESCRIPTION - LOCATION
LOCATION_4: BREAST
LOCATION: NECK
LOCATION: ABDOMEN

## 2022-03-17 ASSESSMENT — PAIN DESCRIPTION - DESCRIPTORS
DESCRIPTORS: STABBING
DESCRIPTORS_4: STABBING

## 2022-03-17 ASSESSMENT — PAIN DESCRIPTION - ORIENTATION
ORIENTATION_4: RIGHT
ORIENTATION: RIGHT

## 2022-03-17 NOTE — ACP (ADVANCE CARE PLANNING)
Advance Care Planning     General Advance Care Planning (ACP) Conversation    Date of Conversation: 3/17/2022  Conducted with: Patient with Decision Making Capacity    Healthcare Decision Maker:    Primary Decision Maker: Tracey Pool - Spouse - [phone]  Click here to complete Healthcare Decision Makers including selection of the Healthcare Decision Maker Relationship (ie \"Primary\"). Today we documented Decision Maker(s) consistent with Legal Next of Kin hierarchy. Content/Action Overview:   Has ACP document(s) on file - reflects the patient's care preferences  Reviewed DNR/DNI and patient elects Full Code (Attempt Resuscitation)        Length of Voluntary ACP Conversation in minutes:  <16 minutes (Non-Billable)    DEXTER Alcantara Intern

## 2022-03-17 NOTE — PROGRESS NOTES
Pt transported by JOSE Mccoy onto unit at this time. Pt appears in no acute distress. Pt assisted to bed, VSS, will continue to monitor Pt.

## 2022-03-17 NOTE — H&P
History and Physical    Patient:  Nazia Linder    CHIEF COMPLAINT:    Intractable nausea and vomiting    HISTORY OF PRESENT ILLNESS:   The patient is a 73 y.o. female with PMH of REYEZ cirrhosis s/p liver transplant (10/24/20), esophageal varices s/p banding (10/14/20), chronic nausea/vomiting/abd pain/diarrhea, splenomegaly, uncontrolled HTN, CKD3, anemia, atrial fibrillation and recent TIA who presented to the ER with complaints of nausea, vomiting and diffuse abdominal pain. Pt was recently admitted to this facility (3/12 - 3/15) with same complaint. CT A/P obtained that admission revealed colitis. She was treated with IVFs, zosyn, flagyl and PRN anti-emetics. C. difficile and stool cultures were negative. She improved with treatment and was able to tolerate a regular diet. She was then discharged on flagyl. Pt returned to the ER last night with recurrence of nausea and vomiting, stating she was unable to take anything orally and was concerned about dehydration. She also endorsed generalized abdominal pain. She also endorsed weakness and generalize fatigue. She denied any CP, SOA, dysuria, fever, chills. Blood pressure 199/77, pulse 86, respirations 22, temperature 99, oxygen sat 98% on room air. Labs: WBC 4.2, hemoglobin 10.3, hematocrit 29.8, platelets 416, sodium 138, potassium 3.7, chloride 109, CO2 21, BUN 13, creatinine 0.9, anion gap 8, calcium 8.3, glucose 145, AST 10, bilirubin 1.5, ALT less than 5, alkaline phos 71, lipase 14. UA unremarkable. CT A/P showed decreased mild small bowel wall thickening could represent mild enteritis. Stable splenomegaly. Stable chronic RLL atelectasis. Unchanged extensive paraesophageal varices. Pt was given IVFs, IV phenergan, IV morphine and protonix. She was then admitted for further care.       Past Medical History:      Diagnosis Date    Arthritis, rheumatoid (HCC)     [facility]osis ([facility]     Colitis     Diabetes mellitus ([facility]     Hypertension     IIH (idiopathic intracranial hypertension)     Liver cirrhosis (HCC)     Osteoarthritis     Sciatica     Unspecified sleep apnea    Splenomegaly  Ascites  Esophageal varices s/p banding  Portal hypertensive gastropathy  Chronic nonocclusive thrombus within the main portal vein  Chronic pain  CAD    Past Surgical History:      Procedure Laterality Date    BONE MARROW BIOPSY      DILATION AND CURETTAGE OF UTERUS      DILATION AND CURETTAGE OF UTERUS  1998 & 2000    LIVER BIOPSY      LIVER TRANSPLANT  10/24/2020    TUBAL LIGATION  1982   EV banding    Medications Prior to Admission:    Prior to Admission medications    Medication Sig Start Date End Date Taking?  Authorizing Provider   sennosides-docusate sodium (SENOKOT-S) 8.6-50 MG tablet Take 1 tablet by mouth in the morning and at bedtime    Historical Provider, MD   aspirin 325 MG EC tablet Take 1 tablet by mouth daily 3/16/22   LINCOLN Doherty   lactobacillus (CULTURELLE) capsule Take 1 capsule by mouth daily (with breakfast) 3/16/22   LINCOLN Doherty   atorvastatin (LIPITOR) 80 MG tablet Take 1 tablet by mouth nightly 3/15/22   LINCOLN Doherty   carvedilol (COREG) 12.5 MG tablet Take 1 tablet by mouth 2 times daily (with meals) 3/15/22   LINCOLN Moncada   metroNIDAZOLE (FLAGYL) 500 MG tablet Take 1 tablet by mouth every 8 hours for 7 days 3/15/22 3/22/22  LINCOLN Doherty   DPH-Lido-AlHydr-MgHydr-Simeth (MAGIC MOUTHWASH) SUSP Swish and spit 5 mLs 4 times daily as needed for Irritation 3/15/22 3/25/22  LINCOLN Tomas   nystatin (MYCOSTATIN) 352893 UNIT/ML suspension Take 5 mLs by mouth 4 times daily for 31 doses 3/15/22 3/23/22  LINCOLN Tomas   pantoprazole (PROTONIX) 40 MG tablet Take 1 tablet by mouth every morning (before breakfast) 3/16/22   LINCOLN Tomas   hydrALAZINE (APRESOLINE) 25 MG tablet Take 1 tablet by mouth every 8 hours as needed (systolic >063) 6/08/11   LINCOLN Tomas   ALPRAZolam (XANAX) 0.25 MG tablet TAKE 1 TABLET BY MOUTH THREE TIMES DAILY AS NEEDED - MUST LAST 30 DAYS 10/27/21   Historical Provider, MD   promethazine (PHENERGAN) 12.5 MG tablet Take 12.5 mg by mouth every 6 hours as needed 7/19/21   Historical Provider, MD   gabapentin (NEURONTIN) 300 MG capsule Take 300 mg by mouth 4 times daily. Historical Provider, MD   mycophenolate (CELLCEPT) 250 MG capsule Take 500 mg by mouth 2 times daily 3/9/21   Historical Provider, MD   cycloSPORINE modified (NEORAL) 25 MG capsule Take 3 capsules every morning and take 4 capsules every night 3/30/21   Historical Provider, MD   torsemide (DEMADEX) 20 MG tablet Take 10 mg by mouth daily  3/22/21   Historical Provider, MD   vitamin D (ERGOCALCIFEROL) 1.25 MG (42016 UT) CAPS capsule TAKE ONE CAPSULE BY MOUTH ONCE A WEEK. 1/13/21   Historical Provider, MD       Allergies:  Lisinopril, Ciprofloxacin, and Dye [iodides]    Social History:   TOBACCO:   reports that she has been smoking cigarettes. She has a 0.10 pack-year smoking history. She has never used smokeless tobacco.  ETOH:   reports no history of alcohol use. OCCUPATION:  None    Family History:       Problem Relation Age of Onset    High Blood Pressure Mother     Alzheimer's Disease Mother     Heart Disease Father     High Blood Pressure Father     Heart Disease Maternal Grandmother     Heart Disease Maternal Grandfather     Stroke Maternal Grandfather     Diabetes Paternal Grandmother     High Blood Pressure Brother        Review of system  Constitutional:  Denies fever or chills. Positive for generalized weakness and fatigue. Eyes:  Denies eye pain or redness  HENT:  Denies nasal congestion or sore throat   Respiratory:  Denies cough or shortness of breath   Cardiovascular:  Denies chest pain or edema   GI:  Denies bloody stools or diarrhea. Positive for nausea, vomiting and abdominal pain.   :  Denies dysuria or frequency  Musculoskeletal:  Denies acute neck pain or body aches  Integument:  Denies rash or itching  Neurologic: thickening could represent mild enteritis. 2.  Stable splenomegaly. 3.  Stable chronic right lower lobe atelectasis. 4.  Unchanged extensive paraesophageal varices. Assessment and Plan     Active Hospital Problems    Diagnosis Date Noted    Intractable vomiting with nausea [R11.2]  -unclear etiology. As per HPI, this is an acute on chronic issue for pt. Also, was admitted here 3/12 - 3/15 for same complaint. Workup that visit was essentially negative. Per extensive chart review, pt has hx of EV s/p banding 10/14/21 (prior to transplant on 10/24/20). She was supposed to follow up with GI for repeat EGD but has had difficulty getting back to . Also, chronically prescribed cellcept for immunosuppression. Cellcept commonly associated with n/v/abd pain/diarrhea.   -case discussed with pt's UC Transplant MD, Dr. Daphne Munoz. He recs repeat EGD and holding cellcept. Annalise Epstein will arrange EGD for Monday or Tuesday. CM consulted to assist with transportation to . Cellcept placed on hold 3/17.  -continue IVFs, scheduled compazine for the next 24 hours; then change to PRN; continue PRN zofran and phenergan; check EKG to eval QTc  -advance diet from NPO to CLD; continue to advance as tolerates    Diarrhea  -as above, possibly due to home cellcept  -cellcept on hold 3/17  -since c.diff and GI PCR panel was neg, dc flagyl. No indication for abx. TIA  -episode of slurred speech and facial numbness 3/14  -CT head, MRI head negative  -CTA neck with mixed atherosclerotic plaque at the left common carotid bifurcation with approximately 30% stenosis at the origin of the left internal carotid artery, mixed atherosclerotic plaque at the right common carotid bifurcation without significant stenosis of the right carotid artery, normal left vertebral artery, patent right vertebral artery with severe stenosis at its origin.    -CTA head with patent intracranial vessels, moderate stenosis of the distal right vertebral artery with patency of both distal vertebral arteries and the basilar artery.  -continue ASA 325mg (pt previously on 81mg PTA) and lipitor 80mg  -follow up with Neurology    03/16/2022    UTI (urinary tract infection) [N39.0]  -pt asymptomatic from urinary complaints  -UA c/w UTI; UCx pending  -covering with rocephin   03/16/2022    Hypokalemia [E87.6]  -Secondary to GI losses   -Replace; monitor and replete as needed   03/16/2022    Hypomagnesemia [E83.42]  -Suspect due to GI losses  -Replace; monitor and replete as needed   03/13/2022    Liver transplant recipient [facility]) [Z94.4]  -Liver transplant at [facility] by Chandrakant Millan on 10/24/20  -on cyclosporine and cellcept since transplant; as above, chronic n/v with occassional emesis, abd pain, diarrhea. Given AEs associated with cellcept, it's possible pt's n/v/abd pain/diarrhea is due to cellcept. -hold cellcept 3/17 per     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 [K75.81, K74.60]  -s/p liver transplant on 10/24/20  -home torsemide on hold until po intake improves  -follows with UC   04/23/2018    Essential hypertension [I10]  -with hypertensive urgency on admission  -suspect due to pain and inability to take po meds PTA  -BP improved after treatment of sxs and restarting home coreg   11/04/2015     Patient was seen and examined by Dr. Oscar Smith and plan of care reviewed. Treatment plan was formulated collaboratively.       LINCOLN Logan certifies per CMS regulation for 42 .15(a), that the patient may reasonably be expected to be discharged or transferred to a hospital within 96 hours after admission to [address] [place] [address]    Electronically signed by LINCOLN Logan on 3/17/2022 at 10:31 AM

## 2022-03-17 NOTE — ED PROVIDER NOTES
[address] ENCOUNTER      Pt Name: Burnell Ahumada  MRN: [id number]  YOB: 1955  Date of evaluation: 3/16/2022  Provider: Tha Hammond MD    [address] [place] [address]       Chief Complaint   Patient presents with    Emesis         HISTORY OF PRESENT ILLNESS  (Location/Symptom, Timing/Onset, Context/Setting, Quality, Duration, Modifying Factors, Severity.)   Burnell Ahumada is a 73 y.o. female who presents to the emergency department with what patient describes as approximately 2-day history of diffuse abdominal pain associate with nausea vomiting and diarrhea. Patient states that she is unable to keep fluids down and is concerned that she is dehydrated. Patient denies any chest pain shortness of breath or any focal signs or symptoms. Patient was just recently discharged from the hospital and states that shortly after getting home her symptoms had returned. Patient states that she has a dry mouth and is concerned that she is dehydrated. She also states that she feels weak and fatigued. Patient denies any chest pain shortness of breath or any focal signs or symptoms. Nursing notes were reviewed. REVIEW OFSYSTEMS    (2-9 systems for level 4, 10 or more for level 5)   ROS:  General: Generalized weakness and fatigue  Cardiovascular:  No chest pain, no palpitations  Respiratory:  No shortness of breath, no cough, no wheezing  Gastrointestinal: Diffuse abdominal pain associated with nausea vomiting and diarrhea  Musculoskeletal:  No muscle pain, no joint pain  Skin:  No rash, no easy bruising  Neurologic:  No speech problems, no headache, no extremity weakness  Psychiatric:  No anxiety  Genitourinary:  No dysuria, no hematuria    Except as noted above the remainder of the review of systems was reviewed and negative.        PAST MEDICAL HISTORY     Past Medical History:   Diagnosis Date    [facility]     Cirrhosis [facility]     Colitis     Diabetes mellitus [facility]     Hypertension     IIH (idiopathic intracranial hypertension)     Liver cirrhosis (HCC)     Osteoarthritis     Sciatica     Unspecified sleep apnea          SURGICAL HISTORY       Past Surgical History:   Procedure Laterality Date    BONE MARROW BIOPSY      DILATION AND CURETTAGE OF UTERUS      DILATION AND CURETTAGE OF UTERUS  1998 & 2000    LIVER BIOPSY      LIVER TRANSPLANT  10/24/2020    TUBAL LIGATION  1982         CURRENT MEDICATIONS       Previous Medications    ALPRAZOLAM (XANAX) 0.25 MG TABLET    TAKE 1 TABLET BY MOUTH THREE TIMES DAILY AS NEEDED - MUST LAST 30 DAYS    ASPIRIN 325 MG EC TABLET    Take 1 tablet by mouth daily    ATORVASTATIN (LIPITOR) 80 MG TABLET    Take 1 tablet by mouth nightly    CARVEDILOL (COREG) 12.5 MG TABLET    Take 1 tablet by mouth 2 times daily (with meals)    CYCLOSPORINE MODIFIED (NEORAL) 25 MG CAPSULE    Take 3 capsules every morning and take 4 capsules every night    DPH-LIDO-ALHYDR-MGHYDR-SIMETH (MAGIC MOUTHWASH) SUSP    Swish and spit 5 mLs 4 times daily as needed for Irritation    GABAPENTIN (NEURONTIN) 300 MG CAPSULE    Take 300 mg by mouth 4 times daily.      HYDRALAZINE (APRESOLINE) 25 MG TABLET    Take 1 tablet by mouth every 8 hours as needed (systolic >334)    LACTOBACILLUS (CULTURELLE) CAPSULE    Take 1 capsule by mouth daily (with breakfast)    METRONIDAZOLE (FLAGYL) 500 MG TABLET    Take 1 tablet by mouth every 8 hours for 7 days    MYCOPHENOLATE (CELLCEPT) 250 MG CAPSULE    Take 500 mg by mouth 2 times daily    NYSTATIN (MYCOSTATIN) 734694 UNIT/ML SUSPENSION    Take 5 mLs by mouth 4 times daily for 31 doses    PANTOPRAZOLE (PROTONIX) 40 MG TABLET    Take 1 tablet by mouth every morning (before breakfast)    PROMETHAZINE (PHENERGAN) 12.5 MG TABLET    Take 12.5 mg by mouth every 6 hours as needed    SENNOSIDES-DOCUSATE SODIUM (SENOKOT-S) 8.6-50 MG TABLET    Take 1 tablet by mouth in the morning and at bedtime    TORSEMIDE (DEMADEX) 20 MG TABLET    Take 10 mg by mouth daily     VITAMIN D (ERGOCALCIFEROL) 1.25 MG (90882 UT) CAPS CAPSULE    TAKE ONE CAPSULE BY MOUTH ONCE A WEEK. ALLERGIES     Lisinopril, Ciprofloxacin, and Dye [iodides]    FAMILY HISTORY       Family History   Problem Relation Age of Onset    High Blood Pressure Mother     Alzheimer's Disease Mother     Heart Disease Father     High Blood Pressure Father     Heart Disease Maternal Grandmother     Heart Disease Maternal Grandfather     Stroke Maternal Grandfather     Diabetes Paternal Grandmother     High Blood Pressure Brother           SOCIAL HISTORY       Social History     Socioeconomic History    Marital status:      Spouse name: None    Number of children: None    Years of education: None    Highest education level: None   Occupational History    None   Tobacco Use    Smoking status: Light Tobacco Smoker     Packs/day: 0.10     Years: 1.00     Pack years: 0.10     Types: Cigarettes     Last attempt to quit: 2019     Years since quittin.8    Smokeless tobacco: Never Used   Substance and Sexual Activity    Alcohol use: No    Drug use: No    Sexual activity: None   Other Topics Concern    None   Social History Narrative    None     Social Determinants of Health     Financial Resource Strain: High Risk    Difficulty of Paying Living Expenses: Hard   Food Insecurity: Food Insecurity Present    Worried About Running Out of Food in the Last Year: Often true    Jacky of Food in the Last Year: Often true   Transportation Needs:     Lack of Transportation (Medical): Not on file    Lack of Transportation (Non-Medical):  Not on file   Physical Activity:     Days of Exercise per Week: Not on file    Minutes of Exercise per Session: Not on file   Stress:     Feeling of Stress : Not on file   Social Connections:     Frequency of Communication with Friends and Family: Not on file    Frequency of Social Gatherings with Friends and Family: Not on file    Attends [employer] Services: Not on file    Active Member of Clubs or Organizations: Not on file    Attends Club or Organization Meetings: Not on file    Marital Status: Not on file   Intimate Partner Violence:     Fear of Current or Ex-Partner: Not on file    Emotionally Abused: Not on file    Physically Abused: Not on file    Sexually Abused: Not on file   Housing Stability:     Unable to Pay for Housing in the Last Year: Not on file    Number of [place] in the Last Year: Not on file    Unstable Housing in the Last Year: Not on file         PHYSICAL EXAM    (up to 7 for level 4, 8 or more for level 5)     ED Triage Vitals [03/16/22 2020]   BP Temp Temp Source Pulse Resp SpO2 Height Weight   (!) 199/77 99 °F (37.2 °C) Oral 86 22 -- 5' 1\" (1.549 m) 120 lb (54.4 kg)       Physical Exam  General :Patient is awake, alert, oriented, in no acute distress, nontoxic appearing  HEENT: Pupils are equally round and reactive to light, EOMI, conjunctivae clear. Mild dry oral mucosa, no exudate. Uvula is midline  Neck: Neck is supple, full range of motion, trachea midline  Cardiac: Heart regular rate, rhythm, no murmurs, rubs, or gallops  Lungs: Lungs are clear to auscultation, there is no wheezing, rhonchi, or rales. There is no use of accessory muscles. Chest wall: There is no tenderness to palpation over the chest wall or over ribs  Abdomen: Abdomen is sof. Diffuse abdominal pain on palpation that recreates his symptoms of brought the patient in the emergency department. Increased bowel sounds all 4 quadrants. Musculoskeletal: 5 out of 5 strength in all 4 extremities. No focal muscle deficits are appreciated  Neuro:  Motor intact, sensory intact, level of consciousness is normal, cerebellar function is normal, reflexes are grossly normal. No evidence of incontinence or loss of bowel or bladder function, no saddle anesthesia noted   Dermatology: Skin is warm and dry  Psych: Mentation is grossly normal, cognition is grossly normal. Affect is appropriate. DIAGNOSTIC RESULTS     EKG: All EKG's are interpreted by the Emergency Department Physician who either signs or Co-signs this chart in the [address] a cardiologist.    The EKG interpreted by me shows normal sinus rhythm rate of 75 per EDMD.    RADIOLOGY:   Non-plain film images such as CT, Ultrasound and MRI are read by the radiologist. Plain radiographic images are visualized and preliminarily interpreted by the emergency physician with the below findings:      ? Radiologist's Report Reviewed:  CT ABDOMEN PELVIS WO CONTRAST Additional Contrast? None   Final Result      1. Decrease mild small bowel wall thickening could represent mild enteritis. 2.  Stable splenomegaly. 3.  Stable chronic right lower lobe atelectasis. 4.  Unchanged extensive paraesophageal varices.             ED BEDSIDE ULTRASOUND:   Performed by ED Physician - none    LABS:    I have reviewed and interpreted all of the currently available lab results from this visit (ifapplicable):  Results for orders placed or performed during the hospital encounter of 03/16/22   CBC with Auto Differential   Result Value Ref Range    WBC 4.8 4.0 - 11.0 K/uL    RBC 3.98 3.80 - 5.80 M/uL    Hemoglobin 12.5 11.5 - 16.5 g/dL    Hematocrit 34.8 (L) 37.0 - 47.0 %    MCV 87.4 80.0 - 100.0 fL    MCH 31.4 27.0 - 32.0 pg    MCHC 35.9 (H) 31.0 - 35.0 g/dL    RDW 14.9 11.0 - 16.0 %    Platelets 523 (L) 784 - 400 K/uL    MPV 10.8 (H) 6.0 - 10.0 fL    Neutrophils % 85.4 %    Immature Granulocytes % 0.4 0.0 - 5.0 %    Lymphocytes % 8.8 %    Monocytes % 4.6 %    Eosinophils % 0.6 %    Basophils % 0.2 %    Neutrophils Absolute 4.1 2.0 - 7.5 K/uL    Immature Granulocytes # 0.0 K/uL    Lymphocytes Absolute 0.4 (L) 1.5 - 4.0 K/uL    Monocytes Absolute 0.2 0.2 - 0.8 K/uL    Eosinophils Absolute 0.0 0.0 - 0.4 K/uL    Basophils Absolute 0.0 0.0 - 0.1 K/uL   Comprehensive Metabolic Panel w/ Reflex to MG   Result Value Ref Range    Sodium 136 136 - 145 mmol/L    Potassium reflex Magnesium 3.2 (L) 3.4 - 5.1 mmol/L    Chloride 105 98 - 107 mmol/L    CO2 20 20 - 30 mmol/L    Anion Gap 11 3 - 16    Glucose 145 (H) 74 - 106 mg/dL    BUN 17 6 - 20 mg/dL    CREATININE 1.0 0.4 - 1.2 mg/dL    GFR Non-African American 56 (L) >59    GFR African American >59 >59    Calcium 9.0 8.5 - 10.5 mg/dL    Total Protein 6.2 (L) 6.4 - 8.3 g/dL    Albumin 4.1 3.4 - 4.8 g/dL    Albumin/Globulin Ratio 2.0 0.8 - 2.0    Total Bilirubin 1.5 (H) 0.3 - 1.2 mg/dL    Alkaline Phosphatase 71 25 - 100 U/L    ALT <5 4 - 36 U/L    AST 10 8 - 33 U/L    Globulin 2.1 Not Established g/dL   Lipase   Result Value Ref Range    Lipase 14.0 5.6 - 51.3 U/L   Urinalysis with Reflex to Culture    Specimen: Urine   Result Value Ref Range    Color, UA Yellow Straw/Yellow    Clarity, UA Clear Clear    Glucose, Ur 100 (A) Negative mg/dL    Bilirubin Urine Negative Negative    Ketones, Urine 40 (A) Negative mg/dL    Specific Gravity, UA 1.025 1.005 - 1.030    Blood, Urine TRACE-INTACT (A) Negative    pH, UA 6.0 5.0 - 8.0    Protein, UA >=300 (A) Negative mg/dL    Urobilinogen, Urine 0.2 <2.0 E.U./dL    Nitrite, Urine Negative Negative    Leukocyte Esterase, Urine Negative Negative    Microscopic Examination YES     Urine Type Voided     Urine Reflex to Culture Not Indicated    Troponin   Result Value Ref Range    Troponin <0.30 <0.30 ng/mL   Lactic Acid   Result Value Ref Range    Lactic Acid 1.8 0.4 - 2.0 mmol/L   Microscopic Urinalysis   Result Value Ref Range    WBC, UA 6-9 (A) 0 - 5 /HPF    RBC, UA 11-20 (A) 0 - 4 /HPF    Epithelial Cells, UA 6-10 (A) 0 - 5 /HPF    Bacteria, UA Rare (A) None Seen /HPF   Magnesium   Result Value Ref Range    Magnesium 1.6 (L) 1.7 - 2.4 mg/dL        All other labs were within normal range or not returned as of this dictation.     EMERGENCY DEPARTMENT COURSE and DIFFERENTIAL DIAGNOSIS/MDM:   Vitals:    Vitals:    03/16/22 2200 03/16/22 2215 03/16/22 2230 03/16/22 2245   BP: (!) 188/93      Pulse: 84 93 91 86   Resp: 19 23 24 23   Temp:       TempSrc:       SpO2: 97% 99%     Weight:       Height:           MEDICATIONS ADMINISTERED IN ED:  Medications   promethazine (PHENERGAN) 12.5mg in sodium chloride 0.9% 50 mL IVPB 12.5 mg (0 mg IntraVENous Stopped 3/16/22 2125)   pantoprazole (PROTONIX) 40 MG injection (  Canceled Entry 3/16/22 2112)   promethazine (PHENERGAN) 12.5mg in sodium chloride 0.9% 50 mL IVPB 12.5 mg (12.5 mg IntraVENous New Bag 3/16/22 2302)   potassium chloride 10 mEq/100 mL IVPB (Peripheral Line) (has no administration in time range)   cefTRIAXone (ROCEPHIN) 1000 mg IVPB in 50 mL D5W minibag (has no administration in time range)   0.9 % sodium chloride bolus (has no administration in time range)   morphine sulfate (PF) injection 4 mg (has no administration in time range)   0.9 % sodium chloride bolus (0 mLs IntraVENous Stopped 3/16/22 2152)   morphine sulfate (PF) injection 4 mg (4 mg IntraVENous Given 3/16/22 2053)   pantoprazole (PROTONIX) 40 mg in sodium chloride 0.9 % 50 mL bolus (0 mg IntraVENous Stopped 3/16/22 2136)       Patient was placed examination room at which time after exam was performed IV was obtained and labs are drawn. Patient was given 1 L IV normal saline bolus along with 12.5 mg of Phenergan IV, 40 mg of Protonix IV and 4 mg of morphine IV. Patient was then taken over CT scan abdomen pelvis without contrast which revealed enteritis. Urinalysis reveals 6-9 WBCs and patient was given Rocephin 1 g IV. Review of patient's labs noted potassium of 3.2 consistent with hypokalemia and patient was given 10 mEq potassium IV. Troponin was negative and glucose was 145 white count was 4.8 and hemoglobin was 12.5. Patient states that her pain had continued and that she feels nauseous and was given Phenergan 12.5 mg IV along with 4 mg of morphine IV and a second IV normal saline bolus.   Results were reviewed with patient and the need for admission. Patient took her nightly immunotherapy medicines that she takes secondary to being a liver transplant patient. Patient states that she felt too weak to go home. MAR was consulted by phone who graciously excepted the patient for admission for further evaluation definitive care           CONSULTS: Svetlana was consulted by phone who graciously excepted the patient for admission for further evaluation definitive care  IP CONSULT TO CASE MANAGEMENT    PROCEDURES:  Procedures    CRITICAL CARE TIME    Total Critical Care time was 0 minutes, excluding separately reportable procedures. There was a high probability of clinically significant/life threatening deterioration in the patient's condition which required my urgent intervention. FINAL IMPRESSION      1. Generalized abdominal pain Stable   2. Intractable vomiting with nausea, unspecified vomiting type Stable   3. Dehydration Stable   4. General weakness Stable   5. Hypokalemia Stable   6. Enteritis Stable         DISPOSITION/PLAN   DISPOSITION        PATIENT REFERRED TO:  No follow-up provider specified. DISCHARGE MEDICATIONS:  New Prescriptions    No medications on file       Comment: Please note this report has been produced using speech recognition software and may contain errorsrelated to that system including errors in grammar, punctuation, and spelling, as well as words and phrases that may be inappropriate. If there are any questions or concerns please feel free to contact the dictating providerfor clarification.     James Weir MD  Attending Emergency Physician              James Weir MD  03/16/22 8624

## 2022-03-17 NOTE — ED NOTES
Patient states that she was discharged yesterday from here, patient states that she started vomiting about [id number] and diarrhea last night.      Kervin Mendoza RN  03/16/22 2019

## 2022-03-17 NOTE — FLOWSHEET NOTE
03/17/22 0943   Discharge Aily[address] 38 Children;Spouse/Significant Other   Current Services Prior To Admission Durable Medical Equipment   DME [address] Medications No   Type of Home Care Services None   Patient expects to be discharged to: [facility]   Expected Discharge Date 03/21/22   Follow Up Appointment: Best Day/Time    (Any)   Has WC, no additional needs @ DC

## 2022-03-17 NOTE — PROGRESS NOTES
Occupational Therapy   Occupational Therapy Initial Assessment  Date: 3/17/2022   Patient Name: Teodoro Camejo  MRN: [id number]     : 1955    Date of Service: 3/17/2022    Discharge Recommendations:  Home with Home health OT  OT Equipment Recommendations  Equipment Needed: No    Assessment   Performance deficits / Impairments: Decreased endurance;Decreased strength;Decreased ADL status; Decreased high-level IADLs  Assessment: Pt agreeable to OT services. Pt reports she is feeling unwell from stomach issues. Pt come to sit at EOB with MOD I. Pt donned shoes with VAUGHN. Pt ambulated ~5 steps using RW with SBA. Limited secondary to reports of dizziness. Pt AROM WFL. BUE weakness noted. Pt will benefit from skilled OT services while IP. Prognosis: Good  Decision Making: Low Complexity  REQUIRES OT FOLLOW UP: Yes  Activity Tolerance  Activity Tolerance: Pt limited by nausea           Patient Diagnosis(es): The primary encounter diagnosis was Generalized abdominal pain. Diagnoses of Intractable vomiting with nausea, unspecified vomiting type, Dehydration, General weakness, Hypokalemia, and Enteritis were also pertinent to this visit. has a past medical history of Arthritis, rheumatoid ([address] Cirrhosis ([address] Colitis, Diabetes mellitus ([address] Hypertension, IIH (idiopathic intracranial hypertension), Liver cirrhosis ([address] Osteoarthritis, Sciatica, and Unspecified sleep apnea. has a past surgical history that includes Dilation and curettage of uterus; bone marrow biopsy; Tubal ligation (); Dilation and curettage of uterus ( & ); liver biopsy; and Liver transplant (10/24/2020).            Restrictions  Restrictions/Precautions  Restrictions/Precautions: General Precautions,Fall Risk  Required Braces or Orthoses?: No    Subjective   General  Chart Reviewed: Yes  Patient assessed for rehabilitation services?: Yes  Family / Caregiver Present: No  Referring Practitioner: Prema Day PA-C  Diagnosis: Nausea, Vomiting, weakness  Subjective  Subjective: Pt agreeable to OT services. Pt states she has had nausea and vomiting since DC and no appetite.   Patient Currently in Pain: Yes (stomach)  Vital Signs  Patient Currently in Pain: Yes (stomach)  Social/Functional History  Social/Functional History  Lives With: Spouse,Son  Type of Home: House  Home Layout: One level  Home Access: Stairs to enter with rails  Entrance Stairs - Number of Steps: 3  Entrance Stairs - Rails: Both  Bathroom Shower/Tub: Tub/Shower unit  Bathroom Toilet: Standard  Bathroom Equipment: Grab bars in shower,3-in-1 commode  Home Equipment: Rolling walker,Quad cane,Cane,Standard walker  Receives Help From: Family  ADL Assistance: Independent  Homemaking Responsibilities: No  Ambulation Assistance: Independent (Furniture walks and uses RW)  Transfer Assistance: Independent  Active : No       Objective   Vision: Impaired  Vision Exceptions: Wears glasses for reading  Hearing: Within functional limits    Orientation  Overall Orientation Status: Within Functional Limits  Observation/Palpation  Observation: Pt lying in bed, room air, NAD, pleasant and cooperative  Balance  Sitting Balance: Independent  Standing Balance: Stand by assistance  Functional Mobility  Functional - Mobility Device: Rolling Walker  Activity: Other  Assist Level: Stand by assistance  Functional Mobility Comments: ~5 feet  ADL  LE Dressing: Setup        Bed mobility  Rolling to Right: Independent  Supine to Sit: Modified independent  Sit to Supine: Modified independent  Scooting: Modified independent  Transfers  Stand Pivot Transfers: Stand by assistance  Sit to stand: Stand by assistance     Cognition  Overall Cognitive Status: WFL                 LUE AROM (degrees)  LUE AROM : WFL  RUE AROM (degrees)  RUE AROM : WFL  LUE Strength  L Shoulder Flex: 4-/5  L Elbow Flex: 4/5  L Elbow Ext: 4/5  L Hand General: 4-/5  RUE Strength  R Shoulder Flex: 4-/5  R Elbow Flex: 4/5  R Elbow Ext: 4/5  R Hand General: 4-/5                   Plan   Plan  Times per week: 3-5  Times per day: Daily  Plan weeks: 1  Current Treatment Recommendations: Strengthening,Balance Training,Functional Mobility Training,Endurance Training,Self-Care / ADL,Patient/Caregiver Education & Training    Goals  Short term goals  Time Frame for Short term goals: 1 week  Short term goal 1: pt to demo independence with HEP. Short term goal 2: pt to tolerate x15 minutes of activity to increase functional activity tolerance. Short term goal 3: Pt to complete bathing with MOD I. Short term goal 4: Pt to complete shower transfer with MOD I. Short term goal 5: Pt to tolerate static standing balance activity maintaining balance 4 minutes using RW for support as needed. Therapy Time   Individual Concurrent Group Co-treatment   Time In 1004         Time Out 1023         Minutes 19              This note serves as a DC summary in the event of pt discharge.      Karen Leal OTR/L

## 2022-03-17 NOTE — PROGRESS NOTES
Physical Therapy    Facility/Department: [facility] MED SURG  Initial Assessment    NAME: Yaritza Olmstead  : 1955  MRN: [id number]    Date of Service: 3/17/2022    Discharge Recommendations:  Continue to assess pending progress        Assessment   Body structures, Functions, Activity limitations: Decreased high-level IADLs  Assessment: Vomiting; general weakness; she will benefit from PT to help improve strength and functional mobility. Treatment Diagnosis: Vomiting; general weakness  Prognosis: Good  Decision Making: Low Complexity  REQUIRES PT FOLLOW UP: Yes  Activity Tolerance  Activity Tolerance: Patient Tolerated treatment well       Patient Diagnosis(es): The primary encounter diagnosis was Generalized abdominal pain. Diagnoses of Intractable vomiting with nausea, unspecified vomiting type, Dehydration, General weakness, Hypokalemia, and Enteritis were also pertinent to this visit. has a past medical history of Arthritis, rheumatoid ([address] Ascites, CAD (coronary artery disease), Chronic pain, Cirrhosis ([address] Colitis, Esophageal varices ([address] Hypertension, IIH (idiopathic intracranial hypertension), Liver cirrhosis ([address] Osteoarthritis, Portal hypertensive gastropathy ([address] Sciatica, Splenomegaly, and Unspecified sleep apnea. has a past surgical history that includes Dilation and curettage of uterus; bone marrow biopsy; Tubal ligation (); Dilation and curettage of uterus ( & ); liver biopsy; Liver transplant (10/24/2020); and Upper gastrointestinal endoscopy (10/14/2020).     Restrictions  Restrictions/Precautions  Restrictions/Precautions: General Precautions,Fall Risk  Required Braces or Orthoses?: No  Vision/Hearing  Vision: Impaired  Vision Exceptions: Wears glasses for reading  Hearing: Within functional limits     Subjective  General  Chart Reviewed: Yes  Patient assessed for rehabilitation services?: Yes  Response To Previous Treatment: Not applicable  Family / Caregiver Present: No  Referring Practitioner: Blu De Leon MD  Pain Screening  Patient Currently in Pain: Yes (stomach)          Orientation  Orientation  Overall Orientation Status: Within Normal Limits  Social/Functional History  Social/Functional History  Lives With: Spouse,Son  Type of Home: House  Home Layout: One level  Home Access: Stairs to enter with rails  Entrance Stairs - Number of Steps: 3  Entrance Stairs - Rails: Both  Bathroom Shower/Tub: Tub/Shower unit  Bathroom Toilet: Standard  Bathroom Equipment: Grab bars in shower,3-in-1 commode  Home Equipment: Rolling walker,Quad cane,Cane,Standard walker  Receives Help From: Family  ADL Assistance: Independent  Homemaking Responsibilities: No  Ambulation Assistance: Independent  Transfer Assistance: Independent  Active : No  Cognition        Objective     Observation/Palpation  Posture: Good  Observation: Pt lying in bed, room air, NAD, pleasant and cooperative    AROM RLE (degrees)  RLE AROM: WFL  AROM LLE (degrees)  LLE AROM : WFL  AROM RUE (degrees)  RUE AROM : WFL  AROM LUE (degrees)  LUE AROM : WFL  Strength RLE  Strength RLE: WFL  Comment: MMG's grossly 4/5 - 4+/5  Strength LLE  Strength LLE: WFL  Comment: MMG's grossly 4/5 - 4+/5  Strength RUE  Strength RUE: WFL  Tone RLE  RLE Tone: Normotonic  Tone LLE  LLE Tone: Normotonic  Motor Control  Gross Motor?: WFL  Sensation  Overall Sensation Status: WFL  Bed mobility  Rolling to Right: Independent  Supine to Sit: Modified independent  Sit to Supine: Modified independent  Scooting: Modified independent  Transfers  Sit to Stand: Supervision;Stand by assistance  Stand to sit: Supervision;Stand by assistance  Ambulation  Ambulation?: Yes  Ambulation 1  Surface: level tile  Device: Rolling Walker  Assistance: Supervision;Stand by assistance  Gait Deviations: Slow Ena;Decreased step length;Decreased step height  Distance: 10 feet     Balance  Posture: Good  Sitting - Static: Good  Sitting - Dynamic: Good  Standing -

## 2022-03-17 NOTE — ED NOTES
Report to Ari Blanca RN on medical unit at this time. Pt to be admitted to room 4-1.        Rosa Hutchinson RN  03/17/22 0000

## 2022-03-17 NOTE — FLOWSHEET NOTE
03/17/22 0052   Assessment   Charting Type Admission   Neurological   Neuro (WDL) WDL   Level of Consciousness Alert (0)   Orientation Level Oriented X4   Cognition Follows commands   Language Slurred  (at times)   [place] Coma Scale   Eye Opening 4   Best Verbal Response 5   Best Motor Response 6   [place] Coma Scale Score 15   HEENT   HEENT (WDL) X   Left Eye Impaired vision   Teeth Dentures upper;Dentures lower   Respiratory   Respiratory (WDL) WDL   Cardiac   Cardiac (WDL) WDL   Cardiac Monitor   Telemetry Monitor On No   Gastrointestinal   Abdominal (WDL) X   RUQ Bowel Sounds Present   LUQ Bowel Sounds Present   RLQ Bowel Sounds Present   LLQ Bowel Sounds Present   Abdomen Inspection Soft   GI Symptoms Nausea;Vomiting   Last BM (including prior to admit) 03/17/22   Tenderness Soft;Nontender   Relieved By Antiemetic   Peripheral Vascular   Peripheral Vascular (WDL) WDL   Edema None   Skin Color/Condition   Skin Color/Condition (WDL) WDL   Skin Color Appropriate for ethnicity   Skin Condition/Temp Warm;Dry   Skin Integrity   Skin Integrity (WDL) X   Skin Integrity Redness   Location sacrum   Preventative Dressing No   Musculoskeletal   Musculoskeletal (WDL) X   RUE Full movement   LUE Full movement   RL Extremity Weakness   LL Extremity Weakness   Genitourinary   Genitourinary (WDL) WDL   Anus/Rectum   Anus/Rectum (WDL) WDL   Psychosocial   Psychosocial (WDL) WDL   Pt oriented to room and call light. Pt denies any needs at this time. Pt encouraged to call out with any needs.  Will continue to monitor Pt

## 2022-03-18 ENCOUNTER — APPOINTMENT (OUTPATIENT)
Dept: CT IMAGING | Facility: HOSPITAL | Age: 67
DRG: 392 | End: 2022-03-18
Payer: MEDICARE

## 2022-03-18 LAB
A/G RATIO: 1.6 (ref 0.8–2)
ALBUMIN SERPL-MCNC: 3.1 G/DL (ref 3.4–4.8)
ALP BLD-CCNC: 62 U/L (ref 25–100)
ALT SERPL-CCNC: <5 U/L (ref 4–36)
ANION GAP SERPL CALCULATED.3IONS-SCNC: 10 MMOL/L (ref 3–16)
AST SERPL-CCNC: 10 U/L (ref 8–33)
BILIRUB SERPL-MCNC: 0.6 MG/DL (ref 0.3–1.2)
BUN BLDV-MCNC: 18 MG/DL (ref 6–20)
CALCIUM SERPL-MCNC: 8.2 MG/DL (ref 8.5–10.5)
CHLORIDE BLD-SCNC: 111 MMOL/L (ref 98–107)
CO2: 21 MMOL/L (ref 20–30)
CREAT SERPL-MCNC: 1.5 MG/DL (ref 0.4–1.2)
GFR AFRICAN AMERICAN: 42
GFR NON-AFRICAN AMERICAN: 35
GLOBULIN: 1.9 G/DL
GLUCOSE BLD-MCNC: 87 MG/DL (ref 74–106)
HCT VFR BLD CALC: 27.6 % (ref 37–47)
HEMOGLOBIN: 9.4 G/DL (ref 11.5–16.5)
MCH RBC QN AUTO: 31.1 PG (ref 27–32)
MCHC RBC AUTO-ENTMCNC: 34.1 G/DL (ref 31–35)
MCV RBC AUTO: 91.4 FL (ref 80–100)
PDW BLD-RTO: 14.6 % (ref 11–16)
PLATELET # BLD: 98 K/UL (ref 150–400)
PMV BLD AUTO: 11.2 FL (ref 6–10)
POTASSIUM REFLEX MAGNESIUM: 3.9 MMOL/L (ref 3.4–5.1)
RBC # BLD: 3.02 M/UL (ref 3.8–5.8)
SODIUM BLD-SCNC: 142 MMOL/L (ref 136–145)
TOTAL PROTEIN: 5 G/DL (ref 6.4–8.3)
WBC # BLD: 2.6 K/UL (ref 4–11)

## 2022-03-18 PROCEDURE — U0003 INFECTIOUS AGENT DETECTION BY NUCLEIC ACID (DNA OR RNA); SEVERE ACUTE RESPIRATORY SYNDROME CORONAVIRUS 2 (SARS-COV-2) (CORONAVIRUS DISEASE [COVID-19]), AMPLIFIED PROBE TECHNIQUE, MAKING USE OF HIGH THROUGHPUT TECHNOLOGIES AS DESCRIBED BY CMS-2020-01-R: HCPCS

## 2022-03-18 PROCEDURE — 97530 THERAPEUTIC ACTIVITIES: CPT

## 2022-03-18 PROCEDURE — 85027 COMPLETE CBC AUTOMATED: CPT

## 2022-03-18 PROCEDURE — 1200000000 HC SEMI PRIVATE

## 2022-03-18 PROCEDURE — U0005 INFEC AGEN DETEC AMPLI PROBE: HCPCS

## 2022-03-18 PROCEDURE — 36415 COLL VENOUS BLD VENIPUNCTURE: CPT

## 2022-03-18 PROCEDURE — 71250 CT THORAX DX C-: CPT

## 2022-03-18 PROCEDURE — 6360000002 HC RX W HCPCS: Performed by: PHYSICIAN ASSISTANT

## 2022-03-18 PROCEDURE — 97116 GAIT TRAINING THERAPY: CPT

## 2022-03-18 PROCEDURE — 97110 THERAPEUTIC EXERCISES: CPT

## 2022-03-18 PROCEDURE — 99232 SBSQ HOSP IP/OBS MODERATE 35: CPT | Performed by: INTERNAL MEDICINE

## 2022-03-18 PROCEDURE — 2580000003 HC RX 258: Performed by: PHYSICIAN ASSISTANT

## 2022-03-18 PROCEDURE — 6370000000 HC RX 637 (ALT 250 FOR IP): Performed by: PHYSICIAN ASSISTANT

## 2022-03-18 PROCEDURE — 80053 COMPREHEN METABOLIC PANEL: CPT

## 2022-03-18 RX ORDER — CYANOCOBALAMIN 1000 UG/ML
1000 INJECTION INTRAMUSCULAR; SUBCUTANEOUS ONCE
Status: COMPLETED | OUTPATIENT
Start: 2022-03-19 | End: 2022-03-19

## 2022-03-18 RX ADMIN — ATORVASTATIN CALCIUM 80 MG: 80 TABLET, FILM COATED ORAL at 20:24

## 2022-03-18 RX ADMIN — NYSTATIN 500000 UNITS: 500000 SUSPENSION ORAL at 08:44

## 2022-03-18 RX ADMIN — PANTOPRAZOLE SODIUM 40 MG: 40 TABLET, DELAYED RELEASE ORAL at 06:01

## 2022-03-18 RX ADMIN — PROCHLORPERAZINE EDISYLATE 10 MG: 5 INJECTION INTRAMUSCULAR; INTRAVENOUS at 00:59

## 2022-03-18 RX ADMIN — NYSTATIN 500000 UNITS: 500000 SUSPENSION ORAL at 13:03

## 2022-03-18 RX ADMIN — CARVEDILOL 12.5 MG: 12.5 TABLET, FILM COATED ORAL at 15:52

## 2022-03-18 RX ADMIN — PROCHLORPERAZINE EDISYLATE 10 MG: 5 INJECTION INTRAMUSCULAR; INTRAVENOUS at 05:31

## 2022-03-18 RX ADMIN — SODIUM CHLORIDE: 9 INJECTION, SOLUTION INTRAVENOUS at 08:54

## 2022-03-18 RX ADMIN — NYSTATIN 500000 UNITS: 500000 SUSPENSION ORAL at 15:52

## 2022-03-18 RX ADMIN — GABAPENTIN 300 MG: 300 CAPSULE ORAL at 08:44

## 2022-03-18 RX ADMIN — NYSTATIN 500000 UNITS: 500000 SUSPENSION ORAL at 20:24

## 2022-03-18 RX ADMIN — ENOXAPARIN SODIUM 40 MG: 100 INJECTION SUBCUTANEOUS at 08:44

## 2022-03-18 RX ADMIN — GABAPENTIN 300 MG: 300 CAPSULE ORAL at 15:53

## 2022-03-18 RX ADMIN — ALPRAZOLAM 0.25 MG: 0.25 TABLET ORAL at 20:24

## 2022-03-18 RX ADMIN — Medication 1 CAPSULE: at 08:44

## 2022-03-18 RX ADMIN — GABAPENTIN 300 MG: 300 CAPSULE ORAL at 20:24

## 2022-03-18 RX ADMIN — HYDRALAZINE HYDROCHLORIDE 25 MG: 25 TABLET, FILM COATED ORAL at 05:10

## 2022-03-18 RX ADMIN — CYCLOSPORINE 75 MG: 25 CAPSULE, LIQUID FILLED ORAL at 08:45

## 2022-03-18 RX ADMIN — ACETAMINOPHEN 650 MG: 325 TABLET, FILM COATED ORAL at 13:03

## 2022-03-18 RX ADMIN — GABAPENTIN 300 MG: 300 CAPSULE ORAL at 13:03

## 2022-03-18 RX ADMIN — HYDRALAZINE HYDROCHLORIDE 25 MG: 25 TABLET, FILM COATED ORAL at 15:52

## 2022-03-18 RX ADMIN — CARVEDILOL 12.5 MG: 12.5 TABLET, FILM COATED ORAL at 08:44

## 2022-03-18 RX ADMIN — ASPIRIN 325 MG: 325 TABLET, COATED ORAL at 08:45

## 2022-03-18 RX ADMIN — ONDANSETRON HYDROCHLORIDE 4 MG: 2 INJECTION, SOLUTION INTRAMUSCULAR; INTRAVENOUS at 08:53

## 2022-03-18 RX ADMIN — CYCLOSPORINE 100 MG: 25 CAPSULE, LIQUID FILLED ORAL at 20:24

## 2022-03-18 ASSESSMENT — PAIN SCALES - GENERAL: PAINLEVEL_OUTOF10: 9

## 2022-03-18 NOTE — CARE COORDINATION
The Plan for Transition of Care is related to the following treatment goals: home with family assist PRN    The Patient and/or patient representative was provided with a choice of provider and agrees with the discharge plan. [x] Yes [] No    Freedom of choice list was provided with basic dialogue that supports the patient's individualized plan of care/goals, treatment preferences and shares the quality data associated with the providers. [x] Yes [] No    No DC needs noted at this time. Pt has transportation option to appt next week at [facility]. No further DC needs noted at this time.

## 2022-03-18 NOTE — PROGRESS NOTES
Occupational Therapy  Facility/Department: [address] [place] [address] MED SURG  Daily Treatment Note  NAME: Alanna Hutchinson  : 1955  MRN: [id number]    Date of Service: 3/18/2022    Discharge Recommendations:  Home with Home health OT       Assessment   Assessment: Pt agreeable to OT services. Pt ambulated in room with RW with no LOB. Pt provided with BUE ther ex with orange theraband. Pt educated on HEP and proper technique for each exercise. Patient Diagnosis(es): The primary encounter diagnosis was Generalized abdominal pain. Diagnoses of Intractable vomiting with nausea, unspecified vomiting type, Dehydration, General weakness, Hypokalemia, and Enteritis were also pertinent to this visit. has a past medical history of Arthritis, rheumatoid ([address] Ascites, CAD (coronary artery disease), Chronic pain, Cirrhosis ([address] Colitis, Esophageal varices ([address] Hypertension, IIH (idiopathic intracranial hypertension), Liver cirrhosis ([address] Osteoarthritis, Portal hypertensive gastropathy ([address] Sciatica, Splenomegaly, and Unspecified sleep apnea. has a past surgical history that includes Dilation and curettage of uterus; bone marrow biopsy; Tubal ligation (); Dilation and curettage of uterus ( & ); liver biopsy; Liver transplant (10/24/2020); and Upper gastrointestinal endoscopy (10/14/2020). Restrictions  Restrictions/Precautions  Restrictions/Precautions: General Precautions,Fall Risk  Required Braces or Orthoses?: No  Subjective   General  Chart Reviewed: Yes  Patient assessed for rehabilitation services?: Yes  Family / Caregiver Present: No  Referring Practitioner: Ciarra Claudio PA-C  Diagnosis: Nausea, Vomiting, weakness  Subjective  Subjective: Pt agreeable to OT services. Pt states she has had nausea and vomiting since DC and no appetite.       Orientation     Objective             Functional Mobility  Functional - Mobility Device: Rolling Walker  Assist Level: Stand by assistance  Functional Mobility Comments: 100 feet        Plan   Plan  Times per week: 3-5  Times per day: Daily  Plan weeks: 1  Current Treatment Recommendations: Strengthening,Balance Training,Functional Mobility Training,Endurance Training,Self-Care / ADL,Patient/Caregiver Education & Training    Goals  Short term goals  Time Frame for Short term goals: 1 week  Short term goal 1: pt to demo independence with HEP. Short term goal 2: pt to tolerate x15 minutes of activity to increase functional activity tolerance. Short term goal 3: Pt to complete bathing with MOD I. Short term goal 4: Pt to complete shower transfer with MOD I. Short term goal 5: Pt to tolerate static standing balance activity maintaining balance 4 minutes using RW for support as needed. Therapy Time   Individual Concurrent Group Co-treatment   Time In 1200         Time Out 2449         Minutes 14              This note serves as a DC summary in the event of pt discharge.      Karen Leal OTR/L

## 2022-03-18 NOTE — ADT AUTH CERT
Utilization Reviews         3/17 CLINICAL by Bi Diehl RN       Review Status Review Entered   In Primary 3/18/2022 14:35      Criteria Review   UNABLE TO ACCESS INDICIA     NO PROGRESS NOTE AVAILABLE     TEMP 98.1, HR 85, RR 20, /99, O2 100 ON RA     PLAT 127     NO IMAGINNG     IS, REG DIET, I/O, PT, OT     ROCEPHIN 1G QD IV, MAG SULF 2G IV X 1, FALGYL 500 MG TID PO, K CHL 10 MEQ IV X 1, COMPAZINE 10 MG Q6H IV, NS DRIP @ 75, ZOFRAN 4 MG IV X 1

## 2022-03-18 NOTE — CARE COORDINATION
Today I contacted the patients  to inquire about travel to future appointments in [place]. The  assured me that he had the means and ability to bring the patient to [place], as long as the patient was willing to travel.     Call was made at 10:30 am.

## 2022-03-18 NOTE — PROGRESS NOTES
Physical Therapy  Facility/Department: [facility] MED SURG  Daily Treatment Note  NAME: Delmi Lee  : 1955  MRN: [id number]    Date of Service: 3/18/2022    Discharge Recommendations:  Continue to assess pending progress      Assessment   Assessment: Patient awake in bed with IV infusing offering multple c/o. States she gets lightheaded when she gets up, feels weak and unsteady, and reports R breast pain and L foot pain. Also reports being confused on time and dates and requested paper and pen to try to write things down that may help her memory. Patient completed bed mobility with Mod I.  Stood with supervision and ambulated ! 5 feet with RW and SBA to the bathroom. Completed toileting unassisted. Patient ambulated in the room ~70 feet and then transferred to the recliner. Engaged patient in B LE therex. Provided patient with a paper calendar and some blank paper and a pen. PA in to assess. REQUIRES PT FOLLOW UP: Yes  Activity Tolerance  Activity Tolerance: Patient Tolerated treatment well     Patient Diagnosis(es): The primary encounter diagnosis was Generalized abdominal pain. Diagnoses of Intractable vomiting with nausea, unspecified vomiting type, Dehydration, General weakness, Hypokalemia, and Enteritis were also pertinent to this visit. has a past medical history of Arthritis, rheumatoid ([address] Ascites, CAD (coronary artery disease), Chronic pain, Cirrhosis ([address] Colitis, Esophageal varices ([address] Hypertension, IIH (idiopathic intracranial hypertension), Liver cirrhosis ([address] Osteoarthritis, Portal hypertensive gastropathy ([address] Sciatica, Splenomegaly, and Unspecified sleep apnea. has a past surgical history that includes Dilation and curettage of uterus; bone marrow biopsy; Tubal ligation (); Dilation and curettage of uterus ( & ); liver biopsy;  Liver transplant (10/24/2020); and Upper gastrointestinal endoscopy (10/14/2020). Restrictions  Restrictions/Precautions  Restrictions/Precautions: General Precautions,Fall Risk  Required Braces or Orthoses?: No  Subjective   General  Chart Reviewed: Yes  Response To Previous Treatment: Patient with no complaints from previous session. Family / Caregiver Present: No  Subjective  Subjective: Patient offers multiple c/o. States she gets lightheaded when she gets up, feels weak and unsteady, and reports R breast pain and L foot pain. Also reports being confused on time and dates and requested paper and pen to try to write things down that may help her memory. Pain Screening  Patient Currently in Pain: Yes  Vital Signs  Patient Currently in Pain: Yes    Objective   Bed mobility  Rolling to Right: Modified independent  Supine to Sit: Modified independent  Scooting: Modified independent  Transfers  Sit to Stand: Supervision  Stand to sit: Supervision  Ambulation  Ambulation?: Yes  Ambulation 1  Surface: level tile  Device: Rolling Walker  Other Apparatus:  (IV pole)  Assistance: Stand by assistance  Distance: ~15 feet and 70 feet within the room     Balance  Posture: Good  Sitting - Static: Good  Sitting - Dynamic: Good  Standing - Static: Good;-  Standing - Dynamic: Fair;+  Exercises  Hip Flexion: 10  Hip Abduction: 10  Knee Long Arc Quad: 10  Ankle Pumps: 10      Goals  Long term goals  Time Frame for Long term goals : 3-4 days  Long term goal 1: Patient to perform basic transfers with modified independence. Long term goal 2: Patient to ambulate 50 feet x 2 with RW with Supervision-modified.     Plan    Plan  Times per week: 3-4 days  Plan weeks: 3-4 days  Current Treatment Recommendations: Strengthening,Balance Training,Functional Mobility Training,Gait Training,Neuromuscular Re-education,Home Exercise Program,Safety Education & Training  Safety Devices  Type of devices: Left in chair,Call light within reach     Therapy Time   Individual Concurrent Group Co-treatment   Time In 4945         Time Out 4013         Minutes 39              This note serves as D/C summary if patient is discharged prior to next visit.   Stacey Clements, PTA

## 2022-03-18 NOTE — PROGRESS NOTES
Progress Note      Subjective:   Chief complaint:   Intractable nausea/vomiting/abd pain    Interval History:   Seen and examined this morning. States she is feeling better and asking for more food because she is \"starving. \"  Patient asking for money for gas to make it to  next week for EGD. Case management has been working to arrange transport however local transportation companies are not able to transport out of state.  transplant coordinator, Dane Tillman, has offered gas cards to patient. He has also explored transportation options for patient; however, due to her insurance, no out-of-state transport can be arranged. Dane Tillman can be reached at [phone]. Lara Fortune with case management here has spoken with patient's  who stated transport to  would not be a problem if patient was willing to go. Review of systems:   Constitutional:  Denies fever or chills. Positive for generalized weakness and fatigue. Eyes:  Denies eye pain or redness  HENT:  Denies nasal congestion or sore throat   Respiratory:  Denies cough or shortness of breath   Cardiovascular:  Denies chest pain or edema   GI:  Denies bloody stools or diarrhea. Positive for nausea, vomiting and abdominal pain; improved. :  Denies dysuria or frequency  Musculoskeletal:  Denies acute neck pain or body aches  Integument:  Denies rash or itching  Neurologic:  Denies headache, dizziness, numbness, tingling or unilateral weakness  Psychiatric:  Denies acute depression or acute anxiety    Past medical history, surgical history, family history and social history reviewed and unchanged compared to H&P earlier this admission.     Medications:   Scheduled Meds:   cefTRIAXone (ROCEPHIN) IV  1,000 mg IntraVENous Q24H    aspirin  325 mg Oral Daily    atorvastatin  80 mg Oral Nightly    carvedilol  12.5 mg Oral BID WC    cycloSPORINE modified  100 mg Oral Nightly    gabapentin  300 mg Oral 4x Daily    lactobacillus  1 capsule Oral Daily with breakfast    [Held by provider] mycophenolate  500 mg Oral BID    nystatin  5 mL Oral 4x Daily    pantoprazole  40 mg Oral QAM AC    vitamin D  50,000 Units Oral Weekly    enoxaparin  40 mg SubCUTAneous Daily    cycloSPORINE modified  75 mg Oral QAM     Continuous Infusions:    Objective:     Vital Signs  Temp: 97.7 °F (36.5 °C)  Pulse: 56  Resp: 18  BP: (!) 190/85  SpO2: 98 %  O2 Device: None (Room air)       Vital signs reviewed in electronic charts. Physical exam  Constitutional:  Well developed, chronically ill appearing female sitting upright in bed, appears to not feel well. Eyes:  no scleral icterus, conjunctiva normal   HENT:  Atraumatic, external ears normal, nose normal, oropharynx moist, no pharyngeal exudates. Neck- supple, no JVD, no lymphadenopathy  Respiratory:  No respiratory distress, no wheezing, rales or rhonchi detected  Cardiovascular:  Normal rate, normal rhythm, no murmurs, no gallops, no rubs, no edema   GI:  Soft, nondistended, thin, generalized tenderness, normal bowel sounds, no voluntary guarding  Musculoskeletal:  No cyanosis or obvious acute deformity. Moving all extremities   Integument:  Warm and dry. Lymphatic:  No cervical or axillary lymphadenopathy noted   Neurologic:  Alert & oriented x 3, no apparent focal deficits noted   Psychiatric:  Speech and behavior appropriate     Results:     Lab Results   Component Value Date    WBC 2.6 (L) 03/18/2022    HGB 9.4 (L) 03/18/2022    HCT 27.6 (L) 03/18/2022    MCV 91.4 03/18/2022    PLT 98 (L) 03/18/2022       Lab Results   Component Value Date     03/18/2022    K 3.9 03/18/2022     03/18/2022    CL 99.0 06/01/2012    CO2 21 03/18/2022    BUN 18 03/18/2022    CREATININE 1.5 03/18/2022    CREATININE 0.9 06/01/2012    GLUCOSE 87 03/18/2022    CALCIUM 8.2 03/18/2022        Assessment and Plan:      Active Hospital Problems     Diagnosis Date Noted    Intractable vomiting with nausea [R11.2]  -As per HPI, this is an acute on chronic issue for pt. Also, was admitted here 3/12 - 3/15 for same complaint. Workup that visit was essentially negative. Per extensive chart review, pt has hx of EV s/p banding 10/14/21 (prior to transplant on 10/24/20). She was supposed to follow up with GI for repeat EGD but has had difficulty getting back to . Also, chronically prescribed cellcept for immunosuppression. Cellcept commonly associated with n/v/abd pain/diarrhea.   -case discussed with pt's [facility] Transplant MD, Dr. Irma Litten on 3/17. He recs repeat EGD and holding cellcept. Kristal Millan will arrange EGD for next week. Cellcept placed on hold 3/17.  - consulted to assist with transportation to , however, no options available to transport out of state.   - Transplant Coordinator, Jerman Pan, has also explored out-of-state transport options for patient however has been unsuccessful. Jerman Pan is able to provide gas cards for patient. Jerman Pan can be reached at [phone].  -Case management, Tonja Reid, spoke with patient's  who states transportation to  is not an issue. The issue is getting patient to go to the appointment.  -Patient has improved with IV fluids, scheduled Compazine and as needed Zofran. Diet was advanced from CLD to full liquids on 3/17. Diet further advanced to regular on 3/18. Scheduled Compazine and IV fluids discontinued 3/18.  -Plan to monitor for an additional 24 hours. If patient remains asymptomatic and able to tolerate p.o., plan to discharge home tomorrow.     Diarrhea  -as above, possibly due to home cellcept  -cellcept on hold 3/17  -since c.diff and GI PCR panel was neg, dc'ed flagyl on 3/17.  No indication for abx.     TIA  -episode of slurred speech and facial numbness 3/14  -CT head, MRI head negative  -CTA neck with mixed atherosclerotic plaque at the left common carotid bifurcation with approximately 30% stenosis at the origin of the left internal carotid artery, mixed atherosclerotic plaque at the right common carotid bifurcation without significant stenosis of the right carotid artery, normal left vertebral artery, patent right vertebral artery with severe stenosis at its origin. -CTA head with patent intracranial vessels, moderate stenosis of the distal right vertebral artery with patency of both distal vertebral arteries and the basilar artery.  -continue ASA 325mg (pt previously on 81mg PTA) and lipitor 80mg  -follow up with Neurology     03/16/2022    UTI (urinary tract infection) [N39.0]  -pt asymptomatic from urinary complaints  -UA concerning for UTI, however, did not reflux to culture  -dc rocephin   03/16/2022    Hypokalemia [E87.6]  -Secondary to GI losses   -Replace; monitor and replete as needed    03/16/2022    Hypomagnesemia [E83.42]  -Suspect due to GI losses  -Replace; monitor and replete as needed    03/13/2022    Liver transplant recipient [facility]) [Z94.4]  -Liver transplant at [facility] by Brook Rubinstein on 10/24/20  -on cyclosporine and cellcept since transplant; as above, chronic n/v with occassional emesis, abd pain, diarrhea. Given AEs associated with cellcept, it's possible pt's n/v/abd pain/diarrhea is due to cellcept. -hold cellcept 3/17 per      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 ([facility] [K75.81, K74.60]  -s/p liver transplant on 10/24/20  -home torsemide on hold until po intake improves  -follows with UC    04/23/2018    Essential hypertension [I10]  -with hypertensive urgency on admission  -suspect due to pain and inability to take po meds PTA  -BP improved after treatment of sxs and restarting home coreg        Patient was seen and examined by Dr. Caitlin Cano and plan of care reviewed. Treatment plan was formulated collaboratively.       Electronically signed by LINCOLN Garcia on 3/18/2022 at 4:03 PM

## 2022-03-19 LAB
A/G RATIO: 1.7 (ref 0.8–2)
ALBUMIN SERPL-MCNC: 3.2 G/DL (ref 3.4–4.8)
ALP BLD-CCNC: 70 U/L (ref 25–100)
ALT SERPL-CCNC: 6 U/L (ref 4–36)
ANION GAP SERPL CALCULATED.3IONS-SCNC: 10 MMOL/L (ref 3–16)
AST SERPL-CCNC: 10 U/L (ref 8–33)
BILIRUB SERPL-MCNC: 0.7 MG/DL (ref 0.3–1.2)
BUN BLDV-MCNC: 14 MG/DL (ref 6–20)
CALCIUM SERPL-MCNC: 8.4 MG/DL (ref 8.5–10.5)
CHLORIDE BLD-SCNC: 115 MMOL/L (ref 98–107)
CO2: 21 MMOL/L (ref 20–30)
CREAT SERPL-MCNC: 1.1 MG/DL (ref 0.4–1.2)
CYCLOSPORINE A: 193.6 NG/ML
FERRITIN: 247 NG/ML (ref 22–322)
GFR AFRICAN AMERICAN: >59
GFR NON-AFRICAN AMERICAN: 50
GLOBULIN: 1.9 G/DL
GLUCOSE BLD-MCNC: 98 MG/DL (ref 74–106)
HCT VFR BLD CALC: 29.3 % (ref 37–47)
HEMOGLOBIN: 10 G/DL (ref 11.5–16.5)
IRON SATURATION: 41 % (ref 15–50)
IRON: 81 UG/DL (ref 37–145)
MCH RBC QN AUTO: 29.9 PG (ref 27–32)
MCHC RBC AUTO-ENTMCNC: 34.1 G/DL (ref 31–35)
MCV RBC AUTO: 87.7 FL (ref 80–100)
PDW BLD-RTO: 14.1 % (ref 11–16)
PLATELET # BLD: 112 K/UL (ref 150–400)
PMV BLD AUTO: 11 FL (ref 6–10)
POTASSIUM REFLEX MAGNESIUM: 3.8 MMOL/L (ref 3.4–5.1)
RBC # BLD: 3.34 M/UL (ref 3.8–5.8)
SODIUM BLD-SCNC: 146 MMOL/L (ref 136–145)
TOTAL IRON BINDING CAPACITY: 199 UG/DL (ref 250–450)
TOTAL PROTEIN: 5.1 G/DL (ref 6.4–8.3)
WBC # BLD: 2.7 K/UL (ref 4–11)

## 2022-03-19 PROCEDURE — 82728 ASSAY OF FERRITIN: CPT

## 2022-03-19 PROCEDURE — 6370000000 HC RX 637 (ALT 250 FOR IP)

## 2022-03-19 PROCEDURE — 83550 IRON BINDING TEST: CPT

## 2022-03-19 PROCEDURE — 80053 COMPREHEN METABOLIC PANEL: CPT

## 2022-03-19 PROCEDURE — 6370000000 HC RX 637 (ALT 250 FOR IP): Performed by: PHYSICIAN ASSISTANT

## 2022-03-19 PROCEDURE — 83540 ASSAY OF IRON: CPT

## 2022-03-19 PROCEDURE — 6360000002 HC RX W HCPCS: Performed by: PHYSICIAN ASSISTANT

## 2022-03-19 PROCEDURE — 6360000002 HC RX W HCPCS: Performed by: INTERNAL MEDICINE

## 2022-03-19 PROCEDURE — 85027 COMPLETE CBC AUTOMATED: CPT

## 2022-03-19 PROCEDURE — 99232 SBSQ HOSP IP/OBS MODERATE 35: CPT | Performed by: INTERNAL MEDICINE

## 2022-03-19 PROCEDURE — 1200000000 HC SEMI PRIVATE

## 2022-03-19 PROCEDURE — 36415 COLL VENOUS BLD VENIPUNCTURE: CPT

## 2022-03-19 RX ORDER — CLONIDINE HYDROCHLORIDE 0.1 MG/1
0.1 TABLET ORAL ONCE
Status: COMPLETED | OUTPATIENT
Start: 2022-03-19 | End: 2022-03-19

## 2022-03-19 RX ORDER — HYDRALAZINE HYDROCHLORIDE 25 MG/1
50 TABLET, FILM COATED ORAL EVERY 8 HOURS
Status: DISCONTINUED | OUTPATIENT
Start: 2022-03-19 | End: 2022-03-19

## 2022-03-19 RX ORDER — CLONIDINE HYDROCHLORIDE 0.1 MG/1
TABLET ORAL
Status: COMPLETED
Start: 2022-03-19 | End: 2022-03-19

## 2022-03-19 RX ORDER — HYDRALAZINE HYDROCHLORIDE 25 MG/1
50 TABLET, FILM COATED ORAL ONCE
Status: COMPLETED | OUTPATIENT
Start: 2022-03-19 | End: 2022-03-19

## 2022-03-19 RX ORDER — CLONIDINE HYDROCHLORIDE 0.1 MG/1
0.1 TABLET ORAL EVERY 6 HOURS PRN
Status: DISCONTINUED | OUTPATIENT
Start: 2022-03-19 | End: 2022-03-22 | Stop reason: HOSPADM

## 2022-03-19 RX ORDER — HYDRALAZINE HYDROCHLORIDE 25 MG/1
100 TABLET, FILM COATED ORAL EVERY 8 HOURS
Status: DISCONTINUED | OUTPATIENT
Start: 2022-03-19 | End: 2022-03-22 | Stop reason: HOSPADM

## 2022-03-19 RX ADMIN — NYSTATIN 500000 UNITS: 500000 SUSPENSION ORAL at 20:00

## 2022-03-19 RX ADMIN — ALPRAZOLAM 0.25 MG: 0.25 TABLET ORAL at 08:51

## 2022-03-19 RX ADMIN — CYCLOSPORINE 75 MG: 25 CAPSULE, LIQUID FILLED ORAL at 08:50

## 2022-03-19 RX ADMIN — NYSTATIN 500000 UNITS: 500000 SUSPENSION ORAL at 08:51

## 2022-03-19 RX ADMIN — NYSTATIN 500000 UNITS: 500000 SUSPENSION ORAL at 13:07

## 2022-03-19 RX ADMIN — GABAPENTIN 300 MG: 300 CAPSULE ORAL at 08:51

## 2022-03-19 RX ADMIN — HYDRALAZINE HYDROCHLORIDE 50 MG: 25 TABLET, FILM COATED ORAL at 13:07

## 2022-03-19 RX ADMIN — ONDANSETRON HYDROCHLORIDE 4 MG: 2 INJECTION, SOLUTION INTRAMUSCULAR; INTRAVENOUS at 06:02

## 2022-03-19 RX ADMIN — CYANOCOBALAMIN 1000 MCG: 1000 INJECTION INTRAMUSCULAR; SUBCUTANEOUS at 08:51

## 2022-03-19 RX ADMIN — HYDRALAZINE HYDROCHLORIDE 100 MG: 25 TABLET, FILM COATED ORAL at 20:00

## 2022-03-19 RX ADMIN — ONDANSETRON HYDROCHLORIDE 4 MG: 2 INJECTION, SOLUTION INTRAMUSCULAR; INTRAVENOUS at 16:57

## 2022-03-19 RX ADMIN — GABAPENTIN 300 MG: 300 CAPSULE ORAL at 20:00

## 2022-03-19 RX ADMIN — CARVEDILOL 12.5 MG: 12.5 TABLET, FILM COATED ORAL at 08:51

## 2022-03-19 RX ADMIN — ATORVASTATIN CALCIUM 80 MG: 80 TABLET, FILM COATED ORAL at 20:00

## 2022-03-19 RX ADMIN — HYDRALAZINE HYDROCHLORIDE 50 MG: 25 TABLET, FILM COATED ORAL at 15:05

## 2022-03-19 RX ADMIN — HYDRALAZINE HYDROCHLORIDE 25 MG: 25 TABLET, FILM COATED ORAL at 04:41

## 2022-03-19 RX ADMIN — Medication 1 CAPSULE: at 08:51

## 2022-03-19 RX ADMIN — CARVEDILOL 12.5 MG: 12.5 TABLET, FILM COATED ORAL at 17:01

## 2022-03-19 RX ADMIN — Medication 12.5 MG: at 09:03

## 2022-03-19 RX ADMIN — NYSTATIN 500000 UNITS: 500000 SUSPENSION ORAL at 17:00

## 2022-03-19 RX ADMIN — ASPIRIN 325 MG: 325 TABLET, COATED ORAL at 08:51

## 2022-03-19 RX ADMIN — GABAPENTIN 300 MG: 300 CAPSULE ORAL at 17:01

## 2022-03-19 RX ADMIN — ACETAMINOPHEN 650 MG: 325 TABLET, FILM COATED ORAL at 19:51

## 2022-03-19 RX ADMIN — CLONIDINE HYDROCHLORIDE 0.1 MG: 0.1 TABLET ORAL at 15:04

## 2022-03-19 RX ADMIN — ALPRAZOLAM 0.25 MG: 0.25 TABLET ORAL at 20:00

## 2022-03-19 RX ADMIN — CYCLOSPORINE 100 MG: 25 CAPSULE, LIQUID FILLED ORAL at 20:00

## 2022-03-19 RX ADMIN — PANTOPRAZOLE SODIUM 40 MG: 40 TABLET, DELAYED RELEASE ORAL at 05:58

## 2022-03-19 RX ADMIN — ENOXAPARIN SODIUM 40 MG: 100 INJECTION SUBCUTANEOUS at 08:51

## 2022-03-19 RX ADMIN — GABAPENTIN 300 MG: 300 CAPSULE ORAL at 13:07

## 2022-03-19 ASSESSMENT — PAIN SCALES - GENERAL: PAINLEVEL_OUTOF10: 7

## 2022-03-19 NOTE — PLAN OF CARE
Problem: Falls - Risk of:  Goal: Will remain free from falls  Description: Will remain free from falls  Outcome: Ongoing  Goal: Absence of physical injury  Description: Absence of physical injury  Outcome: Ongoing     Problem: Pain:  Goal: Pain level will decrease  Description: Pain level will decrease  Outcome: Met This Shift

## 2022-03-19 NOTE — PROGRESS NOTES
Progress Note      Subjective:   Chief complaint:   Intractable n/v/abd pain and diarrhea    Interval History:   Pt seen and examined this morning. States she had recurrence of diarrhea. 2 episodes thus far today. Required phenergan overnight for nausea. Also noted to be hypertensive with . Review of systems:   Constitutional:  Denies fever or chills. Positive for generalized weakness and fatigue.   Eyes:  Denies eye pain or redness  HENT:  Denies nasal congestion or sore throat   Respiratory:  Denies cough or shortness of breath   Cardiovascular:  Denies chest pain or edema   GI:  Denies bloody stools. Positive for nausea, vomiting, diarrhea and abdominal pain. :  Denies dysuria or frequency. Musculoskeletal:  Denies acute neck pain or body aches  Integument:  Denies rash or itching  Neurologic:  Denies headache, dizziness, numbness, tingling or unilateral weakness  Psychiatric:  Denies acute depression or acute anxiety    Past medical history, surgical history, family history and social history reviewed and unchanged compared to H&P earlier this admission. Medications:   Scheduled Meds:   hydrALAZINE  50 mg Oral Q8H    aspirin  325 mg Oral Daily    atorvastatin  80 mg Oral Nightly    carvedilol  12.5 mg Oral BID WC    cycloSPORINE modified  100 mg Oral Nightly    gabapentin  300 mg Oral 4x Daily    lactobacillus  1 capsule Oral Daily with breakfast    [Held by provider] mycophenolate  500 mg Oral BID    nystatin  5 mL Oral 4x Daily    pantoprazole  40 mg Oral QAM AC    vitamin D  50,000 Units Oral Weekly    enoxaparin  40 mg SubCUTAneous Daily    cycloSPORINE modified  75 mg Oral QAM     Continuous Infusions:    Objective:     Vital Signs  Temp: 98.1 °F (36.7 °C)  Pulse: 61  Resp: 18  BP: (!) 180/75  SpO2: 98 %  O2 Device: None (Room air)       Vital signs reviewed in electronic charts.     Physical exam  Constitutional:  Well developed, chronically ill appearing female sitting upright in bed, appears to not feel well. Eyes:  no scleral icterus, conjunctiva normal   HENT:  Atraumatic, external ears normal, nose normal, oropharynx moist, no pharyngeal exudates. Neck- supple, no JVD, no lymphadenopathy  Respiratory:  No respiratory distress, no wheezing, rales or rhonchi detected  Cardiovascular:  Normal rate, normal rhythm, no murmurs, no gallops, no rubs, no edema   GI:  Soft, nondistended, thin, generalized tenderness, normal bowel sounds, no voluntary guarding  Musculoskeletal:  No cyanosis or obvious acute deformity. Moving all extremities   Integument:  Warm and dry. Lymphatic:  No cervical or axillary lymphadenopathy noted   Neurologic:  Alert & oriented x 3, no apparent focal deficits noted   Psychiatric:  Speech and behavior appropriate     Results:     Lab Results   Component Value Date    WBC 2.7 (L) 03/19/2022    HGB 10.0 (L) 03/19/2022    HCT 29.3 (L) 03/19/2022    MCV 87.7 03/19/2022     (L) 03/19/2022       Lab Results   Component Value Date     03/19/2022    K 3.8 03/19/2022     03/19/2022    CL 99.0 06/01/2012    CO2 21 03/19/2022    BUN 14 03/19/2022    CREATININE 1.1 03/19/2022    CREATININE 0.9 06/01/2012    GLUCOSE 98 03/19/2022    CALCIUM 8.4 03/19/2022        Assessment and Plan: Active Hospital Problems     Diagnosis Date Noted    Intractable vomiting with nausea [R11.2]  -As per HPI, this is an acute on chronic issue for pt. Also, was admitted here 3/12 - 3/15 for same complaint. Workup that visit was essentially negative. Per extensive chart review, pt has hx of EV s/p banding 10/14/21 (prior to transplant on 10/24/20). She was supposed to follow up with GI for repeat EGD but has had difficulty getting back to UC. Also, chronically prescribed cellcept for immunosuppression. Cellcept commonly associated with n/v/abd pain/diarrhea.   -case discussed with pt's UC Transplant MD, Dr. Ganesh Romano on 3/17.  He recs repeat EGD and holding cellcept. Gennette Pump will arrange EGD for next week. Cellcept placed on hold 3/17.  -CM consulted to assist with transportation to , however, no options available to transport out of state.   - Transplant Coordinator, Delfino Hanna, has also explored out-of-state transport options for patient however has been unsuccessful. Delfino Hanna is able to provide gas cards for patient. Delfino  can be reached at [phone].  -Case management, Mackenzie Reed, spoke with patient's  who states transportation to  is not an issue. The issue is getting patient to go to the appointment.  -Patient initially treated with IVFs, scheduled Compazine, PRN phenergan and PRN Zofran. Diet advanced from CLD to full liquids on 3/17. Diet further advanced to regular on 3/18. Scheduled Compazine and IV fluids discontinued 3/18.  -pt had recurrence of nausea last night and required PRN phenergan.  Also had 2 episodes of diarrhea thus far today.  -stool studies ordered 3/19     Diarrhea  -as above, possibly due to home cellcept  -cellcept on hold 3/17  -since c.diff and GI PCR panel was neg last admission, dc'ed flagyl on 3/17.   -pt had recurrence of diarrhea 3/19; repeat cdiff and GI panel PCR ordered     TIA  -episode of slurred speech and facial numbness 3/14  -CT head, MRI head negative  -CTA neck with mixed atherosclerotic plaque at the left common carotid bifurcation with approximately 30% stenosis at the origin of the left internal carotid artery, mixed atherosclerotic plaque at the right common carotid bifurcation without significant stenosis of the right carotid artery, normal left vertebral artery, patent right vertebral artery with severe stenosis at its origin.   -CTA head with patent intracranial vessels, moderate stenosis of the distal right vertebral artery with patency of both distal vertebral arteries and the basilar artery.  -continue ASA 325mg (pt previously on 81mg PTA) and lipitor 80mg  -follow up with Neurology     03/16/2022    UTI (urinary tract infection) [N39.0]  -pt asymptomatic from urinary complaints  -UA concerning for UTI, however, did not reflux to culture  -dc rocephin 3/18    03/16/2022    Hypokalemia [E87.6]  -Secondary to GI losses   -Replaced; monitor and replete as needed    03/16/2022    Hypomagnesemia [E83.42]  -Suspect due to GI losses  -Replaced; monitor and replete as needed    03/13/2022    Liver transplant recipient [facility]) [Z94.4]  -Liver transplant at [facility] by Susan Lindsey on 10/24/20  -on cyclosporine and cellcept since transplant; as above, chronic n/v with occassional emesis, abd pain, diarrhea. Given AEs associated with cellcept, it's possible pt's n/v/abd pain/diarrhea is due to cellcept. -hold cellcept 3/17 per      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 ([facility] [address] [K75.81, K74.60]  -s/p liver transplant on 10/24/20  -home torsemide on hold until po intake improves  -follows with UC    04/23/2018    Essential hypertension [I10]  -with hypertensive urgency on admission  -suspect due to pain and inability to take po meds PTA  -BP elevated again on 3/19. Continue coreg. Increased hydralazine 3/19. Monitor and titrate as needed.          Patient was seen and examined by Dr. Abelino Kennedy and plan of care reviewed. Treatment plan was formulated collaboratively.       Electronically signed by LINCOLN Perez on 3/19/2022 at 1:20 PM

## 2022-03-19 NOTE — FLOWSHEET NOTE
03/19/22 1455   Vital Signs   Temp 99.2 °F (37.3 °C)   Temp Source Oral   Pulse 66   BP (!) 203/73  (Notified)       Notified PA. 0.1 mg clonidine ordered per MD and additional 50 mg hydralazine.

## 2022-03-19 NOTE — FLOWSHEET NOTE
03/19/22 1613   Vital Signs   BP (!) 185/80     Orders for Clonidine 0.1 mg q 6 PRN and increase hydralazine to 100 at next dose.

## 2022-03-20 PROBLEM — M79.606 LEG PAIN: Status: ACTIVE | Noted: 2022-03-13

## 2022-03-20 PROBLEM — F41.9 ANXIETY: Status: ACTIVE | Noted: 2022-03-20

## 2022-03-20 LAB
A/G RATIO: 1.4 (ref 0.8–2)
ALBUMIN SERPL-MCNC: 3 G/DL (ref 3.4–4.8)
ALP BLD-CCNC: 67 U/L (ref 25–100)
ALT SERPL-CCNC: 6 U/L (ref 4–36)
ANION GAP SERPL CALCULATED.3IONS-SCNC: 8 MMOL/L (ref 3–16)
AST SERPL-CCNC: 8 U/L (ref 8–33)
BILIRUB SERPL-MCNC: 0.7 MG/DL (ref 0.3–1.2)
BUN BLDV-MCNC: 16 MG/DL (ref 6–20)
CALCIUM SERPL-MCNC: 8.6 MG/DL (ref 8.5–10.5)
CHLORIDE BLD-SCNC: 114 MMOL/L (ref 98–107)
CO2: 20 MMOL/L (ref 20–30)
CREAT SERPL-MCNC: 1.3 MG/DL (ref 0.4–1.2)
GFR AFRICAN AMERICAN: 50
GFR NON-AFRICAN AMERICAN: 41
GLOBULIN: 2.1 G/DL
GLUCOSE BLD-MCNC: 116 MG/DL (ref 74–106)
HCT VFR BLD CALC: 30.7 % (ref 37–47)
HEMOGLOBIN: 10.4 G/DL (ref 11.5–16.5)
MCH RBC QN AUTO: 31.3 PG (ref 27–32)
MCHC RBC AUTO-ENTMCNC: 33.9 G/DL (ref 31–35)
MCV RBC AUTO: 92.5 FL (ref 80–100)
PDW BLD-RTO: 15.4 % (ref 11–16)
PLATELET # BLD: 125 K/UL (ref 150–400)
PMV BLD AUTO: 11.5 FL (ref 6–10)
POTASSIUM REFLEX MAGNESIUM: 4.2 MMOL/L (ref 3.4–5.1)
RBC # BLD: 3.32 M/UL (ref 3.8–5.8)
SARS-COV-2: NOT DETECTED
SODIUM BLD-SCNC: 142 MMOL/L (ref 136–145)
TOTAL PROTEIN: 5.1 G/DL (ref 6.4–8.3)
WBC # BLD: 4.1 K/UL (ref 4–11)

## 2022-03-20 PROCEDURE — 87177 OVA AND PARASITES SMEARS: CPT

## 2022-03-20 PROCEDURE — 85027 COMPLETE CBC AUTOMATED: CPT

## 2022-03-20 PROCEDURE — 6370000000 HC RX 637 (ALT 250 FOR IP): Performed by: PHYSICIAN ASSISTANT

## 2022-03-20 PROCEDURE — 87505 NFCT AGENT DETECTION GI: CPT

## 2022-03-20 PROCEDURE — 99233 SBSQ HOSP IP/OBS HIGH 50: CPT | Performed by: PHYSICIAN ASSISTANT

## 2022-03-20 PROCEDURE — 2580000003 HC RX 258: Performed by: PHYSICIAN ASSISTANT

## 2022-03-20 PROCEDURE — 36415 COLL VENOUS BLD VENIPUNCTURE: CPT

## 2022-03-20 PROCEDURE — 1200000000 HC SEMI PRIVATE

## 2022-03-20 PROCEDURE — 80053 COMPREHEN METABOLIC PANEL: CPT

## 2022-03-20 PROCEDURE — 6360000002 HC RX W HCPCS: Performed by: PHYSICIAN ASSISTANT

## 2022-03-20 PROCEDURE — 87324 CLOSTRIDIUM AG IA: CPT

## 2022-03-20 PROCEDURE — 97116 GAIT TRAINING THERAPY: CPT

## 2022-03-20 PROCEDURE — 87449 NOS EACH ORGANISM AG IA: CPT

## 2022-03-20 PROCEDURE — 87209 SMEAR COMPLEX STAIN: CPT

## 2022-03-20 PROCEDURE — 87328 CRYPTOSPORIDIUM AG IA: CPT

## 2022-03-20 PROCEDURE — 97530 THERAPEUTIC ACTIVITIES: CPT

## 2022-03-20 RX ORDER — SODIUM CHLORIDE 9 MG/ML
INJECTION, SOLUTION INTRAVENOUS CONTINUOUS
Status: DISCONTINUED | OUTPATIENT
Start: 2022-03-20 | End: 2022-03-22

## 2022-03-20 RX ORDER — OXYCODONE HYDROCHLORIDE 5 MG/1
5 TABLET ORAL EVERY 4 HOURS PRN
Status: DISCONTINUED | OUTPATIENT
Start: 2022-03-20 | End: 2022-03-22 | Stop reason: HOSPADM

## 2022-03-20 RX ORDER — ALPRAZOLAM 0.5 MG/1
0.5 TABLET ORAL 3 TIMES DAILY PRN
Status: DISCONTINUED | OUTPATIENT
Start: 2022-03-20 | End: 2022-03-22 | Stop reason: HOSPADM

## 2022-03-20 RX ADMIN — NYSTATIN 500000 UNITS: 500000 SUSPENSION ORAL at 17:41

## 2022-03-20 RX ADMIN — NYSTATIN 500000 UNITS: 500000 SUSPENSION ORAL at 12:52

## 2022-03-20 RX ADMIN — Medication 1 CAPSULE: at 08:32

## 2022-03-20 RX ADMIN — CARVEDILOL 12.5 MG: 12.5 TABLET, FILM COATED ORAL at 17:41

## 2022-03-20 RX ADMIN — HYDRALAZINE HYDROCHLORIDE 100 MG: 25 TABLET, FILM COATED ORAL at 12:22

## 2022-03-20 RX ADMIN — HYDRALAZINE HYDROCHLORIDE 100 MG: 25 TABLET, FILM COATED ORAL at 20:47

## 2022-03-20 RX ADMIN — ONDANSETRON 4 MG: 4 TABLET, ORALLY DISINTEGRATING ORAL at 06:11

## 2022-03-20 RX ADMIN — ACETAMINOPHEN 650 MG: 325 TABLET, FILM COATED ORAL at 08:31

## 2022-03-20 RX ADMIN — SODIUM CHLORIDE: 9 INJECTION, SOLUTION INTRAVENOUS at 10:16

## 2022-03-20 RX ADMIN — ENOXAPARIN SODIUM 40 MG: 100 INJECTION SUBCUTANEOUS at 08:31

## 2022-03-20 RX ADMIN — ALPRAZOLAM 0.25 MG: 0.25 TABLET ORAL at 08:31

## 2022-03-20 RX ADMIN — ASPIRIN 325 MG: 325 TABLET, COATED ORAL at 08:31

## 2022-03-20 RX ADMIN — NYSTATIN 500000 UNITS: 500000 SUSPENSION ORAL at 08:31

## 2022-03-20 RX ADMIN — GABAPENTIN 300 MG: 300 CAPSULE ORAL at 20:46

## 2022-03-20 RX ADMIN — CARVEDILOL 12.5 MG: 12.5 TABLET, FILM COATED ORAL at 08:32

## 2022-03-20 RX ADMIN — OXYCODONE 5 MG: 5 TABLET ORAL at 12:23

## 2022-03-20 RX ADMIN — ATORVASTATIN CALCIUM 80 MG: 80 TABLET, FILM COATED ORAL at 20:46

## 2022-03-20 RX ADMIN — PANTOPRAZOLE SODIUM 40 MG: 40 TABLET, DELAYED RELEASE ORAL at 06:11

## 2022-03-20 RX ADMIN — ONDANSETRON HYDROCHLORIDE 4 MG: 2 INJECTION, SOLUTION INTRAMUSCULAR; INTRAVENOUS at 12:52

## 2022-03-20 RX ADMIN — ALPRAZOLAM 0.5 MG: 0.5 TABLET ORAL at 20:47

## 2022-03-20 RX ADMIN — Medication 12.5 MG: at 08:21

## 2022-03-20 RX ADMIN — GABAPENTIN 300 MG: 300 CAPSULE ORAL at 08:32

## 2022-03-20 RX ADMIN — OXYCODONE 5 MG: 5 TABLET ORAL at 20:47

## 2022-03-20 RX ADMIN — CYCLOSPORINE 100 MG: 25 CAPSULE, LIQUID FILLED ORAL at 20:49

## 2022-03-20 RX ADMIN — HYDRALAZINE HYDROCHLORIDE 100 MG: 25 TABLET, FILM COATED ORAL at 06:11

## 2022-03-20 RX ADMIN — GABAPENTIN 300 MG: 300 CAPSULE ORAL at 17:41

## 2022-03-20 RX ADMIN — GABAPENTIN 300 MG: 300 CAPSULE ORAL at 12:52

## 2022-03-20 RX ADMIN — NYSTATIN 500000 UNITS: 500000 SUSPENSION ORAL at 20:47

## 2022-03-20 RX ADMIN — CYCLOSPORINE 75 MG: 25 CAPSULE, LIQUID FILLED ORAL at 08:31

## 2022-03-20 ASSESSMENT — PAIN SCALES - GENERAL
PAINLEVEL_OUTOF10: 10
PAINLEVEL_OUTOF10: 9
PAINLEVEL_OUTOF10: 5
PAINLEVEL_OUTOF10: 6
PAINLEVEL_OUTOF10: 9

## 2022-03-20 ASSESSMENT — PAIN DESCRIPTION - LOCATION: LOCATION: ABDOMEN

## 2022-03-20 NOTE — PLAN OF CARE
Problem: Falls - Risk of:  Goal: Will remain free from falls  Description: Will remain free from falls  3/20/2022 1110 by Simon Bella RN  Outcome: Met This Shift  3/19/2022 2324 by Tim Choudhury RN  Outcome: Ongoing  Goal: Absence of physical injury  Description: Absence of physical injury  3/19/2022 2324 by Tim Choudhury RN  Outcome: Ongoing     Problem: Pain:  Goal: Pain level will decrease  Description: Pain level will decrease  3/20/2022 1110 by Simon Bella RN  Outcome: Met This Shift  3/19/2022 2324 by Tim Choudhury RN  Outcome: Met This Shift

## 2022-03-20 NOTE — FLOWSHEET NOTE
03/20/22 0902   Assessment   Charting Type Shift assessment   Neurological   Neuro (WDL) WDL   Level of Consciousness Alert (0)   Orientation Level Oriented X4   Cognition Appropriate judgement   Language Clear   Driver Coma Scale   Eye Opening 4   Best Verbal Response 5   Best Motor Response 6   Driver Coma Scale Score 15   HEENT   HEENT (WDL) X   Right Eye Impaired vision   Left Eye Impaired vision   Teeth Dentures upper;Dentures lower   Nose Intact   Throat Other (Comment)   Neck Symmetrical   Tongue Pink & moist   Voice Normal   Mucous Membrane Moist;Pink; Intact   Respiratory   Respiratory (WDL) WDL   Cardiac   Cardiac (WDL) WDL   Cardiac Monitor   Telemetry Monitor On No   Gastrointestinal   Abdominal (WDL) X   RUQ Bowel Sounds Active   LUQ Bowel Sounds Active   RLQ Bowel Sounds Active   LLQ Bowel Sounds Active   Abdomen Inspection Rounded; Soft   Tenderness Tenderness  (LLQ, RLQ)   Passing Flatus Y   Peripheral Vascular   Peripheral Vascular (WDL) WDL   Edema None   RLE Neurovascular Assessment   R Pedal Pulse +2   LLE Neurovascular Assessment   L Pedal Pulse +2   Skin Color/Condition   Skin Color/Condition (WDL) WDL   Skin Color Pale   Skin Condition/Temp Dry; Warm   Skin Integrity   Skin Integrity (WDL) X   Skin Integrity Redness   Location sacrum   Preventative Dressing No   Musculoskeletal   Musculoskeletal (WDL) X   RUE Full movement   LUE Full movement   RL Extremity Weakness   LL Extremity Weakness   Genitourinary   Genitourinary (WDL) WDL   Psychosocial   Psychosocial (WDL) X   Patient Behaviors Anxious

## 2022-03-20 NOTE — PROGRESS NOTES
Physical Therapy  Facility/Department: [facility] MED SURG  Daily Treatment Note  NAME: Leyda Whalen  : 1955  MRN: [id number]    Date of Service: 3/20/2022    Discharge Recommendations:  Continue to assess pending progress      Assessment   Assessment: Patient required encouragement to participate with therapy. She is worried about her overall health problems and needs encouragement to focus more on the good things in her life. Patient Mod I with bed mobility. Ambulated ~350 feet with RW and supervision. Patient agreeable to sit up in a chair and transferred to the recliner upon return to the room. Patient states she feels much better and is glad I encouraged her to get out of bed and out of the room. REQUIRES PT FOLLOW UP: Yes  Activity Tolerance  Activity Tolerance: Patient Tolerated treatment well     Patient Diagnosis(es): The primary encounter diagnosis was Generalized abdominal pain. Diagnoses of Intractable vomiting with nausea, unspecified vomiting type, Dehydration, General weakness, Hypokalemia, and Enteritis were also pertinent to this visit. has a past medical history of Arthritis, rheumatoid ([address] Ascites, CAD (coronary artery disease), Chronic pain, Cirrhosis ([address] Colitis, Esophageal varices ([address] Hypertension, IIH (idiopathic intracranial hypertension), Liver cirrhosis ([address] Osteoarthritis, Portal hypertensive gastropathy ([address] Sciatica, Splenomegaly, and Unspecified sleep apnea. has a past surgical history that includes Dilation and curettage of uterus; bone marrow biopsy; Tubal ligation (); Dilation and curettage of uterus ( & ); liver biopsy; Liver transplant (10/24/2020); and Upper gastrointestinal endoscopy (10/14/2020).     Restrictions  Restrictions/Precautions  Restrictions/Precautions: General Precautions,Fall Risk  Required Braces or Orthoses?: No  Subjective   General  Chart Reviewed: Yes  Response To Previous Treatment: Not applicable  Family / Caregiver Present: No  Subjective  Subjective: Patient reports having nausea and diarrhea. States she feels like she's only had a few good days in the last couple of years. Patient required encouragement to get out of bed and participate with PT. Pain Screening  Patient Currently in Pain: Yes  Pain Assessment  Pain Location: Abdomen  Vital Signs  Patient Currently in Pain: Yes       Objective   Bed mobility  Rolling to Right: Modified independent  Supine to Sit: Modified independent  Scooting: Modified independent  Transfers  Sit to Stand: Modified independent  Stand to sit: Modified independent  Ambulation  Ambulation?: Yes  Ambulation 1  Surface: level tile  Device: Rolling Walker  Assistance: Supervision  Distance: ~350 feet     Balance  Posture: Good  Sitting - Static: Good  Sitting - Dynamic: Good  Standing - Static: Good  Standing - Dynamic: Good;-       Goals  Long term goals  Time Frame for Long term goals : 3-4 days  Long term goal 1: Patient to perform basic transfers with modified independence. Long term goal 2: Patient to ambulate 50 feet x 2 with RW with Supervision-modified. Plan    Plan  Times per week: 3-4 days  Plan weeks: 3-4 days  Current Treatment Recommendations: Strengthening,Balance Training,Functional Mobility Training,Gait Training,Neuromuscular Re-education,Home Exercise Program,Safety Education & Training  Safety Devices  Type of devices: Left in chair,Call light within reach     Therapy Time   Individual Concurrent Group Co-treatment   Time In 3194         Time Out 2971         Minutes 49              This note serves as D/C summary if patient is discharged prior to next visit.   Caitie Barahona, PTA

## 2022-03-20 NOTE — PROGRESS NOTES
Progress Note      Subjective:   Chief complaint:   N/V/D/abd pain    Interval History:   Patient seen and examined this morning. She continues to complain of nausea. Also reports several episodes of diarrhea overnight. Unfortunately, she has not been able to give a stool sample just yet. She had a small mishap in the restroom earlier this morning missing the collection hat. Patient also became hypertensive yesterday afternoon requiring titration of blood pressure medicines. Blood pressure much improved today. She appears anxious about her overall health today. Review of systems:   Constitutional:  Denies fever or chills. Positive for generalized weakness and fatigue.   Eyes:  Denies eye pain or redness  HENT:  Denies nasal congestion or sore throat   Respiratory:  Denies cough or shortness of breath   Cardiovascular:  Denies chest pain or edema   GI:  Denies bloody stools. Positive for nausea, vomiting, diarrhea and abdominal pain. :  Denies dysuria or frequency. Musculoskeletal:  Denies acute neck pain or body aches  Integument:  Denies rash or itching  Neurologic:  Denies headache, dizziness, numbness, tingling or unilateral weakness  Psychiatric:  Denies acute depression. Positive for anxiety. Past medical history, surgical history, family history and social history reviewed and unchanged compared to H&P earlier this admission.     Medications:   Scheduled Meds:   hydrALAZINE  100 mg Oral Q8H    aspirin  325 mg Oral Daily    atorvastatin  80 mg Oral Nightly    carvedilol  12.5 mg Oral BID WC    cycloSPORINE modified  100 mg Oral Nightly    gabapentin  300 mg Oral 4x Daily    lactobacillus  1 capsule Oral Daily with breakfast    [Held by provider] mycophenolate  500 mg Oral BID    nystatin  5 mL Oral 4x Daily    pantoprazole  40 mg Oral QAM AC    vitamin D  50,000 Units Oral Weekly    enoxaparin  40 mg SubCUTAneous Daily    cycloSPORINE modified  75 mg Oral QAM     Continuous Infusions:   sodium chloride 100 mL/hr at 03/20/22 1016       Objective:     Vital Signs  Temp: 98.1 °F (36.7 °C)  Pulse: 62  Resp: 18  BP: (!) 155/71  SpO2: 98 %  O2 Device: None (Room air)       Vital signs reviewed in electronic charts. Physical exam  Constitutional:  Well developed, chronically ill appearing female sitting upright in bed, appears to not feel well. Tearful and anxious. Eyes:  no scleral icterus, conjunctiva normal   HENT:  Atraumatic, external ears normal, nose normal, oropharynx moist, no pharyngeal exudates. Neck- supple, no JVD, no lymphadenopathy  Respiratory:  No respiratory distress, no wheezing, rales or rhonchi detected  Cardiovascular:  Normal rate, normal rhythm, no murmurs, no gallops, no rubs, no edema   GI:  Soft, nondistended, thin, generalized tenderness, normal bowel sounds, no voluntary guarding  Musculoskeletal:  No cyanosis or obvious acute deformity. Moving all extremities   Integument:  Warm and dry. Lymphatic:  No cervical or axillary lymphadenopathy noted   Neurologic:  Alert & oriented x 3, no apparent focal deficits noted   Psychiatric:  Speech and behavior appropriate. Tearful and anxious. Results:     Lab Results   Component Value Date    WBC 4.1 03/20/2022    HGB 10.4 (L) 03/20/2022    HCT 30.7 (L) 03/20/2022    MCV 92.5 03/20/2022     (L) 03/20/2022       Lab Results   Component Value Date     03/20/2022    K 4.2 03/20/2022     03/20/2022    CL 99.0 06/01/2012    CO2 20 03/20/2022    BUN 16 03/20/2022    CREATININE 1.3 03/20/2022    CREATININE 0.9 06/01/2012    GLUCOSE 116 03/20/2022    CALCIUM 8.6 03/20/2022        Assessment and Plan: Active Hospital Problems     Diagnosis Date Noted    Intractable vomiting with nausea [R11.2]  -As per HPI, this is an acute on chronic issue for pt. Also, was admitted here 3/12 - 3/15 for same complaint. Workup that visit was essentially negative.  Per extensive chart review, pt has hx of EV s/p banding 10/14/21 (prior to transplant on 10/24/20). She was supposed to follow up with GI for repeat EGD but has had difficulty getting back to UC. Also, chronically prescribed cellcept for immunosuppression. Cellcept commonly associated with n/v/abd pain/diarrhea.   -case discussed with pt's UC Transplant MD, Dr. Rosemary Esteban 3/17. He recs repeat EGD and holding cellcept.  arranged for EGD on 3/23. Cellcept placed on hold 3/17.  - consulted to assist with transportation to , however, no options available to transport out of state.   - Transplant Coordinator, [facility], has also explored out-of-state transport options for patient however has also been unsuccessful. [facility] is able to provide gas cards for patient. [facility] can be reached at [phone].  -Case management, Tatiana Hernandez, spoke with patient's  who states transportation to  is not an issue.  The issue is getting patient to go to the appointment.  -Patient initially treated with IVFs, scheduled Compazine, PRN phenergan and PRN Zofran.  Diet advanced from CLD to full liquids on 3/17. Parra Headings further advanced to regular on 3/18.  Scheduled Compazine and IV fluids discontinued 3/18.  -pt had recurrence of nausea on 3/18 and required PRN phenergan. Also had recurrence of diarrhea on 3/19. She continues to have diarrhea. Stool studies for C. difficile and GI pathogens have been ordered since 3/19 however patient is yet to provide a sample.  -Considered adding Flagyl and/or oral vancomycin however would like to obtain stool sample before initiating treatment since patient is chronically immunosuppressed.   -stool studies ordered 3/19; pt encouraged to give sample today (3/20)     Diarrhea  -as above, possibly due to home cellcept, but could also be from bacterial/viral/stress colitis  -cellcept on hold 3/17  -c.diff and GI PCR panel neg last admission  -repeat c.diff and GI PCR panel ordered 3/19     TIA  -episode of slurred speech titrate as needed. Anxiety  -home regimen: xanax 0.25mg TID PRN; increased to 0.5mg TID PRN 3/20    Leg pain  -not controlled with PRN APAP  -add PRN oxycodone 5mg Q4 3/20          Pt's case was reviewed and discussed with  over the phone.         Electronically signed by LINCOLN Lopez on 3/20/2022 at 11:38 AM

## 2022-03-21 LAB
A/G RATIO: 1.7 (ref 0.8–2)
ALBUMIN SERPL-MCNC: 3 G/DL (ref 3.4–4.8)
ALP BLD-CCNC: 68 U/L (ref 25–100)
ALT SERPL-CCNC: <5 U/L (ref 4–36)
ANION GAP SERPL CALCULATED.3IONS-SCNC: 8 MMOL/L (ref 3–16)
AST SERPL-CCNC: 6 U/L (ref 8–33)
BASOPHILS ABSOLUTE: 0 K/UL (ref 0–0.1)
BASOPHILS RELATIVE PERCENT: 0.4 %
BILIRUB SERPL-MCNC: 0.9 MG/DL (ref 0.3–1.2)
BUN BLDV-MCNC: 20 MG/DL (ref 6–20)
C DIFF TOXIN/ANTIGEN: NORMAL
CALCIUM SERPL-MCNC: 8.3 MG/DL (ref 8.5–10.5)
CHLORIDE BLD-SCNC: 116 MMOL/L (ref 98–107)
CO2: 19 MMOL/L (ref 20–30)
CREAT SERPL-MCNC: 1.3 MG/DL (ref 0.4–1.2)
EOSINOPHILS ABSOLUTE: 0.1 K/UL (ref 0–0.4)
EOSINOPHILS RELATIVE PERCENT: 2.1 %
GFR AFRICAN AMERICAN: 50
GFR NON-AFRICAN AMERICAN: 41
GLOBULIN: 1.8 G/DL
GLUCOSE BLD-MCNC: 123 MG/DL (ref 74–106)
HCT VFR BLD CALC: 27.6 % (ref 37–47)
HEMOGLOBIN: 9.2 G/DL (ref 11.5–16.5)
IMMATURE GRANULOCYTES #: 0 K/UL
IMMATURE GRANULOCYTES %: 0 % (ref 0–5)
LYMPHOCYTES ABSOLUTE: 0.7 K/UL (ref 1.5–4)
LYMPHOCYTES RELATIVE PERCENT: 23.7 %
MCH RBC QN AUTO: 31.6 PG (ref 27–32)
MCHC RBC AUTO-ENTMCNC: 33.3 G/DL (ref 31–35)
MCV RBC AUTO: 94.8 FL (ref 80–100)
MONOCYTES ABSOLUTE: 0.2 K/UL (ref 0.2–0.8)
MONOCYTES RELATIVE PERCENT: 7.8 %
NEUTROPHILS ABSOLUTE: 1.9 K/UL (ref 2–7.5)
NEUTROPHILS RELATIVE PERCENT: 66 %
PDW BLD-RTO: 16.1 % (ref 11–16)
PLATELET # BLD: 94 K/UL (ref 150–400)
PMV BLD AUTO: 11.9 FL (ref 6–10)
POTASSIUM REFLEX MAGNESIUM: 4.7 MMOL/L (ref 3.4–5.1)
RBC # BLD: 2.91 M/UL (ref 3.8–5.8)
SODIUM BLD-SCNC: 143 MMOL/L (ref 136–145)
TOTAL PROTEIN: 4.8 G/DL (ref 6.4–8.3)
WBC # BLD: 2.9 K/UL (ref 4–11)

## 2022-03-21 PROCEDURE — 87040 BLOOD CULTURE FOR BACTERIA: CPT

## 2022-03-21 PROCEDURE — 1200000000 HC SEMI PRIVATE

## 2022-03-21 PROCEDURE — 6360000002 HC RX W HCPCS: Performed by: PHYSICIAN ASSISTANT

## 2022-03-21 PROCEDURE — 97116 GAIT TRAINING THERAPY: CPT

## 2022-03-21 PROCEDURE — 99231 SBSQ HOSP IP/OBS SF/LOW 25: CPT | Performed by: INTERNAL MEDICINE

## 2022-03-21 PROCEDURE — 97535 SELF CARE MNGMENT TRAINING: CPT

## 2022-03-21 PROCEDURE — 6370000000 HC RX 637 (ALT 250 FOR IP): Performed by: PHYSICIAN ASSISTANT

## 2022-03-21 PROCEDURE — 97530 THERAPEUTIC ACTIVITIES: CPT

## 2022-03-21 PROCEDURE — 85025 COMPLETE CBC W/AUTO DIFF WBC: CPT

## 2022-03-21 PROCEDURE — 2580000003 HC RX 258: Performed by: PHYSICIAN ASSISTANT

## 2022-03-21 PROCEDURE — 36415 COLL VENOUS BLD VENIPUNCTURE: CPT

## 2022-03-21 PROCEDURE — 80053 COMPREHEN METABOLIC PANEL: CPT

## 2022-03-21 RX ADMIN — ATORVASTATIN CALCIUM 80 MG: 80 TABLET, FILM COATED ORAL at 20:15

## 2022-03-21 RX ADMIN — Medication 12.5 MG: at 01:22

## 2022-03-21 RX ADMIN — HYDRALAZINE HYDROCHLORIDE 100 MG: 25 TABLET, FILM COATED ORAL at 03:54

## 2022-03-21 RX ADMIN — Medication 1 CAPSULE: at 08:20

## 2022-03-21 RX ADMIN — HYDRALAZINE HYDROCHLORIDE 100 MG: 25 TABLET, FILM COATED ORAL at 20:15

## 2022-03-21 RX ADMIN — ONDANSETRON HYDROCHLORIDE 4 MG: 2 INJECTION, SOLUTION INTRAMUSCULAR; INTRAVENOUS at 08:17

## 2022-03-21 RX ADMIN — OXYCODONE 5 MG: 5 TABLET ORAL at 03:53

## 2022-03-21 RX ADMIN — GABAPENTIN 300 MG: 300 CAPSULE ORAL at 20:15

## 2022-03-21 RX ADMIN — ASPIRIN 325 MG: 325 TABLET, COATED ORAL at 08:20

## 2022-03-21 RX ADMIN — GABAPENTIN 300 MG: 300 CAPSULE ORAL at 12:38

## 2022-03-21 RX ADMIN — GABAPENTIN 300 MG: 300 CAPSULE ORAL at 16:37

## 2022-03-21 RX ADMIN — OXYCODONE 5 MG: 5 TABLET ORAL at 08:32

## 2022-03-21 RX ADMIN — CARVEDILOL 12.5 MG: 12.5 TABLET, FILM COATED ORAL at 08:24

## 2022-03-21 RX ADMIN — ALPRAZOLAM 0.5 MG: 0.5 TABLET ORAL at 16:37

## 2022-03-21 RX ADMIN — CYCLOSPORINE 75 MG: 25 CAPSULE, LIQUID FILLED ORAL at 08:20

## 2022-03-21 RX ADMIN — HYDRALAZINE HYDROCHLORIDE 100 MG: 25 TABLET, FILM COATED ORAL at 12:20

## 2022-03-21 RX ADMIN — GABAPENTIN 300 MG: 300 CAPSULE ORAL at 08:20

## 2022-03-21 RX ADMIN — ENOXAPARIN SODIUM 40 MG: 100 INJECTION SUBCUTANEOUS at 08:20

## 2022-03-21 RX ADMIN — CARVEDILOL 12.5 MG: 12.5 TABLET, FILM COATED ORAL at 16:37

## 2022-03-21 RX ADMIN — PANTOPRAZOLE SODIUM 40 MG: 40 TABLET, DELAYED RELEASE ORAL at 06:05

## 2022-03-21 RX ADMIN — NYSTATIN 500000 UNITS: 500000 SUSPENSION ORAL at 20:15

## 2022-03-21 RX ADMIN — ALPRAZOLAM 0.5 MG: 0.5 TABLET ORAL at 08:19

## 2022-03-21 RX ADMIN — SODIUM CHLORIDE: 9 INJECTION, SOLUTION INTRAVENOUS at 08:24

## 2022-03-21 RX ADMIN — NYSTATIN 500000 UNITS: 500000 SUSPENSION ORAL at 08:20

## 2022-03-21 RX ADMIN — NYSTATIN 500000 UNITS: 500000 SUSPENSION ORAL at 12:38

## 2022-03-21 RX ADMIN — OXYCODONE 5 MG: 5 TABLET ORAL at 12:31

## 2022-03-21 RX ADMIN — NYSTATIN 500000 UNITS: 500000 SUSPENSION ORAL at 16:37

## 2022-03-21 RX ADMIN — OXYCODONE 5 MG: 5 TABLET ORAL at 16:37

## 2022-03-21 ASSESSMENT — PAIN DESCRIPTION - ORIENTATION: ORIENTATION: RIGHT

## 2022-03-21 ASSESSMENT — PAIN SCALES - GENERAL
PAINLEVEL_OUTOF10: 9
PAINLEVEL_OUTOF10: 9
PAINLEVEL_OUTOF10: 4
PAINLEVEL_OUTOF10: 10
PAINLEVEL_OUTOF10: 5
PAINLEVEL_OUTOF10: 9

## 2022-03-21 ASSESSMENT — PAIN DESCRIPTION - LOCATION: LOCATION: ABDOMEN

## 2022-03-21 ASSESSMENT — PAIN DESCRIPTION - PAIN TYPE: TYPE: ACUTE PAIN;CHRONIC PAIN

## 2022-03-21 NOTE — PROGRESS NOTES
Physical Therapy  Facility/Department: [facility] MED SURG  Daily Treatment Note  NAME: Ramesh Clemens  : 1955  MRN: [id number]    Date of Service: 3/21/2022    Discharge Recommendations:  Continue to assess pending progress      Assessment   Assessment: Patient reports she's waiting on lab results but probably won't get to go home today. Patient Mod I with bed mobility. Ambulated ~350 feet with RW and supervision. Patient declined to sit up in the recliner and returned to bed after ambulation. REQUIRES PT FOLLOW UP: Yes  Activity Tolerance  Activity Tolerance: Patient Tolerated treatment well     Patient Diagnosis(es): The primary encounter diagnosis was Generalized abdominal pain. Diagnoses of Intractable vomiting with nausea, unspecified vomiting type, Dehydration, General weakness, Hypokalemia, and Enteritis were also pertinent to this visit. has a past medical history of Arthritis, rheumatoid ([address] Ascites, CAD (coronary artery disease), Chronic pain, Cirrhosis ([address] Colitis, Esophageal varices ([address] Hypertension, IIH (idiopathic intracranial hypertension), Liver cirrhosis ([address] Osteoarthritis, Portal hypertensive gastropathy ([address] Sciatica, Splenomegaly, and Unspecified sleep apnea. has a past surgical history that includes Dilation and curettage of uterus; bone marrow biopsy; Tubal ligation (); Dilation and curettage of uterus ( & ); liver biopsy; Liver transplant (10/24/2020); and Upper gastrointestinal endoscopy (10/14/2020). Restrictions  Restrictions/Precautions  Restrictions/Precautions: General Precautions,Fall Risk  Required Braces or Orthoses?: No  Subjective   General  Chart Reviewed: Yes  Response To Previous Treatment: Patient with no complaints from previous session. Family / Caregiver Present: No  Subjective  Subjective: Patient states she probably won't get to go home today. Reports she is awaiting lab results.   Pain Screening  Patient Currently in Pain: Yes  Vital Signs  Patient Currently in Pain: Yes       Objective   Bed mobility  Rolling to Right: Modified independent  Supine to Sit: Modified independent  Sit to Supine: Modified independent  Scooting: Modified independent  Transfers  Sit to Stand: Modified independent  Stand to sit: Modified independent  Ambulation  Ambulation?: Yes  Ambulation 1  Surface: level tile  Device: Rolling Walker  Assistance: Supervision  Distance: ~350 feet     Balance  Posture: Good  Sitting - Static: Good  Sitting - Dynamic: Good  Standing - Static: Good  Standing - Dynamic: Good;-      Goals  Long term goals  Time Frame for Long term goals : 3-4 days  Long term goal 1: Patient to perform basic transfers with modified independence. Long term goal 2: Patient to ambulate 50 feet x 2 with RW with Supervision-modified. Plan    Plan  Times per week: 3-4 days  Plan weeks: 3-4 days  Current Treatment Recommendations: Strengthening,Balance Training,Functional Mobility Training,Gait Training,Neuromuscular Re-education,Home Exercise Program,Safety Education & Training  Safety Devices  Type of devices: Left in bed,Call light within reach     Therapy Time   Individual Concurrent Group Co-treatment   Time In 9784         Time Out 1111         Minutes 23              This note serves as D/C summary if patient is discharged prior to next visit.   Cassandra López, PTA

## 2022-03-21 NOTE — PROGRESS NOTES
Occupational Therapy  Facility/Department: [facility] MED SURG  Daily Treatment Note  NAME: Aria Feliciano  : 1955  MRN: [id number]    Date of Service: 3/21/2022    Discharge Recommendations:  Home with Home health OT       Assessment   Assessment: Pt agreeable to OT services. Pt transferred to sitting at EOB. Pt ambulated to bathroom with RW SBA. Pt transferred to shower chair with SBA with min verbal cuing for hand placement and transfer techniques. Pt completed shower seated on shower chair. Pt completed task demo good safety awareness. Pt completed boone hygiene in standing using grab bar without verbal cuing. Pt donned UB/LB clothing with VAUGHN. Pt tolerated well. Pt ambulated to bed and transferred to supine with MOD I. Patient Diagnosis(es): The primary encounter diagnosis was Generalized abdominal pain. Diagnoses of Intractable vomiting with nausea, unspecified vomiting type, Dehydration, General weakness, Hypokalemia, and Enteritis were also pertinent to this visit. has a past medical history of Arthritis, rheumatoid ([address] Ascites, CAD (coronary artery disease), Chronic pain, Cirrhosis ([address] Colitis, Esophageal varices ([address] Hypertension, IIH (idiopathic intracranial hypertension), Liver cirrhosis ([address] Osteoarthritis, Portal hypertensive gastropathy ([address] Sciatica, Splenomegaly, and Unspecified sleep apnea. has a past surgical history that includes Dilation and curettage of uterus; bone marrow biopsy; Tubal ligation (); Dilation and curettage of uterus ( & ); liver biopsy; Liver transplant (10/24/2020); and Upper gastrointestinal endoscopy (10/14/2020).     Restrictions  Restrictions/Precautions  Restrictions/Precautions: General Precautions,Fall Risk  Required Braces or Orthoses?: No  Subjective   General  Chart Reviewed: Yes  Patient assessed for rehabilitation services?: Yes  Family / Caregiver Present: No  Referring Practitioner: Milagros Turner PA-C  Diagnosis: Nausea, Vomiting, weakness  Subjective  Subjective: Pt agreeable to OT services. Pt states she has had nausea and vomiting since DC and no appetite. Orientation     Objective    ADL  Grooming: Setup  UE Bathing: Setup  LE Bathing: Stand by assistance  UE Dressing: Setup  LE Dressing: Setup        Functional Mobility  Functional - Mobility Device: Rolling Walker  Activity: To/from bathroom  Assist Level: Stand by assistance  Bed mobility  Rolling to Right: Modified independent  Supine to Sit: Modified independent  Sit to Supine: Modified independent  Scooting: Modified independent  Transfers  Stand Pivot Transfers: Stand by assistance  Sit to stand: Stand by assistance                                                                 Plan   Plan  Times per week: 3-5  Times per day: Daily  Plan weeks: 1  Current Treatment Recommendations: Strengthening,Balance Training,Functional Mobility Training,Endurance Training,Self-Care / ADL,Patient/Caregiver Education & Training    Goals  Short term goals  Time Frame for Short term goals: 1 week  Short term goal 1: pt to demo independence with HEP. Short term goal 2: pt to tolerate x15 minutes of activity to increase functional activity tolerance. Short term goal 3: Pt to complete bathing with MOD I. Short term goal 4: Pt to complete shower transfer with MOD I. Short term goal 5: Pt to tolerate static standing balance activity maintaining balance 4 minutes using RW for support as needed. Therapy Time   Individual Concurrent Group Co-treatment   Time In 8365         Time Out 1154         Minutes 28              This note serves as a DC summary in the event of pt discharge.      Karen Leal OTR/L

## 2022-03-21 NOTE — PROGRESS NOTES
Progress Note      Subjective:   Chief complaint: n/v/d    Interval History:   Sitting up in bed. She is tearful and says she is nervous about her overall health. Feels like she will never get better. Remaining very nauseated. No vomiting today but having diarrhea. At times it is difficult to get to the bathroom in ntime. Stomach feels bloated today. Wants to try to eat a piece of toast for breakfast.    Review of systems:   Constitutional:  Denies fever or chills. Positive for generalized weakness and fatigue.   Eyes:  Denies eye pain or redness  HENT:  Denies nasal congestion or sore throat   Respiratory:  Denies cough or shortness of breath   Cardiovascular:  Denies chest pain or edema   GI:  Denies bloody stools. Positive for nausea, vomiting, diarrhea and abdominal pain. :  Denies dysuria or frequency. Musculoskeletal:  Denies acute neck pain or body aches  Integument:  Denies rash or itching  Neurologic:  Denies headache, dizziness, numbness, tingling or unilateral weakness  Psychiatric:  Denies acute depression. Positive for anxiety. Past medical history, surgical history, family history and social history reviewed and unchanged compared to H&P earlier this admission.     Medications:   Scheduled Meds:   hydrALAZINE  100 mg Oral Q8H    aspirin  325 mg Oral Daily    atorvastatin  80 mg Oral Nightly    carvedilol  12.5 mg Oral BID WC    cycloSPORINE modified  100 mg Oral Nightly    gabapentin  300 mg Oral 4x Daily    lactobacillus  1 capsule Oral Daily with breakfast    [Held by provider] mycophenolate  500 mg Oral BID    nystatin  5 mL Oral 4x Daily    pantoprazole  40 mg Oral QAM AC    vitamin D  50,000 Units Oral Weekly    enoxaparin  40 mg SubCUTAneous Daily    cycloSPORINE modified  75 mg Oral QAM     Continuous Infusions:   sodium chloride 100 mL/hr at 03/21/22 0824       Objective:     Vital Signs  Temp: 97.7 °F (36.5 °C)  Pulse: 61  Resp: 18  BP: (!) 164/64  SpO2: 100 %  O2 Device: None (Room air)       Vital signs reviewed in electronic charts. Physical exam  Constitutional:  Well developed, chronically ill appearing female sitting upright in bed, tearful. Eyes:  no scleral icterus, conjunctiva normal   HENT:  Atraumatic, external ears normal, nose normal, oropharynx moist, no pharyngeal exudates. Neck- supple, no JVD, no lymphadenopathy  Respiratory:  No respiratory distress on room air, no wheezing, rales or rhonchi detected  Cardiovascular:  Normal rate, normal rhythm, no murmurs, no gallops, no rubs, no edema   GI:  Soft, slightly distended, generalized mild tenderness, normal bowel sounds, no voluntary guarding  Musculoskeletal:  No cyanosis or obvious acute deformity. Moving all extremities   Integument:  Warm and dry. Neurologic:  Alert & oriented x 3, no apparent focal deficits noted   Psychiatric:  Speech and behavior appropriate. Tearful. Results:     Lab Results   Component Value Date    WBC 2.9 (L) 03/21/2022    HGB 9.2 (L) 03/21/2022    HCT 27.6 (L) 03/21/2022    MCV 94.8 03/21/2022    PLT 94 (L) 03/21/2022       Lab Results   Component Value Date     03/21/2022    K 4.7 03/21/2022     03/21/2022    CL 99.0 06/01/2012    CO2 19 03/21/2022    BUN 20 03/21/2022    CREATININE 1.3 03/21/2022    CREATININE 0.9 06/01/2012    GLUCOSE 123 03/21/2022    CALCIUM 8.3 03/21/2022        Assessment and Plan:      Active Hospital Problems    Diagnosis Date Noted    Anxiety [F41.9]  - home regimen: xanax 0.25mg TID PRN; increased to 0.5mg TID PRN 3/20   03/20/2022    TIA (transient ischemic attack) [G45.9]  -episode of slurred speech and facial numbness 3/14  -CT head, MRI head negative  -CTA neck with mixed atherosclerotic plaque at the left common carotid bifurcation with approximately 30% stenosis at the origin of the left internal carotid artery, mixed atherosclerotic plaque at the right common carotid bifurcation without significant stenosis of the right carotid artery, normal left vertebral artery, patent right vertebral artery with severe stenosis at its origin.   -CTA head with patent intracranial vessels, moderate stenosis of the distal right vertebral artery with patency of both distal vertebral arteries and the basilar artery.  -continue ASA 325mg (pt previously on 81mg PTA) and lipitor 80mg  - will need to follow up with neurosurgery as outpatient -  notified and will make this appointment    03/17/2022    Intractable vomiting with nausea [R11.2]  -seems to be acute on chronic issue for pt. Also, was admitted here 3/12 - 3/15 with suspected TIA as well as N/V/D. Workup that visit was essentially negative. Per extensive chart review, pt has hx of EV s/p banding 10/14/21 (prior to transplant on 10/24/20). She was supposed to follow up with GI for repeat EGD but has had difficulty getting back to . Also, chronically prescribed cellcept for immunosuppression. Cellcept commonly associated with n/v/abd pain/diarrhea. - CT abd/pelv 3/16 with decrease mild small bowel wall thickening could represent mild enteritis, stable chronic RLL atelectasis, stable splenomegaly, unchanged extensive paraesophageal varices.   -Alma Rosa Bernard PA-C discussed case with pt's UC Transplant MD, Dr. Wong Held 3/17. He recs repeat EGD and holding cellcept.  arranged for EGD on 3/23. Cellcept placed on hold 3/17.  -CM consulted to assist with transportation to , however, no options available to transport out of state.  Transplant Coordinator, Goldie Pruitt, has also explored out-of-state transport options for patient however has also been unsuccessful. Goldie Pruitt is able to provide gas cards for patient. Goldie Pruitt can be reached at [phone].  Case management, Eugenia Pittman, spoke with patient's  who states transportation to  is not an issue.  The issue is getting patient to go to the appointment.  -Patient initially treated with IVFs, scheduled Compazine, PRN phenergan and PRN Zofran.  Diet advanced from CLD to full liquids on 3/17. Chan  further advanced to regular on 3/18.  Scheduled Compazine and IV fluids discontinued 3/18.  -pt had recurrence of nausea on 3/18 and required PRN phenergan. - recurrence of diarrhea on 3/19. She continues to have diarrhea. With slightly increased Cr 1.3 hydrated with IVFs. Recent Cdiff, fecal leukocytes, GI bactereial pathogens negative. She recently completed 5 day course of IV Zosyn and at least 7 day course of PO flagyl prior to recurrence of diarrhea. On culturelle. Repeat Cdiff and GI bacterial pathogens pending as well as giardia, cryptosporidium, and O&P. Could also benefit from being evaluated for CMV with biopsy s/p transplant CMV (D-/R+) putting her at intermediate risk for CMV. Could consider colonoscopy and EGD rather than just EGD.   - Discussed persistent n/v/d despite holding cellcept with UC transplant Dr. Pam Bai on 3/21. He recommends transfer for GI evaluation as well. Case discussed with  GI Dr. Jimi Soto who has accepted patient for transfer when bed becomes available (likely 3/22). Advance diet as tolerated per GI.    03/16/2022    Hypokalemia [E87.6]  - Secondary to GI losses   - Replace; monitor and replete as needed   03/16/2022    Hypomagnesemia [E83.42]  -Suspect due to GI losses  -Replaced; monitor and replete as needed   03/13/2022   ·   Oral thrush  - continue nystatin swish and swallow        Leg pain [M79.606]  - not controlled with PRN APAP  -add PRN oxycodone 5mg Q4 3/20   03/13/2022    Liver transplant recipient [facility]) [Z94.4]  -Liver transplant at [facility] by Santosh Conde on 10/24/20  -on cyclosporine and cellcept since transplant; as above, chronic n/v with occassional emesis, abd pain, diarrhea. Given AEs associated with cellcept, it's possible pt's n/v/abd pain/diarrhea is due to cellcept.    -hold cellcept 3/17 per    - plan to transfer to  for further evaluation    11/04/2021   [facility] Liver cirrhosis secondary to REYEZ ([facility] [address] [K75.81, K65.59]  -s/p liver transplant on 10/24/20 as above  - esophageal varices noted and no recent upper/lower endoscopy per patient report   - pancytopenic   -home torsemide on hold until po intake improves  -PPI and BB recently added to regimen   -follows with UC transplant team and will be transferred when bed becomes available    04/23/2018    Diarrhea [R19.7]  -as above, possibly due to home cellcept, but could also be from bacterial/viral/stress colitis  -cellcept on hold 3/17  - CT abd/pelv ER 3/16 with decreased mild small bowel wall thickening could represent mild enteritis (appears improved when compared with pervious ER CT abd 3/12)   -c.diff, fec leukocytes, and GI PCR panel neg last admission  -repeat c.diff and GI PCR panel ordered as well as giardia, cryptosporidium, O&P which are pending   - plan to transfer for GI evaluation as above    04/23/2018    Generalized abdominal pain [R10.84]  - see under nausea, vomiting, diarrhea    04/23/2018    Essential hypertension [I10]  -with hypertensive urgency on admission  -suspect due to pain and inability to take po meds PTA  -BP elevated again on 3/19. Continue coreg. Increased hydralazine and added PRN clonidine 3/19 with improvement. -Monitor and titrate as needed. 11/04/2015    History of atrial fibrillation   - Per chart review patient has discontinued eliquis that was previously prescribed. - Continue ASA daily    - NSR on exam   - f/u with cards as scheduled  03/13/2014     Patient was seen and examined with Dr. Brianna Anderson. After reviewing patient data and diagnostic testing the plan of care was established in conjunction with Dr. Brianna Anderson.     Electronically signed by LINCOLN Contreras on 3/21/2022 at 2:22 PM

## 2022-03-21 NOTE — CARE COORDINATION
[address] [place] [address] Med Surg  [address] [address]  Phone: [phone]           03/21/2022      To whom it may concern:    Shyam Heath needs to transport his [facility] [place] [address] on 03/22/2022. He will need to be out of the state [facility] for at least that day, but may need to be longer to assist with the care of his mother. [facility] with you if continued stay is needed beyond 03/22/2022. If you have any questions or concerns, do not hesitate to call me or the nurse's station at [phone]. Thank you,    Reva Torres RN  Care Coordinator  [facility]  [address], [place]  Office:  [phone]  Fax:  [phone]  [email or web address]

## 2022-03-21 NOTE — FLOWSHEET NOTE
03/21/22 0830   Assessment   Charting Type Shift assessment   Neurological   Neuro (WDL) WDL   Level of Consciousness Alert (0)   Orientation Level Oriented X4   Cognition Appropriate judgement   Language Clear   Swallow Screening   Is the patient unable to remain alert for testing? No   Is the patient on a modified diet (thickened liquids) due to pre-existing dysphagia? No   Is there presence of existing enteral tube feeding via the stomach or nose? No   [place] Coma Scale   Eye Opening 4   Best Verbal Response 5   Best Motor Response 6   [place] Coma Scale Score 15   NIHSS Stroke Scale   NIHSS Stroke Scale Assessed No   HEENT   HEENT (WDL) X   Right Eye Impaired vision   Left Eye Impaired vision   Teeth Dentures upper;Dentures lower   Nose Intact   Tongue Pink & moist   Voice Normal   Mucous Membrane Moist;Pink; Intact   Respiratory   Respiratory (WDL) WDL   Breath Sounds   Right Upper Lobe Clear   Right Middle Lobe Diminished   Right Lower Lobe Diminished   Left Upper Lobe Clear   Left Lower Lobe Diminished   Cardiac   Cardiac (WDL) WDL   Cardiac Monitor   Telemetry Monitor On No   Gastrointestinal   Abdominal (WDL) X   RUQ Bowel Sounds Active   LUQ Bowel Sounds Active   RLQ Bowel Sounds Active   LLQ Bowel Sounds Active   Abdomen Inspection Rounded; Soft   GI Symptoms Nausea   Tenderness Tenderness   Peripheral Vascular   Peripheral Vascular (WDL) WDL   Edema None   RLE Neurovascular Assessment   R Pedal Pulse +2   LLE Neurovascular Assessment   L Pedal Pulse +2   Skin Color/Condition   Skin Color/Condition (WDL) WDL   Skin Color Pale   Skin Condition/Temp Dry; Warm   Skin Integrity   Skin Integrity (WDL) X   Skin Integrity Redness   Location sacrum   Preventative Dressing No  (cream applied)   Musculoskeletal   Musculoskeletal (WDL) X   RUE Full movement   LUE Full movement   RL Extremity Weakness   LL Extremity Weakness   Genitourinary   Genitourinary (WDL) WDL   Urine Assessment   Incontinence No   Anus/Rectum Anus/Rectum (WDL) WDL   Psychosocial   Psychosocial (WDL) X   Patient Behaviors Anxious   Pt alert times 4-Pt able to take am medications well per MAR-Am assessment completed-Breathing equal and unlabored- Pt having complaints of nausea-PRN nausea medication given- PRN anxiety and pain medication given per request-IV infusing NS  @ 100 ml hour at this time- IV site clean dry and intact-Pt told to call out with any needs-Pt verbalized understanding-Will continue to monitor-Call bell at bedside

## 2022-03-21 NOTE — FLOWSHEET NOTE
03/20/22 2202   Assessment   Charting Type Shift assessment   Neurological   Neuro (WDL) WDL   Level of Consciousness Alert (0)   Orientation Level Oriented X4   Cognition Appropriate judgement   Language Clear   [place] Coma Scale   Eye Opening 4   Best Verbal Response 5   Best Motor Response 6   [place] Coma Scale Score 15   HEENT   HEENT (WDL) X   Right Eye Impaired vision   Left Eye Impaired vision   Teeth Dentures upper;Dentures lower   Nose Intact   Tongue Pink & moist   Voice Normal   Mucous Membrane Moist;Pink; Intact   Respiratory   Respiratory (WDL) WDL   Cardiac   Cardiac (WDL) WDL   Cardiac Monitor   Telemetry Monitor On No   Gastrointestinal   Abdominal (WDL) X   RUQ Bowel Sounds Active   LUQ Bowel Sounds Active   RLQ Bowel Sounds Active   LLQ Bowel Sounds Active   Abdomen Inspection Rounded; Soft   GI Symptoms Nausea   Last BM (including prior to admit) 03/19/22   Tenderness Tenderness   Peripheral Vascular   Peripheral Vascular (WDL) WDL   Edema None   RLE Neurovascular Assessment   R Pedal Pulse +2   LLE Neurovascular Assessment   L Pedal Pulse +2   Skin Color/Condition   Skin Color/Condition (WDL) WDL   Skin Color Pale   Skin Condition/Temp Warm;Dry   Skin Integrity   Skin Integrity (WDL) X   Skin Integrity Redness   Location sacrum   Musculoskeletal   Musculoskeletal (WDL) X   RUE Full movement   LUE Full movement   RL Extremity Weakness   LL Extremity Weakness   Genitourinary   Genitourinary (WDL) WDL   Urine Assessment   Incontinence No   Psychosocial   Psychosocial (WDL) X   Patient Behaviors Anxious

## 2022-03-21 NOTE — PROGRESS NOTES
Was contacted by patients daughter in law, she stated her and pts son would be transporting pt to [address] and to call them when pt got a bed and was ready to go.

## 2022-03-21 NOTE — FLOWSHEET NOTE
03/21/22 0713   Vital Signs   Temp 97.7 °F (36.5 °C)   Temp Source Oral   Pulse 64   Heart Rate Source Radial   Resp 18   BP (!) 142/69   BP Location Left upper arm   MAP (mmHg) 90   Patient Position Supine   Oxygen Therapy   SpO2 97 %   O2 Device None (Room air)

## 2022-03-21 NOTE — PROGRESS NOTES
Pt on wait list for bed at [facility] DR CLAUDETTE OLVERA, stated they would possibly have a bed tomorrow (3/22/22)     Called patients  and informed him of plan, he said he would be available to transport patient, just call him when patient gets bed.

## 2022-03-22 VITALS
RESPIRATION RATE: 18 BRPM | BODY MASS INDEX: 24.18 KG/M2 | TEMPERATURE: 97.7 F | SYSTOLIC BLOOD PRESSURE: 161 MMHG | HEART RATE: 60 BPM | OXYGEN SATURATION: 98 % | DIASTOLIC BLOOD PRESSURE: 63 MMHG | HEIGHT: 61 IN | WEIGHT: 128.06 LBS

## 2022-03-22 LAB
A/G RATIO: 1.9 (ref 0.8–2)
ALBUMIN SERPL-MCNC: 3 G/DL (ref 3.4–4.8)
ALP BLD-CCNC: 66 U/L (ref 25–100)
ALT SERPL-CCNC: <5 U/L (ref 4–36)
ANION GAP SERPL CALCULATED.3IONS-SCNC: 8 MMOL/L (ref 3–16)
AST SERPL-CCNC: 6 U/L (ref 8–33)
BASOPHILS ABSOLUTE: 0 K/UL (ref 0–0.1)
BASOPHILS RELATIVE PERCENT: 0.4 %
BILIRUB SERPL-MCNC: 0.7 MG/DL (ref 0.3–1.2)
BUN BLDV-MCNC: 23 MG/DL (ref 6–20)
CALCIUM SERPL-MCNC: 8.2 MG/DL (ref 8.5–10.5)
CHLORIDE BLD-SCNC: 114 MMOL/L (ref 98–107)
CO2: 19 MMOL/L (ref 20–30)
CREAT SERPL-MCNC: 1.4 MG/DL (ref 0.4–1.2)
CRYPTOSPORIDIUM ANTIGEN STOOL: NORMAL
EOSINOPHILS ABSOLUTE: 0.1 K/UL (ref 0–0.4)
EOSINOPHILS RELATIVE PERCENT: 3.6 %
GFR AFRICAN AMERICAN: 46
GFR NON-AFRICAN AMERICAN: 38
GI BACTERIAL PATHOGENS BY PCR: NORMAL
GIARDIA ANTIGEN STOOL: NORMAL
GLOBULIN: 1.6 G/DL
GLUCOSE BLD-MCNC: 108 MG/DL (ref 74–106)
HCT VFR BLD CALC: 27 % (ref 37–47)
HEMOGLOBIN: 9 G/DL (ref 11.5–16.5)
IMMATURE GRANULOCYTES #: 0 K/UL
IMMATURE GRANULOCYTES %: 0 % (ref 0–5)
LYMPHOCYTES ABSOLUTE: 0.6 K/UL (ref 1.5–4)
LYMPHOCYTES RELATIVE PERCENT: 23.7 %
MCH RBC QN AUTO: 30.4 PG (ref 27–32)
MCHC RBC AUTO-ENTMCNC: 33.3 G/DL (ref 31–35)
MCV RBC AUTO: 91.2 FL (ref 80–100)
MONOCYTES ABSOLUTE: 0.2 K/UL (ref 0.2–0.8)
MONOCYTES RELATIVE PERCENT: 7.5 %
NEUTROPHILS ABSOLUTE: 1.6 K/UL (ref 2–7.5)
NEUTROPHILS RELATIVE PERCENT: 64.8 %
PDW BLD-RTO: 15.1 % (ref 11–16)
PLATELET # BLD: 110 K/UL (ref 150–400)
PMV BLD AUTO: 11.8 FL (ref 6–10)
POTASSIUM REFLEX MAGNESIUM: 4.4 MMOL/L (ref 3.4–5.1)
RBC # BLD: 2.96 M/UL (ref 3.8–5.8)
SODIUM BLD-SCNC: 141 MMOL/L (ref 136–145)
TOTAL PROTEIN: 4.6 G/DL (ref 6.4–8.3)
WBC # BLD: 2.5 K/UL (ref 4–11)

## 2022-03-22 PROCEDURE — 6370000000 HC RX 637 (ALT 250 FOR IP): Performed by: PHYSICIAN ASSISTANT

## 2022-03-22 PROCEDURE — 99238 HOSP IP/OBS DSCHRG MGMT 30/<: CPT | Performed by: INTERNAL MEDICINE

## 2022-03-22 PROCEDURE — 85025 COMPLETE CBC W/AUTO DIFF WBC: CPT

## 2022-03-22 PROCEDURE — 2580000003 HC RX 258: Performed by: PHYSICIAN ASSISTANT

## 2022-03-22 PROCEDURE — 36415 COLL VENOUS BLD VENIPUNCTURE: CPT

## 2022-03-22 PROCEDURE — 80053 COMPREHEN METABOLIC PANEL: CPT

## 2022-03-22 PROCEDURE — 6360000002 HC RX W HCPCS: Performed by: PHYSICIAN ASSISTANT

## 2022-03-22 PROCEDURE — 97530 THERAPEUTIC ACTIVITIES: CPT

## 2022-03-22 RX ADMIN — ASPIRIN 325 MG: 325 TABLET, COATED ORAL at 08:19

## 2022-03-22 RX ADMIN — GABAPENTIN 300 MG: 300 CAPSULE ORAL at 08:19

## 2022-03-22 RX ADMIN — HYDRALAZINE HYDROCHLORIDE 100 MG: 25 TABLET, FILM COATED ORAL at 04:45

## 2022-03-22 RX ADMIN — Medication 1 CAPSULE: at 08:19

## 2022-03-22 RX ADMIN — SODIUM CHLORIDE: 9 INJECTION, SOLUTION INTRAVENOUS at 06:46

## 2022-03-22 RX ADMIN — CARVEDILOL 12.5 MG: 12.5 TABLET, FILM COATED ORAL at 08:19

## 2022-03-22 RX ADMIN — ALPRAZOLAM 0.5 MG: 0.5 TABLET ORAL at 04:48

## 2022-03-22 RX ADMIN — OXYCODONE 5 MG: 5 TABLET ORAL at 04:48

## 2022-03-22 RX ADMIN — ENOXAPARIN SODIUM 40 MG: 100 INJECTION SUBCUTANEOUS at 08:20

## 2022-03-22 RX ADMIN — ONDANSETRON 4 MG: 4 TABLET, ORALLY DISINTEGRATING ORAL at 04:48

## 2022-03-22 RX ADMIN — PANTOPRAZOLE SODIUM 40 MG: 40 TABLET, DELAYED RELEASE ORAL at 05:38

## 2022-03-22 RX ADMIN — NYSTATIN 500000 UNITS: 500000 SUSPENSION ORAL at 08:21

## 2022-03-22 RX ADMIN — CYCLOSPORINE 75 MG: 25 CAPSULE, LIQUID FILLED ORAL at 08:20

## 2022-03-22 ASSESSMENT — PAIN SCALES - GENERAL
PAINLEVEL_OUTOF10: 0
PAINLEVEL_OUTOF10: 8

## 2022-03-22 NOTE — PROGRESS NOTES
Occupational Therapy  Facility/Department: [address] [place] [address] MED SURG  Daily Treatment Note  NAME: Shirley Barnard  : 1955  MRN: [id number]    Date of Service: 3/22/2022    Discharge Recommendations:  Home with Home health OT       Assessment   Assessment: Pt agreeable to OT services. Pt transferred to sitting at EOB without difficulty. Pt reports she is DC soon to another facility. Pt come to stand at SBA to RW. Pt ambulated in hallway with SBA. Pt reports she is not feeling well. Pt had LOB requiring CGA for correction. Pt returned to room and was DC. Pt transported and assisted with transfer to car with SBA. Patient Diagnosis(es): The primary encounter diagnosis was Generalized abdominal pain. Diagnoses of Intractable vomiting with nausea, unspecified vomiting type, Dehydration, General weakness, Hypokalemia, Enteritis, and TIA (transient ischemic attack) were also pertinent to this visit. has a past medical history of Arthritis, rheumatoid ([address] Ascites, CAD (coronary artery disease), Chronic pain, Cirrhosis ([address] Colitis, Esophageal varices ([address] Hypertension, IIH (idiopathic intracranial hypertension), Liver cirrhosis ([address] Osteoarthritis, Portal hypertensive gastropathy ([address] Sciatica, Splenomegaly, and Unspecified sleep apnea. has a past surgical history that includes Dilation and curettage of uterus; bone marrow biopsy; Tubal ligation (); Dilation and curettage of uterus ( & ); liver biopsy; Liver transplant (10/24/2020); and Upper gastrointestinal endoscopy (10/14/2020). Restrictions  Restrictions/Precautions  Restrictions/Precautions: General Precautions,Fall Risk  Required Braces or Orthoses?: No  Subjective   General  Chart Reviewed: Yes  Patient assessed for rehabilitation services?: Yes  Family / Caregiver Present: No  Referring Practitioner: Ritesh Canales PA-C  Diagnosis: Nausea, Vomiting, weakness  Subjective  Subjective: Pt agreeable to OT services.  Pt states she has had nausea and vomiting since DC and no appetite. Orientation     Objective             Functional Mobility  Functional - Mobility Device: Rolling Walker  Assist Level: Stand by assistance  Functional Mobility Comments: 250 feet with RW. SBA  Bed mobility  Supine to Sit: Modified independent  Sit to Supine: Modified independent  Scooting: Modified independent  Transfers  Stand Pivot Transfers: Stand by assistance  Sit to stand: Stand by assistance        Plan   Plan  Times per week: 3-5  Times per day: Daily  Plan weeks: 1  Current Treatment Recommendations: Strengthening,Balance Training,Functional Mobility Training,Endurance Training,Self-Care / ADL,Patient/Caregiver Education & Training    Goals  Short term goals  Time Frame for Short term goals: 1 week  Short term goal 1: pt to demo independence with HEP. Short term goal 2: pt to tolerate x15 minutes of activity to increase functional activity tolerance. Short term goal 3: Pt to complete bathing with MOD I. Short term goal 4: Pt to complete shower transfer with MOD I. Short term goal 5: Pt to tolerate static standing balance activity maintaining balance 4 minutes using RW for support as needed. Therapy Time   Individual Concurrent Group Co-treatment   Time In 8769         Time Out 1012         Minutes 14              This note serves as a DC summary in the event of pt discharge.      Karen Leal OTR/L

## 2022-03-22 NOTE — DISCHARGE SUMMARY
Discharge Summary      Patient ID: Shirley Cox      Patient's PCP: Deny Mazariegos MD    Admit Date: 3/16/2022     Discharge Date:  3/22/2022    Admitting Provider: Deny Mazariegos MD    Discharging Provider: LINCOLN Moulton     Reason for this admission:   Nausea, vomiting, diarrhea     Discharge Diagnoses: Active Hospital Problems    Diagnosis Date Noted    Anxiety [F41.9] 03/20/2022    TIA (transient ischemic attack) [G45.9] 03/17/2022    Intractable vomiting with nausea [R11.2] 03/16/2022    Hypokalemia [E87.6] 03/16/2022    Hypomagnesemia [E83.42] 03/13/2022    Enteritis [K52.9] 03/13/2022    Leg pain [M79.606] 03/13/2022    Oral thrush [B37.0] 03/13/2022    History of atrial fibrillation [Z86.79] 03/13/2022    Liver transplant recipient 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 Esophageal varices. CT ABDOMEN PELVIS WO CONTRAST Additional Contrast? None   Final Result      1. Decrease mild small bowel wall thickening could represent mild enteritis. 2.  Stable splenomegaly. 3.  Stable chronic right lower lobe atelectasis. 4.  Unchanged extensive paraesophageal varices. Consults:   IP CONSULT TO CASE MANAGEMENT  PT/OT    Briefly:   Ms. Lisbeth Navarro is a 74 yo female with PMH of anxiety, atrial fibrillation, TIA, liver transplant October 2020 in setting of decompensated REYEZ cirrhosis, and chronic pain. She was admitted at this facility 3/12 to 3/15 for N/V/D. GI workup unremarkable except for possible enteritis noted on CT abd/pelv. She was treated with IV zosyn and PO flagyl while awaiting stool studies which were negative. Patient reported GI symptoms resolved prior to discharge.  She completed 5 day course of IV Zosyn and at least 7 days of PO flagyl. Per chart review during that hospitalization patient with sudden onset speech changes and concern for acute stroke. Stat CT head without acute findings. Stat CTA neck with mixed atherosclerotic plaque at the left common carotid bifurcation with approximately 30% stenosis at the origin of the left internal carotid artery, mixed atherosclerotic plaque at the right common carotid bifurcation without significant stenosis of the right carotid artery, normal left vertebral artery, patent right vertebral artery with severe stenosis at its origin. Stat CTA head with patent intracranial vessels, moderate stenosis of the distal right vertebral artery with patency of both distal vertebral arteries and the basilar artery. At that time the case was discussed with [facility] stroke navigator. Per vascular interventionalist, Dr. Colette Hernandes, patient did not require immediate intervention. Her home aspirin was increased from 81 mg daily to 325 mg daily. Lipitor 80 mg. Stroke team also recommended goal SBP less than 180. MRI brain was obtained on 3/14 and negative for any acute findings. Since her neurologic symptoms resolved and there was no immediate need for transfer patient was set up for outpatient neurosurgery evaluation. Patient was readmitted on 3/16 for recurrence of N/V/D and she was unable to tolerate PO intake at home. CT abd/pelv 3/16 with decrease in mild small bowel wall thickening which could represent mild enteritis, stable splenomegaly, stable chronic right lower lobe atelectasis, unchanged extensive paraesophageal varices. She was treated with IV compazine, IV phenergan, IV zofran. IVFs ordered with ongoing vomiting and diarrhea. Case discussed with her UC transplant team who recommended placing Cellcept on hold 3/17. In setting of ongoing vomiting with extensive EV plan was for outpatient EGD at [facility] Wednesday and transplant clinic follow up.  Patient has not been seen since Oct 2021 (reportedly due to transportation issues). Patient denies improvement in symptoms despite holding Cellcept. Also reports diffuse mild intermittent abdominal pain. States she does not feel well enough to be discharged home and continues to have difficulty tolerating oral intake at times. Stool studies including Cdiff, giardia, cryptosporidium, GI bacterial pathogens, fecal leukocytes negative. O&P still pending. Concerned that patient may need evaluated by GI in addition to EGD and possibly even colonoscopy. She would benefit from in person evaluation by transplant team as well. Case discussed with  transplant physician Dr. Linda De La Fuente who recommended patient be evaluated by GI. Unfortunately our critical access facility does not have GI services. Case discussed with  GI Dr. Víctor Cole who accepted the patient for transfer for further evaluation. Per case management no  transportation available for out of state transfer. Patient's family agreed to transport her to  today. See further details of hospital course below.       Hospital Course:       Anxiety [F41.9]  - home regimen: xanax 0.25mg TID PRN  - per chart review previously followed with  psychiatry   - Xanax increased to 0.5mg TID PRN 3/20 due to worsening anxiety     03/20/2022    TIA (transient ischemic attack) [G45.9]  -episode of slurred speech and facial numbness 3/14  -CT head, MRI head negative  -CTA neck with mixed atherosclerotic plaque at the left common carotid bifurcation with approximately 30% stenosis at the origin of the left internal carotid artery, mixed atherosclerotic plaque at the right common carotid bifurcation without significant stenosis of the right carotid artery, normal left vertebral artery, patent right vertebral artery with severe stenosis at its origin.   -CTA head with patent intracranial vessels, moderate stenosis of the distal right vertebral artery with patency of both distal vertebral arteries pathogens negative during previous admit. She recently completed 5 day course of IV Zosyn and at least 7 day course of PO flagyl prior to recurrence of diarrhea. On culturelle. - Repeat Cdiff and GI bacterial pathogens negative. Giardia and cryptosporidium negative. O&P pending.   - Cr 1.4 and she was continued on IVFs. - Feel patient would benefit from further evaluation by GI and transplant team especially in setting of immunosuppression and having to hold cellcept at this time. Per chart review history of CMV (D-/R+) putting her at intermediate risk for CMV. Other history includes fungal peritonitis. Concern that outpatient EGD and outpatient clinic f/u not appropriate plan for her with report of ongoing N/V/D and difficulty tolerating oral intake. - Discussed persistent n/v/d and intermittent abdominal pain despite holding cellcept since 3/17 with UC transplant Dr. Lombardi on 3/21. He recommends transfer for GI evaluation. Case discussed with  GI Dr. Víctor Cole who has accepted patient for transfer when bed becomes available. Advance diet as tolerated per GI.     03/16/2022    Hypokalemia [E87.6]  - Secondary to GI losses   - Replace; monitor and replete as needed    03/16/2022    Hypomagnesemia [E83.42]  -Suspect due to GI losses  -Replaced; monitor and replete as needed    03/13/2022   ·   Oral thrush  - treate with nystatin swish and swallow          Leg pain [M79.606]  - not controlled with PRN APAP  - added PRN oxycodone 5mg Q4 3/20    03/13/2022    Liver transplant recipient [facility]) [Z94.4]  -Liver transplant at [facility] by Mark Bearden on 10/24/20  -on cyclosporine and cellcept since transplant; as above, chronic n/v with occassional emesis, abd pain, diarrhea. Given AEs associated with cellcept, it's possible pt's n/v/abd pain/diarrhea is due to cellcept.    - hold cellcept 3/17 per    - plan to transfer to  for further evaluation   - of note, patient reports transportation and social issues keeping her from being compliant with scheduled appointments and procedures at [facility]. She may benefit from transfer of care to more accessible facility if possible and felt appropriate by transplant team.    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 ([facility] [address] [K75.81, K74.60]  -s/p liver transplant on 10/24/20 as above  - esophageal varices noted and no recent upper/lower endoscopy per patient report   - pancytopenic   - home torsemide on hold with decreased po intake and vomiting/diarrhea  - PPI and BB recently added to regimen   - follows with UC transplant team and will be transferred when bed becomes available     04/23/2018    Diarrhea [R19.7]  - as above, possibly due to home cellcept, but could also be from bacterial/viral/stress colitis  - cellcept on hold 3/17  - CT abd/pelv ER 3/16 with decreased mild small bowel wall thickening could represent mild enteritis (appears improved when compared with pervious ER CT abd 3/12)   - c.diff, fec leukocytes, and GI PCR panel neg last admission  - repeat c.diff and GI PCR panel ordered as well as giardia, cryptosporidium which are negative. O&P pending.  - plan to transfer for GI evaluation as above     04/23/2018    Generalized abdominal pain [R10.84]  - see under nausea, vomiting, diarrhea     04/23/2018    Essential hypertension [I10]  -with hypertensive urgency on admission  -suspect due to pain and inability to take po meds PTA  -BP elevated again on 3/19. Continue coreg. Increased hydralazine and added PRN clonidine 3/19 with improvement.   -Monitor and titrate as needed.    11/04/2015    History of atrial fibrillation   - Per chart review patient has discontinued eliquis that was previously prescribed.    - Continue ASA daily    - NSR on exam   - f/u with cards as scheduled  03/13/2014          Disposition: Transfer to the [facility] per GI Dr. Billie Grant and transplant Dr. Marie Krishnan   Discharged Condition: Stable    Vital Signs  Temp: 97.7 °F (36.5 °C)  Pulse: 60  Resp: 18  BP: (!) 161/63  SpO2: 98 %  O2 Device: None (Room air)       Vital signs reviewed in electronic chart. Physical exam  Constitutional:  Well developed, chronically ill appearing female sitting upright in bed, no acute distress. Eyes:  no scleral icterus, conjunctiva normal   HENT:  Atraumatic, external ears normal, nose normal, oropharynx moist, no pharyngeal exudates. Neck- supple, no JVD, no lymphadenopathy. Oral thrush improving. Respiratory:  No respiratory distress on room air, no wheezing, rales or rhonchi detected  Cardiovascular:  Normal rate, normal rhythm, no murmurs, no gallops, no rubs, no edema   GI:  Soft, slightly distended, generalized mild tenderness worse in LLQ, normal bowel sounds, no voluntary guarding  Musculoskeletal:  No cyanosis or obvious acute deformity. Moving all extremities   Integument:  Warm and dry. Neurologic:  Alert & oriented x 3, no apparent focal deficits noted   Psychiatric:  Speech and behavior appropriate. Labs: For convenience and continuity at follow-up the following most recent labs are provided:    CBC:   Lab Results   Component Value Date    WBC 2.5 03/22/2022    HGB 9.0 03/22/2022    HCT 27.0 03/22/2022    HCT 44.3 06/01/2012     03/22/2022     06/01/2012       RENAL:   Lab Results   Component Value Date     03/22/2022    K 4.4 03/22/2022     03/22/2022    CL 99.0 06/01/2012    CO2 19 03/22/2022    BUN 23 03/22/2022    CREATININE 1.4 03/22/2022    CREATININE 0.9 06/01/2012       Patient was seen and examined with Dr. Faisal Blanton. After reviewing patient data and diagnostic testing the plan of care was established in conjunction with Dr. Faisal Blanton. Signed:  Electronically signed by LINCOLN Adame on 3/22/2022 at 8:15 AM       Thank you Isela Glaser MD for the opportunity to be involved in this patient's care. If you have any questions or concerns please feel free to contact me at [phone].

## 2022-03-22 NOTE — FLOWSHEET NOTE
03/21/22 2000   Assessment   Charting Type Shift assessment   Neurological   Neuro (WDL) WDL   Level of Consciousness Alert (0)   Orientation Level Oriented X4   Cognition Appropriate judgement   Language Clear   [place] Coma Scale   Eye Opening 4   Best Verbal Response 5   Best Motor Response 6   [place] Coma Scale Score 15   HEENT   HEENT (WDL) X   Right Eye Impaired vision   Left Eye Impaired vision   Teeth Dentures upper;Dentures lower   Nose Intact   Tongue Pink & moist   Voice Normal   Mucous Membrane Moist;Pink; Intact   Respiratory   Respiratory (WDL) WDL   Breath Sounds   Right Upper Lobe Clear   Right Middle Lobe Diminished   Right Lower Lobe Diminished   Left Upper Lobe Clear   Left Lower Lobe Diminished   Cardiac   Cardiac (WDL) WDL   Cardiac Monitor   Telemetry Monitor On No   Gastrointestinal   Abdominal (WDL) X   RUQ Bowel Sounds Active   LUQ Bowel Sounds Active   RLQ Bowel Sounds Active   LLQ Bowel Sounds Active   Abdomen Inspection Rounded; Soft   GI Symptoms Nausea   Tenderness Tenderness   Peripheral Vascular   Peripheral Vascular (WDL) WDL   Edema None   RLE Neurovascular Assessment   R Pedal Pulse +2   LLE Neurovascular Assessment   L Pedal Pulse +2   Skin Color/Condition   Skin Color/Condition (WDL) WDL   Skin Color Pale   Skin Condition/Temp Dry; Warm   Skin Integrity   Skin Integrity (WDL) X   Skin Integrity Redness   Location Sacrum    Musculoskeletal   Musculoskeletal (WDL) X   RUE Full movement   LUE Full movement   RL Extremity Weakness   LL Extremity Weakness   Genitourinary   Genitourinary (WDL) WDL   Urine Assessment   Incontinence No   Anus/Rectum   Anus/Rectum (WDL) WDL   Psychosocial   Psychosocial (WDL) X   Patient Behaviors Anxious

## 2022-03-22 NOTE — FLOWSHEET NOTE
03/22/22 0800   Assessment   Charting Type Shift assessment   Neurological   Neuro (WDL) WDL   Level of Consciousness Alert (0)   Orientation Level Oriented X4   Cognition Appropriate judgement; Follows commands; Appropriate safety awareness; Appropriate attention/concentration   Language Clear   [place] Coma Scale   Eye Opening 4   Best Verbal Response 5   Best Motor Response 6   [place] Coma Scale Score 15   HEENT   HEENT (WDL) X   Right Eye Impaired vision   Left Eye Impaired vision   Teeth Dentures upper;Dentures lower   Nose Intact   Tongue Pink & moist   Voice Normal   Mucous Membrane Moist;Pink; Intact   Respiratory   Respiratory (WDL) WDL   Breath Sounds   Right Upper Lobe Clear   Right Middle Lobe Diminished   Right Lower Lobe Diminished   Left Upper Lobe Clear   Left Lower Lobe Diminished   Cardiac   Cardiac (WDL) WDL   Cardiac Monitor   Telemetry Monitor On No   Gastrointestinal   Abdominal (WDL) X   RUQ Bowel Sounds Active   LUQ Bowel Sounds Active   RLQ Bowel Sounds Active   LLQ Bowel Sounds Active   Abdomen Inspection Rounded; Soft   GI Symptoms Nausea   Tenderness Tenderness   Peripheral Vascular   Peripheral Vascular (WDL) WDL   Edema None   RLE Neurovascular Assessment   R Pedal Pulse +2   LLE Neurovascular Assessment   L Pedal Pulse +2   Skin Color/Condition   Skin Color/Condition (WDL) WDL   Skin Color Pale   Skin Condition/Temp Warm;Dry   Skin Integrity   Skin Integrity (WDL) X   Skin Integrity Redness   Location sacrum   Musculoskeletal   Musculoskeletal (WDL) X   RUE Full movement   LUE Full movement   RL Extremity Weakness   LL Extremity Weakness   Genitourinary   Genitourinary (WDL) WDL   Urine Assessment   Incontinence No   Anus/Rectum   Anus/Rectum (WDL) WDL   Psychosocial   Psychosocial (WDL) X   Patient Behaviors Anxious  (at times)

## 2022-03-22 NOTE — PROGRESS NOTES
Order to transfer to [facility], report called to james crouch, iv dcd pt request, packet and home meds given to pt to give to rn, transport by  pov, wheeled out by staff, nad noted

## 2022-03-23 LAB
EKG ATRIAL RATE: 50 BPM
EKG DIAGNOSIS: NORMAL
EKG P AXIS: 44 DEGREES
EKG P-R INTERVAL: 154 MS
EKG Q-T INTERVAL: 482 MS
EKG QRS DURATION: 84 MS
EKG QTC CALCULATION (BAZETT): 439 MS
EKG R AXIS: 6 DEGREES
EKG T AXIS: -71 DEGREES
EKG VENTRICULAR RATE: 50 BPM

## 2022-03-25 ENCOUNTER — TELEPHONE (OUTPATIENT)
Dept: PRIMARY CARE CLINIC | Age: 67
End: 2022-03-25

## 2022-03-25 DIAGNOSIS — F41.9 ANXIETY: Primary | ICD-10-CM

## 2022-03-25 DIAGNOSIS — M79.2 NEUROPATHIC PAIN: ICD-10-CM

## 2022-03-25 LAB
BLOOD CULTURE, ROUTINE: NORMAL
CULTURE, BLOOD 2: NORMAL
INTERPRETATION: NEGATIVE

## 2022-03-25 NOTE — TELEPHONE ENCOUNTER
----- Message from Janee Vega sent at 3/25/2022  3:21 PM EDT -----  Subject: Refill Request    QUESTIONS  Name of Medication? Other - Gabapentin 300mg  Patient-reported dosage and instructions? 4 times daily  How many days do you have left? 0  [id number]  Pharmacy phone number (if available)? [phone]  Additional Information for Provider? Pt is requesting if she could   increase the dosage to possibly 6 times daily. Please advise.   ---------------------------------------------------------------------------  --------------  CALL BACK INFO  What is the best way for the office to contact you? OK to leave message on   voicemail  Preferred Call Back Phone Number?  [phone]

## 2022-03-28 RX ORDER — ALPRAZOLAM 0.25 MG/1
0.25 TABLET ORAL 3 TIMES DAILY PRN
Qty: 45 TABLET | Refills: 0 | Status: SHIPPED | OUTPATIENT
Start: 2022-03-28 | End: 2022-04-12

## 2022-03-28 RX ORDER — GABAPENTIN 300 MG/1
300 CAPSULE ORAL 4 TIMES DAILY
Qty: 120 CAPSULE | Refills: 0 | Status: SHIPPED | OUTPATIENT
Start: 2022-03-28 | End: 2022-09-15 | Stop reason: SDUPTHER

## 2022-03-28 NOTE — TELEPHONE ENCOUNTER
I did contact the patient myself. She seems to be doing well but still have some nausea. I did go ahead and send her 1 month of gabapentin and 2 weeks off of Xanax. Advised her to contact her prescriber previously to notify on that part and to send me a letter that we can proceed with that and if that is indicated for her. Also advised her to be compliant with appointment with her transplant team and try to arrange some of her care in [place] or [facility] with gastroenterology and her maintenance/regular labs can be done locally at [facility].  Patient need follow-up appointment in 1 to 2 weeks.

## 2022-03-28 NOTE — TELEPHONE ENCOUNTER
Pt is home now. Stated they were supposed to write her xanax and gabapentin but didn't send it. And they sent her zofran and she can't take it she needs phenergan.  States she is so sick she is about to go back to the hospital.

## 2022-03-29 RX ORDER — PROMETHAZINE HYDROCHLORIDE 12.5 MG/1
TABLET ORAL
Qty: 20 TABLET | Refills: 0 | Status: SHIPPED | OUTPATIENT
Start: 2022-03-29 | End: 2022-04-12

## 2022-04-06 ENCOUNTER — HOSPITAL ENCOUNTER (OUTPATIENT)
Facility: HOSPITAL | Age: 67
Discharge: HOME | End: 2022-04-06
Payer: MEDICARE

## 2022-04-06 LAB
ALBUMIN SERPL-MCNC: 3.7 G/DL (ref 3.4–4.8)
ALP BLD-CCNC: 82 U/L (ref 25–100)
ALT SERPL-CCNC: 14 U/L (ref 4–36)
ANION GAP SERPL CALCULATED.3IONS-SCNC: 11 MMOL/L (ref 3–16)
AST SERPL-CCNC: 15 U/L (ref 8–33)
BASOPHILS ABSOLUTE: 0 K/UL (ref 0–0.1)
BASOPHILS RELATIVE PERCENT: 0.7 %
BILIRUB SERPL-MCNC: 0.8 MG/DL (ref 0.3–1.2)
BILIRUBIN DIRECT: 0.3 MG/DL (ref 0–0.2)
BILIRUBIN, INDIRECT: 0.5 MG/DL (ref 0.2–0.8)
BUN BLDV-MCNC: 23 MG/DL (ref 6–20)
CALCIUM SERPL-MCNC: 9.2 MG/DL (ref 8.5–10.5)
CHLORIDE BLD-SCNC: 109 MMOL/L (ref 98–107)
CO2: 24 MMOL/L (ref 20–30)
CREAT SERPL-MCNC: 1.1 MG/DL (ref 0.4–1.2)
EOSINOPHILS ABSOLUTE: 0.2 K/UL (ref 0–0.4)
EOSINOPHILS RELATIVE PERCENT: 5.2 %
GFR AFRICAN AMERICAN: >59
GFR NON-AFRICAN AMERICAN: 50
GLUCOSE BLD-MCNC: 226 MG/DL (ref 74–106)
HCT VFR BLD CALC: 30.2 % (ref 37–47)
HEMOGLOBIN: 9.8 G/DL (ref 11.5–16.5)
IMMATURE GRANULOCYTES #: 0 K/UL
IMMATURE GRANULOCYTES %: 0.3 % (ref 0–5)
LYMPHOCYTES ABSOLUTE: 0.7 K/UL (ref 1.5–4)
LYMPHOCYTES RELATIVE PERCENT: 22.6 %
MCH RBC QN AUTO: 30.8 PG (ref 27–32)
MCHC RBC AUTO-ENTMCNC: 32.5 G/DL (ref 31–35)
MCV RBC AUTO: 95 FL (ref 80–100)
MONOCYTES ABSOLUTE: 0.2 K/UL (ref 0.2–0.8)
MONOCYTES RELATIVE PERCENT: 6.6 %
NEUTROPHILS ABSOLUTE: 2 K/UL (ref 2–7.5)
NEUTROPHILS RELATIVE PERCENT: 64.6 %
PDW BLD-RTO: 17.5 % (ref 11–16)
PHOSPHORUS: 4.4 MG/DL (ref 2.5–4.5)
PLATELET # BLD: 183 K/UL (ref 150–400)
PMV BLD AUTO: 10.1 FL (ref 6–10)
POTASSIUM SERPL-SCNC: 5.5 MMOL/L (ref 3.4–5.1)
RBC # BLD: 3.18 M/UL (ref 3.8–5.8)
SODIUM BLD-SCNC: 144 MMOL/L (ref 136–145)
TOTAL PROTEIN: 5.9 G/DL (ref 6.4–8.3)
WBC # BLD: 3.1 K/UL (ref 4–11)

## 2022-04-06 PROCEDURE — 80158 DRUG ASSAY CYCLOSPORINE: CPT

## 2022-04-06 PROCEDURE — 80069 RENAL FUNCTION PANEL: CPT

## 2022-04-06 PROCEDURE — 85025 COMPLETE CBC W/AUTO DIFF WBC: CPT

## 2022-04-06 PROCEDURE — 80076 HEPATIC FUNCTION PANEL: CPT

## 2022-04-06 PROCEDURE — 36415 COLL VENOUS BLD VENIPUNCTURE: CPT

## 2022-04-08 LAB — CYCLOSPORINE A: 134.5 NG/ML

## 2022-04-12 ENCOUNTER — OFFICE VISIT (OUTPATIENT)
Dept: PRIMARY CARE CLINIC | Age: 67
End: 2022-04-12
Payer: MEDICARE

## 2022-04-12 VITALS
HEART RATE: 56 BPM | DIASTOLIC BLOOD PRESSURE: 60 MMHG | SYSTOLIC BLOOD PRESSURE: 122 MMHG | OXYGEN SATURATION: 100 % | BODY MASS INDEX: 24.64 KG/M2 | TEMPERATURE: 98.7 F | RESPIRATION RATE: 18 BRPM | WEIGHT: 130.4 LBS

## 2022-04-12 DIAGNOSIS — R11.2 NON-INTRACTABLE VOMITING WITH NAUSEA, UNSPECIFIED VOMITING TYPE: ICD-10-CM

## 2022-04-12 DIAGNOSIS — Z94.4 LIVER TRANSPLANT RECIPIENT (HCC): ICD-10-CM

## 2022-04-12 DIAGNOSIS — R73.9 HYPERGLYCEMIA: ICD-10-CM

## 2022-04-12 DIAGNOSIS — I10 ESSENTIAL HYPERTENSION: ICD-10-CM

## 2022-04-12 DIAGNOSIS — D64.9 ANEMIA, UNSPECIFIED TYPE: ICD-10-CM

## 2022-04-12 DIAGNOSIS — M54.50 CHRONIC MIDLINE LOW BACK PAIN WITHOUT SCIATICA: ICD-10-CM

## 2022-04-12 DIAGNOSIS — G89.29 CHRONIC MIDLINE LOW BACK PAIN WITHOUT SCIATICA: ICD-10-CM

## 2022-04-12 DIAGNOSIS — E87.5 HYPERKALEMIA: ICD-10-CM

## 2022-04-12 DIAGNOSIS — D61.818 PANCYTOPENIA (HCC): Primary | ICD-10-CM

## 2022-04-12 LAB
CHP ED QC CHECK: NORMAL
GLUCOSE BLD-MCNC: 124 MG/DL

## 2022-04-12 PROCEDURE — 1111F DSCHRG MED/CURRENT MED MERGE: CPT | Performed by: INTERNAL MEDICINE

## 2022-04-12 PROCEDURE — 1090F PRES/ABSN URINE INCON ASSESS: CPT | Performed by: INTERNAL MEDICINE

## 2022-04-12 PROCEDURE — 1123F ACP DISCUSS/DSCN MKR DOCD: CPT | Performed by: INTERNAL MEDICINE

## 2022-04-12 PROCEDURE — 82962 GLUCOSE BLOOD TEST: CPT | Performed by: INTERNAL MEDICINE

## 2022-04-12 PROCEDURE — 4004F PT TOBACCO SCREEN RCVD TLK: CPT | Performed by: INTERNAL MEDICINE

## 2022-04-12 PROCEDURE — G8427 DOCREV CUR MEDS BY ELIG CLIN: HCPCS | Performed by: INTERNAL MEDICINE

## 2022-04-12 PROCEDURE — G8420 CALC BMI NORM PARAMETERS: HCPCS | Performed by: INTERNAL MEDICINE

## 2022-04-12 PROCEDURE — 4040F PNEUMOC VAC/ADMIN/RCVD: CPT | Performed by: INTERNAL MEDICINE

## 2022-04-12 PROCEDURE — 99214 OFFICE O/P EST MOD 30 MIN: CPT | Performed by: INTERNAL MEDICINE

## 2022-04-12 PROCEDURE — 3017F COLORECTAL CA SCREEN DOC REV: CPT | Performed by: INTERNAL MEDICINE

## 2022-04-12 PROCEDURE — G8399 PT W/DXA RESULTS DOCUMENT: HCPCS | Performed by: INTERNAL MEDICINE

## 2022-04-12 RX ORDER — AMLODIPINE BESYLATE 10 MG/1
TABLET ORAL DAILY
COMMUNITY
Start: 2022-03-25 | End: 2022-10-25 | Stop reason: SDUPTHER

## 2022-04-12 RX ORDER — ONDANSETRON 4 MG/1
TABLET, FILM COATED ORAL
COMMUNITY
Start: 2022-03-25 | End: 2022-09-15

## 2022-04-12 ASSESSMENT — ENCOUNTER SYMPTOMS
SINUS PRESSURE: 0
WHEEZING: 0
EYE DISCHARGE: 0
SORE THROAT: 0
ABDOMINAL PAIN: 0
SHORTNESS OF BREATH: 0
COUGH: 0
VOMITING: 0
BACK PAIN: 1
NAUSEA: 1

## 2022-04-12 ASSESSMENT — PATIENT HEALTH QUESTIONNAIRE - PHQ9
10. IF YOU CHECKED OFF ANY PROBLEMS, HOW DIFFICULT HAVE THESE PROBLEMS MADE IT FOR YOU TO DO YOUR WORK, TAKE CARE OF THINGS AT HOME, OR GET ALONG WITH OTHER PEOPLE: 1
1. LITTLE INTEREST OR PLEASURE IN DOING THINGS: 1
8. MOVING OR SPEAKING SO SLOWLY THAT OTHER PEOPLE COULD HAVE NOTICED. OR THE OPPOSITE, BEING SO FIGETY OR RESTLESS THAT YOU HAVE BEEN MOVING AROUND A LOT MORE THAN USUAL: 1
5. POOR APPETITE OR OVEREATING: 0
7. TROUBLE CONCENTRATING ON THINGS, SUCH AS READING THE NEWSPAPER OR WATCHING TELEVISION: 0
3. TROUBLE FALLING OR STAYING ASLEEP: 1
9. THOUGHTS THAT YOU WOULD BE BETTER OFF DEAD, OR OF HURTING YOURSELF: 0
SUM OF ALL RESPONSES TO PHQ9 QUESTIONS 1 & 2: 2
6. FEELING BAD ABOUT YOURSELF - OR THAT YOU ARE A FAILURE OR HAVE LET YOURSELF OR YOUR FAMILY DOWN: 1
SUM OF ALL RESPONSES TO PHQ QUESTIONS 1-9: 6
4. FEELING TIRED OR HAVING LITTLE ENERGY: 1
SUM OF ALL RESPONSES TO PHQ QUESTIONS 1-9: 6
SUM OF ALL RESPONSES TO PHQ QUESTIONS 1-9: 6
2. FEELING DOWN, DEPRESSED OR HOPELESS: 1
SUM OF ALL RESPONSES TO PHQ QUESTIONS 1-9: 6

## 2022-04-12 NOTE — PROGRESS NOTES
Chief Complaint   Patient presents with    Cirrhosis    Atrial Fibrillation    Chronic Renal Failure       Have you seen any other physician or provider since your last visit yes [facility] , [facility]    Have you had any other diagnostic tests since your last visit? yes -     Have you changed or stopped any medications since your last visit?  Yes updated list.

## 2022-04-12 NOTE — PROGRESS NOTES
SUBJECTIVE:    Patient ID: Giovanni Rich is a 73 y. o.female. Chief Complaint   Patient presents with    Cirrhosis    Atrial Fibrillation    Chronic Renal Failure         HPI:  Patient with a prior history of pancytopenia. She reported no easy bruising or bleeding. Lab abnormality for at least several months. Patient with a prior history of liver transplant on immunosuppressant. patient has had hypertension for several years. She has been compliant with taking medications, without side effects from it. She has been following a low-sodium, is active and never exercises. Weight is increasing steadily, compared to last visit. Her blood pressure is stable at this time. Patient with hx of liver transplant. She continues to follow with  transplant team. She is scheduled to see them later this week. She states she is doing well aside from having some difficulty walking due to pain in her legs. She has been compliant with taking her medications, without side effects. Pt had some LBP that recently has gotten worse. Radiate to nowhere. Pain is worse with exertion, better with rest. Sx getting worse. No change in B/B. Some stiffness after long sitting or standing. No tingling or numbness. She does have a hx of some complicated epidural procedures. Patient's medications, allergies, past medical, surgical, social and family histories were reviewed and updated as appropriate in electronic medical record. Outpatient Medications Marked as Taking for the 4/12/22 encounter (Office Visit) with Eamon Kennedy MD   Medication Sig Dispense Refill    ondansetron (ZOFRAN) 4 MG tablet       nystatin (MYCOSTATIN) 731318 UNIT/ML suspension Take 500,000 Units by mouth 4 times daily      amLODIPine (NORVASC) 10 MG tablet daily      gabapentin (NEURONTIN) 300 MG capsule Take 1 capsule by mouth 4 times daily for 30 days.  Intended supply: 30 days 120 capsule 0    ALPRAZolam (XANAX) 0.25 MG tablet Take 1 tablet by mouth 3 times daily as needed for Anxiety for up to 15 days. 45 tablet 0    sennosides-docusate sodium (SENOKOT-S) 8.6-50 MG tablet Take 1 tablet by mouth in the morning and at bedtime      aspirin 325 MG EC tablet Take 1 tablet by mouth daily 30 tablet 3    lactobacillus (CULTURELLE) capsule Take 1 capsule by mouth daily (with breakfast) 30 capsule 0    atorvastatin (LIPITOR) 80 MG tablet Take 1 tablet by mouth nightly 30 tablet 3    carvedilol (COREG) 12.5 MG tablet Take 1 tablet by mouth 2 times daily (with meals) 60 tablet 3    pantoprazole (PROTONIX) 40 MG tablet Take 1 tablet by mouth every morning (before breakfast) 30 tablet 3    hydrALAZINE (APRESOLINE) 25 MG tablet Take 1 tablet by mouth every 8 hours as needed (systolic >118) 90 tablet 3    mycophenolate (CELLCEPT) 250 MG capsule Take 500 mg by mouth 2 times daily      cycloSPORINE modified (NEORAL) 25 MG capsule Take 3 capsules every morning and take 4 capsules every night      torsemide (DEMADEX) 20 MG tablet Take 10 mg by mouth daily       vitamin D (ERGOCALCIFEROL) 1.25 MG (88938 UT) CAPS capsule TAKE ONE CAPSULE BY MOUTH ONCE A WEEK. Review of Systems   Constitutional: Positive for fatigue. Negative for chills and fever. HENT: Negative for congestion, sinus pressure and sore throat. Eyes: Negative for discharge and visual disturbance. Respiratory: Negative for cough, shortness of breath and wheezing. Cardiovascular: Negative for chest pain and palpitations. Gastrointestinal: Positive for nausea. Negative for abdominal pain and vomiting. Endocrine: Negative for cold intolerance and heat intolerance. Genitourinary: Negative for dysuria, frequency and urgency. Musculoskeletal: Positive for back pain and gait problem. Negative for arthralgias. Skin: Negative for rash and wound. Neurological: Positive for dizziness and weakness. Negative for syncope, numbness and headaches. Hematological: Negative. Psychiatric/Behavioral: Negative for agitation and sleep disturbance. The patient is nervous/anxious. Past Medical History:   Diagnosis Date    Arthritis, rheumatoid (HCC)     Ascites     CAD (coronary artery disease)     Chronic pain     Cirrhosis (HCC)     Colitis     Esophageal varices (HCC)     Hypertension     IIH (idiopathic intracranial hypertension)     Liver cirrhosis (HCC)     Osteoarthritis     Portal hypertensive gastropathy (HCC)     Sciatica     Splenomegaly     Unspecified sleep apnea      Past Surgical History:   Procedure Laterality Date    BONE MARROW BIOPSY      DILATION AND CURETTAGE OF UTERUS      DILATION AND CURETTAGE OF UTERUS   &     LIVER BIOPSY      LIVER TRANSPLANT  10/24/2020    TUBAL LIGATION  1982    UPPER GASTROINTESTINAL ENDOSCOPY  10/14/2020    with banding after varices     Family History   Problem Relation Age of Onset    High Blood Pressure Mother     Alzheimer's Disease Mother     Heart Disease Father     High Blood Pressure Father     Heart Disease Maternal Grandmother     Heart Disease Maternal Grandfather     Stroke Maternal Grandfather     Diabetes Paternal Grandmother     High Blood Pressure Brother       Social History     Tobacco Use   Smoking Status Light Tobacco Smoker    Packs/day: 0.10    Years: 1.00    Pack years: 0.10    Types: Cigarettes    Last attempt to quit: 2019    Years since quittin.9   Smokeless Tobacco Never Used       OBJECTIVE:   Wt Readings from Last 3 Encounters:   22 130 lb 6.4 oz (59.1 kg)   22 128 lb 1 oz (58.1 kg)   22 120 lb 4 oz (54.5 kg)     BP Readings from Last 3 Encounters:   22 122/60   22 (!) 161/63   03/15/22 (!) 154/78       /60   Pulse 56   Temp 98.7 °F (37.1 °C)   Resp 18   Wt 130 lb 6.4 oz (59.1 kg)   SpO2 100%   BMI 24.64 kg/m²      Physical Exam  Vitals and nursing note reviewed.    Constitutional:       Appearance: Normal appearance. She is well-developed. Comments: In wheelchair   HENT:      Head: Normocephalic and atraumatic. Right Ear: External ear normal.      Left Ear: External ear normal.      Nose: Nose normal.      Mouth/Throat:      Mouth: Mucous membranes are moist.      Pharynx: Oropharynx is clear. Eyes:      Conjunctiva/sclera: Conjunctivae normal.      Pupils: Pupils are equal, round, and reactive to light. Neck:      Thyroid: No thyromegaly. Vascular: No JVD. Cardiovascular:      Rate and Rhythm: Normal rate and regular rhythm. Heart sounds: Normal heart sounds. Pulmonary:      Effort: Pulmonary effort is normal.      Breath sounds: Normal breath sounds. No wheezing or rales. Abdominal:      General: Bowel sounds are normal. There is no distension. Palpations: Abdomen is soft. Tenderness: There is no abdominal tenderness. Musculoskeletal:         General: No tenderness. Cervical back: Neck supple. No rigidity. No muscular tenderness. Lumbar back: No tenderness or bony tenderness. Decreased range of motion. Right lower leg: No edema. Left lower leg: No edema. Comments: Mild difficulty standing up. Slow gait. Skin:     Findings: No erythema or rash. Neurological:      General: No focal deficit present. Mental Status: She is alert and oriented to person, place, and time.    Psychiatric:         Behavior: Behavior normal.         Judgment: Judgment normal.         Lab Results   Component Value Date     04/06/2022    K 5.5 04/06/2022    K 4.4 03/22/2022     04/06/2022    CL 99.0 06/01/2012    CO2 24 04/06/2022    GLUCOSE 226 04/06/2022    BUN 23 04/06/2022    CREATININE 1.1 04/06/2022    CREATININE 0.9 06/01/2012    CALCIUM 9.2 04/06/2022    PROT 5.9 04/06/2022    LABALBU 3.7 04/06/2022    LABALBU 3.9 06/01/2012    BILITOT 0.8 04/06/2022    ALT 14 04/06/2022    AST 15 04/06/2022       Hemoglobin A1C (%)   Date Value   03/14/2022 3.8 (L) Microscopic Examination (no units)   Date Value   03/16/2022 YES     LDL Calculated (mg/dL)   Date Value   03/14/2022 87         Lab Results   Component Value Date    WBC 3.1 04/06/2022    NEUTROABS 2.0 04/06/2022    HGB 9.8 04/06/2022    HCT 30.2 04/06/2022    HCT 44.3 06/01/2012    MCV 95.0 04/06/2022     04/06/2022     06/01/2012       Lab Results   Component Value Date    TSH 2.59 03/13/2022       ASSESSMENT/PLAN:     1. Pancytopenia ([address]  Could be related to her multiple medical issues/medications/history of liver transplant/immunosuppression. Last platelet count within normal limit. Continue to monitor closely. Recent iron studies, folic acid and C71 were unremarkable. B12 toward the lowest normal limit. 2. Non-intractable vomiting with nausea, unspecified vomiting type  Still having some nausea but no more vomiting. She did have recent EGD which came back unremarkable. Colonoscopy was done in 2018 and was unremarkable. CellCept was placed on hold per her transplant team.  Continue to monitor for now. Patient to discuss with her transplant team for how long need to stay off CellCept. Monitor labs periodically. Patient to call with any worsening symptoms. Continue GI prophylaxis. 3. Essential hypertension  BP is stable. I have advised her on low-sodium diet, exercise and weight control. I am going to continue current medication. Will monitor her renal function every few months, have advised her to check blood pressure frequently and to keep a record of this. 4. Liver transplant recipient [facility])  Continue immunosuppression per transplant team.  Currently off CellCept. Patient to continue to follow up with transplant team. Discussed the importance of being very compliant with medication regimen and recommendations, as well as following up on blood work. Patient to discuss with her transplant team for whole need to be off CellCept.     5. Anemia, unspecified type  Hemoglobin continue to be low but and fairly stable range for her. Recent anemia work-up was unremarkable. Recent esaphegogastroendoscopy was unremarkable. Colonoscopy was unremarkable in 2018. Will continue to monitor periodically. 6. Hyperkalemia  Continue to monitor potassium level closely. Discussed monitoring her diet. Check labs in the next few weeks. 7. Hyperglycemia  I have advised her on diet, exercise and weight control. I have also, advised her that this condition could possibly progress into diabetes. I will monitor her A1c periodically. - POCT Glucose    8. Chronic midline low back pain without sciatica  MRI lumbar spine 12/9/2015  1. Diffuse moderate lumbar facet joint arthritis with minimal   anterior listhesis at the L4-L5 level. 2. 2+ L1-L5 degenerative disc disease. There is borderline spinal   stenosis at the L2-L5 levels. 3. 4+ L5-S1 degenerative disc disease. 4. Otherwise unremarkable exam. There is no disc herniation. Difficult to proceed with physical therapy related to trouble with transportation. Patient with worsening back pain and worsening mobility. Will proceed with MRI L-spine specially with moderate to severe disease noted on MRI 7 years ago. Further recommendation based on test results. - MRI Lumbar Spine WO Contrast; Future       ICorona MA am scribing for and in the presence of Cody Iqbal MD on this date of 04/12/22 at 4:30 PM    I, Dr. Cody Iqbal, personally performed the services described in the documentation as scribed by Corona De La Cruz MA, in my presence and it is both accurate and complete.

## 2022-04-20 ENCOUNTER — TELEPHONE (OUTPATIENT)
Dept: PRIMARY CARE CLINIC | Age: 67
End: 2022-04-20

## 2022-04-20 PROBLEM — N39.0 UTI (URINARY TRACT INFECTION): Status: RESOLVED | Noted: 2022-03-16 | Resolved: 2022-04-20

## 2022-08-09 NOTE — PROGRESS NOTES
SUBJECTIVE:    Patient ID: Cornell Valera is a 59 y.o. female. Chief Complaint   Patient presents with    Edema    Referral - General     thoracentesis and paracentesis       HPI:    Patient's medications, allergies, past medical, surgical,social and family histories were reviewed and updated as appropriate. She is having worsening shortness of air and thinks she may need the thoracentesis again. She says her legs are swelling worse. She is on 40 mg lasix 2 times a day. She has appt with hepatology in 2 days. She wants to know about changing gabapentin. She says she is intermittently losing her eye site. She say she was diagnosed with idiopathic intercranial hypertension at [facility] in 2011. She started having some trouble wearing a seat belt related to ascites due to it placing the seat belt too high and makes her feel like she is going to choke. Cough   This is a recurrent problem. The current episode started more than 1 year ago. The problem has been gradually worsening. The problem occurs every few minutes. Pertinent negatives include no chest pain, chills, fever, headaches, hemoptysis, rash, sore throat, shortness of breath or wheezing. Nothing aggravates the symptoms. Treatments tried: paracentesis. The treatment provided mild relief. Review of Systems   Constitutional: Negative for chills and fever. HENT: Negative for congestion, sinus pressure and sore throat. Eyes: Negative for discharge and visual disturbance. Respiratory: Negative for cough, hemoptysis, shortness of breath and wheezing. Cardiovascular: Positive for leg swelling. Negative for chest pain and palpitations. Gastrointestinal: Positive for abdominal distention. Negative for abdominal pain, nausea and vomiting. Endocrine: Negative for cold intolerance and heat intolerance. Genitourinary: Negative for dysuria, frequency and urgency. Musculoskeletal: Negative for arthralgias and back pain.    Skin: Negative for rash and wound. Neurological: Negative for syncope, numbness and headaches. Hematological: Negative. Psychiatric/Behavioral: Negative for agitation and sleep disturbance. The patient is not nervous/anxious.         Past Medical History:   Diagnosis Date    Arthritis, rheumatoid (HCC)     Cirrhosis ([address]     Colitis     Hypertension     IIH (idiopathic intracranial hypertension)     Liver cirrhosis (HCC)     Osteoarthritis     Type II or unspecified type diabetes mellitus without mention of complication, not stated as uncontrolled     Unspecified sleep apnea        Past Surgical History:   Procedure Laterality Date    BONE MARROW BIOPSY      DILATION AND CURETTAGE OF UTERUS      DILATION AND CURETTAGE OF UTERUS   &     TUBAL LIGATION  1982       Family History   Problem Relation Age of Onset    High Blood Pressure Mother     Alzheimer's Disease Mother     Heart Disease Father     High Blood Pressure Father     Heart Disease Maternal Grandmother     Heart Disease Maternal Grandfather     Stroke Maternal Grandfather     Diabetes Paternal Grandmother     High Blood Pressure Brother        Social History     Socioeconomic History    Marital status: Legally      Spouse name: None    Number of children: None    Years of education: None    Highest education level: None   Occupational History    None   Social Needs    Financial resource strain: None    Food insecurity:     Worry: None     Inability: None    Transportation needs:     Medical: None     Non-medical: None   Tobacco Use    Smoking status: Former Smoker     Packs/day: 0.10     Years: 1.00     Pack years: 0.10     Types: Cigarettes     Last attempt to quit: 2019     Years since quittin.1    Smokeless tobacco: Never Used   Substance and Sexual Activity    Alcohol use: No    Drug use: No    Sexual activity: None   Lifestyle    Physical activity:     Days per week: None     Minutes per session: None    Stress: None   Relationships    Social connections:     Talks on phone: None     Gets together: None     Attends Druze service: None     Active member of club or organization: None     Attends meetings of clubs or organizations: None     Relationship status: None    Intimate partner violence:     Fear of current or ex partner: None     Emotionally abused: None     Physically abused: None     Forced sexual activity: None   Other Topics Concern    None   Social History Narrative    None       OBJECTIVE:    Vitals:    06/24/19 1546   BP: 130/70   Site: Left Upper Arm   Position: Sitting   Pulse: 79   Resp: 16   SpO2: 93%   Weight: 150 lb (68 kg)   Height: 5' 1\" (1.549 m)     Physical Exam   Constitutional: She is oriented to person, place, and time. She appears well-developed and well-nourished. No distress. HENT:   Head: Normocephalic and atraumatic. Nose: Nose normal.   Mouth/Throat: Oropharynx is clear and moist.   Eyes: Pupils are equal, round, and reactive to light. Conjunctivae and EOM are normal. Right eye exhibits no discharge. Left eye exhibits no discharge. No scleral icterus. Neck: Normal range of motion. Neck supple. No JVD present. No tracheal deviation present. No thyromegaly present. Cardiovascular: Normal rate, regular rhythm and normal heart sounds. No murmur heard. Pulmonary/Chest: Effort normal. No stridor. No respiratory distress. She has no wheezes. She has rales. She exhibits no tenderness. Abdominal: Soft. Bowel sounds are normal. She exhibits distension. She exhibits no mass. There is tenderness. There is no rebound and no guarding. Musculoskeletal: Normal range of motion. She exhibits edema. She exhibits no tenderness. Lymphadenopathy:     She has no cervical adenopathy. Neurological: She is alert and oriented to person, place, and time. Skin: Skin is warm and dry. No rash noted. She is not diaphoretic.    Psychiatric: She has a normal mood and affect. Nursing note and vitals reviewed. No results found for requested labs within last 30 days. Hemoglobin A1C (%)   Date Value   10/30/2018 5.3     Microscopic Examination (no units)   Date Value   05/16/2018 YES     LDL Calculated (mg/dL)   Date Value   04/10/2018 94         Lab Results   Component Value Date    WBC 8.9 05/12/2019    HGB 12.6 05/12/2019    HCT 39.2 05/12/2019    MCV 94.0 05/12/2019     05/12/2019    LYMPHOPCT 6.8 05/12/2019    RBC 4.17 05/12/2019    MCH 30.2 05/12/2019    MCHC 32.1 05/12/2019    RDW 16.8 (H) 05/12/2019       Lab Results   Component Value Date     (L) 05/12/2019    K 4.4 05/12/2019     05/12/2019    CO2 19 (L) 05/12/2019    BUN 36 (H) 05/12/2019    CREATININE 1.4 (H) 05/12/2019    GLUCOSE 152 (H) 05/12/2019    CALCIUM 8.7 05/12/2019    PROT 7.1 05/12/2019    LABALBU 3.1 (L) 05/12/2019    BILITOT 3.5 (H) 05/12/2019    ALKPHOS 122 (H) 05/12/2019    AST 23 05/12/2019    ALT 19 05/12/2019    LABGLOM 38 (L) 05/12/2019    GFRAA 46 (L) 05/12/2019    AGRATIO 0.8 05/12/2019    GLOB 4.0 05/12/2019       Lab Results   Component Value Date    TSH 1.46 10/29/2015       Current Outpatient Medications   Medication Sig Dispense Refill    gabapentin (NEURONTIN) 400 MG capsule Take 1 capsule by mouth 4 times daily for 90 days. 120 capsule 3    acetaZOLAMIDE (DIAMOX) 125 MG tablet Take 1 tablet by mouth 3 times daily 90 tablet 3    HYDROcodone-acetaminophen (NORCO) 5-325 MG per tablet Take 1 tablet by mouth every 4 hours as needed for Pain for up to 3 days.  18 tablet 0    spironolactone (ALDACTONE) 100 MG tablet Take 100 mg by mouth 2 times daily      sulfamethoxazole-trimethoprim (BACTRIM DS) 800-160 MG per tablet Take 1 tablet by mouth daily 30 tablet 5    ALPRAZolam (XANAX) 0.5 MG tablet TAKE ONE TABLET BY MOUTH THREE TIMES A DAY AS NEEDED FOR SLEEP OR ANXIETY 90 tablet 2    DULoxetine (CYMBALTA) 30 MG extended release capsule Take 1 capsule by mouth daily To Ophthalmology    Vision changes  -     External Referral To Neurology  -     External Referral To Ophthalmology    Chest wall pain  -     HYDROcodone-acetaminophen (NORCO) 5-325 MG per tablet; Take 1 tablet by mouth every 4 hours as needed for Pain for up to 3 days. Additional plan relatedto above diagnosis:  Chronic pain  I have reviewed the patient's past medical history, physical exam, medications, labs, radiographic studies and consultations. In addition, I have assessed detailed aspects of the patient's pain, the affects of the pain on the patient's functional and psychological function, coexisting conditions, possible alternative treatment modalities, risks for addiction and abuse and risks and benefits of the given medication. After review and discussion with the patient regarding the above medication, the patient and myself have agreed the use of the controlled medication is an integral part of improving the patient's ability to perform ADL's, and sleep through the night without excessive pain. The over all goal would  be improvement of the patient's quality of life with the minimum and safest medication. The above information, including response and treatment goals, will be assessed and updated on a regular basis. The patient is also agreeable to have routine VIVEK reports, random urine drug screens and pill counts. The patient has agreed to only use the medication as prescribed and will notify me of any problems or change in condition that occurs. Return in about 3 months (around 9/24/2019) for chronic conditions.     Controlled Substances Monitoring: Dilution (U/0.1 Cc): 1

## 2022-08-29 ENCOUNTER — HOSPITAL ENCOUNTER (OUTPATIENT)
Facility: HOSPITAL | Age: 67
Discharge: HOME | End: 2022-08-29
Payer: MEDICARE

## 2022-08-29 LAB
ALBUMIN SERPL-MCNC: 4.4 G/DL (ref 3.4–4.8)
ALP BLD-CCNC: 101 U/L (ref 25–100)
ALT SERPL-CCNC: 11 U/L (ref 4–36)
ANION GAP SERPL CALCULATED.3IONS-SCNC: 9 MMOL/L (ref 3–16)
AST SERPL-CCNC: 14 U/L (ref 8–33)
BASOPHILS ABSOLUTE: 0 K/UL (ref 0–0.1)
BASOPHILS RELATIVE PERCENT: 0.5 %
BILIRUB SERPL-MCNC: 1.5 MG/DL (ref 0.3–1.2)
BILIRUBIN DIRECT: 0.3 MG/DL (ref 0–0.2)
BILIRUBIN, INDIRECT: 1.2 MG/DL (ref 0.2–0.8)
BUN BLDV-MCNC: 32 MG/DL (ref 6–20)
CALCIUM SERPL-MCNC: 9.8 MG/DL (ref 8.5–10.5)
CHLORIDE BLD-SCNC: 102 MMOL/L (ref 98–107)
CO2: 28 MMOL/L (ref 20–30)
CREAT SERPL-MCNC: 1.6 MG/DL (ref 0.4–1.2)
EOSINOPHILS ABSOLUTE: 0.2 K/UL (ref 0–0.4)
EOSINOPHILS RELATIVE PERCENT: 2.4 %
GFR AFRICAN AMERICAN: 39
GFR NON-AFRICAN AMERICAN: 32
GLUCOSE BLD-MCNC: 112 MG/DL (ref 74–106)
HCT VFR BLD CALC: 34.9 % (ref 37–47)
HEMOGLOBIN: 12 G/DL (ref 11.5–16.5)
IMMATURE GRANULOCYTES #: 0 K/UL
IMMATURE GRANULOCYTES %: 0.2 % (ref 0–5)
LYMPHOCYTES ABSOLUTE: 0.9 K/UL (ref 1.5–4)
LYMPHOCYTES RELATIVE PERCENT: 14.5 %
MCH RBC QN AUTO: 31.3 PG (ref 27–32)
MCHC RBC AUTO-ENTMCNC: 34.4 G/DL (ref 31–35)
MCV RBC AUTO: 90.9 FL (ref 80–100)
MONOCYTES ABSOLUTE: 0.4 K/UL (ref 0.2–0.8)
MONOCYTES RELATIVE PERCENT: 6.4 %
NEUTROPHILS ABSOLUTE: 4.7 K/UL (ref 2–7.5)
NEUTROPHILS RELATIVE PERCENT: 76 %
PDW BLD-RTO: 14.6 % (ref 11–16)
PHOSPHORUS: 4.4 MG/DL (ref 2.5–4.5)
PLATELET # BLD: 162 K/UL (ref 150–400)
PMV BLD AUTO: 9.7 FL (ref 6–10)
POTASSIUM SERPL-SCNC: 4.7 MMOL/L (ref 3.4–5.1)
RBC # BLD: 3.84 M/UL (ref 3.8–5.8)
SODIUM BLD-SCNC: 139 MMOL/L (ref 136–145)
TOTAL PROTEIN: 6.7 G/DL (ref 6.4–8.3)
VITAMIN D 25-HYDROXY: 37.2 (ref 32–100)
WBC # BLD: 6.1 K/UL (ref 4–11)

## 2022-08-29 PROCEDURE — 80158 DRUG ASSAY CYCLOSPORINE: CPT

## 2022-08-29 PROCEDURE — 80076 HEPATIC FUNCTION PANEL: CPT

## 2022-08-29 PROCEDURE — 82306 VITAMIN D 25 HYDROXY: CPT

## 2022-08-29 PROCEDURE — 80069 RENAL FUNCTION PANEL: CPT

## 2022-08-29 PROCEDURE — 85025 COMPLETE CBC W/AUTO DIFF WBC: CPT

## 2022-08-29 PROCEDURE — 36415 COLL VENOUS BLD VENIPUNCTURE: CPT

## 2022-08-31 LAB — CYCLOSPORINE A: 109.8 NG/ML

## 2022-09-15 ENCOUNTER — TELEPHONE (OUTPATIENT)
Dept: PRIMARY CARE CLINIC | Age: 67
End: 2022-09-15

## 2022-09-15 ENCOUNTER — OFFICE VISIT (OUTPATIENT)
Dept: PRIMARY CARE CLINIC | Age: 67
End: 2022-09-15
Payer: MEDICARE

## 2022-09-15 ENCOUNTER — HOSPITAL ENCOUNTER (OUTPATIENT)
Facility: HOSPITAL | Age: 67
Discharge: HOME | End: 2022-09-15
Payer: MEDICARE

## 2022-09-15 VITALS
HEIGHT: 61 IN | DIASTOLIC BLOOD PRESSURE: 80 MMHG | HEART RATE: 67 BPM | OXYGEN SATURATION: 96 % | RESPIRATION RATE: 18 BRPM | SYSTOLIC BLOOD PRESSURE: 136 MMHG | BODY MASS INDEX: 27.34 KG/M2 | WEIGHT: 144.8 LBS

## 2022-09-15 DIAGNOSIS — Z23 NEED FOR PROPHYLACTIC VACCINATION AND INOCULATION AGAINST VARICELLA: ICD-10-CM

## 2022-09-15 DIAGNOSIS — F32.A ANXIETY AND DEPRESSION: ICD-10-CM

## 2022-09-15 DIAGNOSIS — N17.9 ACUTE RENAL FAILURE, UNSPECIFIED ACUTE RENAL FAILURE TYPE (HCC): ICD-10-CM

## 2022-09-15 DIAGNOSIS — Z00.00 INITIAL MEDICARE ANNUAL WELLNESS VISIT: Primary | ICD-10-CM

## 2022-09-15 DIAGNOSIS — Z12.31 ENCOUNTER FOR SCREENING MAMMOGRAM FOR BREAST CANCER: ICD-10-CM

## 2022-09-15 DIAGNOSIS — M79.2 NEUROPATHIC PAIN: ICD-10-CM

## 2022-09-15 DIAGNOSIS — F41.9 ANXIETY AND DEPRESSION: ICD-10-CM

## 2022-09-15 DIAGNOSIS — Z00.00 INITIAL MEDICARE ANNUAL WELLNESS VISIT: ICD-10-CM

## 2022-09-15 DIAGNOSIS — Z87.891 PERSONAL HISTORY OF TOBACCO USE, PRESENTING HAZARDS TO HEALTH: ICD-10-CM

## 2022-09-15 LAB
A/G RATIO: 2 (ref 0.8–2)
ALBUMIN SERPL-MCNC: 4.6 G/DL (ref 3.4–4.8)
ALP BLD-CCNC: 104 U/L (ref 25–100)
ALT SERPL-CCNC: 12 U/L (ref 4–36)
ANION GAP SERPL CALCULATED.3IONS-SCNC: 11 MMOL/L (ref 3–16)
AST SERPL-CCNC: 13 U/L (ref 8–33)
BILIRUB SERPL-MCNC: 1 MG/DL (ref 0.3–1.2)
BUN BLDV-MCNC: 37 MG/DL (ref 6–20)
CALCIUM SERPL-MCNC: 9.9 MG/DL (ref 8.5–10.5)
CHLORIDE BLD-SCNC: 106 MMOL/L (ref 98–107)
CO2: 25 MMOL/L (ref 20–30)
CREAT SERPL-MCNC: 1.6 MG/DL (ref 0.4–1.2)
GFR AFRICAN AMERICAN: 39
GFR NON-AFRICAN AMERICAN: 32
GLOBULIN: 2.3 G/DL
GLUCOSE BLD-MCNC: 106 MG/DL (ref 74–106)
POTASSIUM SERPL-SCNC: 5.2 MMOL/L (ref 3.4–5.1)
SODIUM BLD-SCNC: 142 MMOL/L (ref 136–145)
TOTAL PROTEIN: 6.9 G/DL (ref 6.4–8.3)

## 2022-09-15 PROCEDURE — G0438 PPPS, INITIAL VISIT: HCPCS | Performed by: INTERNAL MEDICINE

## 2022-09-15 PROCEDURE — 1123F ACP DISCUSS/DSCN MKR DOCD: CPT | Performed by: INTERNAL MEDICINE

## 2022-09-15 PROCEDURE — 80053 COMPREHEN METABOLIC PANEL: CPT

## 2022-09-15 PROCEDURE — 3017F COLORECTAL CA SCREEN DOC REV: CPT | Performed by: INTERNAL MEDICINE

## 2022-09-15 RX ORDER — ALPRAZOLAM 0.25 MG/1
TABLET ORAL
COMMUNITY
Start: 2022-07-29 | End: 2022-09-15

## 2022-09-15 RX ORDER — GABAPENTIN 300 MG/1
300 CAPSULE ORAL 4 TIMES DAILY
Qty: 120 CAPSULE | Refills: 0 | Status: SHIPPED | OUTPATIENT
Start: 2022-09-15 | End: 2022-10-12 | Stop reason: SDUPTHER

## 2022-09-15 RX ORDER — ALPRAZOLAM 0.5 MG/1
0.5 TABLET ORAL 2 TIMES DAILY PRN
Qty: 60 TABLET | Refills: 0 | Status: SHIPPED | OUTPATIENT
Start: 2022-09-15 | End: 2022-10-12 | Stop reason: SDUPTHER

## 2022-09-15 RX ORDER — ASPIRIN 81 MG/1
81 TABLET ORAL DAILY
COMMUNITY

## 2022-09-15 SDOH — ECONOMIC STABILITY: FOOD INSECURITY: WITHIN THE PAST 12 MONTHS, YOU WORRIED THAT YOUR FOOD WOULD RUN OUT BEFORE YOU GOT MONEY TO BUY MORE.: OFTEN TRUE

## 2022-09-15 SDOH — ECONOMIC STABILITY: FOOD INSECURITY: WITHIN THE PAST 12 MONTHS, THE FOOD YOU BOUGHT JUST DIDN'T LAST AND YOU DIDN'T HAVE MONEY TO GET MORE.: OFTEN TRUE

## 2022-09-15 ASSESSMENT — PATIENT HEALTH QUESTIONNAIRE - PHQ9
6. FEELING BAD ABOUT YOURSELF - OR THAT YOU ARE A FAILURE OR HAVE LET YOURSELF OR YOUR FAMILY DOWN: 0
7. TROUBLE CONCENTRATING ON THINGS, SUCH AS READING THE NEWSPAPER OR WATCHING TELEVISION: 1
SUM OF ALL RESPONSES TO PHQ QUESTIONS 1-9: 9
4. FEELING TIRED OR HAVING LITTLE ENERGY: 1
SUM OF ALL RESPONSES TO PHQ QUESTIONS 1-9: 9
SUM OF ALL RESPONSES TO PHQ9 QUESTIONS 1 & 2: 4
2. FEELING DOWN, DEPRESSED OR HOPELESS: 2
3. TROUBLE FALLING OR STAYING ASLEEP: 1
SUM OF ALL RESPONSES TO PHQ QUESTIONS 1-9: 9
9. THOUGHTS THAT YOU WOULD BE BETTER OFF DEAD, OR OF HURTING YOURSELF: 0
SUM OF ALL RESPONSES TO PHQ QUESTIONS 1-9: 9
1. LITTLE INTEREST OR PLEASURE IN DOING THINGS: 2
5. POOR APPETITE OR OVEREATING: 1
10. IF YOU CHECKED OFF ANY PROBLEMS, HOW DIFFICULT HAVE THESE PROBLEMS MADE IT FOR YOU TO DO YOUR WORK, TAKE CARE OF THINGS AT HOME, OR GET ALONG WITH OTHER PEOPLE: 1
8. MOVING OR SPEAKING SO SLOWLY THAT OTHER PEOPLE COULD HAVE NOTICED. OR THE OPPOSITE, BEING SO FIGETY OR RESTLESS THAT YOU HAVE BEEN MOVING AROUND A LOT MORE THAN USUAL: 1

## 2022-09-15 ASSESSMENT — LIFESTYLE VARIABLES: HOW OFTEN DO YOU HAVE A DRINK CONTAINING ALCOHOL: NEVER

## 2022-09-15 ASSESSMENT — SOCIAL DETERMINANTS OF HEALTH (SDOH): HOW HARD IS IT FOR YOU TO PAY FOR THE VERY BASICS LIKE FOOD, HOUSING, MEDICAL CARE, AND HEATING?: VERY HARD

## 2022-09-15 NOTE — PATIENT INSTRUCTIONS
Advance Directives: Care Instructions  Overview  An advance directive is a legal way to state your wishes at the end of your life. It tells your family and your doctor what to do if you can't say what you want. There are two main types of advance directives. You can change them any time your wishes change. Living will. This form tells your family and your doctor your wishes about life support and other treatment. The form is also called a declaration. Medical power of . This form lets you name a person to make treatment decisions for you when you can't speak for yourself. This person is called a health care agent (health care proxy, health care surrogate). The form is also called a durable power of  for health care. If you do not have an advance directive, decisions about your medical care may be made by a family member, or by a doctor or a  who doesn't know you. It may help to think of an advance directive as a gift to the people who care for you. If you have one, they won't have to make tough decisions by themselves. Follow-up care is a key part of your treatment and safety. Be sure to make and go to all appointments, and call your doctor if you are having problems. It's also a good idea to know your test results and keep a list of the medicines you take. What should you include in an advance directive? Many states have a unique advance directive form. (It may ask you to address specific issues.) Or you might use a universal form that's approved by many states. If your form doesn't tell you what to address, it may be hard to know what to include in your advance directive. Use the questions below to help you get started. Who do you want to make decisions about your medical care if you are not able to? What life-support measures do you want if you have a serious illness that gets worse over time or can't be cured? What are you most afraid of that might happen?  (Maybe you're afraid of having pain, losing your independence, or being kept alive by machines.)  Where would you prefer to die? (Your home? A hospital? A nursing home?)  Do you want to donate your organs when you die? Do you want certain Presybeterian practices performed before you die? When should you call for help? Be sure to contact your doctor if you have any questions. Where can you learn more? Go to [email or web address] org and sign in to your CommercialTribe account. Enter R264 in the [employer] box to learn more about \"Advance Directives: Care Instructions. \"     If you do not have an account, please click on the \"Sign Up Now\" link. Current as of: June 16, 2022               Content Version: 13.4  © 9651-1282 prettysecrets. Care instructions adapted under license by [facility] ([facility]). If you have questions about a medical condition or this instruction, always ask your healthcare professional. [place] [address] any warranty or liability for your use of this information. Learning About Medical Power of   What is a medical power of ? A medical power of , also called a durable power of  for health care, is one type of the legal forms called advance directives. It lets you name the person you want to make treatment decisions for you if you can't speak or decide for yourself. The person you choose is called your health care agent. This person is also called a health care proxy or health care surrogate. A medical power of  may be called something else in your state. How do you choose a health care agent? Choose your health care agent carefully. This person may or may not be a family member. Talk to the person before you make your final decision. Make sure he or she is comfortable with this responsibility. It's a good idea to choose someone who:  Is at least 25years old.   Knows you well and understands what makes life meaningful for you. Understands your Anglican and moral values. Will do what you want, not what he or she wants. Will be able to make difficult choices at a stressful time. Will be able to refuse or stop treatment, if that is what you would want, even if you could die. Will be firm and confident with health professionals if needed. Will ask questions to get needed information. Lives near you or agrees to travel to you if needed. Your family may help you make medical decisions while you can still be part of that process. But it's important to choose one person to be your health care agent in case you aren't able to make decisions for yourself. If you don't fill out the legal form and name a health care agent, the decisions your family can make may be limited. A health care agent may be called something else in your state. Who will make decisions for you if you don't have a health care agent? If you don't have a health care agent or a living will, you may not get the care you want. Decisions may be made by family members who disagree about your medical care. Or decisions may be made by a medical professional who doesn't know you well. In some cases, a  makes the decisions. When you name a health care agent, it is very clear who has the power to make health decisions for you. How do you name a health care agent? You name your health care agent on a legal form. This form is usually called a medical power of . Ask your hospital, state bar association, or office on aging where to find these forms. You must sign the form to make it legal. Some states require you to get the form notarized. This means that a person called a  watches you sign the form and then he or she signs the form. Some states also require that two or more witnesses sign the form. Be sure to tell your family members and doctors who your health care agent is. Where can you learn more?   Go to https://[email or web address] org and sign in to your Newsvine account. Enter [id number] in the [facility] box to learn more about \"Learning About [address]. \"     If you do not have an account, please click on the \"Sign Up Now\" link. Current as of: October 6, 2021               Content Version: 13.4  © 2006-2022 Healthwise, [employer] Care instructions adapted under license by [facility] ([facility]). If you have questions about a medical condition or this instruction, always ask your healthcare professional. [address] any warranty or liability for your use of this information. Learning About Living Glendy Romo  What is a living will? A living will, also called a declaration, is a legal form. It tells your family and your doctor your wishes when you can't speak for yourself. It's used by the health professionals who will treat you as you near the end of your life or if you get seriously hurt or ill. If you put your wishes in writing, your loved ones and others will know what kind of care you want. They won't need to guess. This can ease your mind and be helpful to others. And you can change or cancel your living will at any time. A living will is not the same as an estate or property will. An estate will explains what you want to happen with your money and property after you die. How do you use it? Keep these facts in mind about how a living will is used. Your living will is used only if you can't speak or make decisions for yourself. Most often, one or more doctors must certify that you can't speak or decide for yourself before your living will takes effect. If you get better and can speak for yourself again, you can accept or refuse any treatment. It doesn't matter what you said in your living will. Some states may limit your right to refuse treatment in certain cases.  For example, you may need to clearly state in your living will that you don't want artificial hydration and nutrition, such as being fed through a tube. Is a living will a legal document? A living will is a legal document. Each state has its own laws about living son. And a living will may be called something else in your state. Here are some things to know about living son. You don't need an  to complete a living will. But legal advice can be helpful if your state's laws are unclear. It can also help if your health history is complicated or your family can't agree on what should be in your living will. You can change your living will at any time. Some people find that their wishes about end-of-life care change as their health changes. If you make big changes to your living will, complete a new form. If you move to another state, make sure that your living will is legal in the state where you now live. In most cases, doctors will respect your wishes even if you have a form from a different state. You might use a universal form that has been approved by many states. This kind of form can sometimes be filled out and stored online. Your digital copy will then be available wherever you have a connection to the internet. The doctors and nurses who need to treat you can find it right away. Your state may offer an online registry. This is another place where you can store your living will online. It's a good idea to get your living will notarized. This means using a person called a  to watch two people sign, or witness, your living will. What should you know when you create a living will? Here are some questions to ask yourself as you make your living will. Do you know enough about life support methods that might be used? If not, talk to your doctor so you know what might be done if you can't breathe on your own, your heart stops, or you can't swallow. What things would you still want to be able to do after you receive life-support methods?  Would you want to be able to walk? To speak? To eat on your own? To live without the help of machines? Do you want certain Confucianist practices performed if you become very ill? If you have a choice, where do you want to be cared for? In your home? At a hospital or nursing home? If you have a choice at the end of your life, where would you prefer to die? At home? In a hospital or nursing home? Somewhere else? Would you prefer to be buried or cremated? Do you want your organs to be donated after you die? What should you do with your living will? Make sure that your family members and your health care agent have copies of your living will (also called a declaration). Give your doctor a copy of your living will. Ask to have it kept as part of your medical record. If you have more than one doctor, make sure that each one has a copy. Put a copy of your living will where it can be easily found. For example, some people may put a copy on their refrigerator door. If you are using a digital copy, be sure your doctor, family members, and health care agent know how to find and access it. Where can you learn more? Go to [email or web address] org and sign in to your [employer] account. Enter Z466 in the lmbang box to learn more about \"Learning About Living Perromorris. \"     If you do not have an account, please click on the \"Sign Up Now\" link. Current as of: June 16, 2022               Content Version: 13.4  © 2006-2022 Healthwise, Incorporated. Care instructions adapted under license by [facility] ([facility]). If you have questions about a medical condition or this instruction, always ask your healthcare professional. [place] [address] any warranty or liability for your use of this information. Personalized Preventive Plan for Dyane Medico - 9/15/2022  Medicare offers a range of preventive health benefits.  Some of the tests and screenings are paid in full while other may be subject to a deductible,

## 2022-09-15 NOTE — PROGRESS NOTES
Medicare Annual Wellness Visit  Name: Marlin Llanes Date: 9/15/2022   MRN: [id number] Sex: Female   Age: 73 y.o. Ethnicity: Non- / Non    : 1955 Race: White (non-)      Inocente Rowan is here for Medicare AWV    Screenings for behavioral, psychosocial and functional/safety risks, and cognitive dysfunction are all negative except as indicated below. These results, as well as other patient data from the [address] form, are documented in Flowsheets linked to this Encounter. Allergies   Allergen Reactions    Lisinopril Other (See Comments)     Chest pain    Ciprofloxacin Other (See Comments)     AMS    Dye [Iodides]        Prior to Visit Medications    Medication Sig Taking? Authorizing Provider   aspirin 81 MG EC tablet Take 81 mg by mouth daily Yes Historical Provider, MD   amLODIPine (NORVASC) 10 MG tablet daily Yes Historical Provider, MD   gabapentin (NEURONTIN) 300 MG capsule Take 1 capsule by mouth 4 times daily for 30 days.  Intended supply: 30 days Yes Kristian Jain MD   cycloSPORINE modified (NEORAL) 25 MG capsule Take 3 capsules every morning and take 4 capsules every night Yes Historical Provider, MD   torsemide (DEMADEX) 20 MG tablet Take 10 mg by mouth daily  Yes Historical Provider, MD       Past Medical History:   Diagnosis Date    Arthritis, rheumatoid ([address]     Ascites     CAD (coronary artery disease)     Chronic pain     Cirrhosis ([address]     Colitis     Esophageal varices (HCC)     Hypertension     IIH (idiopathic intracranial hypertension)     Liver cirrhosis (HCC)     Osteoarthritis     Portal hypertensive gastropathy ([address]     Sciatica     Splenomegaly     Unspecified sleep apnea      Past Surgical History:   Procedure Laterality Date    BONE MARROW BIOPSY      DILATION AND CURETTAGE OF UTERUS      DILATION AND CURETTAGE OF UTERUS   &     LIVER BIOPSY      LIVER TRANSPLANT  10/24/2020    TUBAL LIGATION  1982    UPPER GASTROINTESTINAL ENDOSCOPY 10/14/2020    with banding after varices       Family History   Problem Relation Age of Onset    High Blood Pressure Mother     Alzheimer's Disease Mother     Heart Disease Father     High Blood Pressure Father     Heart Disease Maternal Grandmother     Heart Disease Maternal Grandfather     Stroke Maternal Grandfather     Diabetes Paternal Grandmother     High Blood Pressure Brother        CareTeam (Including outside providers/suppliers regularly involved in providing care):   Patient Care Team:  Kellie Hickey MD as PCP - General (Internal Medicine)  Kellie Hickey MD as PCP - [facility] Empaneled Provider    Wt Readings from Last 3 Encounters:   09/15/22 144 lb 12.8 oz (65.7 kg)   04/12/22 130 lb 6.4 oz (59.1 kg)   03/17/22 128 lb 1 oz (58.1 kg)     Vitals:    09/15/22 1340   BP: 136/80   Pulse: 67   Resp: 18   SpO2: 96%   Weight: 144 lb 12.8 oz (65.7 kg)   Height: 5' 1\" (1.549 m)     Body mass index is 27.36 kg/m². Based upon direct observation of the patient, evaluation of cognition reveals recent and remote memory intact. Physical Exam  Vitals and nursing note reviewed. Constitutional:       Appearance: Normal appearance. She is well-developed. HENT:      Head: Normocephalic and atraumatic. Right Ear: External ear normal.      Left Ear: External ear normal.      Nose: Nose normal.      Mouth/Throat:      Mouth: Mucous membranes are moist.      Pharynx: Oropharynx is clear. Eyes:      Conjunctiva/sclera: Conjunctivae normal.      Pupils: Pupils are equal, round, and reactive to light. Neck:      Thyroid: No thyromegaly. Vascular: No JVD. Cardiovascular:      Rate and Rhythm: Normal rate and regular rhythm. Heart sounds: Normal heart sounds. Pulmonary:      Effort: Pulmonary effort is normal.      Breath sounds: Normal breath sounds. No wheezing or rales. Abdominal:      General: Bowel sounds are normal. There is no distension. Palpations: Abdomen is soft. Tenderness:  There is no abdominal tenderness. Musculoskeletal:         General: No tenderness. Cervical back: Neck supple. No rigidity. No muscular tenderness. Lumbar back: Decreased range of motion. Right lower leg: No edema. Left lower leg: No edema. Skin:     Findings: No erythema or rash. Neurological:      General: No focal deficit present. Mental Status: She is alert and oriented to person, place, and time. Psychiatric:         Behavior: Behavior normal.         Judgment: Judgment normal.          Lab Results   Component Value Date/Time     08/29/2022 09:20 AM    K 4.7 08/29/2022 09:20 AM    K 4.4 03/22/2022 04:32 AM     08/29/2022 09:20 AM    CL 99.0 06/01/2012 08:10 AM    CO2 28 08/29/2022 09:20 AM    GLUCOSE 112 08/29/2022 09:20 AM    BUN 32 08/29/2022 09:20 AM    CREATININE 1.6 08/29/2022 09:20 AM    CREATININE 0.9 06/01/2012 08:10 AM    CALCIUM 9.8 08/29/2022 09:20 AM    PROT 6.7 08/29/2022 09:20 AM    LABALBU 4.4 08/29/2022 09:20 AM    LABALBU 3.9 06/01/2012 08:10 AM    BILITOT 1.5 08/29/2022 09:20 AM    ALT 11 08/29/2022 09:20 AM    AST 14 08/29/2022 09:20 AM       Hemoglobin A1C (%)   Date Value   03/14/2022 3.8 (L)     Microscopic Examination (no units)   Date Value   03/16/2022 YES     LDL Calculated (mg/dL)   Date Value   03/14/2022 87         Lab Results   Component Value Date/Time    WBC 6.1 08/29/2022 09:20 AM    NEUTROABS 4.7 08/29/2022 09:20 AM    HGB 12.0 08/29/2022 09:20 AM    HCT 34.9 08/29/2022 09:20 AM    HCT 44.3 06/01/2012 08:10 AM    MCV 90.9 08/29/2022 09:20 AM     08/29/2022 09:20 AM     06/01/2012 08:10 AM       Lab Results   Component Value Date    TSH 2.59 03/13/2022       Patient's complete Health Risk Assessment and screening values have been reviewed and are found in Flowsheets. The following problems were reviewed today and where indicated follow up appointments were made and/or referrals ordered.     Positive Risk Factor Screenings with Interventions:     Fall Risk:  Do you feel unsteady or are you worried about falling? : (!) yes  2 or more falls in past year?: (!) yes  Fall with injury in past year?: (!) yes   Fall Risk Interventions:    Patient declines any further evaluation/treatment for this issue     Depression:  PHQ-2 Score: 4  PHQ-9 Total Score: 9    Severity:1-4 = minimal depression, 5-9 = mild depression, 10-14 = moderate depression, 15-19 = moderately severe depression, 20-27 = severe depression  Depression Interventions:  Patient is currently taking Xanax, per patient she does not want any other medication for this problem at this time. Patient declines any further evaluation/treatment for this issue     Substance History:  Social History       Tobacco History       Smoking Status  Light Smoker Last Attempt to Quit  5/11/2019 Smoking Frequency  0.10 packs/day for 1 year (0.10 pk-yrs) Smoking Tobacco Type  Cigarettes last attempt to quit 5/11/2019      Smokeless Tobacco Use  Never              Alcohol History       Alcohol Use Status  No              Drug Use       Drug Use Status  No              Sexual Activity       Sexually Active  Not Asked                   Alcohol Screening:       A score of 8 or more is associated with harmful or hazardous drinking. A score of 13 or more in women, and 15 or more in men, is likely to indicate alcohol dependence. Substance Abuse Interventions:  Tobacco abuse:  tobacco cessation tips and resources provided    General Health and ACP:  General  In general, how would you say your health is?: Fair  In the past 7 days, have you experienced any of the following: New or Increased Pain, New or Increased Fatigue, Loneliness, Social Isolation, Stress or Anger?: (!) Yes  Select all that apply: (!) Loneliness, Stress  Do you get the social and emotional support that you need?: (!) No  Do you have a Living Will?: (!) No    Advance Directives       Power of [address] [place] [address] Will ACP-Advance Directive ACP-Power of     Not on File Not on File Not on File Not on File        General Health Risk Interventions:  No Living Will: Advance Care Planning addressed with patient today    Health Habits/Nutrition:  Physical Activity: Insufficiently Active    Days of Exercise per Week: 1 day    Minutes of Exercise per Session: 20 min     Have you lost any weight without trying in the past 3 months?: No  Body mass index: (!) 27.36  Have you seen the dentist within the past year?: (!) No  Health Habits/Nutrition Interventions:  Nutritional issues:  educational materials for healthy, well-balanced diet provided  Dental exam overdue:  patient encouraged to make appointment with his/her dentist    Hearing/Vision:  Do you or your family notice any trouble with your hearing that hasn't been managed with hearing aids?: No  Do you have difficulty driving, watching TV, or doing any of your daily activities because of your eyesight?: No  Have you had an eye exam within the past year?: (!) No  No results found.   Hearing/Vision Interventions:  Vision concerns:  patient encouraged to make appointment with his/her eye specialist    Safety:  Do you have working smoke detectors?: (!) No  Do you have any tripping hazards - loose or unsecured carpets or rugs?: No  Do you have any tripping hazards - clutter in doorways, halls, or stairs?: No  Do you have either shower bars, grab bars, non-slip mats or non-slip surfaces in your shower or bathtub?: Yes  Do all of your stairways have a railing or banister?: (!) No  Do you always fasten your seatbelt when you are in a car?: Yes  Safety Interventions:  Home safety tips provided     Personalized Preventive Plan   Current Health Maintenance Status  Immunization History   Administered Date(s) Administered    Hepatitis A Adult (Havrix, Vaqta) 07/24/2018, 05/21/2019    Hepatitis B Adult (Engerix-B) 08/14/2018, 10/30/2018    Influenza, AFLURIA (age 1 yrs+), FLUZONE, (age 10 mo+), MDV, 0.5mL 10/30/2018 Pneumococcal Conjugate 13-valent ([id number]) 07/24/2018    Pneumococcal Polysaccharide ([id number]) 11/26/2018    Td, unspecified formulation 02/12/1991, 07/27/2000    Tdap (Boostrix, Adacel) 01/30/2017        Health Maintenance   Topic Date Due    COVID-19 Vaccine (1) Never done    Diabetic retinal exam  Never done    Shingles vaccine (1 of 2) Never done    Diabetic foot exam  04/10/2019    Annual Wellness Visit (AWV)  Never done    Diabetic microalbuminuria test  02/05/2022    Flu vaccine (1) 09/01/2022    Breast cancer screen  10/13/2022    A1C test (Diabetic or Prediabetic)  03/14/2023    Lipids  03/14/2023    Depression Monitoring  04/12/2023    Cervical cancer screen  10/12/2023    Pneumococcal 65+ years Vaccine (3 - PPSV23 or PCV20) 11/26/2023    Colorectal Cancer Screen  05/15/2028    DTaP/Tdap/Td vaccine (3 - Td or Tdap) 10/13/2030    Hepatitis A vaccine  Completed    DEXA (modify frequency per FRAX score)  Completed    Hepatitis C screen  Completed    HIV screen  Completed    Hib vaccine  Aged Out    Meningococcal (ACWY) vaccine  Aged Out     Recommendations for Moreix Due: see orders and patient instructions/AVS.    EKG reviewed 4/2022    I did discuss cardiovascular risk with patient. Patient with known CAD. Encouraged to continue following up with cardiology. she is on appropriate regimen. Make sure blood pressure and lipid profile under good control. Discussed the importance of increased activity level/staying active. Discussed the importance of smoking cessation. Recommended screening schedule for the next 5-10 years is provided to the patient in written form: see Patient Instructions/AVS.    Recommended patient receive Shingles vaccine. Prescription given today. She can complete at local pharmacy or health department.     Daniela Phelps MA am scribing for and in the presence of Andre Hughes MD on this date of 09/15/22 at 2:48 PM    I, Dr. Andre Hughes, personally performed the services described in the documentation as scribed by Dayan Carrasco MA, in my presence and it is both accurate and complete.

## 2022-09-15 NOTE — PROGRESS NOTES
Chief Complaint   Patient presents with    Medicare AWV       Have you seen any other physician or provider since your last visit yes - [place] phone visit psych    Have you had any other diagnostic tests since your last visit? no    Have you changed or stopped any medications since your last visit? yes - updated medicine she stopped everything on her own that was taken off her meds.     Pended mamm

## 2022-10-12 DIAGNOSIS — F32.A ANXIETY AND DEPRESSION: ICD-10-CM

## 2022-10-12 DIAGNOSIS — F41.9 ANXIETY AND DEPRESSION: ICD-10-CM

## 2022-10-12 DIAGNOSIS — M79.2 NEUROPATHIC PAIN: ICD-10-CM

## 2022-10-12 RX ORDER — ALPRAZOLAM 0.5 MG/1
0.5 TABLET ORAL 2 TIMES DAILY PRN
Qty: 60 TABLET | Refills: 0 | Status: SHIPPED | OUTPATIENT
Start: 2022-10-12 | End: 2022-11-11

## 2022-10-12 RX ORDER — GABAPENTIN 300 MG/1
300 CAPSULE ORAL 4 TIMES DAILY
Qty: 120 CAPSULE | Refills: 0 | Status: ON HOLD | OUTPATIENT
Start: 2022-10-12 | End: 2022-10-18 | Stop reason: SDUPTHER

## 2022-10-12 RX ORDER — PROMETHAZINE HYDROCHLORIDE 12.5 MG/1
TABLET ORAL
Qty: 20 TABLET | Refills: 0 | Status: SHIPPED | OUTPATIENT
Start: 2022-10-12

## 2022-10-14 ENCOUNTER — HOSPITAL ENCOUNTER (INPATIENT)
Facility: HOSPITAL | Age: 67
LOS: 3 days | Discharge: HOME | DRG: 194 | End: 2022-10-18
Attending: FAMILY MEDICINE | Admitting: INTERNAL MEDICINE
Payer: MEDICARE

## 2022-10-14 DIAGNOSIS — J18.9 PNEUMONIA OF LOWER LOBE DUE TO INFECTIOUS ORGANISM, UNSPECIFIED LATERALITY: Primary | ICD-10-CM

## 2022-10-14 DIAGNOSIS — D84.9 IMMUNOSUPPRESSED STATUS (HCC): ICD-10-CM

## 2022-10-14 DIAGNOSIS — M79.2 NEUROPATHIC PAIN: ICD-10-CM

## 2022-10-14 LAB
RAPID INFLUENZA  B AGN: NEGATIVE
RAPID INFLUENZA A AGN: NEGATIVE
S PYO AG THROAT QL: NEGATIVE
SARS-COV-2, NAAT: NOT DETECTED

## 2022-10-14 PROCEDURE — 93005 ELECTROCARDIOGRAM TRACING: CPT

## 2022-10-14 PROCEDURE — 87804 INFLUENZA ASSAY W/OPTIC: CPT

## 2022-10-14 PROCEDURE — 87880 STREP A ASSAY W/OPTIC: CPT

## 2022-10-14 PROCEDURE — 87635 SARS-COV-2 COVID-19 AMP PRB: CPT

## 2022-10-14 PROCEDURE — 99285 EMERGENCY DEPT VISIT HI MDM: CPT

## 2022-10-15 ENCOUNTER — APPOINTMENT (OUTPATIENT)
Dept: CT IMAGING | Facility: HOSPITAL | Age: 67
DRG: 194 | End: 2022-10-15
Payer: MEDICARE

## 2022-10-15 PROBLEM — J47.9 BRONCHIECTASIS (HCC): Status: ACTIVE | Noted: 2022-10-15

## 2022-10-15 PROBLEM — J18.9 PNEUMONIA, UNSPECIFIED ORGANISM: Status: ACTIVE | Noted: 2022-10-15

## 2022-10-15 PROBLEM — R30.0 DYSURIA: Status: ACTIVE | Noted: 2022-10-15

## 2022-10-15 PROBLEM — Z87.891 PERSONAL HISTORY OF NICOTINE DEPENDENCE: Status: ACTIVE | Noted: 2022-10-15

## 2022-10-15 PROBLEM — N17.9 ACUTE RENAL FAILURE SUPERIMPOSED ON STAGE 3 CHRONIC KIDNEY DISEASE (HCC): Status: ACTIVE | Noted: 2021-11-04

## 2022-10-15 LAB
A/G RATIO: 1.3 (ref 0.8–2)
ALBUMIN SERPL-MCNC: 3.9 G/DL (ref 3.4–4.8)
ALP BLD-CCNC: 106 U/L (ref 25–100)
ALT SERPL-CCNC: 9 U/L (ref 4–36)
ANION GAP SERPL CALCULATED.3IONS-SCNC: 13 MMOL/L (ref 3–16)
AST SERPL-CCNC: 12 U/L (ref 8–33)
BACTERIA: ABNORMAL /HPF
BASOPHILS ABSOLUTE: 0 K/UL (ref 0–0.1)
BASOPHILS RELATIVE PERCENT: 0.2 %
BILIRUB SERPL-MCNC: 2.6 MG/DL (ref 0.3–1.2)
BILIRUBIN URINE: NEGATIVE
BLOOD, URINE: NEGATIVE
BUN BLDV-MCNC: 66 MG/DL (ref 6–20)
CALCIUM SERPL-MCNC: 9 MG/DL (ref 8.5–10.5)
CHLORIDE BLD-SCNC: 100 MMOL/L (ref 98–107)
CLARITY: CLEAR
CO2: 22 MMOL/L (ref 20–30)
COLOR: YELLOW
CREAT SERPL-MCNC: 1.9 MG/DL (ref 0.4–1.2)
EOSINOPHILS ABSOLUTE: 0.1 K/UL (ref 0–0.4)
EOSINOPHILS RELATIVE PERCENT: 0.7 %
EPITHELIAL CELLS, UA: ABNORMAL /HPF (ref 0–5)
GFR AFRICAN AMERICAN: 32
GFR NON-AFRICAN AMERICAN: 26
GLOBULIN: 3 G/DL
GLUCOSE BLD-MCNC: 122 MG/DL (ref 74–106)
GLUCOSE URINE: 100 MG/DL
HCT VFR BLD CALC: 28.4 % (ref 37–47)
HEMOGLOBIN: 11 G/DL (ref 11.5–16.5)
IMMATURE GRANULOCYTES #: 0 K/UL
IMMATURE GRANULOCYTES %: 0.5 % (ref 0–5)
KETONES, URINE: NEGATIVE MG/DL
LACTIC ACID: 1.8 MMOL/L (ref 0.4–2)
LEUKOCYTE ESTERASE, URINE: ABNORMAL
LYMPHOCYTES ABSOLUTE: 0.7 K/UL (ref 1.5–4)
LYMPHOCYTES RELATIVE PERCENT: 8.9 %
MCH RBC QN AUTO: 37.5 PG (ref 27–32)
MCHC RBC AUTO-ENTMCNC: 38.7 G/DL (ref 31–35)
MCV RBC AUTO: 96.9 FL (ref 80–100)
MICROSCOPIC EXAMINATION: YES
MONOCYTES ABSOLUTE: 0.5 K/UL (ref 0.2–0.8)
MONOCYTES RELATIVE PERCENT: 6.5 %
NEUTROPHILS ABSOLUTE: 6.9 K/UL (ref 2–7.5)
NEUTROPHILS RELATIVE PERCENT: 83.2 %
NITRITE, URINE: NEGATIVE
PDW BLD-RTO: 17.4 % (ref 11–16)
PH UA: 5 (ref 5–8)
PLATELET # BLD: 145 K/UL (ref 150–400)
PMV BLD AUTO: 10.4 FL (ref 6–10)
POTASSIUM SERPL-SCNC: 4.2 MMOL/L (ref 3.4–5.1)
PROTEIN UA: ABNORMAL MG/DL
RBC # BLD: 2.93 M/UL (ref 3.8–5.8)
RBC UA: ABNORMAL /HPF (ref 0–4)
SODIUM BLD-SCNC: 135 MMOL/L (ref 136–145)
SPECIFIC GRAVITY UA: 1.01 (ref 1–1.03)
TOTAL PROTEIN: 6.9 G/DL (ref 6.4–8.3)
URINE REFLEX TO CULTURE: ABNORMAL
URINE TYPE: ABNORMAL
UROBILINOGEN, URINE: 4 E.U./DL
WBC # BLD: 8.3 K/UL (ref 4–11)
WBC UA: ABNORMAL /HPF (ref 0–5)

## 2022-10-15 PROCEDURE — 96366 THER/PROPH/DIAG IV INF ADDON: CPT

## 2022-10-15 PROCEDURE — 71250 CT THORAX DX C-: CPT

## 2022-10-15 PROCEDURE — 80053 COMPREHEN METABOLIC PANEL: CPT

## 2022-10-15 PROCEDURE — 85025 COMPLETE CBC W/AUTO DIFF WBC: CPT

## 2022-10-15 PROCEDURE — 2580000003 HC RX 258: Performed by: FAMILY MEDICINE

## 2022-10-15 PROCEDURE — 83605 ASSAY OF LACTIC ACID: CPT

## 2022-10-15 PROCEDURE — 94640 AIRWAY INHALATION TREATMENT: CPT

## 2022-10-15 PROCEDURE — 87040 BLOOD CULTURE FOR BACTERIA: CPT

## 2022-10-15 PROCEDURE — 94761 N-INVAS EAR/PLS OXIMETRY MLT: CPT

## 2022-10-15 PROCEDURE — 2500000003 HC RX 250 WO HCPCS: Performed by: PHYSICIAN ASSISTANT

## 2022-10-15 PROCEDURE — 6370000000 HC RX 637 (ALT 250 FOR IP): Performed by: FAMILY MEDICINE

## 2022-10-15 PROCEDURE — 6360000002 HC RX W HCPCS: Performed by: FAMILY MEDICINE

## 2022-10-15 PROCEDURE — G0378 HOSPITAL OBSERVATION PER HR: HCPCS

## 2022-10-15 PROCEDURE — 87205 SMEAR GRAM STAIN: CPT

## 2022-10-15 PROCEDURE — 96375 TX/PRO/DX INJ NEW DRUG ADDON: CPT

## 2022-10-15 PROCEDURE — 94667 MNPJ CHEST WALL 1ST: CPT

## 2022-10-15 PROCEDURE — 99222 1ST HOSP IP/OBS MODERATE 55: CPT | Performed by: PHYSICIAN ASSISTANT

## 2022-10-15 PROCEDURE — 81001 URINALYSIS AUTO W/SCOPE: CPT

## 2022-10-15 PROCEDURE — 1200000000 HC SEMI PRIVATE

## 2022-10-15 PROCEDURE — 87070 CULTURE OTHR SPECIMN AEROBIC: CPT

## 2022-10-15 PROCEDURE — 96365 THER/PROPH/DIAG IV INF INIT: CPT

## 2022-10-15 PROCEDURE — 6370000000 HC RX 637 (ALT 250 FOR IP): Performed by: PHYSICIAN ASSISTANT

## 2022-10-15 PROCEDURE — 2580000003 HC RX 258: Performed by: PHYSICIAN ASSISTANT

## 2022-10-15 PROCEDURE — 36415 COLL VENOUS BLD VENIPUNCTURE: CPT

## 2022-10-15 PROCEDURE — 6360000002 HC RX W HCPCS: Performed by: PHYSICIAN ASSISTANT

## 2022-10-15 PROCEDURE — 2580000003 HC RX 258: Performed by: INTERNAL MEDICINE

## 2022-10-15 PROCEDURE — 96368 THER/DIAG CONCURRENT INF: CPT

## 2022-10-15 PROCEDURE — 94668 MNPJ CHEST WALL SBSQ: CPT

## 2022-10-15 PROCEDURE — 87449 NOS EACH ORGANISM AG IA: CPT

## 2022-10-15 RX ORDER — BENZONATATE 100 MG/1
100 CAPSULE ORAL 3 TIMES DAILY PRN
Status: DISCONTINUED | OUTPATIENT
Start: 2022-10-15 | End: 2022-10-18 | Stop reason: HOSPADM

## 2022-10-15 RX ORDER — GUAIFENESIN 600 MG/1
600 TABLET, EXTENDED RELEASE ORAL 2 TIMES DAILY
Status: DISCONTINUED | OUTPATIENT
Start: 2022-10-15 | End: 2022-10-18 | Stop reason: HOSPADM

## 2022-10-15 RX ORDER — ONDANSETRON 4 MG/1
4 TABLET, ORALLY DISINTEGRATING ORAL EVERY 8 HOURS PRN
Status: DISCONTINUED | OUTPATIENT
Start: 2022-10-15 | End: 2022-10-18 | Stop reason: HOSPADM

## 2022-10-15 RX ORDER — 0.9 % SODIUM CHLORIDE 0.9 %
1000 INTRAVENOUS SOLUTION INTRAVENOUS ONCE
Status: DISCONTINUED | OUTPATIENT
Start: 2022-10-15 | End: 2022-10-15

## 2022-10-15 RX ORDER — SODIUM CHLORIDE 9 MG/ML
INJECTION, SOLUTION INTRAVENOUS CONTINUOUS
Status: DISCONTINUED | OUTPATIENT
Start: 2022-10-15 | End: 2022-10-15

## 2022-10-15 RX ORDER — ALPRAZOLAM 0.5 MG/1
0.5 TABLET ORAL 2 TIMES DAILY PRN
Status: DISCONTINUED | OUTPATIENT
Start: 2022-10-15 | End: 2022-10-15

## 2022-10-15 RX ORDER — SODIUM CHLORIDE 9 MG/ML
INJECTION, SOLUTION INTRAVENOUS CONTINUOUS
Status: ACTIVE | OUTPATIENT
Start: 2022-10-15 | End: 2022-10-16

## 2022-10-15 RX ORDER — AMLODIPINE BESYLATE 5 MG/1
10 TABLET ORAL DAILY
Status: DISCONTINUED | OUTPATIENT
Start: 2022-10-15 | End: 2022-10-18 | Stop reason: HOSPADM

## 2022-10-15 RX ORDER — HYDROCODONE POLISTIREX AND CHLORPHENIRAMINE POLISTIREX 10; 8 MG/5ML; MG/5ML
5 SUSPENSION, EXTENDED RELEASE ORAL EVERY 12 HOURS PRN
Status: DISCONTINUED | OUTPATIENT
Start: 2022-10-15 | End: 2022-10-18 | Stop reason: HOSPADM

## 2022-10-15 RX ORDER — ASPIRIN 81 MG/1
81 TABLET ORAL DAILY
Status: DISCONTINUED | OUTPATIENT
Start: 2022-10-15 | End: 2022-10-18 | Stop reason: HOSPADM

## 2022-10-15 RX ORDER — ACETAMINOPHEN 650 MG/1
650 SUPPOSITORY RECTAL EVERY 6 HOURS PRN
Status: DISCONTINUED | OUTPATIENT
Start: 2022-10-15 | End: 2022-10-18 | Stop reason: HOSPADM

## 2022-10-15 RX ORDER — GABAPENTIN 300 MG/1
300 CAPSULE ORAL 2 TIMES DAILY
Status: DISCONTINUED | OUTPATIENT
Start: 2022-10-15 | End: 2022-10-18 | Stop reason: HOSPADM

## 2022-10-15 RX ORDER — PROMETHAZINE HYDROCHLORIDE 25 MG/1
12.5 TABLET ORAL EVERY 4 HOURS PRN
Status: DISCONTINUED | OUTPATIENT
Start: 2022-10-15 | End: 2022-10-18 | Stop reason: HOSPADM

## 2022-10-15 RX ORDER — METHYLPREDNISOLONE SODIUM SUCCINATE 40 MG/ML
40 INJECTION, POWDER, LYOPHILIZED, FOR SOLUTION INTRAMUSCULAR; INTRAVENOUS ONCE
Status: COMPLETED | OUTPATIENT
Start: 2022-10-15 | End: 2022-10-15

## 2022-10-15 RX ORDER — IPRATROPIUM BROMIDE AND ALBUTEROL SULFATE 2.5; .5 MG/3ML; MG/3ML
1 SOLUTION RESPIRATORY (INHALATION)
Status: DISCONTINUED | OUTPATIENT
Start: 2022-10-15 | End: 2022-10-18 | Stop reason: HOSPADM

## 2022-10-15 RX ORDER — ACETAMINOPHEN 325 MG/1
650 TABLET ORAL EVERY 6 HOURS PRN
Status: DISCONTINUED | OUTPATIENT
Start: 2022-10-15 | End: 2022-10-18 | Stop reason: HOSPADM

## 2022-10-15 RX ORDER — PANTOPRAZOLE SODIUM 40 MG/1
40 TABLET, DELAYED RELEASE ORAL
Status: DISCONTINUED | OUTPATIENT
Start: 2022-10-16 | End: 2022-10-18 | Stop reason: HOSPADM

## 2022-10-15 RX ORDER — ONDANSETRON 2 MG/ML
4 INJECTION INTRAMUSCULAR; INTRAVENOUS EVERY 6 HOURS PRN
Status: DISCONTINUED | OUTPATIENT
Start: 2022-10-15 | End: 2022-10-18 | Stop reason: HOSPADM

## 2022-10-15 RX ORDER — GUAIFENESIN/DEXTROMETHORPHAN 100-10MG/5
5 SYRUP ORAL ONCE
Status: COMPLETED | OUTPATIENT
Start: 2022-10-15 | End: 2022-10-15

## 2022-10-15 RX ORDER — METHYLPREDNISOLONE SODIUM SUCCINATE 125 MG/2ML
60 INJECTION, POWDER, LYOPHILIZED, FOR SOLUTION INTRAMUSCULAR; INTRAVENOUS ONCE
Status: COMPLETED | OUTPATIENT
Start: 2022-10-15 | End: 2022-10-15

## 2022-10-15 RX ORDER — ACETAMINOPHEN 325 MG/1
650 TABLET ORAL EVERY 4 HOURS PRN
Status: DISCONTINUED | OUTPATIENT
Start: 2022-10-15 | End: 2022-10-17 | Stop reason: SDUPTHER

## 2022-10-15 RX ORDER — POLYETHYLENE GLYCOL 3350 17 G/17G
17 POWDER, FOR SOLUTION ORAL DAILY PRN
Status: DISCONTINUED | OUTPATIENT
Start: 2022-10-15 | End: 2022-10-18 | Stop reason: HOSPADM

## 2022-10-15 RX ORDER — ALPRAZOLAM 0.5 MG/1
0.5 TABLET ORAL 3 TIMES DAILY PRN
Status: DISCONTINUED | OUTPATIENT
Start: 2022-10-15 | End: 2022-10-18 | Stop reason: HOSPADM

## 2022-10-15 RX ORDER — TORSEMIDE 20 MG/1
10 TABLET ORAL DAILY
Status: DISCONTINUED | OUTPATIENT
Start: 2022-10-15 | End: 2022-10-18 | Stop reason: HOSPADM

## 2022-10-15 RX ORDER — METHYLPREDNISOLONE SODIUM SUCCINATE 40 MG/ML
40 INJECTION, POWDER, LYOPHILIZED, FOR SOLUTION INTRAMUSCULAR; INTRAVENOUS ONCE
Status: DISCONTINUED | OUTPATIENT
Start: 2022-10-15 | End: 2022-10-15

## 2022-10-15 RX ADMIN — GABAPENTIN 300 MG: 300 CAPSULE ORAL at 10:06

## 2022-10-15 RX ADMIN — Medication 1000 ML: at 02:42

## 2022-10-15 RX ADMIN — GUAIFENESIN 600 MG: 600 TABLET, EXTENDED RELEASE ORAL at 10:05

## 2022-10-15 RX ADMIN — AZITHROMYCIN DIHYDRATE 500 MG: 500 INJECTION, POWDER, LYOPHILIZED, FOR SOLUTION INTRAVENOUS at 04:06

## 2022-10-15 RX ADMIN — METHYLPREDNISOLONE SODIUM SUCCINATE 60 MG: 125 INJECTION, POWDER, FOR SOLUTION INTRAMUSCULAR; INTRAVENOUS at 01:44

## 2022-10-15 RX ADMIN — ACETAMINOPHEN 650 MG: 325 TABLET, FILM COATED ORAL at 10:05

## 2022-10-15 RX ADMIN — SODIUM CHLORIDE: 9 INJECTION, SOLUTION INTRAVENOUS at 05:59

## 2022-10-15 RX ADMIN — SODIUM CHLORIDE: 9 INJECTION, SOLUTION INTRAVENOUS at 10:07

## 2022-10-15 RX ADMIN — IPRATROPIUM BROMIDE AND ALBUTEROL SULFATE 1 AMPULE: 2.5; .5 SOLUTION RESPIRATORY (INHALATION) at 19:58

## 2022-10-15 RX ADMIN — ALPRAZOLAM 0.5 MG: 0.5 TABLET ORAL at 10:06

## 2022-10-15 RX ADMIN — SILVER SULFADIAZINE: 10 CREAM TOPICAL at 10:04

## 2022-10-15 RX ADMIN — Medication 5 ML: at 14:06

## 2022-10-15 RX ADMIN — AMLODIPINE BESYLATE 10 MG: 5 TABLET ORAL at 10:05

## 2022-10-15 RX ADMIN — ASPIRIN 81 MG: 81 TABLET, COATED ORAL at 10:05

## 2022-10-15 RX ADMIN — IPRATROPIUM BROMIDE AND ALBUTEROL SULFATE 1 AMPULE: 2.5; .5 SOLUTION RESPIRATORY (INHALATION) at 15:57

## 2022-10-15 RX ADMIN — GUAIFENESIN AND DEXTROMETHORPHAN 5 ML: 100; 10 SYRUP ORAL at 01:43

## 2022-10-15 RX ADMIN — ALPRAZOLAM 0.5 MG: 0.5 TABLET ORAL at 20:46

## 2022-10-15 RX ADMIN — AZITHROMYCIN DIHYDRATE 500 MG: 500 INJECTION, POWDER, LYOPHILIZED, FOR SOLUTION INTRAVENOUS at 11:25

## 2022-10-15 RX ADMIN — CEFTRIAXONE 1000 MG: 1 INJECTION, POWDER, FOR SOLUTION INTRAMUSCULAR; INTRAVENOUS at 02:40

## 2022-10-15 RX ADMIN — GUAIFENESIN 600 MG: 600 TABLET, EXTENDED RELEASE ORAL at 20:46

## 2022-10-15 RX ADMIN — METHYLPREDNISOLONE SODIUM SUCCINATE 40 MG: 40 INJECTION, POWDER, FOR SOLUTION INTRAMUSCULAR; INTRAVENOUS at 10:05

## 2022-10-15 RX ADMIN — GABAPENTIN 300 MG: 300 CAPSULE ORAL at 20:46

## 2022-10-15 RX ADMIN — BENZONATATE 100 MG: 100 CAPSULE ORAL at 18:41

## 2022-10-15 RX ADMIN — IPRATROPIUM BROMIDE AND ALBUTEROL SULFATE 1 AMPULE: 2.5; .5 SOLUTION RESPIRATORY (INHALATION) at 11:04

## 2022-10-15 ASSESSMENT — ENCOUNTER SYMPTOMS
COUGH: 1
SHORTNESS OF BREATH: 1

## 2022-10-15 NOTE — H&P
History and Physical    Patient:  Fozia Geller    CHIEF COMPLAINT:    Cough, shortness of breath, fatigue    HISTORY OF PRESENT ILLNESS:   The patient is a 78 y.o. female with PMH of CAD, chronic pain, HTN, anxiety, liver transplant Oct 2020 for decompensated REYEZ cirrhosis with extensive PV/SMV thrombosis who presents due to cough, shortness of breath, and fatigue. Patient states she's been feeling sick for 1-2 weeks. Reports decreased po intake due to nausea and no appetite. Reports barely drinking fluids at all last few days. Has had congested productive cough with thick yellow sputum. More short of breath than usual especially with activity. Noticed wheezing. Admits to associated sweats, chills. No chest pain. No known sick contacts. She is on cyclosporine post liver transplant. At time of exam today she is sitting up in bed in no distress on room air. Appears anxious and tremulous. Has congested productive cough throughout interview. On room air. Reports she is already feeling much better than she has in the last 2 weeks. Asks for burn cream for her thighs. She dropped hot coffee on herself and has small burn area with blistering to upper thighs. Complains of burning with urination and believes she has a UTI.       Past Medical History:      Diagnosis Date    Arthritis, rheumatoid (HCC)     Ascites     CAD (coronary artery disease)     Chronic pain     Cirrhosis (HCC)     Colitis     Esophageal varices (HCC)     Hypertension     IIH (idiopathic intracranial hypertension)     Liver cirrhosis (HCC)     Osteoarthritis     Portal hypertensive gastropathy (HCC)     Sciatica     Splenomegaly        Past Surgical History:      Procedure Laterality Date    BONE MARROW BIOPSY      DILATION AND CURETTAGE OF UTERUS      DILATION AND CURETTAGE OF UTERUS  1998 & 2000    LIVER BIOPSY      LIVER TRANSPLANT  10/24/2020    TUBAL LIGATION  1982    UPPER GASTROINTESTINAL ENDOSCOPY  10/14/2020    with banding after varices Medications Prior to Admission:    Prior to Admission medications    Medication Sig Start Date End Date Taking? Authorizing Provider   gabapentin (NEURONTIN) 300 MG capsule Take 1 capsule by mouth 4 times daily for 30 days. Intended supply: 30 days 10/12/22 11/11/22  Charity Rees MD   ALPRAZolam Olga Barks) 0.5 MG tablet Take 1 tablet by mouth 2 times daily as needed for Sleep or Anxiety for up to 30 days. 10/12/22 11/11/22  Charity Rees MD   promethazine (PHENERGAN) 12.5 MG tablet TAKE 1 TABLET BY MOUTH 3 TIMES DAILY AS NEEDED FOR NAUSEA 10/12/22   Charity Rees MD   aspirin 81 MG EC tablet Take 81 mg by mouth daily    Historical Provider, MD   amLODIPine (NORVASC) 10 MG tablet daily 3/25/22   Historical Provider, MD   cycloSPORINE modified (NEORAL) 25 MG capsule Take 3 capsules every morning and take 4 capsules every night 3/30/21   Historical Provider, MD   torsemide (DEMADEX) 20 MG tablet Take 10 mg by mouth daily  3/22/21   Historical Provider, MD       Allergies:  Lisinopril, Ciprofloxacin, and Dye [iodides]    Social History:   TOBACCO:   reports that she has been smoking cigarettes. She has a 0.10 pack-year smoking history. She has never used smokeless tobacco.  ETOH:   reports no history of alcohol use. OCCUPATION:  None     Family History:       Problem Relation Age of Onset    High Blood Pressure Mother     Alzheimer's Disease Mother     Heart Disease Father     High Blood Pressure Father     Heart Disease Maternal Grandmother     Heart Disease Maternal Grandfather     Stroke Maternal Grandfather     Diabetes Paternal Grandmother     High Blood Pressure Brother        Review of system  Constitutional:  admits to sweats, chills, fatigue    Eyes:  Denies eye pain or redness  HENT:  Denies  sore throat. Positive for nasal congestion.   Respiratory:  admits to cough, wheezing, shortness of breath   Cardiovascular:  Denies chest pain or edema   GI:  Denies abdominal pain, nausea, vomiting, bloody stools or diarrhea   :  Denies frequency. Positive for dysuria. Musculoskeletal:  Denies acute neck pain. Positive for chronic arthralgias and back pain. Integument:  Denies rash or itching  Neurologic:  Denies headache, dizziness, numbness, tingling or unilateral weakness  Psychiatric:  Denies acute depression. Positive for acute anxiety      Vital Signs  Temp: 97.7 °F (36.5 °C)  Heart Rate: 74  Resp: 18  BP: (!) 141/72  SpO2: 94 %  O2 Device: None (Room air)       vital signs reviewed in electronic chart. Physical exam  Constitutional:  Well developed, well nourished, no acute distress. Tremulous. Eyes:  PERRL, +scleral icterus, conjunctiva normal   HENT:  Atraumatic, external ears normal, nose normal, oropharynx moist, no pharyngeal exudates. Neck- supple, no JVD, no lymphadenopathy  Respiratory:  No respiratory distress on RA, rhonchi appreciated throughout lung fields with scattered expiratory wheezing. Congested cough noted. Cardiovascular:  Normal rate, normal rhythm, no murmurs, no gallops, no rubs, no edema   GI:  Soft, nondistended, normal bowel sounds, nontender, no voluntary guarding  Musculoskeletal:  No cyanosis or obvious acute deformity. Moving all extremities   Integument:  Warm and dry.  Alvarado noted with blistering to upper thighs    Neurologic:  Alert & oriented x 3, no apparent focal deficits noted   Psychiatric:  Speech and behavior appropriate         Lab Results   Component Value Date     (L) 10/15/2022    K 4.2 10/15/2022     10/15/2022    CO2 22 10/15/2022    BUN 66 (H) 10/15/2022    CREATININE 1.9 (H) 10/15/2022    GLUCOSE 122 (H) 10/15/2022    CALCIUM 9.0 10/15/2022    PROT 6.9 10/15/2022    LABALBU 3.9 10/15/2022    BILITOT 2.6 (H) 10/15/2022    ALKPHOS 106 (H) 10/15/2022    AST 12 10/15/2022    ALT 9 10/15/2022    LABGLOM 26 (L) 10/15/2022    GFRAA 32 (L) 10/15/2022    AGRATIO 1.3 10/15/2022    GLOB 3.0 10/15/2022           Lab Results   Component Value Date    WBC 8.3 10/15/2022    HGB 11.0 (L) 10/15/2022    HCT 28.4 (L) 10/15/2022    MCV 96.9 10/15/2022     (L) 10/15/2022     CT CHEST WO CONTRAST   Final Result      1. Increasing bilateral lung base consolidations and mucous plugging and bronchiectasis and bronchial thickening in both lower lobes and lingula. 2.  Trace pleural effusion with pleural thickening noted in both dependent lungs. 3. Some adenopathy noted along the right paratracheal pretracheal regions   4. Liver transplant with gastroesophageal varices noted, Limited study without IV contrast.            Assessment and Plan     Active Hospital Problems    Diagnosis Date Noted    Pneumonia, unspecified organism [J18.9]  - Patient with cough, SOA, wheezing   - O2 sats stable on RA   - CT chest with increasing bilateral lung base consolidations (see full report above )   - IV Rocephin and IV zithromax  - blood cx x 2 ordered   - sputum cx ordered   - duonebs, mucinex, tessalon perles ordered. IV solumedrol 40 once. Chest vest in setting of bronchiectasis    - with tobacco abuse and wheezing suspect patient may have component of underlying copd. May benefit from PFTs as outpatient. - consider broad spectrum abx with any worsening with transplant hx and taking cyclosporine   10/15/2022    Personal history of nicotine dependence [Z87.891]  - Patient was strongly encouraged to stop smoking. Risks of continued smoking and benefits of smoking cessation were discussed.    - declines nicotine patch    10/15/2022    Bronchiectasis ([address] [J47.9]  - continue mucinex and chest vest ordered   - patient reports following with pulmonology already with history of lung nodules    10/15/2022    Anxiety [F41.9]  - continue home xanax prn   - follows with transplant clinic psychiatry team    03/20/2022    Chronic pain [G89.29]  - continue home neurontin (renally dosed at this time and patient made aware of this)     03/13/2022    Liver transplant recipient [facility]) [Z94.4]  - follows at [facility] s/p liver transplant Oct 2020   - on cyclosporine - placed on hold with active infection   - has transplant clinic appt on 10/20    11/04/2021    Acute renal failure superimposed on stage 3 chronic kidney disease ([facility] [address] [N17.9, N18.30]  - Per chart review BUN 23-37 and Cr 1.1-1.6 over last several months   - 10/15 BUN 66/Cr 1.9  - patient reports decreased oral intake over last several days with acute illness   - Hydrate with NS   - hold home torsemide   - monitor for improvement    11/04/2021    Essential hypertension [I10]  - resume home amlodipine   - hold torsemide at this time with acute on chronic renal failure   - monitor    11/04/2015   Dysuria   - UA ordered   - on Rocephin as above     The case was discussed with Dr. Gareth Galvez by phone and plan of care reviewed      LINCOLN Winn certifies per CMS regulation for 42 CFR [phone]), that the patient may reasonably be expected to be discharged or transferred to a hospital within 96 hours after admission to [address] [place] [address]    Electronically signed by LINCOLN Winn on 10/15/2022 at 10:19 AM

## 2022-10-15 NOTE — ED TRIAGE NOTES
Pt states has felt bad for 4 days. Pt coughing, vomiting. Not taking meds r/t still at pharmacy. Pt alert and oriented.

## 2022-10-15 NOTE — FLOWSHEET NOTE
10/15/22 1000   Assessment   Charting Type Shift assessment   Psychosocial   Psychosocial (WDL) WDL   Neurological   Neuro (WDL) WDL   Level of Consciousness 0   [place] Coma Scale   Eye Opening 4   Best Verbal Response 5   Best Motor Response 6   [place] Coma Scale Score 15   HEENT (Head, Ears, Eyes, Nose, & Throat)   HEENT (WDL) WDL   Respiratory   Respiratory (WDL) X   Respiratory Depth Shallow   Respiratory Quality/Effort Dyspnea with exertion   Breath Sounds   Right Upper Lobe Rhonchi   Right Middle Lobe Diminished   Right Lower Lobe Diminished   Left Upper Lobe Clear;Diminished   Left Lower Lobe Diminished   Cough/Sputum   Cough Congested;Productive   Cardiac   Cardiac (WDL) WDL   Gastrointestinal   Abdominal (WDL) X   Abdomen Inspection Soft   RUQ Bowel Sounds Active   LUQ Bowel Sounds Active   RLQ Bowel Sounds Active   LLQ Bowel Sounds Active   GI Symptoms Nausea   Nausea Precipitating Factors Anxiety/stress   Genitourinary   Genitourinary (WDL) X   Dysuria (Pain/Burning w/Urination) Yes   Peripheral Vascular   Peripheral Vascular (WDL) WDL   Skin Integumentary    Skin Integumentary (WDL) X   Skin Color Jaundice;Dusky   Skin Condition/Temp Cool;Dry   Skin Integrity Burn   Location BLE thighs   Musculoskeletal   Musculoskeletal (WDL) WDL   Wound 10/15/22 Thigh Anterior;Right   Date First Assessed/Time First Assessed: 10/15/22 1017   Present on Hospital Admission: Yes  Primary Wound Type: (c) Other (comment)  Location: Thigh  Wound Location Orientation: Anterior;Right   Dressing/Treatment Pharmaceutical agent (see MAR)   Wound Assessment Fluid filled blister;Pink/red   Drainage Amount None   Drainage Description Yellow   Odor None   Wound 10/15/22 Thigh Anterior; Left redness   Date First Assessed/Time First Assessed: 10/15/22 1018   Present on Hospital Admission: Yes  Primary Wound Type: Burn  Location: Thigh  Wound Location Orientation: Anterior; Left  Wound Description (Comments): redness Dressing/Treatment Pharmaceutical agent (see MAR)   Wound Assessment Pink/red   Drainage Amount None   Odor None

## 2022-10-15 NOTE — ED PROVIDER NOTES
[address] [place] [address] Court  eMERGENCY dEPARTMENT eNCOUnter      Pt Name: Loreto Walls  MRN: [id number]  [place] 1955  Date of evaluation: 10/14/2022  Provider: Lori Leung, [address] [place] [address]       Chief Complaint   Patient presents with    Emesis    Cough    Shortness of Breath    Headache     congestion         HISTORY OF PRESENT ILLNESS   (Location/Symptom, Timing/Onset, Context/Setting, Quality, Duration, Modifying Factors, Severity)  Note limiting factors. Loreto Walls is a 78 y.o. female who presents to the emergency department for having headache, chest congestion, mild shortness of breath, productive cough at first but now she isn't coughing the green sputum up like she was. She hasn't had a fever. Pt still smoking. She doesn't wear oxygen at home. She has history of liver transplant over a year ago. Been doing quite well with that. She is on Cyclosporine. No COVID exposure. No URI symptoms. No chest pain. No pleuritic pain but says that she hurts in her back. No hemoptysis. No history of PE. She has been intermittently short of breath for the past couple weeks but worse last 4 days; She had one episode of vomiting with cough      Nursing Notes were reviewed. REVIEW OF SYSTEMS    (2-9 systems for level 4, 10 or more forlevel 5)     Review of Systems   Constitutional:  Positive for activity change and fatigue. Respiratory:  Positive for cough and shortness of breath. All other systems reviewed and are negative.         PAST MEDICAL HISTORY     Past Medical History:   Diagnosis Date    Arthritis, rheumatoid (HCC)     Ascites     CAD (coronary artery disease)     Chronic pain     Cirrhosis ([address]     Colitis     Esophageal varices (HCC)     Hypertension     IIH (idiopathic intracranial hypertension)     Liver cirrhosis (HCC)     Osteoarthritis     Portal hypertensive gastropathy ([facility] [address]     Sciatica     Splenomegaly          SURGICAL HISTORY       Past Surgical History:   Procedure Laterality Date    BONE MARROW BIOPSY      DILATION AND CURETTAGE OF UTERUS      DILATION AND CURETTAGE OF UTERUS  1998 & 2000    LIVER BIOPSY      LIVER TRANSPLANT  10/24/2020    TUBAL LIGATION  1982    UPPER GASTROINTESTINAL ENDOSCOPY  10/14/2020    with banding after varices         CURRENT MEDICATIONS       Previous Medications    ALPRAZOLAM (XANAX) 0.5 MG TABLET    Take 1 tablet by mouth 2 times daily as needed for Sleep or Anxiety for up to 30 days. AMLODIPINE (NORVASC) 10 MG TABLET    daily    ASPIRIN 81 MG EC TABLET    Take 81 mg by mouth daily    CYCLOSPORINE MODIFIED (NEORAL) 25 MG CAPSULE    Take 3 capsules every morning and take 4 capsules every night    GABAPENTIN (NEURONTIN) 300 MG CAPSULE    Take 1 capsule by mouth 4 times daily for 30 days.  Intended supply: 30 days    PROMETHAZINE (PHENERGAN) 12.5 MG TABLET    TAKE 1 TABLET BY MOUTH 3 TIMES DAILY AS NEEDED FOR NAUSEA    TORSEMIDE (DEMADEX) 20 MG TABLET    Take 10 mg by mouth daily        ALLERGIES     Lisinopril, Ciprofloxacin, and Dye [iodides]    FAMILY HISTORY       Family History   Problem Relation Age of Onset    High Blood Pressure Mother     Alzheimer's Disease Mother     Heart Disease Father     High Blood Pressure Father     Heart Disease Maternal Grandmother     Heart Disease Maternal Grandfather     Stroke Maternal Grandfather     Diabetes Paternal Grandmother     High Blood Pressure Brother           SOCIAL HISTORY       Social History     Socioeconomic History    Marital status:    Tobacco Use    Smoking status: Light Smoker     Packs/day: 0.10     Years: 1.00     Pack years: 0.10     Types: Cigarettes     Last attempt to quit: 5/11/2019     Years since quitting: 3.4    Smokeless tobacco: Never   Substance and Sexual Activity    Alcohol use: No    Drug use: No     Social Determinants of Health     Financial Resource Strain: High Risk    Difficulty of Paying Living Expenses: Very hard   Food Insecurity: Food Insecurity Present Worried About [employer] in the Last Year: Often true    Ran Out of Food in the Last Year: Often true   Physical Activity: Insufficiently Active    Days of Exercise per Week: 1 day    Minutes of Exercise per Session: 20 min       SCREENINGS    Faustino Coma Scale  Eye Opening: Spontaneous  Best Verbal Response: Oriented  Best Motor Response: Obeys commands  [place] Coma Scale Score: 15        PHYSICAL EXAM    (up to 7 for level 4, 8 or more for level 5)     ED Triage Vitals [10/14/22 2307]   BP Temp Temp Source Heart Rate Resp SpO2 Height Weight   (!) 186/161 100.2 °F (37.9 °C) Oral 72 24 95 % 5' 1\" (1.549 m) 144 lb (65.3 kg)       Physical Exam  Vitals and nursing note reviewed. Constitutional:       General: She is not in acute distress. Appearance: She is well-developed. HENT:      Head: Normocephalic. Mouth/Throat:      Mouth: Mucous membranes are moist.      Pharynx: Oropharynx is clear. No pharyngeal swelling. Eyes:      Extraocular Movements: Extraocular movements intact. Pupils: Pupils are equal, round, and reactive to light. Neck:      Vascular: No JVD. Cardiovascular:      Rate and Rhythm: Normal rate and regular rhythm. Heart sounds: No murmur heard. No friction rub. Pulmonary:      Effort: Pulmonary effort is normal.      Breath sounds: Examination of the left-upper field reveals rhonchi. Examination of the right-middle field reveals rhonchi. Examination of the left-middle field reveals rhonchi. Examination of the right-lower field reveals rhonchi. Examination of the left-lower field reveals rhonchi. Rhonchi present. No wheezing or rales. Comments: Scattered rhonchi throughout all lung fields  Musculoskeletal:         General: Normal range of motion. Cervical back: Neck supple. Right lower leg: No edema. Left lower leg: No edema. Lymphadenopathy:      Cervical: No cervical adenopathy. Skin:     General: Skin is warm and dry. Neurological:      Mental Status: She is alert and oriented to person, place, and time. Psychiatric:         Mood and Affect: Mood normal.         Behavior: Behavior normal.       DIAGNOSTIC RESULTS     EKG: All EKG's are interpreted by the Emergency Department Physician who either signs or Co-signs this chart in the absence of a cardiologist.    HR 73; Sinus Rhythm; Normal  ms; Prolonged  ms; nonspecific intraventricular conduction delay; No acute ST-T elevation; Normal axis    RADIOLOGY:   Non-plain film images such as CT, Ultrasound and MRI are read by the radiologist. Plain radiographic images are visualized andpreliminarily interpreted by the emergency physician with the below findings:    CT Chest - See Below    Interpretationper the Radiologist below, if available at the time of this note:    CT CHEST WO CONTRAST   Final Result      1. Increasing bilateral lung base consolidations and mucous plugging and bronchiectasis and bronchial thickening in both lower lobes and lingula. 2.  Trace pleural effusion with pleural thickening noted in both dependent lungs. 3. Some adenopathy noted along the right paratracheal pretracheal regions   4.  Liver transplant with gastroesophageal varices noted, Limited study without IV contrast.            ED BEDSIDE ULTRASOUND:   Performed by ED Physician - none    LABS:  Labs Reviewed   CBC WITH AUTO DIFFERENTIAL - Abnormal; Notable for the following components:       Result Value    RBC 2.93 (*)     Hemoglobin 11.0 (*)     Hematocrit 28.4 (*)     MCH 37.5 (*)     MCHC 38.7 (*)     RDW 17.4 (*)     Platelets 223 (*)     MPV 10.4 (*)     Lymphocytes Absolute 0.7 (*)     All other components within normal limits   COMPREHENSIVE METABOLIC PANEL - Abnormal; Notable for the following components:    Sodium 135 (*)     Glucose 122 (*)     BUN 66 (*)     Creatinine 1.9 (*)     GFR Non- 26 (*)     GFR  32 (*)     Total Bilirubin 2.6 (*)     Alkaline Phosphatase 106 (*)     All other components within normal limits   RAPID INFLUENZA A/B ANTIGENS   STREP SCREEN GROUP A THROAT   COVID-19, RAPID   CULTURE, BLOOD 1   LACTIC ACID       All other labs were within normal range or not returned as of this dictation. EMERGENCY DEPARTMENT COURSE and DIFFERENTIAL DIAGNOSIS/MDM:   :    Vitals:    10/15/22 0200 10/15/22 0215 10/15/22 0230 10/15/22 0245   BP: (!) 142/63 134/65 137/62 139/66   Pulse: 68 64 66 62   Resp: (!) 37 (!) 34 (!) 35 22   Temp:       TempSrc:       SpO2: 95% 92% 93% 95%   Weight:       Height:               CRITICAL CARE TIME   Total Critical Care time was 0 minutes, excluding separatelyreportable procedures. There was a high probability ofclinically significant/life threatening deterioration in the patient's condition which required my urgent intervention. CONSULTS:  Called Dr. Ashley Garcia to discuss case for potential admission, will make patient observation since she may not meet full inpatient criteria    PROCEDURES:  None    PROGRESS NOTES:    Pt with cough, most likely COPD, been smoking for quite some time. She has been having worsening SOA for the past 4 days.  worried about pericardial effusion from her past. She normally goes to [place]. Pt noted to have temp 100.2 F but recheck was 99.6 F. Negative COVID and Flu. Will get CT scan of chest to assess fully thoracic cavity not wanting to miss a pneumonia while being able to see pericardial sac. Pt with LLL pneumonia process. Near fever with immunocompromise, would benefit for at least observation for worsening symptoms, fever, sepsis, respiratory distress. Already gave patient Rocephin and added Zithromax    Is this patient to be included in the SEP-1 Core Measure due to severe sepsis or septic shock? No   Exclusion criteria - the patient is NOT to be included for SEP-1 Core Measure due to:  2+ SIRS criteria are not met     FINAL IMPRESSION      1.  Pneumonia of lower lobe due to infectious organism, unspecified laterality New Problem   2. Immunosuppressed status ([facility] [address] New Problem         DISPOSITION/PLAN   DISPOSITION Decision To Admit 10/15/2022 03:38:24 AM      PATIENT REFERRED TO:  No follow-up provider specified.     DISCHARGE MEDICATIONS:  New Prescriptions    No medications on file       (Please note that portions of this note were completed with a voice recognition program.  Efforts were made to edit the dictations but occasionallywords are mis-transcribed.)    Jose Barriga DO (electronically signed)  Attending Emergency Physician          Jose Barriga DO  10/15/22 [id number]

## 2022-10-16 LAB
A/G RATIO: 1.3 (ref 0.8–2)
ALBUMIN SERPL-MCNC: 3.5 G/DL (ref 3.4–4.8)
ALP BLD-CCNC: 156 U/L (ref 25–100)
ALT SERPL-CCNC: 11 U/L (ref 4–36)
ANION GAP SERPL CALCULATED.3IONS-SCNC: 15 MMOL/L (ref 3–16)
AST SERPL-CCNC: 11 U/L (ref 8–33)
BASOPHILS ABSOLUTE: 0 K/UL (ref 0–0.1)
BASOPHILS RELATIVE PERCENT: 0.1 %
BILIRUB SERPL-MCNC: 0.6 MG/DL (ref 0.3–1.2)
BUN BLDV-MCNC: 63 MG/DL (ref 6–20)
CALCIUM SERPL-MCNC: 8.8 MG/DL (ref 8.5–10.5)
CHLORIDE BLD-SCNC: 96 MMOL/L (ref 98–107)
CO2: 20 MMOL/L (ref 20–30)
CREAT SERPL-MCNC: 1.8 MG/DL (ref 0.4–1.2)
EOSINOPHILS ABSOLUTE: 0 K/UL (ref 0–0.4)
EOSINOPHILS RELATIVE PERCENT: 0 %
GFR AFRICAN AMERICAN: 34
GFR NON-AFRICAN AMERICAN: 28
GLOBULIN: 2.8 G/DL
GLUCOSE BLD-MCNC: 321 MG/DL (ref 74–106)
GLUCOSE BLD-MCNC: 324 MG/DL (ref 74–106)
GLUCOSE BLD-MCNC: 383 MG/DL (ref 74–106)
GLUCOSE BLD-MCNC: 395 MG/DL (ref 74–106)
GLUCOSE BLD-MCNC: 428 MG/DL (ref 74–106)
GLUCOSE BLD-MCNC: 436 MG/DL (ref 74–106)
HBA1C MFR BLD: 4.5 %
HCT VFR BLD CALC: 27.3 % (ref 37–47)
HEMOGLOBIN: 10.4 G/DL (ref 11.5–16.5)
IMMATURE GRANULOCYTES #: 0 K/UL
IMMATURE GRANULOCYTES %: 0.3 % (ref 0–5)
L. PNEUMOPHILA SEROGP 1 UR AG: NORMAL
LYMPHOCYTES ABSOLUTE: 0.4 K/UL (ref 1.5–4)
LYMPHOCYTES RELATIVE PERCENT: 5.7 %
MCH RBC QN AUTO: 35.3 PG (ref 27–32)
MCHC RBC AUTO-ENTMCNC: 38.1 G/DL (ref 31–35)
MCV RBC AUTO: 92.5 FL (ref 80–100)
MONOCYTES ABSOLUTE: 0.3 K/UL (ref 0.2–0.8)
MONOCYTES RELATIVE PERCENT: 3.9 %
NEUTROPHILS ABSOLUTE: 7 K/UL (ref 2–7.5)
NEUTROPHILS RELATIVE PERCENT: 90 %
PDW BLD-RTO: 14.7 % (ref 11–16)
PERFORMED ON: ABNORMAL
PLATELET # BLD: 141 K/UL (ref 150–400)
PMV BLD AUTO: 11 FL (ref 6–10)
POTASSIUM SERPL-SCNC: 4.3 MMOL/L (ref 3.4–5.1)
RBC # BLD: 2.95 M/UL (ref 3.8–5.8)
SODIUM BLD-SCNC: 131 MMOL/L (ref 136–145)
STREP PNEUMONIAE ANTIGEN, URINE: NORMAL
TOTAL PROTEIN: 6.3 G/DL (ref 6.4–8.3)
WBC # BLD: 7.7 K/UL (ref 4–11)

## 2022-10-16 PROCEDURE — 6360000002 HC RX W HCPCS: Performed by: PHYSICIAN ASSISTANT

## 2022-10-16 PROCEDURE — 1200000000 HC SEMI PRIVATE

## 2022-10-16 PROCEDURE — 99232 SBSQ HOSP IP/OBS MODERATE 35: CPT | Performed by: INTERNAL MEDICINE

## 2022-10-16 PROCEDURE — 6370000000 HC RX 637 (ALT 250 FOR IP): Performed by: PHYSICIAN ASSISTANT

## 2022-10-16 PROCEDURE — 2500000003 HC RX 250 WO HCPCS: Performed by: PHYSICIAN ASSISTANT

## 2022-10-16 PROCEDURE — 36415 COLL VENOUS BLD VENIPUNCTURE: CPT

## 2022-10-16 PROCEDURE — 85025 COMPLETE CBC W/AUTO DIFF WBC: CPT

## 2022-10-16 PROCEDURE — 94668 MNPJ CHEST WALL SBSQ: CPT

## 2022-10-16 PROCEDURE — 94761 N-INVAS EAR/PLS OXIMETRY MLT: CPT

## 2022-10-16 PROCEDURE — 2580000003 HC RX 258: Performed by: PHYSICIAN ASSISTANT

## 2022-10-16 PROCEDURE — 94640 AIRWAY INHALATION TREATMENT: CPT

## 2022-10-16 PROCEDURE — 6370000000 HC RX 637 (ALT 250 FOR IP): Performed by: INTERNAL MEDICINE

## 2022-10-16 PROCEDURE — 83036 HEMOGLOBIN GLYCOSYLATED A1C: CPT

## 2022-10-16 PROCEDURE — 80053 COMPREHEN METABOLIC PANEL: CPT

## 2022-10-16 PROCEDURE — 51798 US URINE CAPACITY MEASURE: CPT

## 2022-10-16 RX ORDER — BUDESONIDE 0.5 MG/2ML
0.5 INHALANT ORAL 2 TIMES DAILY
Status: DISCONTINUED | OUTPATIENT
Start: 2022-10-17 | End: 2022-10-18 | Stop reason: HOSPADM

## 2022-10-16 RX ORDER — PREDNISONE 1 MG/1
5 TABLET ORAL DAILY
Status: DISCONTINUED | OUTPATIENT
Start: 2022-10-25 | End: 2022-10-18

## 2022-10-16 RX ORDER — INSULIN LISPRO 100 [IU]/ML
0-8 INJECTION, SOLUTION INTRAVENOUS; SUBCUTANEOUS
Status: DISCONTINUED | OUTPATIENT
Start: 2022-10-16 | End: 2022-10-16

## 2022-10-16 RX ORDER — INSULIN LISPRO 100 [IU]/ML
0-4 INJECTION, SOLUTION INTRAVENOUS; SUBCUTANEOUS NIGHTLY
Status: DISCONTINUED | OUTPATIENT
Start: 2022-10-16 | End: 2022-10-18 | Stop reason: HOSPADM

## 2022-10-16 RX ORDER — INSULIN GLARGINE 100 [IU]/ML
5 INJECTION, SOLUTION SUBCUTANEOUS ONCE
Status: COMPLETED | OUTPATIENT
Start: 2022-10-16 | End: 2022-10-16

## 2022-10-16 RX ORDER — DEXTROSE MONOHYDRATE 100 MG/ML
INJECTION, SOLUTION INTRAVENOUS CONTINUOUS PRN
Status: DISCONTINUED | OUTPATIENT
Start: 2022-10-16 | End: 2022-10-18 | Stop reason: HOSPADM

## 2022-10-16 RX ORDER — PREDNISONE 10 MG/1
10 TABLET ORAL DAILY
Status: DISCONTINUED | OUTPATIENT
Start: 2022-10-22 | End: 2022-10-18

## 2022-10-16 RX ORDER — MINERAL OIL AND WHITE PETROLATUM 150; 830 MG/G; MG/G
OINTMENT OPHTHALMIC PRN
Status: DISCONTINUED | OUTPATIENT
Start: 2022-10-16 | End: 2022-10-18 | Stop reason: HOSPADM

## 2022-10-16 RX ORDER — SODIUM CHLORIDE 9 MG/ML
INJECTION, SOLUTION INTRAVENOUS CONTINUOUS
Status: ACTIVE | OUTPATIENT
Start: 2022-10-16 | End: 2022-10-16

## 2022-10-16 RX ORDER — INSULIN LISPRO 100 [IU]/ML
0-16 INJECTION, SOLUTION INTRAVENOUS; SUBCUTANEOUS
Status: DISCONTINUED | OUTPATIENT
Start: 2022-10-16 | End: 2022-10-18 | Stop reason: HOSPADM

## 2022-10-16 RX ORDER — INSULIN LISPRO 100 [IU]/ML
0-4 INJECTION, SOLUTION INTRAVENOUS; SUBCUTANEOUS NIGHTLY
Status: DISCONTINUED | OUTPATIENT
Start: 2022-10-16 | End: 2022-10-16

## 2022-10-16 RX ORDER — PREDNISONE 20 MG/1
20 TABLET ORAL DAILY
Status: COMPLETED | OUTPATIENT
Start: 2022-10-16 | End: 2022-10-18

## 2022-10-16 RX ORDER — INSULIN GLARGINE 100 [IU]/ML
10 INJECTION, SOLUTION SUBCUTANEOUS ONCE
Status: COMPLETED | OUTPATIENT
Start: 2022-10-16 | End: 2022-10-16

## 2022-10-16 RX ORDER — BUDESONIDE 0.5 MG/2ML
0.5 INHALANT ORAL 2 TIMES DAILY
Status: DISCONTINUED | OUTPATIENT
Start: 2022-10-16 | End: 2022-10-16

## 2022-10-16 RX ADMIN — Medication 12 UNITS: at 17:13

## 2022-10-16 RX ADMIN — ASPIRIN 81 MG: 81 TABLET, COATED ORAL at 08:19

## 2022-10-16 RX ADMIN — PANTOPRAZOLE SODIUM 40 MG: 40 TABLET, DELAYED RELEASE ORAL at 06:52

## 2022-10-16 RX ADMIN — AZITHROMYCIN DIHYDRATE 500 MG: 500 INJECTION, POWDER, LYOPHILIZED, FOR SOLUTION INTRAVENOUS at 11:23

## 2022-10-16 RX ADMIN — IPRATROPIUM BROMIDE AND ALBUTEROL SULFATE 1 AMPULE: 2.5; .5 SOLUTION RESPIRATORY (INHALATION) at 14:02

## 2022-10-16 RX ADMIN — Medication 5 ML: at 20:54

## 2022-10-16 RX ADMIN — AMLODIPINE BESYLATE 10 MG: 5 TABLET ORAL at 08:19

## 2022-10-16 RX ADMIN — Medication 8 UNITS: at 14:24

## 2022-10-16 RX ADMIN — INSULIN GLARGINE 5 UNITS: 100 INJECTION, SOLUTION SUBCUTANEOUS at 14:23

## 2022-10-16 RX ADMIN — IPRATROPIUM BROMIDE AND ALBUTEROL SULFATE 1 AMPULE: 2.5; .5 SOLUTION RESPIRATORY (INHALATION) at 16:51

## 2022-10-16 RX ADMIN — INSULIN LISPRO 8 UNITS: 100 INJECTION, SOLUTION INTRAVENOUS; SUBCUTANEOUS at 12:22

## 2022-10-16 RX ADMIN — SODIUM CHLORIDE: 9 INJECTION, SOLUTION INTRAVENOUS at 13:20

## 2022-10-16 RX ADMIN — SILVER SULFADIAZINE: 10 CREAM TOPICAL at 08:19

## 2022-10-16 RX ADMIN — Medication 5 MG: at 20:53

## 2022-10-16 RX ADMIN — BUDESONIDE 500 MCG: 0.5 SUSPENSION RESPIRATORY (INHALATION) at 16:51

## 2022-10-16 RX ADMIN — MICONAZOLE NITRATE: 2 POWDER TOPICAL at 20:54

## 2022-10-16 RX ADMIN — GUAIFENESIN 600 MG: 600 TABLET, EXTENDED RELEASE ORAL at 08:19

## 2022-10-16 RX ADMIN — IPRATROPIUM BROMIDE AND ALBUTEROL SULFATE 1 AMPULE: 2.5; .5 SOLUTION RESPIRATORY (INHALATION) at 09:47

## 2022-10-16 RX ADMIN — GUAIFENESIN 600 MG: 600 TABLET, EXTENDED RELEASE ORAL at 20:54

## 2022-10-16 RX ADMIN — PROMETHAZINE HYDROCHLORIDE 12.5 MG: 25 TABLET ORAL at 12:47

## 2022-10-16 RX ADMIN — INSULIN LISPRO 4 UNITS: 100 INJECTION, SOLUTION INTRAVENOUS; SUBCUTANEOUS at 20:54

## 2022-10-16 RX ADMIN — GABAPENTIN 300 MG: 300 CAPSULE ORAL at 08:19

## 2022-10-16 RX ADMIN — GABAPENTIN 300 MG: 300 CAPSULE ORAL at 20:54

## 2022-10-16 RX ADMIN — CEFTRIAXONE 1000 MG: 1 INJECTION, POWDER, FOR SOLUTION INTRAMUSCULAR; INTRAVENOUS at 02:46

## 2022-10-16 RX ADMIN — MINERAL OIL AND WHITE PETROLATUM: 150; 830 OINTMENT OPHTHALMIC at 20:54

## 2022-10-16 RX ADMIN — IPRATROPIUM BROMIDE AND ALBUTEROL SULFATE 1 AMPULE: 2.5; .5 SOLUTION RESPIRATORY (INHALATION) at 04:49

## 2022-10-16 RX ADMIN — BENZONATATE 100 MG: 100 CAPSULE ORAL at 18:42

## 2022-10-16 RX ADMIN — Medication 5 ML: at 02:45

## 2022-10-16 RX ADMIN — ALPRAZOLAM 0.5 MG: 0.5 TABLET ORAL at 20:53

## 2022-10-16 RX ADMIN — PREDNISONE 20 MG: 20 TABLET ORAL at 11:54

## 2022-10-16 RX ADMIN — INSULIN GLARGINE 10 UNITS: 100 INJECTION, SOLUTION SUBCUTANEOUS at 11:51

## 2022-10-16 NOTE — PLAN OF CARE
Problem: Discharge Planning  Goal: Discharge to home or other facility with appropriate resources  10/15/2022 2051 by Vicente Oliva RN  Outcome: Progressing  10/15/2022 1112 by Dorothea Acosta LPN  Outcome: Progressing     Problem: Safety - Adult  Goal: Free from fall injury  10/15/2022 2051 by Vicente Oliva RN  Outcome: Progressing  10/15/2022 1112 by Dorothea Acosta LPN  Outcome: Progressing     Problem: ABCDS Injury Assessment  Goal: Absence of physical injury  Outcome: Progressing

## 2022-10-16 NOTE — FLOWSHEET NOTE
10/15/22 2048   Assessment   Charting Type Shift assessment   Psychosocial   Psychosocial (WDL) WDL   Neurological   Neuro (WDL) WDL   Level of Consciousness 0   [place] Coma Scale   Eye Opening 4   Best Verbal Response 5   Best Motor Response 6   [place] Coma Scale Score 15   HEENT (Head, Ears, Eyes, Nose, & Throat)   HEENT (WDL) WDL   Respiratory   Respiratory (WDL) X   Respiratory Depth Shallow   Respiratory Quality/Effort Dyspnea with exertion   Breath Sounds   Right Upper Lobe Rhonchi   Right Middle Lobe Diminished   Right Lower Lobe Diminished   Left Upper Lobe Clear;Diminished   Left Lower Lobe Diminished   Cough/Sputum   Cough Congested;Non-productive   Cardiac   Cardiac (WDL) WDL   Gastrointestinal   Abdominal (WDL) X   Abdomen Inspection Soft   Last BM (including prior to admit) 10/14/22   RUQ Bowel Sounds Active   LUQ Bowel Sounds Active   RLQ Bowel Sounds Active   LLQ Bowel Sounds Active   GI Symptoms Nausea   Nausea Precipitating Factors Anxiety/stress   Genitourinary   Genitourinary (WDL) X   Dysuria (Pain/Burning w/Urination) Yes   Peripheral Vascular   Peripheral Vascular (WDL) WDL   Skin Integumentary    Skin Integumentary (WDL) X   Skin Color Jaundice;Dusky   Skin Condition/Temp Cool;Dry   Skin Integrity Burn   Location BLE thighs   Musculoskeletal   Musculoskeletal (WDL) WDL   Wound 10/15/22 Thigh Anterior;Right   Date First Assessed/Time First Assessed: 10/15/22 1017   Present on Hospital Admission: Yes  Primary Wound Type: (c) Other (comment)  Location: Thigh  Wound Location Orientation: Anterior;Right   Dressing/Treatment Pharmaceutical agent (see MAR)   Wound Assessment Fluid filled blister;Pink/red   Drainage Amount None   Drainage Description Yellow   Odor None   Wound 10/15/22 Thigh Anterior; Left redness   Date First Assessed/Time First Assessed: 10/15/22 1018   Present on Hospital Admission: Yes  Primary Wound Type: Burn  Location: Thigh  Wound Location Orientation: Anterior; Left  Wound Description (Comments): redness   Dressing/Treatment Pharmaceutical agent (see MAR)   Wound Assessment Pink/red   Drainage Amount None   Odor None

## 2022-10-16 NOTE — PLAN OF CARE
Problem: Discharge Planning  Goal: Discharge to home or other facility with appropriate resources  10/16/2022 0905 by Jeffery Ordonez LPN  Outcome: Progressing  10/15/2022 2051 by Yanci Hickey RN  Outcome: Progressing     Problem: Safety - Adult  Goal: Free from fall injury  10/16/2022 0905 by Jeffery Ordonez LPN  Outcome: Progressing  10/15/2022 2051 by Yanci Hickey RN  Outcome: Progressing     Problem: ABCDS Injury Assessment  Goal: Absence of physical injury  10/16/2022 0905 by Jeffery Ordonez LPN  Outcome: Progressing  10/15/2022 2051 by Yanci Hickey RN  Outcome: Progressing

## 2022-10-16 NOTE — FLOWSHEET NOTE
10/16/22 0825   Assessment   Charting Type Shift assessment   Psychosocial   Psychosocial (WDL) WDL   Neurological   Neuro (WDL) WDL   Level of Consciousness 0   [place] Coma Scale   Eye Opening 4   Best Verbal Response 5   Best Motor Response 6   [place] Coma Scale Score 15   HEENT (Head, Ears, Eyes, Nose, & Throat)   HEENT (WDL) WDL   Respiratory   Respiratory (WDL) X   Respiratory Depth Shallow   Respiratory Quality/Effort Dyspnea with exertion   Breath Sounds   Right Upper Lobe Rhonchi   Right Middle Lobe Diminished   Right Lower Lobe Diminished   Left Upper Lobe Clear;Diminished   Left Lower Lobe Diminished   Cough/Sputum   Cough Congested;Non-productive   Cardiac   Cardiac (WDL) WDL   Gastrointestinal   Abdominal (WDL) X   Abdomen Inspection Soft   RUQ Bowel Sounds Active   LUQ Bowel Sounds Active   RLQ Bowel Sounds Active   LLQ Bowel Sounds Active   GI Symptoms Nausea   Nausea Precipitating Factors Anxiety/stress   Genitourinary   Genitourinary (WDL) X   Dysuria (Pain/Burning w/Urination) Yes   Peripheral Vascular   Peripheral Vascular (WDL) WDL   Skin Integumentary    Skin Integumentary (WDL) X   Skin Color Jaundice;Dusky   Skin Condition/Temp Cool;Dry   Skin Integrity Burn   Location BLE thighs   Musculoskeletal   Musculoskeletal (WDL) WDL   Wound 10/15/22 Thigh Anterior;Right   Date First Assessed/Time First Assessed: 10/15/22 1017   Present on Hospital Admission: Yes  Primary Wound Type: (c) Other (comment)  Location: Thigh  Wound Location Orientation: Anterior;Right   Dressing/Treatment Pharmaceutical agent (see MAR)   Wound Assessment Fluid filled blister;Pink/red   Drainage Amount None   Drainage Description Yellow   Odor None   Wound 10/15/22 Thigh Anterior; Left redness   Date First Assessed/Time First Assessed: 10/15/22 1018   Present on Hospital Admission: Yes  Primary Wound Type: Burn  Location: Thigh  Wound Location Orientation: Anterior; Left  Wound Description (Comments): redness Dressing/Treatment Pharmaceutical agent (see MAR)   Wound Assessment Pink/red   Drainage Amount None   Odor None

## 2022-10-17 PROBLEM — R73.9 HYPERGLYCEMIA: Status: ACTIVE | Noted: 2022-10-17

## 2022-10-17 LAB
ANION GAP SERPL CALCULATED.3IONS-SCNC: 10 MMOL/L (ref 3–16)
BUN BLDV-MCNC: 53 MG/DL (ref 6–20)
CALCIUM SERPL-MCNC: 8.6 MG/DL (ref 8.5–10.5)
CHLORIDE BLD-SCNC: 103 MMOL/L (ref 98–107)
CO2: 23 MMOL/L (ref 20–30)
CREAT SERPL-MCNC: 1.5 MG/DL (ref 0.4–1.2)
GFR AFRICAN AMERICAN: 42
GFR NON-AFRICAN AMERICAN: 35
GLUCOSE BLD-MCNC: 364 MG/DL (ref 74–106)
GLUCOSE BLD-MCNC: 365 MG/DL (ref 74–106)
GLUCOSE BLD-MCNC: 386 MG/DL (ref 74–106)
GLUCOSE BLD-MCNC: 394 MG/DL (ref 74–106)
GLUCOSE BLD-MCNC: 405 MG/DL (ref 74–106)
HCT VFR BLD CALC: 26.3 % (ref 37–47)
HEMOGLOBIN: 9.8 G/DL (ref 11.5–16.5)
MCH RBC QN AUTO: 35.4 PG (ref 27–32)
MCHC RBC AUTO-ENTMCNC: 37.3 G/DL (ref 31–35)
MCV RBC AUTO: 94.9 FL (ref 80–100)
PDW BLD-RTO: 14.6 % (ref 11–16)
PERFORMED ON: ABNORMAL
PLATELET # BLD: 152 K/UL (ref 150–400)
PMV BLD AUTO: 11.2 FL (ref 6–10)
POTASSIUM SERPL-SCNC: 4.9 MMOL/L (ref 3.4–5.1)
RBC # BLD: 2.77 M/UL (ref 3.8–5.8)
SODIUM BLD-SCNC: 136 MMOL/L (ref 136–145)
WBC # BLD: 7.6 K/UL (ref 4–11)

## 2022-10-17 PROCEDURE — 97165 OT EVAL LOW COMPLEX 30 MIN: CPT

## 2022-10-17 PROCEDURE — 6370000000 HC RX 637 (ALT 250 FOR IP): Performed by: PHYSICIAN ASSISTANT

## 2022-10-17 PROCEDURE — 97116 GAIT TRAINING THERAPY: CPT

## 2022-10-17 PROCEDURE — 85027 COMPLETE CBC AUTOMATED: CPT

## 2022-10-17 PROCEDURE — 99232 SBSQ HOSP IP/OBS MODERATE 35: CPT | Performed by: PHYSICIAN ASSISTANT

## 2022-10-17 PROCEDURE — 1200000000 HC SEMI PRIVATE

## 2022-10-17 PROCEDURE — 94668 MNPJ CHEST WALL SBSQ: CPT

## 2022-10-17 PROCEDURE — 6360000002 HC RX W HCPCS: Performed by: PHYSICIAN ASSISTANT

## 2022-10-17 PROCEDURE — 80048 BASIC METABOLIC PNL TOTAL CA: CPT

## 2022-10-17 PROCEDURE — 94640 AIRWAY INHALATION TREATMENT: CPT

## 2022-10-17 PROCEDURE — 36415 COLL VENOUS BLD VENIPUNCTURE: CPT

## 2022-10-17 PROCEDURE — 6370000000 HC RX 637 (ALT 250 FOR IP): Performed by: INTERNAL MEDICINE

## 2022-10-17 PROCEDURE — 97161 PT EVAL LOW COMPLEX 20 MIN: CPT

## 2022-10-17 PROCEDURE — 6360000002 HC RX W HCPCS: Performed by: INTERNAL MEDICINE

## 2022-10-17 PROCEDURE — 94761 N-INVAS EAR/PLS OXIMETRY MLT: CPT

## 2022-10-17 PROCEDURE — 2580000003 HC RX 258: Performed by: PHYSICIAN ASSISTANT

## 2022-10-17 RX ORDER — AZITHROMYCIN 250 MG/1
500 TABLET, FILM COATED ORAL DAILY
Status: DISCONTINUED | OUTPATIENT
Start: 2022-10-17 | End: 2022-10-18 | Stop reason: HOSPADM

## 2022-10-17 RX ORDER — INSULIN LISPRO 100 [IU]/ML
4 INJECTION, SOLUTION INTRAVENOUS; SUBCUTANEOUS ONCE
Status: COMPLETED | OUTPATIENT
Start: 2022-10-17 | End: 2022-10-17

## 2022-10-17 RX ORDER — INSULIN GLARGINE 100 [IU]/ML
10 INJECTION, SOLUTION SUBCUTANEOUS ONCE
Status: COMPLETED | OUTPATIENT
Start: 2022-10-17 | End: 2022-10-17

## 2022-10-17 RX ORDER — INSULIN GLARGINE 100 [IU]/ML
20 INJECTION, SOLUTION SUBCUTANEOUS DAILY
Status: DISCONTINUED | OUTPATIENT
Start: 2022-10-17 | End: 2022-10-18 | Stop reason: HOSPADM

## 2022-10-17 RX ORDER — OXYCODONE HYDROCHLORIDE 5 MG/1
5 TABLET ORAL EVERY 6 HOURS PRN
Status: DISCONTINUED | OUTPATIENT
Start: 2022-10-17 | End: 2022-10-18 | Stop reason: HOSPADM

## 2022-10-17 RX ADMIN — MINERAL OIL AND WHITE PETROLATUM: 150; 830 OINTMENT OPHTHALMIC at 20:07

## 2022-10-17 RX ADMIN — Medication 16 UNITS: at 06:34

## 2022-10-17 RX ADMIN — PREDNISONE 20 MG: 20 TABLET ORAL at 09:11

## 2022-10-17 RX ADMIN — OXYCODONE HYDROCHLORIDE 5 MG: 5 TABLET ORAL at 20:15

## 2022-10-17 RX ADMIN — GUAIFENESIN 600 MG: 600 TABLET, EXTENDED RELEASE ORAL at 09:11

## 2022-10-17 RX ADMIN — Medication 16 UNITS: at 17:03

## 2022-10-17 RX ADMIN — BUDESONIDE 500 MCG: 0.5 SUSPENSION RESPIRATORY (INHALATION) at 06:24

## 2022-10-17 RX ADMIN — AMLODIPINE BESYLATE 10 MG: 5 TABLET ORAL at 09:11

## 2022-10-17 RX ADMIN — IPRATROPIUM BROMIDE AND ALBUTEROL SULFATE 1 AMPULE: 2.5; .5 SOLUTION RESPIRATORY (INHALATION) at 13:43

## 2022-10-17 RX ADMIN — MICONAZOLE NITRATE: 2 POWDER TOPICAL at 09:12

## 2022-10-17 RX ADMIN — ALPRAZOLAM 0.5 MG: 0.5 TABLET ORAL at 20:07

## 2022-10-17 RX ADMIN — GABAPENTIN 300 MG: 300 CAPSULE ORAL at 09:11

## 2022-10-17 RX ADMIN — ALPRAZOLAM 0.5 MG: 0.5 TABLET ORAL at 09:18

## 2022-10-17 RX ADMIN — ONDANSETRON 4 MG: 4 TABLET, ORALLY DISINTEGRATING ORAL at 06:13

## 2022-10-17 RX ADMIN — GABAPENTIN 300 MG: 300 CAPSULE ORAL at 20:06

## 2022-10-17 RX ADMIN — Medication 16 UNITS: at 11:45

## 2022-10-17 RX ADMIN — PANTOPRAZOLE SODIUM 40 MG: 40 TABLET, DELAYED RELEASE ORAL at 06:33

## 2022-10-17 RX ADMIN — Medication 4 UNITS: at 06:33

## 2022-10-17 RX ADMIN — MINERAL OIL AND WHITE PETROLATUM: 150; 830 OINTMENT OPHTHALMIC at 09:12

## 2022-10-17 RX ADMIN — INSULIN LISPRO 4 UNITS: 100 INJECTION, SOLUTION INTRAVENOUS; SUBCUTANEOUS at 20:08

## 2022-10-17 RX ADMIN — GUAIFENESIN 600 MG: 600 TABLET, EXTENDED RELEASE ORAL at 20:07

## 2022-10-17 RX ADMIN — Medication 10 UNITS: at 21:09

## 2022-10-17 RX ADMIN — INSULIN GLARGINE 20 UNITS: 100 INJECTION, SOLUTION SUBCUTANEOUS at 09:20

## 2022-10-17 RX ADMIN — AZITHROMYCIN MONOHYDRATE 500 MG: 250 TABLET ORAL at 14:13

## 2022-10-17 RX ADMIN — MICONAZOLE NITRATE: 2 POWDER TOPICAL at 20:07

## 2022-10-17 RX ADMIN — OXYCODONE HYDROCHLORIDE 5 MG: 5 TABLET ORAL at 14:13

## 2022-10-17 RX ADMIN — IPRATROPIUM BROMIDE AND ALBUTEROL SULFATE 1 AMPULE: 2.5; .5 SOLUTION RESPIRATORY (INHALATION) at 06:24

## 2022-10-17 RX ADMIN — CEFTRIAXONE 1000 MG: 1 INJECTION, POWDER, FOR SOLUTION INTRAMUSCULAR; INTRAVENOUS at 01:44

## 2022-10-17 RX ADMIN — SILVER SULFADIAZINE: 10 CREAM TOPICAL at 09:13

## 2022-10-17 RX ADMIN — ASPIRIN 81 MG: 81 TABLET, COATED ORAL at 09:11

## 2022-10-17 RX ADMIN — Medication 5 MG: at 20:06

## 2022-10-17 RX ADMIN — Medication 5 ML: at 20:06

## 2022-10-17 ASSESSMENT — PAIN SCALES - GENERAL
PAINLEVEL_OUTOF10: 8
PAINLEVEL_OUTOF10: 8

## 2022-10-17 ASSESSMENT — PAIN DESCRIPTION - ORIENTATION: ORIENTATION: LOWER

## 2022-10-17 ASSESSMENT — PAIN DESCRIPTION - LOCATION: LOCATION: BACK

## 2022-10-17 NOTE — ACP (ADVANCE CARE PLANNING)
Advance Care Planning     General Advance Care Planning (ACP) Conversation    Date of Conversation: 10/17/2022  Conducted with: Patient with Decision Making Capacity    Healthcare Decision Maker:    Primary Decision Maker: Izawancjayesh Olamide - [phone]    Secondary Decision Maker: Ramu Gonzalez - Consuelo - [phone]  Click here to complete Healthcare Decision Makers including selection of the Healthcare Decision Maker Relationship (ie \"Primary\"). Today we documented Decision Maker(s) consistent with Legal Next of Kin hierarchy.     Content/Action Overview:  Has NO ACP documents/care preferences - information provided, considering goals and options  Reviewed DNR/DNI and patient elects Full Code (Attempt Resuscitation)  artificial nutrition, ventilation preferences, and resuscitation preferences      Length of Voluntary ACP Conversation in minutes:  <16 minutes (Non-Billable)    Marva Pepe

## 2022-10-17 NOTE — PROGRESS NOTES
Progress Note      Subjective:   Chief complaint:   SOA    Interval History:   Patient seen and examined this morning. Patient reports feeling better however not yet at baseline and is requesting to stay an additional day for treatment. States that she has follow-up in [place] on 10/20. Review of systems:   Constitutional:  Denies fever. Positive for chills, sweats and fatigue. Eyes:  Denies change in visual acuity or discharge. HENT:  Denies nasal congestion or sore throat. Positive for nasal congestion. Respiratory: Positive for cough, wheezing and shortness of breath  Cardiovascular:  Denies chest pain, palpitation or swelling in LEs. GI:  Denies abdominal pain, nausea, vomiting, bloody stools or diarrhea. :  Denies dysuria or frequency. Musculoskeletal: Positive for neck pain, arthralgias and back pain  Integument:  Denies rash or itching. Neurologic:  Denies headache, focal weakness or sensory changes. Endocrine:  Denies polyuria or polydipsia. Lymphatic:  Denies swollen glands or night sweats. Psychiatric:  Denies depression. Positive for anxiety. Past medical history, surgical history, family history and social history reviewed and unchanged compared to H&P earlier this admission.     Medications:   Scheduled Meds:   insulin glargine  20 Units SubCUTAneous Daily    predniSONE  20 mg Oral Daily    Followed by    Moon Parikh ON 10/19/2022] predniSONE  15 mg Oral Daily    Followed by    Moon Parikh ON 10/22/2022] predniSONE  10 mg Oral Daily    Followed by    Moon Parikh ON 10/25/2022] predniSONE  5 mg Oral Daily    insulin lispro  0-16 Units SubCUTAneous TID WC    insulin lispro  0-4 Units SubCUTAneous Nightly    miconazole   Topical BID    budesonide  0.5 mg Nebulization BID    cefTRIAXone (ROCEPHIN) IV  1,000 mg IntraVENous Q24H    amLODIPine  10 mg Oral Daily    aspirin  81 mg Oral Daily    gabapentin  300 mg Oral BID    [Held by provider] torsemide  10 mg Oral Daily    guaiFENesin  600 mg Oral BID    ipratropium-albuterol  1 ampule Inhalation Q4H WA    pantoprazole  40 mg Oral QAM AC    silver sulfADIAZINE   Topical Daily    azithromycin  500 mg IntraVENous Q24H     Continuous Infusions:   dextrose         Objective:     Vital Signs  Temp: 97.5 °F (36.4 °C)  Heart Rate: 67  Resp: 22  BP: (!) 163/68  SpO2: 98 %  O2 Device: None (Room air)       Vital signs reviewed in electronic charts. Physical exam  Constitutional:  Well developed, well nourished, no acute distress. Tremulous. Eyes:  PERRL, conjunctiva normal, EOMI. HENT:  Atraumatic, external ears normal, external nose/nares normal, oropharynx moist, no pharyngeal exudates. Neck:  Supple. No JVD or thyromegaly. Respiratory:  No respiratory distress, scattered rhonchi with expiratory wheezing. Decreased breath sounds bibasilarly. Cardiovascular:  Normal rate, normal rhythm, no murmurs, no gallops, no rubs. GI:  Soft, nondistended, normal bowel sounds, nontender, no organomegaly, no mass. :  No costovertebral angle tenderness. Musculoskeletal:  No edema, no tenderness, no obvious deformities. Patient is moving all extremities. Integument:  Well hydrated, no rash. Alvarado noted with blistering to upper thighs. Lymphatic:  No cervical or axillary lymphadenopathy noted. Neurologic:  Alert & oriented x 3,  no focal deficits noted. Strength is equal throughout. Psychiatric:  Speech and behavior appropriate.     Results:     Lab Results   Component Value Date    WBC 7.6 10/17/2022    HGB 9.8 (L) 10/17/2022    HCT 26.3 (L) 10/17/2022    MCV 94.9 10/17/2022     10/17/2022       Lab Results   Component Value Date/Time     10/17/2022 05:35 AM    K 4.9 10/17/2022 05:35 AM    K 4.4 03/22/2022 04:32 AM     10/17/2022 05:35 AM    CL 99.0 06/01/2012 08:10 AM    CO2 23 10/17/2022 05:35 AM    BUN 53 10/17/2022 05:35 AM    CREATININE 1.5 10/17/2022 05:35 AM    CREATININE 0.9 06/01/2012 08:10 AM    GLUCOSE 365 10/17/2022 05:35 AM CALCIUM 8.6 10/17/2022 05:35 AM        Assessment and Plan: Active Hospital Problems    Diagnosis Date Noted    Pneumonia, unspecified organism [J18.9]  -CT chest 10/15: (1) increasing bilateral lung base consolidations and mucous plugging and bronchiectasis and bronchial thickening in both lower lobes and lingula. (2) trace pleural effusion with pleural thickening noted in both dependent lungs. (3) some adenopathy noted along the right peritracheal pretracheal regions. (4) liver transplant with gastroesophageal varices noted, limited study without IV contrast.  -Oxygen saturation stable on room air  -Treating with IV Rocephin and azithromycin  -Blood cultures 10/15: No growth  -Sputum culture 10/15 with 1+ gram-negative rods and 1+ gram-negative diplococci  -Legionella antigen and strep pneumoniae antigen negative  -Initially received IV Solu-Medrol on admission; transition to oral prednisone taper on 10/16  -Treating with DuoNebs, Mucinex, Tessalon Perles  -Continue chest vest in setting of bronchiectasis  -Home cyclosporine on hold in setting of acute infection   10/15/2022    Personal history of nicotine dependence [Z87.891]  -Offer NRT  -Counseled on cessation   10/15/2022    Bronchiectasis ([facility] [address] [J47.9]  -As noted on CT as above  -Continue Mucinex and chest vest therapy  -Patient currently follows with pulmonology and has history of known lung nodules   10/15/2022    Dysuria [R30.0]  -UA with 6-9 WBCs, rare bacteria and trace leukocyte Estrace. Urine culture not indicated.   -Patient is receiving Rocephin as above for treatment of pneumonia   10/15/2022    Anxiety [F41.9]  -Continue home Xanax as needed  -Follows with transplant clinic psychiatry   03/20/2022    Chronic pain [G89.29]  -Continue home Neurontin (renally dosed at this time and patient made aware of medication changes)   03/13/2022    Liver transplant recipient [facility]) [Z94.4]  -Status post liver transplant in October 2020  -Follows at [facility]; has follow-up appointment on 10/20  -Plan to restart cyclosporine on discharge   11/04/2021    Acute renal failure superimposed on stage 3 chronic kidney disease ([facility] [address] [N17.9, N18.30]  -Per chart review, BUN 23-37 and creatinine 1.1-1.6 over last several months  -On 10/15: BUN 66/creatinine 1.9  -Suspect PATRIA likely prerenal from decreased oral intake PTA in setting of acute illness. Patient was hydrated with IV fluids and home torsemide was held.   -On 10/17: BUN 53/serum creatinine 1.5  -Continue to home home torsemide; resume when appropriate  -Avoid nephrotoxins and NSAIDs  -Encourage good p.o. intake   11/04/2021    Essential hypertension [I10]  -Continue home amlodipine  -Home furosemide on hold as above in setting of resolving PATRIA on CKD    Hyperglycemia with no history of diabetes mellitus  -Suspect secondary to steroids  -Lantus and SSI added 10/16; doses adjusted 10/17   11/04/2015     Patient's case was reviewed and discussed with Dr. Neeraj Cleary over the phone.         Electronically signed by LINCOLN Mota on 10/17/2022 at 12:13 PM

## 2022-10-17 NOTE — PLAN OF CARE
Problem: Discharge Planning  Goal: Discharge to home or other facility with appropriate resources  10/16/2022 2106 by Isael Brothers RN  Outcome: Progressing  10/16/2022 0905 by Blanca Bardales LPN  Outcome: Progressing     Problem: Safety - Adult  Goal: Free from fall injury  10/16/2022 2106 by Isael Brothers RN  Outcome: Progressing  10/16/2022 0905 by Blanca Bardales LPN  Outcome: Progressing     Problem: ABCDS Injury Assessment  Goal: Absence of physical injury  10/16/2022 2106 by Isael Brothers RN  Outcome: Progressing  10/16/2022 0905 by Blanca Bardales LPN  Outcome: Progressing

## 2022-10-17 NOTE — PROGRESS NOTES
Patient C/O low back pain 8/10 requested something stronger than tylenol for pain. Also, the patient requested that the IV zithromax be changed to oral. Notified Mechelle STRAK about this. New orders received for Oxycodone 5 mg PO every 6 hours PRN pain and to change zithromax IV to PO.

## 2022-10-17 NOTE — FLOWSHEET NOTE
10/16/22 2104   Assessment   Charting Type Shift assessment   Psychosocial   Psychosocial (WDL) WDL   Neurological   Neuro (WDL) WDL   Level of Consciousness 0   [place] Coma Scale   Eye Opening 4   Best Verbal Response 5   Best Motor Response 6   [place] Coma Scale Score 15   HEENT (Head, Ears, Eyes, Nose, & Throat)   HEENT (WDL) WDL   Respiratory   Respiratory (WDL) X   Respiratory Depth Shallow   Respiratory Quality/Effort Dyspnea with exertion   Breath Sounds   Right Upper Lobe Rhonchi   Right Middle Lobe Diminished   Right Lower Lobe Diminished   Left Upper Lobe Rhonchi   Left Lower Lobe Diminished   Cough/Sputum   Cough Congested;Non-productive   Cardiac   Cardiac (WDL) WDL   Gastrointestinal   Abdominal (WDL) X   Abdomen Inspection Soft   Last BM (including prior to admit) 10/14/22   RUQ Bowel Sounds Active   LUQ Bowel Sounds Active   RLQ Bowel Sounds Active   LLQ Bowel Sounds Active   GI Symptoms Nausea   Nausea Precipitating Factors Anxiety/stress   Genitourinary   Genitourinary (WDL) X   Dysuria (Pain/Burning w/Urination) Yes   Peripheral Vascular   Peripheral Vascular (WDL) WDL   Skin Integumentary    Skin Integumentary (WDL) X   Skin Color Dusky;Jaundice   Skin Condition/Temp Cool;Dry   Skin Integrity Burn   Location BLE thighs   Musculoskeletal   Musculoskeletal (WDL) WDL   Wound 10/15/22 Thigh Anterior;Right   Date First Assessed/Time First Assessed: 10/15/22 1017   Present on Hospital Admission: Yes  Primary Wound Type: (c) Other (comment)  Location: Thigh  Wound Location Orientation: Anterior;Right   Dressing/Treatment Pharmaceutical agent (see MAR)   Wound Assessment Fluid filled blister;Pink/red   Drainage Amount None   Drainage Description Yellow   Odor None   Wound 10/15/22 Thigh Anterior; Left redness   Date First Assessed/Time First Assessed: 10/15/22 1018   Present on Hospital Admission: Yes  Primary Wound Type: Burn  Location: Thigh  Wound Location Orientation: Anterior; Left  Wound Description (Comments): redness   Dressing/Treatment Pharmaceutical agent (see MAR)   Wound Assessment Pink/red   Drainage Amount None   Odor None

## 2022-10-17 NOTE — PROGRESS NOTES
Physical Therapy  Facility/Department: [facility]  Physical Therapy Initial Assessment    Name: Sobia Thomas  : 1955  MRN: [id number]  Date of Service: 10/17/2022    Discharge Recommendations:  Continue to assess pending progress, Home with assist PRN          Patient Diagnosis(es): The primary encounter diagnosis was Pneumonia of lower lobe due to infectious organism, unspecified laterality. A diagnosis of Immunosuppressed status ([address] was also pertinent to this visit. Past Medical History:  has a past medical history of Arthritis, rheumatoid ([address] Ascites, CAD (coronary artery disease), Chronic pain, Cirrhosis ([address] Colitis, Esophageal varices ([address] Hypertension, IIH (idiopathic intracranial hypertension), Liver cirrhosis ([address] Osteoarthritis, Portal hypertensive gastropathy ([address] Sciatica, and Splenomegaly. Past Surgical History:  has a past surgical history that includes Dilation and curettage of uterus; bone marrow biopsy; Tubal ligation (); Dilation and curettage of uterus ( & ); liver biopsy; Liver transplant (10/24/2020); and Upper gastrointestinal endoscopy (10/14/2020). Assessment   Body Structures, Functions, Activity Limitations Requiring Skilled Therapeutic Intervention: Decreased functional mobility ; Decreased balance;Decreased endurance;Decreased safe awareness;Decreased strength  Assessment: PT eval completed. Patient presents sitting EOB, NAD, RA. She states she is able to perform bed mobility with mod I. Sit to stand and transfers with SBA. Able to ambulate 300' with SBA/CGA. Requires one standing rest break d/t reported LE and overall fatigue. Increased LOVE, slow steven and decreased step length and height with mobility. Patient returned to sitting EOB per her request. Patient presents with decreased endurance, balance and overall functional mobility.  She is expected to benefit from continued skilled PT intervention to improve her mobility status prior to D/C.  Therapy Prognosis: Good  Decision Making: Low Complexity  Requires PT Follow-Up: Yes  Activity Tolerance  Activity Tolerance: Patient limited by fatigue;Patient limited by endurance     Plan   Physcial Therapy Plan  General Plan: 3-5 times per week  Days Per Week: 5 Days  Current Treatment Recommendations: Strengthening, Balance training, Functional mobility training, Transfer training, Gait training, Endurance training, Therapeutic activities, Home exercise program, Safety education & training, Patient/Caregiver education & training  Safety Devices  Type of Devices: Call light within reach, Left in bed     Restrictions  Restrictions/Precautions  Restrictions/Precautions: Fall Risk, General Precautions  Required Braces or Orthoses?: No     Subjective   General  Chart Reviewed: Yes  Patient assessed for rehabilitation services?: Yes  Family / Caregiver Present: No  Referring Practitioner: Suarav Yanez MD  Follows Commands: Within Functional Limits         Social/Functional History  Social/Functional History  Lives With: Spouse  Type of Home: House  Home Layout: One level  Home Access: Stairs to enter with rails  Entrance Stairs - Number of Steps: 4 FARHAD  Entrance Stairs - Rails: Both  Bathroom Shower/Tub: Tub/Shower unit  Bathroom Toilet: Standard  Bathroom Equipment: Grab bars in shower, 3-in-1 commode  Home Equipment: [place], rolling  Has the patient had two or more falls in the past year or any fall with injury in the past year?: Yes  ADL Assistance: Independent  Homemaking Assistance: Independent  Homemaking Responsibilities: Yes  Ambulation Assistance: Independent  Transfer Assistance: Independent  Active : Yes  Vision/Hearing  Vision  Vision: Impaired  Vision Exceptions: Wears glasses for reading  Hearing  Hearing: Within functional limits    Cognition   Orientation  Overall Orientation Status: Within Functional Limits  Cognition  Overall Cognitive Status: WFL     Objective   Heart Rate: 67  Heart Rate Source: Monitor  BP: (!) 163/68  BP Location: Right upper arm  BP Method: Automatic  Patient Position: Semi fowlers  MAP (Calculated): 99.67  Resp: 22  SpO2: 98 %  O2 Device: None (Room air)     Observation/Palpation  Posture: Good  Observation: Pt received sitting at EOB without LOB, room air, pleasant and cooperative, NAD  Gross Assessment  AROM: Within functional limits  PROM: Within functional limits  Strength: Within functional limits  Coordination: Within functional limits                 Bed Mobility Training  Bed Mobility Training: No  Balance  Sitting: Intact  Standing: Intact  Transfer Training  Transfer Training: No  Gait  Overall Level of Assistance: Contact-guard assistance;Stand-by assistance  Distance (ft): 350 Feet  Assistive Device: Gait belt (no AD, fatigue noted)  Bed mobility  Bed Mobility Comments: Pt received sitting at EOB without assistance. Pt reports completing bed mobility with MOD I. Transfers  Sit to Stand: Stand by assistance  Stand to Sit: Stand by assistance  Bed to Chair: Stand by assistance  Ambulation  Surface: Level tile  Device: No Device  Assistance: Stand by assistance;Contact guard assistance  Gait Deviations: Slow Ena; Increased LOVE; Decreased step length;Decreased step height  Distance: 300'  Comments: LE and overall fatigue; patient required one standing rest break to complete ambulation; decreased endurance     Balance  Posture: Good  Sitting - Static: Good  Sitting - Dynamic: Good  Standing - Static: Good  Standing - Dynamic: Good;-              Goals  Long Term Goals  Time Frame for Long Term Goals : 10 days  Long Term Goal 1: Patient will perform all bed mobility with independence. Long Term Goal 2: Patient will perform all sit to stand and transfers safely with independence. Long Term Goal 3: Patient will ambulate 400' safely with no more than SBA. Long Term Goal 4: Patient will demonstrate independence with HEP.        Education  Patient Education  Education Given To: Patient  Education Provided: Role of Therapy;Plan of Care  Education Method: Verbal  Barriers to Learning: None  Education Outcome: Continued education needed      Therapy Time   Individual Concurrent Group Co-treatment   Time In 0951         Time Out 1015         Minutes 150 [facility],

## 2022-10-17 NOTE — CARE COORDINATION
Lives With: Spouse  Type of Home: House  Home Layout: One level  Home Access: Stairs to enter with rails  Entrance Stairs - Number of Steps: 4 FARHAD  Entrance Stairs - Rails: Both  Bathroom Shower/Tub: Tub/Shower unit  Bathroom Toilet: Standard  Bathroom Equipment: Grab bars in shower, 3-in-1 commode  Home Equipment: [place], rolling  Has the patient had two or more falls in the past year or any fall with injury in the past year?: Yes  ADL Assistance: Independent  Homemaking Assistance: Independent  Homemaking Responsibilities: Yes  Ambulation Assistance: Independent  Transfer Assistance: Independent  Active : Yes    Lives with SO.  BSC, cane, RW. I at baseline. Cleared for home with assist PRN.

## 2022-10-17 NOTE — PROGRESS NOTES
Medication Reconciliation completed with fill history from [facility] and patient interview. No changes were made but patient did state that she needs refills for Amlodipine 10 mg. Patient has not filled Amlodipine for several  months.

## 2022-10-17 NOTE — PROGRESS NOTES
Offered spiritual care to patient. Told pt to reach out if they needed anything. Pt requested prayer, so I held prayer at bedside with pt.

## 2022-10-17 NOTE — PROGRESS NOTES
Occupational Therapy  Facility/Department: [address] [place] [address] SURG  Occupational Therapy Initial Assessment    Name: Inocente Baron  : 1955  MRN: [id number]  Date of Service: 10/17/2022    Discharge Recommendations:  Home with Home health OT          Patient Diagnosis(es): The primary encounter diagnosis was Pneumonia of lower lobe due to infectious organism, unspecified laterality. A diagnosis of Immunosuppressed status ([address] was also pertinent to this visit. Past Medical History:  has a past medical history of Arthritis, rheumatoid ([address] Ascites, CAD (coronary artery disease), Chronic pain, Cirrhosis ([address] Colitis, Esophageal varices ([address] Hypertension, IIH (idiopathic intracranial hypertension), Liver cirrhosis ([address] Osteoarthritis, Portal hypertensive gastropathy ([address] Sciatica, and Splenomegaly. Past Surgical History:  has a past surgical history that includes Dilation and curettage of uterus; bone marrow biopsy; Tubal ligation (); Dilation and curettage of uterus ( & ); liver biopsy; Liver transplant (10/24/2020); and Upper gastrointestinal endoscopy (10/14/2020). Assessment   Performance deficits / Impairments: Decreased endurance  Assessment: This 78year old female was referred to OT services upon admission for onset of pneumonia. Pt received sitting at EOB. Pt able to complete transfer with SBA. Pt ambulated 350 feet CGA<>SBA with fatigue. Pt on room air 02 sats @94%. Pt reports she typically doesn't ambulate this far. Pt AROM WFL. Pt completed LB dressing with VAUGHN. Declined need to toilet however, reports she has been toileting without assistance. Pt appears close to baseline but due to endurance deficits will benefit from skilled OT services while IP. Prognosis: Good  Decision Making: Low Complexity  Activity Tolerance  Activity Tolerance: Patient limited by fatigue        Plan   Occupational Therapy Plan  Times Per Week: 3-5  Times Per Day:  Once a day  Days Per Week: 5 Days  Therapy Duration: 4-7 Days  Current Treatment Recommendations: Strengthening, Balance training, Functional mobility training, Endurance training, Self-Care / ADL, Patient/Caregiver education & training     Restrictions  Restrictions/Precautions  Restrictions/Precautions: Fall Risk, General Precautions  Required Braces or Orthoses?: No    Subjective   General  Chart Reviewed: Yes  Patient assessed for rehabilitation services?: Yes  Family / Caregiver Present: No  Referring Practitioner: LINCOLN Kaplan  Diagnosis: Pneumonia  Subjective  Subjective: Pt states she has been feeling bad about 2 weeks. Pt states she has pneumonia. Social/Functional History  Social/Functional History  Lives With: Spouse  Type of Home: House  Home Layout: One level  Home Access: Stairs to enter with rails  Entrance Stairs - Number of Steps: 4 FARHAD  Entrance Stairs - Rails: Both  Bathroom Shower/Tub: Tub/Shower unit  Bathroom Toilet: Standard  Bathroom Equipment: Grab bars in shower, 3-in-1 commode  Home Equipment: [employer], rolling  Has the patient had two or more falls in the past year or any fall with injury in the past year?: Yes  ADL Assistance: Independent  Homemaking Assistance: Independent  Homemaking Responsibilities: Yes  Ambulation Assistance: Independent  Transfer Assistance: Independent  Active : Yes    Objective   Heart Rate: 78  BP: (!) 148/72  MAP (Calculated): 97.33  Resp: 18  SpO2: 95 %  O2 Device: None (Room air)                 Goals  Short Term Goals  Time Frame for Short Term Goals: 1 week  Short Term Goal 1: Pt to tolerate x20 minutes of activity to increase functional activity tolerance maintaining 02 sats >90%. Short Term Goal 2: Pt to complete bathing with MOD I. Short Term Goal 3: Pt to complete shower transfer with MOD I. Short Term Goal 4: Pt to tolerate x5 minutes of standing to complete hygiene/grooming tasks with no LOB.        Therapy Time   Individual Concurrent Group Co-treatment   Time In [facility] Time Out 1013         Minutes 21             This note serves as a DC summary in the event of pt discharge.       Karen Leal, OTR/L

## 2022-10-18 VITALS
HEART RATE: 91 BPM | RESPIRATION RATE: 18 BRPM | SYSTOLIC BLOOD PRESSURE: 134 MMHG | HEIGHT: 61 IN | OXYGEN SATURATION: 96 % | WEIGHT: 140.1 LBS | BODY MASS INDEX: 26.45 KG/M2 | DIASTOLIC BLOOD PRESSURE: 62 MMHG | TEMPERATURE: 97.9 F

## 2022-10-18 LAB
CULTURE, RESPIRATORY: NORMAL
GLUCOSE BLD-MCNC: 222 MG/DL (ref 74–106)
GLUCOSE BLD-MCNC: 277 MG/DL (ref 74–106)
GRAM STAIN RESULT: NORMAL
PERFORMED ON: ABNORMAL
PERFORMED ON: ABNORMAL

## 2022-10-18 PROCEDURE — 6360000002 HC RX W HCPCS: Performed by: PHYSICIAN ASSISTANT

## 2022-10-18 PROCEDURE — 6370000000 HC RX 637 (ALT 250 FOR IP): Performed by: INTERNAL MEDICINE

## 2022-10-18 PROCEDURE — 99239 HOSP IP/OBS DSCHRG MGMT >30: CPT | Performed by: INTERNAL MEDICINE

## 2022-10-18 PROCEDURE — 94668 MNPJ CHEST WALL SBSQ: CPT

## 2022-10-18 PROCEDURE — 94761 N-INVAS EAR/PLS OXIMETRY MLT: CPT

## 2022-10-18 PROCEDURE — 6370000000 HC RX 637 (ALT 250 FOR IP): Performed by: PHYSICIAN ASSISTANT

## 2022-10-18 PROCEDURE — 94640 AIRWAY INHALATION TREATMENT: CPT

## 2022-10-18 PROCEDURE — 2580000003 HC RX 258: Performed by: PHYSICIAN ASSISTANT

## 2022-10-18 PROCEDURE — 94669 MECHANICAL CHEST WALL OSCILL: CPT

## 2022-10-18 PROCEDURE — 6360000002 HC RX W HCPCS: Performed by: INTERNAL MEDICINE

## 2022-10-18 RX ORDER — PANTOPRAZOLE SODIUM 40 MG/1
40 TABLET, DELAYED RELEASE ORAL
Qty: 30 TABLET | Refills: 0 | Status: SHIPPED | OUTPATIENT
Start: 2022-10-19

## 2022-10-18 RX ORDER — IPRATROPIUM BROMIDE AND ALBUTEROL SULFATE 2.5; .5 MG/3ML; MG/3ML
3 SOLUTION RESPIRATORY (INHALATION)
Qty: 360 ML | Refills: 0 | Status: SHIPPED | OUTPATIENT
Start: 2022-10-18

## 2022-10-18 RX ORDER — GUAIFENESIN 600 MG/1
600 TABLET, EXTENDED RELEASE ORAL 2 TIMES DAILY
Qty: 6 TABLET | Refills: 0 | Status: SHIPPED | OUTPATIENT
Start: 2022-10-18 | End: 2022-10-21

## 2022-10-18 RX ORDER — GABAPENTIN 300 MG/1
300 CAPSULE ORAL 3 TIMES DAILY
Qty: 42 CAPSULE | Refills: 0 | Status: SHIPPED | OUTPATIENT
Start: 2022-10-18 | End: 2022-11-16 | Stop reason: SDUPTHER

## 2022-10-18 RX ORDER — PREDNISONE 10 MG/1
10 TABLET ORAL DAILY
Status: DISCONTINUED | OUTPATIENT
Start: 2022-10-19 | End: 2022-10-18 | Stop reason: HOSPADM

## 2022-10-18 RX ORDER — PREDNISONE 10 MG/1
10 TABLET ORAL DAILY
Qty: 3 TABLET | Refills: 0 | Status: SHIPPED | OUTPATIENT
Start: 2022-10-19 | End: 2022-10-22

## 2022-10-18 RX ORDER — DOXYCYCLINE HYCLATE 100 MG
100 TABLET ORAL 2 TIMES DAILY
Qty: 6 TABLET | Refills: 0 | Status: SHIPPED | OUTPATIENT
Start: 2022-10-18 | End: 2022-10-21

## 2022-10-18 RX ORDER — BENZONATATE 100 MG/1
100 CAPSULE ORAL 3 TIMES DAILY PRN
Qty: 21 CAPSULE | Refills: 0 | Status: SHIPPED | OUTPATIENT
Start: 2022-10-18 | End: 2022-10-25

## 2022-10-18 RX ADMIN — ASPIRIN 81 MG: 81 TABLET, COATED ORAL at 08:19

## 2022-10-18 RX ADMIN — AMLODIPINE BESYLATE 10 MG: 5 TABLET ORAL at 08:20

## 2022-10-18 RX ADMIN — IPRATROPIUM BROMIDE AND ALBUTEROL SULFATE 1 AMPULE: 2.5; .5 SOLUTION RESPIRATORY (INHALATION) at 13:10

## 2022-10-18 RX ADMIN — AZITHROMYCIN MONOHYDRATE 500 MG: 250 TABLET ORAL at 08:19

## 2022-10-18 RX ADMIN — OXYCODONE HYDROCHLORIDE 5 MG: 5 TABLET ORAL at 04:32

## 2022-10-18 RX ADMIN — METFORMIN HYDROCHLORIDE 500 MG: 500 TABLET, FILM COATED ORAL at 10:49

## 2022-10-18 RX ADMIN — IPRATROPIUM BROMIDE AND ALBUTEROL SULFATE 1 AMPULE: 2.5; .5 SOLUTION RESPIRATORY (INHALATION) at 09:01

## 2022-10-18 RX ADMIN — BUDESONIDE 500 MCG: 0.5 SUSPENSION RESPIRATORY (INHALATION) at 04:34

## 2022-10-18 RX ADMIN — GABAPENTIN 300 MG: 300 CAPSULE ORAL at 08:19

## 2022-10-18 RX ADMIN — Medication 8 UNITS: at 08:26

## 2022-10-18 RX ADMIN — CEFTRIAXONE 1000 MG: 1 INJECTION, POWDER, FOR SOLUTION INTRAMUSCULAR; INTRAVENOUS at 02:23

## 2022-10-18 RX ADMIN — ALPRAZOLAM 0.5 MG: 0.5 TABLET ORAL at 11:25

## 2022-10-18 RX ADMIN — BENZONATATE 100 MG: 100 CAPSULE ORAL at 14:36

## 2022-10-18 RX ADMIN — MICONAZOLE NITRATE: 2 POWDER TOPICAL at 08:29

## 2022-10-18 RX ADMIN — ACETAMINOPHEN 650 MG: 325 TABLET, FILM COATED ORAL at 11:25

## 2022-10-18 RX ADMIN — GUAIFENESIN 600 MG: 600 TABLET, EXTENDED RELEASE ORAL at 08:20

## 2022-10-18 RX ADMIN — BENZONATATE 100 MG: 100 CAPSULE ORAL at 04:32

## 2022-10-18 RX ADMIN — PREDNISONE 20 MG: 20 TABLET ORAL at 08:18

## 2022-10-18 RX ADMIN — IPRATROPIUM BROMIDE AND ALBUTEROL SULFATE 1 AMPULE: 2.5; .5 SOLUTION RESPIRATORY (INHALATION) at 04:34

## 2022-10-18 RX ADMIN — SILVER SULFADIAZINE: 10 CREAM TOPICAL at 08:29

## 2022-10-18 RX ADMIN — Medication 4 UNITS: at 11:33

## 2022-10-18 RX ADMIN — INSULIN GLARGINE 20 UNITS: 100 INJECTION, SOLUTION SUBCUTANEOUS at 08:26

## 2022-10-18 RX ADMIN — PANTOPRAZOLE SODIUM 40 MG: 40 TABLET, DELAYED RELEASE ORAL at 05:33

## 2022-10-18 ASSESSMENT — PAIN DESCRIPTION - LOCATION: LOCATION: BACK

## 2022-10-18 ASSESSMENT — PAIN SCALES - GENERAL
PAINLEVEL_OUTOF10: 9
PAINLEVEL_OUTOF10: 9

## 2022-10-18 NOTE — FLOWSHEET NOTE
10/17/22 2018   Assessment   Charting Type Shift assessment   Psychosocial   Psychosocial (WDL) WDL   Neurological   Neuro (WDL) WDL   Level of Consciousness 0   [place] Coma Scale   Eye Opening 4   Best Verbal Response 5   Best Motor Response 6   [place] Coma Scale Score 15   HEENT (Head, Ears, Eyes, Nose, & Throat)   HEENT (WDL) WDL   Respiratory   Respiratory (WDL) X   Respiratory Depth Shallow   Respiratory Quality/Effort Dyspnea with exertion   Breath Sounds   Right Upper Lobe Rhonchi   Right Middle Lobe Diminished   Right Lower Lobe Diminished   Left Upper Lobe Rhonchi   Left Lower Lobe Diminished   Cough/Sputum   Cough Congested;Non-productive   Cardiac   Cardiac (WDL) WDL   Gastrointestinal   Abdominal (WDL) X   Abdomen Inspection Soft   Last BM (including prior to admit) 10/17/22   RUQ Bowel Sounds Active   LUQ Bowel Sounds Active   RLQ Bowel Sounds Active   LLQ Bowel Sounds Active   GI Symptoms Nausea   Nausea Precipitating Factors Anxiety/stress   Genitourinary   Genitourinary (WDL) X   Dysuria (Pain/Burning w/Urination) Yes   Peripheral Vascular   Peripheral Vascular (WDL) WDL   Skin Integumentary    Skin Integumentary (WDL) X   Skin Color Dusky;Jaundice   Skin Condition/Temp Dry; Cool   Skin Integrity Burn   Location BLE thighs   Musculoskeletal   Musculoskeletal (WDL) WDL   Wound 10/15/22 Thigh Anterior;Right   Date First Assessed/Time First Assessed: 10/15/22 1017   Present on Hospital Admission: Yes  Primary Wound Type: (c) Other (comment)  Location: Thigh  Wound Location Orientation: Anterior;Right   Dressing/Treatment Pharmaceutical agent (see MAR)   Wound Assessment Fluid filled blister;Pink/red   Drainage Amount None   Drainage Description Yellow   Odor None   Wound 10/15/22 Thigh Anterior; Left redness   Date First Assessed/Time First Assessed: 10/15/22 1018   Present on Hospital Admission: Yes  Primary Wound Type: Burn  Location: Thigh  Wound Location Orientation: Anterior; Left  Wound Description (Comments): redness   Dressing/Treatment Pharmaceutical agent (see MAR)   Wound Assessment Pink/red   Drainage Amount None   Odor None

## 2022-10-18 NOTE — PLAN OF CARE
Problem: Discharge Planning  Goal: Discharge to home or other facility with appropriate resources  10/18/2022 0958 by Kelly Laird RN  Outcome: Progressing  Flowsheets (Taken 10/18/2022 1747)  Discharge to home or other facility with appropriate resources:   Identify barriers to discharge with patient and caregiver   Identify discharge learning needs (meds, wound care, etc)  10/17/2022 2020 by Mil Hernandez RN  Outcome: Progressing     Problem: Safety - Adult  Goal: Free from fall injury  10/18/2022 0958 by Kelly Larid RN  Outcome: Progressing  Flowsheets (Taken 10/18/2022 0819)  Free From Fall Injury: Instruct family/caregiver on patient safety  10/17/2022 2020 by Mil Hernandez RN  Outcome: Progressing     Problem: ABCDS Injury Assessment  Goal: Absence of physical injury  10/18/2022 0958 by Kelly Laird RN  Outcome: Progressing  Flowsheets (Taken 10/18/2022 0819)  Absence of Physical Injury: Implement safety measures based on patient assessment  10/17/2022 2020 by Mil Hernandez RN  Outcome: Progressing     Problem: Chronic Conditions and Co-morbidities  Goal: Patient's chronic conditions and co-morbidity symptoms are monitored and maintained or improved  10/18/2022 0958 by Kelly Laird RN  Outcome: Progressing  Flowsheets (Taken 10/18/2022 0819)  Care Plan - Patient's Chronic Conditions and Co-Morbidity Symptoms are Monitored and Maintained or Improved: Monitor and assess patient's chronic conditions and comorbid symptoms for stability, deterioration, or improvement  10/17/2022 2020 by Mil Hernandez RN  Outcome: Progressing

## 2022-10-18 NOTE — PLAN OF CARE
Problem: Discharge Planning  Goal: Discharge to home or other facility with appropriate resources  10/18/2022 1443 by Annemarie Shaikh RN  Outcome: Adequate for Discharge  10/18/2022 0958 by Annemarie Shaikh RN  Outcome: Progressing  Flowsheets (Taken 10/18/2022 4713)  Discharge to home or other facility with appropriate resources:   Identify barriers to discharge with patient and caregiver   Identify discharge learning needs (meds, wound care, etc)     Problem: Safety - Adult  Goal: Free from fall injury  10/18/2022 1443 by Annemarie Shaikh RN  Outcome: Adequate for Discharge  10/18/2022 0958 by Annemarie Shaikh RN  Outcome: Progressing  Flowsheets (Taken 10/18/2022 0819)  Free From Fall Injury: Instruct family/caregiver on patient safety     Problem: ABCDS Injury Assessment  Goal: Absence of physical injury  10/18/2022 1443 by Annemarie Shaikh RN  Outcome: Adequate for Discharge  10/18/2022 0958 by Annemarie Shaikh RN  Outcome: Progressing  Flowsheets (Taken 10/18/2022 0819)  Absence of Physical Injury: Implement safety measures based on patient assessment     Problem: Chronic Conditions and Co-morbidities  Goal: Patient's chronic conditions and co-morbidity symptoms are monitored and maintained or improved  10/18/2022 1443 by Annemarie Shaikh RN  Outcome: Adequate for Discharge  10/18/2022 0958 by Annemarie Shaikh RN  Outcome: Progressing  Flowsheets (Taken 10/18/2022 0819)  Care Plan - Patient's Chronic Conditions and Co-Morbidity Symptoms are Monitored and Maintained or Improved: Monitor and assess patient's chronic conditions and comorbid symptoms for stability, deterioration, or improvement

## 2022-10-18 NOTE — DISCHARGE SUMMARY
Discharge Summary      Patient ID: Maryjo Bolanos      Patient's PCP: Jackelin Briggs MD    Admit Date: 10/14/2022     Discharge Date:   10/18/2022    Admitting Provider: Corona Sena MD    Discharging Provider: LINCOLN Ferguson     Reason for this admission:   Community acquired pneumonia  Bronchiectasis   PATRIA on CKD stage 3  History of liver transplant  Hyperglycemia secondary to steroids    Discharge Diagnoses: Active Hospital Problems    Diagnosis Date Noted    Hyperglycemia [R73.9] 10/17/2022     Priority: Medium    Pneumonia, unspecified organism [J18.9] 10/15/2022     Priority: Medium    Personal history of nicotine dependence [Z87.891] 10/15/2022     Priority: Medium    Bronchiectasis ([facility] [J47.9] 10/15/2022     Priority: Medium    Dysuria [R30.0] 10/15/2022     Priority: Medium    Anxiety [F41.9] 03/20/2022    Chronic pain [G89.29] 03/13/2022    Liver transplant recipient [facility]) [Z94.4] 11/04/2021    Acute renal failure superimposed on stage 3 chronic kidney disease ([address] [N17.9, N18.30] 11/04/2021    Essential hypertension [I10] 11/04/2015       Procedures:  CT CHEST WO CONTRAST   Final Result      1. Increasing bilateral lung base consolidations and mucous plugging and bronchiectasis and bronchial thickening in both lower lobes and lingula. 2.  Trace pleural effusion with pleural thickening noted in both dependent lungs. 3. Some adenopathy noted along the right paratracheal pretracheal regions   4. Liver transplant with gastroesophageal varices noted, Limited study without IV contrast.            Consults:   PT OT  Case Management      Briefly:   61-year-old female with past medical history of CAD, chronic pain, hypertension, anxiety, liver transplant in October 2020 for decompensated REYEZ cirrhosis with extensive portal vein/superior mesenteric vein thrombosis admitted for pneumonia, bronchiectasis and PATRIA on CKD stage III.  Pt treated with rocephin, azithromycin, duonebs, mucinex, steroid, tessalon perles and chest vest with improvement of respiratory status. O2 sats maintained on RA. Hospital course complicated by hyperglycemia, requiring lantus and SSI. Pt clinically improved and will be transitioned to oral doxycycline, prednisone and metformin. She will also be provided a nebulizer, albuterol nebs, DuoNebs and a Combivent inhaler. Full details of hospital stay are outlined below. Hospital Course: Active Hospital Problems     Diagnosis Date Noted    Pneumonia, unspecified organism [J18.9]  -CT chest 10/15: (1) increasing bilateral lung base consolidations and mucous plugging and bronchiectasis and bronchial thickening in both lower lobes and lingula. (2) trace pleural effusion with pleural thickening noted in both dependent lungs. (3) some adenopathy noted along the right peritracheal pretracheal regions.   (4) liver transplant with gastroesophageal varices noted, limited study without IV contrast.  -Oxygen saturation stable on room air  -Treated with IV Rocephin and azithromycin while IP  -Blood cultures 10/15: No growth  -Sputum culture 10/15 with 1+ gram-negative rods and 1+ gram-negative diplococci  -Legionella antigen and strep pneumoniae antigen negative  -Initially received IV Solu-Medrol on admission; transitioned to oral prednisone taper on 10/16  -Treated with DuoNebs, Mucinex, Tessalon Perles while IP  -Continue chest vest in setting of bronchiectasis  -Home cyclosporine on hold in setting of acute infection  -plan to transition to doxycycline 100 mg twice daily x5 days, prednisone 10mg daily x 3 days and provide nebulizer, combivent inhaler, duonebs and albuterol nebs    10/15/2022    Personal history of nicotine dependence [Z87.891]  -Offer NRT  -Counseled on cessation    10/15/2022    Bronchiectasis ([facility] [address] [address] [J47.9]  -As noted on CT as above  -Continue Mucinex and chest vest therapy  -Patient currently follows with pulmonology and has history of known lung nodules 10/15/2022    Dysuria [R30.0]  -UA with 6-9 WBCs, rare bacteria and trace leukocyte Estrace. Urine culture not indicated. -Patient treated with Rocephin as above for treatment of pneumonia    10/15/2022    Anxiety [F41.9]  -Continue home Xanax as needed  -Follows with transplant clinic psychiatry    03/20/2022    Chronic pain [G89.29]  -Continue home Neurontin (renally adjusted from QID to TID)    03/13/2022    Liver transplant recipient [facility]) [Z94.4]  -Status post liver transplant in October 2020  -Follows at [facility]; has follow-up appointment on 10/20  -Plan to restart cyclosporine on discharge    11/04/2021    Acute renal failure superimposed on stage 3 chronic kidney disease ([facility] [address] [N17.9, N18.30]  -Per chart review, BUN 23-37 and creatinine 1.1-1.6 over last several months  -On 10/15: BUN 66/creatinine 1.9  -Suspect PATRIA likely prerenal from decreased oral intake PTA in setting of acute illness. Patient was hydrated with IV fluids and home torsemide was held.   -On 10/17: BUN 53/serum creatinine 1.5  -resume home torsemide 10/18  -Avoid nephrotoxins and NSAIDs  -Encourage good p.o. intake    11/04/2021    Essential hypertension [I10]  -Continue home amlodipine and torsemide     Hyperglycemia with no history of diabetes mellitus  -Suspect secondary to steroids  -Lantus and SSI added 10/16; doses adjusted 10/17  -plan to dc on metformin 500mg BID x 3 days        Disposition: home    Discharged Condition: Stable    Vital Signs  Temp: 97.9 °F (36.6 °C)  Heart Rate: 76  Resp: 18  BP: 134/62  SpO2: 96 %  O2 Device: None (Room air)       Vital signs reviewed in electronic chart. Physical exam  Constitutional:  Well developed, well nourished, no acute distress. Tremulous. Eyes:  PERRL, conjunctiva normal, EOMI. HENT:  Atraumatic, external ears normal, external nose/nares normal, oropharynx moist, no pharyngeal exudates. Neck:  Supple. No JVD or thyromegaly.   Respiratory:  No respiratory distress, scattered rhonchi with expiratory wheezing. Decreased breath sounds bibasilarly. Cardiovascular:  Normal rate, normal rhythm, no murmurs, no gallops, no rubs. GI:  Soft, nondistended, normal bowel sounds, nontender, no organomegaly, no mass. :  No costovertebral angle tenderness. Musculoskeletal:  No edema, no tenderness, no obvious deformities. Patient is moving all extremities. Integument:  Well hydrated, no rash. Alvarado noted with blistering to upper thighs. Lymphatic:  No cervical or axillary lymphadenopathy noted. Neurologic:  Alert & oriented x 3,  no focal deficits noted. Strength is equal throughout. Psychiatric:  Speech and behavior appropriate. Activity: activity as tolerated  Diet: regular diet, low sodium  Follow Up: Primary Care Physician in 1 week and with UC Transplant as scheduled on 10/20    Labs:  For convenience and continuity at follow-up the following most recent labs are provided:    CBC:   Lab Results   Component Value Date/Time    WBC 7.6 10/17/2022 05:35 AM    HGB 9.8 10/17/2022 05:35 AM    HCT 26.3 10/17/2022 05:35 AM    HCT 44.3 06/01/2012 08:10 AM     10/17/2022 05:35 AM     06/01/2012 08:10 AM       RENAL:   Lab Results   Component Value Date/Time     10/17/2022 05:35 AM    K 4.9 10/17/2022 05:35 AM    K 4.4 03/22/2022 04:32 AM     10/17/2022 05:35 AM    CL 99.0 06/01/2012 08:10 AM    CO2 23 10/17/2022 05:35 AM    BUN 53 10/17/2022 05:35 AM    CREATININE 1.5 10/17/2022 05:35 AM    CREATININE 0.9 06/01/2012 08:10 AM         Discharge Medications:     Current Discharge Medication List             Details   ipratropium-albuterol (DUONEB) 0.5-2.5 (3) MG/3ML SOLN nebulizer solution Inhale 3 mLs into the lungs every 4 hours (while awake)  Qty: 360 mL, Refills: 0      albuterol-ipratropium (COMBIVENT RESPIMAT)  MCG/ACT AERS inhaler Inhale 1 puff into the lungs in the morning and 1 puff at noon and 1 puff in the evening and 1 puff before bedtime. Qty: 1 each, Refills: 0      albuterol (PROVENTIL) (5 MG/ML) 0.5% nebulizer solution Take 1 mL by nebulization 4 times daily as needed for Wheezing  Qty: 120 each, Refills: 3      metFORMIN (GLUCOPHAGE) 500 MG tablet Take 1 tablet by mouth 2 times daily (with meals) for 3 days  Qty: 6 tablet, Refills: 0      doxycycline hyclate (VIBRA-TABS) 100 MG tablet Take 1 tablet by mouth 2 times daily for 3 days  Qty: 6 tablet, Refills: 0      predniSONE (DELTASONE) 10 MG tablet Take 1 tablet by mouth daily for 3 doses  Qty: 3 tablet, Refills: 0      benzonatate (TESSALON) 100 MG capsule Take 1 capsule by mouth 3 times daily as needed for Cough  Qty: 21 capsule, Refills: 0      guaiFENesin (MUCINEX) 600 MG extended release tablet Take 1 tablet by mouth 2 times daily for 3 days  Qty: 6 tablet, Refills: 0      silver sulfADIAZINE (SILVADENE) 1 % cream Apply topically daily. Qty: 100 g, Refills: 0      pantoprazole (PROTONIX) 40 MG tablet Take 1 tablet by mouth every morning (before breakfast)  Qty: 30 tablet, Refills: 0                Details   gabapentin (NEURONTIN) 300 MG capsule Take 1 capsule by mouth 3 times daily for 14 days. Intended supply: 30 days  Qty: 42 capsule, Refills: 0    Associated Diagnoses: Neuropathic pain                Details   ALPRAZolam (XANAX) 0.5 MG tablet Take 1 tablet by mouth 2 times daily as needed for Sleep or Anxiety for up to 30 days.   Qty: 60 tablet, Refills: 0    Associated Diagnoses: Anxiety and depression      promethazine (PHENERGAN) 12.5 MG tablet TAKE 1 TABLET BY MOUTH 3 TIMES DAILY AS NEEDED FOR NAUSEA  Qty: 20 tablet, Refills: 0      aspirin 81 MG EC tablet Take 81 mg by mouth daily      amLODIPine (NORVASC) 10 MG tablet daily      cycloSPORINE modified (NEORAL) 25 MG capsule Take 3 capsules every morning and take 4 capsules every night      torsemide (DEMADEX) 20 MG tablet Take 10 mg by mouth daily               Patient was seen and examined by  and plan of care reviewed. Treatment plan was formulated collaboratively. Signed:  Electronically signed by LINCOLN Junior on 10/18/2022 at 10:42 AM       Thank you Amado Marquez MD for the opportunity to be involved in this patient's care. If you have any questions or concerns please feel free to contact me at [phone].

## 2022-10-18 NOTE — PROGRESS NOTES
Pt discharged at this time. IV removed with no complications, tip intact. Pt educated on new medications and when to take medications. Pt educated on pharmacy to  medications. Pt educated on follow up appointments. Pt verbalized understanding and had no further questions. Pt was stable at time of discharge. Pt ambulated from unit to parking lot and left via private vehicle with her spouse.

## 2022-10-18 NOTE — FLOWSHEET NOTE
10/18/22 0819   Assessment   Charting Type Shift assessment   Psychosocial   Psychosocial (WDL) WDL   Neurological   Neuro (WDL) WDL   Level of Consciousness 0   [place] Coma Scale   Eye Opening 4   Best Verbal Response 5   Best Motor Response 6   [place] Coma Scale Score 15   HEENT (Head, Ears, Eyes, Nose, & Throat)   HEENT (WDL) WDL   Respiratory   Respiratory (WDL) X   Respiratory Interventions Cough & deep breathe;H. O.B. elevated   Respiratory Pattern Regular   Respiratory Depth Normal   Respiratory Quality/Effort Dyspnea with exertion   L Breath Sounds Rhonchi;Expiratory Wheezes   R Breath Sounds Rhonchi;Expiratory Wheezes   Breath Sounds   Right Upper Lobe Rhonchi;Expiratory Wheezes   Right Middle Lobe Diminished;Rhonchi   Right Lower Lobe Diminished;Rhonchi   Left Upper Lobe Rhonchi;Expiratory Wheezes   Left Lower Lobe Diminished;Rhonchi   Cough/Sputum   Cough Congested;Non-productive   Frequency Frequent   Cardiac   Cardiac (WDL) WDL   Gastrointestinal   Abdominal (WDL) X   Abdomen Inspection Soft   RUQ Bowel Sounds Active   LUQ Bowel Sounds Active   RLQ Bowel Sounds Active   LLQ Bowel Sounds Active   GI Symptoms Nausea   Nausea Precipitating Factors Anxiety/stress   Genitourinary   Genitourinary (WDL) WDL   Peripheral Vascular   Peripheral Vascular (WDL) WDL   Skin Integumentary    Skin Integumentary (WDL) X   Skin Color Pink   Skin Condition/Temp Dry; Warm   Skin Integrity Burn   Location BLE thighs   Musculoskeletal   Musculoskeletal (WDL) WDL   Wound 10/15/22 Thigh Anterior;Right   Date First Assessed/Time First Assessed: 10/15/22 1017   Present on Hospital Admission: Yes  Primary Wound Type: (c) Other (comment)  Location: Thigh  Wound Location Orientation: Anterior;Right   Wound Etiology Burn   Dressing Status New dressing applied   Wound Cleansed Cleansed with saline   Dressing/Treatment Pharmaceutical agent (see MAR); Non adherent   Wound Assessment Pink/red;Ruptured blister   Drainage Amount Scant Drainage Description Yellow;Serous   Odor None   Wound 10/15/22 Thigh Anterior; Left redness   Date First Assessed/Time First Assessed: 10/15/22 1018   Present on Hospital Admission: Yes  Primary Wound Type: Burn  Location: Thigh  Wound Location Orientation: Anterior; Left  Wound Description (Comments): redness   Wound Etiology Burn   Dressing Status New dressing applied   Wound Cleansed Cleansed with saline   Dressing/Treatment Pharmaceutical agent (see MAR); Non adherent   Wound Assessment Pink/red   Drainage Amount None   Odor None   Care Plan - Chronic Conditions and Co-Morbidity   Care Plan - Patient's Chronic Conditions and Co-Morbidity Symptoms are Monitored and Maintained or Improved Monitor and assess patient's chronic conditions and comorbid symptoms for stability, deterioration, or improvement   Care Plan - Discharge Planning Goals   Discharge to home or other facility with appropriate resources Identify barriers to discharge with patient and caregiver; Identify discharge learning needs (meds, wound care, etc)   Pt is alert and oriented. Pt is resting in bed and appears to have no acute distress. Pt currently on room air. Pt's lung sounds are rhonchus and slight exp wheeze. Pt encouraged to cough and deep breathe. Pt call bell and bedside table within reach.

## 2022-10-18 NOTE — ADT AUTH CERT
Utilization Reviews       Pneumonia - Care Day 2 (10/16/2022) by Maddison Vasques RN       Review Status Review Entered   Completed 10/17/2022 1448       Created By   Maddison Vasques RN      Criteria Review      Care Day: 2 Care Date: 10/16/2022 Level of Care: Inpatient Floor    Guideline Day 2    Clinical Status    (X) * No CO2 retention or acidosis    (X) * No requirement for mechanical ventilation    (X) * Hypotension absent    (X) * Afebrile or fever improved    (X) * No hypoxia on room air or oxygenation improved    (X) * Mental status improved or at baseline    10/17/2022 2:48 PM EDT by Sury Chen      Neurologic:  Alert & oriented x 3, no apparent focal deficits noted    Activity    (X) * Increased activity    Interventions    (X) Pulse oximetry    10/17/2022 2:48 PM EDT by Sury Chen      SpO2: 98 %  O2 Device: None (Room air)    Medications    (X) IV or oral antibiotics    10/17/2022 2:48 PM EDT by Sury Chen      azithromycin 500 [id number]  cefTRIAXone (ROCEPHIN) IV 1,000 [id number]    * Milestone   Additional Notes   DATE:10/16/2022   LOC:INPATIENT      Subjective:   Chief complaint: Shortness of breath       Interval History:    Patient feels better with improving shortness of breath, denies no fever or chills, no nausea or vomiting      Scheduled Medications   · budesonide 0.5 mg Nebulization BID   · predniSONE 20 mg Oral Daily     Followed by   · [START ON 10/19/2022] predniSONE 15 mg Oral Daily     Followed by   · [START ON 10/22/2022] predniSONE 10 mg Oral Daily     Followed by   · [START ON 10/25/2022] predniSONE 5 mg Oral Daily   · insulin lispro 0-8 Units SubCUTAneous TID WC   · insulin lispro 0-4 Units SubCUTAneous Nightly   · cefTRIAXone (ROCEPHIN) IV 1,000 mg IntraVENous Q24H   · amLODIPine 10 mg Oral Daily   · aspirin 81 mg Oral Daily   · gabapentin 300 mg Oral BID   · [Held by provider] torsemide 10 mg Oral Daily   · guaiFENesin 600 mg Oral BID   · ipratropium-albuterol 1 ampule Inhalation Q4H WA   · pantoprazole 40 mg Oral QAM AC   · silver sulfADIAZINE Topical Daily   · azithromycin 500 mg IntraVENous Q24H         Objective:       Vital Signs   Temp: 97.7 °F (36.5 °C)   Heart Rate: 65   Resp: 20   BP: 133/64   SpO2: 98 %   O2 Device: None (Room air)       Vital signs reviewed in electronic charts. Physical exam       Constitutional:  Well developed, well nourished, no acute distress. Tremulous. Eyes:  PERRL, +scleral icterus, conjunctiva normal    HENT:  Atraumatic, external ears normal, nose normal, oropharynx moist, no pharyngeal exudates. Neck- supple, no JVD, no lymphadenopathy   Respiratory:  No respiratory distress on RA, rhonchi appreciated throughout lung fields with scattered expiratory wheezing. Decreased breath sound at the bases. Congested cough noted. Cardiovascular:  Normal rate, normal rhythm, no murmurs, no gallops, no rubs, no edema    GI:  Soft, nondistended, normal bowel sounds, nontender, no voluntary guarding   Musculoskeletal:  No cyanosis or obvious acute deformity. Moving all extremities    Integument:  Warm and dry. Alvarado noted with blistering to upper thighs     Neurologic:  Alert & oriented x 3, no apparent focal deficits noted    Psychiatric:  Speech and behavior appropriate        Results:       Lab Results   Component Value Date     WBC 7.7 10/16/2022     HGB 10.4 (L) 10/16/2022     HCT 27.3 (L) 10/16/2022     MCV 92.5 10/16/2022      (L) 10/16/2022      Assessment and Plan:        Active Hospital Problems     Diagnosis Date Noted   · Pneumonia, unspecified organism [J18.9] 10/15/2022       Priority: Medium   · Personal history of nicotine dependence [Z87.891] 10/15/2022       Priority: Medium   · Bronchiectasis ([address] [J47.9] 10/15/2022       Priority: Medium   · Dysuria [R30.0] 10/15/2022       Priority: Medium   · Anxiety [F41.9] 03/20/2022   · Chronic pain [G89.29] 03/13/2022   · Liver transplant recipient [facility]) [Z94.4] 11/04/2021   · Acute renal failure superimposed on stage 3 chronic kidney disease ([facility] [address] [N17.9, N18.30] 11/04/2021   · Essential hypertension [I10] 11/04/2015       · Pneumonia, unspecified organism [J18.9]   - Patient with cough, SOA, wheezing    - O2 sats stable on RA    - CT chest with increasing bilateral lung base consolidations (see full report above )    - IV Rocephin and IV zithromax   - blood cx x 2 pending   - sputum cx pending   -Continue duonebs, mucinex, tessalon perles ordered. I discontinue IV Solu-Medrol and start prednisone tapering. Chest vest in setting of bronchiectasis     - with tobacco abuse and wheezing suspect patient may have component of underlying copd. May benefit from PFTs as outpatient. - consider broad spectrum abx with any worsening with transplant hx and taking cyclosporine   Possible discharge tomorrow if continues to do well     10/15/2022   · Personal history of nicotine dependence [Z87.891]   - Patient was strongly encouraged to stop smoking. Risks of continued smoking and benefits of smoking cessation were discussed.     - declines nicotine patch      10/15/2022   · Bronchiectasis ([facility] [address] [J47.9]   - continue mucinex and chest vest ordered    - patient reports following with pulmonology already with history of lung nodules      10/15/2022   · Anxiety [F41.9]   - continue home xanax prn    - follows with transplant clinic psychiatry team      03/20/2022   · Chronic pain [G89.29]   - continue home neurontin (renally dosed at this time and patient made aware of this)       03/13/2022   · Liver transplant recipient [facility]) [Z94.4]   - follows at [facility] s/p liver transplant Oct 2020    -Restart cyclosporine on discharge tomorrow   - has transplant clinic appt on 10/20      11/04/2021   · Acute renal failure superimposed on stage 3 chronic kidney disease ([facility] [address] [N17.9, N18.30]   - Per chart review BUN 23-37 and Cr 1.1-1.6 over last several months    - 10/15 BUN 66/Cr 1.9   - patient reports decreased oral intake over last several days with acute illness    - Hydrate with NS    - hold home torsemide    - monitor for improvement      11/04/2021   · Essential hypertension [I10]   - resume home amlodipine    - hold torsemide at this time with acute on chronic renal failure    - monitor      11/04/2015   · Hyperglycemia with no history of diabetes mellitus              Likely secondary to steroid              Start Lantus 10 unit x 1 now              Continue sliding scale              Monitor blood glucose closely       · Dysuria    - UA noted   - on Rocephin as above

## 2022-10-18 NOTE — CARE COORDINATION
The Plan for Transition of Care is related to the following treatment goals: home with assist PRN    The Patient and/or patient representative was provided with a choice of provider and agrees with the discharge plan. [x] Yes [] No    Freedom of choice list was provided with basic dialogue that supports the patient's individualized plan of care/goals, treatment preferences and shares the quality data associated with the providers. [x] Yes [] No    DC today. Nebulizer ordered. No preference. Sent ot [facility] in [employer] (local DME company). No further DC needs noted at this time.

## 2022-10-19 LAB
BLOOD CULTURE, ROUTINE: NORMAL
CULTURE, BLOOD 2: NORMAL

## 2022-10-20 ENCOUNTER — CARE COORDINATION (OUTPATIENT)
Dept: CARE COORDINATION | Age: 67
End: 2022-10-20

## 2022-10-20 NOTE — CARE COORDINATION
[facility] Care Transitions Initial Follow Up Call    Call within 2 business days of discharge: Yes    Care Transition Nurse contacted the patient by telephone to perform post hospital discharge assessment. Verified name and  with patient as identifiers. Provided introduction to self, and explanation of the Care Transition Nurse role. Patient: Addie Saunders Patient : 1955   MRN: [id number]  Reason for Admission: Pneumonia, COPD exacerbation  Discharge Date: 10/18/22 RARS: Readmission Risk Score: 15.2      Last Discharge  Street       Date Complaint Diagnosis Description Type Department Provider    10/14/22 Emesis; Cough; Shortness of Breath; Headache Pneumonia of lower lobe due to infectious organism, unspecified laterality . .. ED to Hosp-Admission (Discharged) (ADMIT) Danielle Olea MD; Valentin Newsome. .. Was this an external facility discharge? No Discharge Facility: [facility] and [facility] to be reviewed by the provider   Additional needs identified to be addressed with provider: No  none               Method of communication with provider: none. Kristin Conrad tells me that she is doing well. She has been to [place] to the doctor this morning. No issues getting or taking her new prescriptions. We made her an appt for HFU. Care Transition Nurse reviewed discharge instructions with patient who verbalized understanding. The patient was given an opportunity to ask questions and does not have any further questions or concerns at this time. Were discharge instructions available to patient? Yes. Reviewed appropriate site of care based on symptoms and resources available to patient including: PCP. The patient agrees to contact the PCP office for questions related to their healthcare. Advance Care Planning:   Does patient have an Advance Directive: not on file.     Medication reconciliation was performed with patient, who verbalizes understanding of administration of home medications. Medications reviewed, 1111F entered: yes    Was patient discharged with a pulse oximeter? no    Non-face-to-face services provided:  Scheduled appointment with [facility]  Obtained and reviewed discharge summary and/or continuity of care documents    Offered patient enrollment in the Remote Patient Monitoring (RPM) program for in-home monitoring: NA.    Care Transitions 24 Hour Call    Schedule Follow Up Appointment with PCP: Completed  Do you have a copy of your discharge instructions?: Yes  Do you have all of your prescriptions and are they filled?: Yes  Have you been contacted by a [facility] Pharmacist?: Yes  Have you scheduled your follow up appointment?: No (Comment: scheduled today for 10/25/22)  Do you feel like you have everything you need to keep you well at home?: Yes  Care Transitions Interventions         Follow Up  Future Appointments   Date Time Provider Fly Michael   10/25/2022  1:00 PM Dolly Ulloa MD [facility]   11/1/2022 11:00 AM LEW GAMBLE 1 [facility] Rad   11/2/2022  2:45 PM Dolly Ulloa MD [address] [place] [address] Transition Nurse provided contact information. Plan for follow-up call in 5-7 days based on severity of symptoms and risk factors.   Plan for next call:  FU on progress    Sheri Almanza RN

## 2022-10-20 NOTE — ADT AUTH CERT
Utilization Reviews       Pneumonia - Care Day 3 (10/17/2022) by Abelardo Hollis RN       Review Status Review Entered   Completed 10/19/2022 1612       Created By   Abelardo Hollis RN      Criteria Review      Care Day: 3 Care Date: 10/17/2022 Level of Care: Inpatient Floor    Guideline Day 3    Clinical Status    (X) * Hemodynamic stability    (X) * Afebrile or temperature acceptable for next level of care    (X) * Tachypnea absent    (X) * Hypoxemia absent    (X) * Mental status at baseline    ( ) * Antibiotic regimen acceptable for next level of care    ( ) * Discharge plans and education understood    Activity    (X) * Ambulatory or acceptable for next level of care    Routes    (X) * Oral hydration    (X) * Oral medications or regimen acceptable for next level of care    (X) * Oral diet or acceptable for next level of care    Interventions    (X) * Oxygen absent or at baseline need    (X) * Isolation not indicated, or is performable at next level of care    (X) WBC    10/19/2022 4:12 PM EDT by Natasha Diane      WBC 7.6    Medications    ( ) Oral antibiotics    10/19/2022 4:12 PM EDT by Natasha Diane      cefTRIAXone (ROCEPHIN) IV 1,000 [id number]  azithromycin 500 [id number]    * Milestone   Additional Notes   DATE:10/17/2022   LOC:INPATIENT      Subjective:   Chief complaint:    SOA       Interval History:    Patient seen and examined this morning. Patient reports feeling better however not yet at baseline and is requesting to stay an additional day for treatment. States that she has follow-up in [place] on 10/20.        Scheduled Medications   · insulin glargine 20 Units SubCUTAneous Daily   · predniSONE 20 mg Oral Daily     Followed by   · [START ON 10/19/2022] predniSONE 15 mg Oral Daily     Followed by   · [START ON 10/22/2022] predniSONE 10 mg Oral Daily     Followed by   · [START ON 10/25/2022] predniSONE 5 mg Oral Daily   · insulin lispro 0-16 Units SubCUTAneous TID WC   · insulin lispro 0-4 Units SubCUTAneous Nightly   · miconazole Topical BID   · budesonide 0.5 mg Nebulization BID   · cefTRIAXone (ROCEPHIN) IV 1,000 mg IntraVENous Q24H   · amLODIPine 10 mg Oral Daily   · aspirin 81 mg Oral Daily   · gabapentin 300 mg Oral BID   · [Held by provider] torsemide 10 mg Oral Daily   · guaiFENesin 600 mg Oral BID   · ipratropium-albuterol 1 ampule Inhalation Q4H WA   · pantoprazole 40 mg Oral QAM AC   · silver sulfADIAZINE Topical Daily   · azithromycin 500 mg IntraVENous Q24H         Objective:       Vital Signs   Temp: 97.5 °F (36.4 °C)   Heart Rate: 67   Resp: 22   BP: (!) 163/68   SpO2: 98 %   O2 Device: None (Room air)       Vital signs reviewed in electronic charts. Physical exam   Constitutional:  Well developed, well nourished, no acute distress. Tremulous. Eyes:  PERRL, conjunctiva normal, EOMI. HENT:  Atraumatic, external ears normal, external nose/nares normal, oropharynx moist, no pharyngeal exudates. Neck:  Supple. No JVD or thyromegaly. Respiratory:  No respiratory distress, scattered rhonchi with expiratory wheezing. Decreased breath sounds bibasilarly. Cardiovascular:  Normal rate, normal rhythm, no murmurs, no gallops, no rubs. GI:  Soft, nondistended, normal bowel sounds, nontender, no organomegaly, no mass. :  No costovertebral angle tenderness. Musculoskeletal:  No edema, no tenderness, no obvious deformities. Patient is moving all extremities. Integument:  Well hydrated, no rash. Alvarado noted with blistering to upper thighs. Lymphatic:  No cervical or axillary lymphadenopathy noted. Neurologic:  Alert & oriented x 3,  no focal deficits noted. Strength is equal throughout. Psychiatric:  Speech and behavior appropriate.        Results:       Lab Results   Component Value Date     WBC 7.6 10/17/2022     HGB 9.8 (L) 10/17/2022     HCT 26.3 (L) 10/17/2022     MCV 94.9 10/17/2022      10/17/2022           Lab Results   Component Value Date/Time      10/17/2022 05:35 AM     K 4.9 10/17/2022 05:35 AM     K 4.4 03/22/2022 04:32 AM      10/17/2022 05:35 AM     CL 99.0 06/01/2012 08:10 AM     CO2 23 10/17/2022 05:35 AM     BUN 53 10/17/2022 05:35 AM     CREATININE 1.5 10/17/2022 05:35 AM     CREATININE 0.9 06/01/2012 08:10 AM     GLUCOSE 365 10/17/2022 05:35 AM     CALCIUM 8.6 10/17/2022 05:35 AM           Assessment and Plan: Active Hospital Problems     Diagnosis Date Noted   · Pneumonia, unspecified organism [J18.9]   -CT chest 10/15: (1) increasing bilateral lung base consolidations and mucous plugging and bronchiectasis and bronchial thickening in both lower lobes and lingula. (2) trace pleural effusion with pleural thickening noted in both dependent lungs. (3) some adenopathy noted along the right peritracheal pretracheal regions. (4) liver transplant with gastroesophageal varices noted, limited study without IV contrast.   -Oxygen saturation stable on room air   -Treating with IV Rocephin and azithromycin   -Blood cultures 10/15: No growth   -Sputum culture 10/15 with 1+ gram-negative rods and 1+ gram-negative diplococci   -Legionella antigen and strep pneumoniae antigen negative   -Initially received IV Solu-Medrol on admission; transition to oral prednisone taper on 10/16   -Treating with DuoNebs, Mucinex, Tessalon Perles   -Continue chest vest in setting of bronchiectasis   -Home cyclosporine on hold in setting of acute infection     10/15/2022   · Personal history of nicotine dependence [Z87.891]   -Offer NRT   -Counseled on cessation     10/15/2022   · Bronchiectasis ([facility] [address] [J47.9]   -As noted on CT as above   -Continue Mucinex and chest vest therapy   -Patient currently follows with pulmonology and has history of known lung nodules     10/15/2022   · Dysuria [R30.0]   -UA with 6-9 WBCs, rare bacteria and trace leukocyte Estrace. Urine culture not indicated.    -Patient is receiving Rocephin as above for treatment of pneumonia 10/15/2022   · Anxiety [F41.9]   -Continue home Xanax as needed   -Follows with transplant clinic psychiatry     03/20/2022   · Chronic pain [G89.29]   -Continue home Neurontin (renally dosed at this time and patient made aware of medication changes)     03/13/2022   · Liver transplant recipient [facility]) [Z94.4]   -Status post liver transplant in October 2020   -Follows at [facility]; has follow-up appointment on 10/20   -Plan to restart cyclosporine on discharge     11/04/2021   · Acute renal failure superimposed on stage 3 chronic kidney disease ([facility] [address] [N17.9, N18.30]   -Per chart review, BUN 23-37 and creatinine 1.1-1.6 over last several months   -On 10/15: BUN 66/creatinine 1.9   -Suspect PATRIA likely prerenal from decreased oral intake PTA in setting of acute illness.   Patient was hydrated with IV fluids and home torsemide was held.    -On 10/17: BUN 53/serum creatinine 1.5   -Continue to home home torsemide; resume when appropriate   -Avoid nephrotoxins and NSAIDs   -Encourage good p.o. intake     11/04/2021   · Essential hypertension [I10]   -Continue home amlodipine   -Home furosemide on hold as above in setting of resolving PATRIA on CKD       Hyperglycemia with no history of diabetes mellitus   -Suspect secondary to steroids   -Lantus and SSI added 10/16; doses adjusted 10/17     11/04/2015

## 2022-10-25 ENCOUNTER — OFFICE VISIT (OUTPATIENT)
Dept: PRIMARY CARE CLINIC | Age: 67
End: 2022-10-25

## 2022-10-25 ENCOUNTER — HOSPITAL ENCOUNTER (OUTPATIENT)
Facility: HOSPITAL | Age: 67
Discharge: HOME | End: 2022-10-25
Payer: MEDICARE

## 2022-10-25 VITALS
WEIGHT: 154.2 LBS | HEART RATE: 77 BPM | BODY MASS INDEX: 29.14 KG/M2 | SYSTOLIC BLOOD PRESSURE: 142 MMHG | OXYGEN SATURATION: 97 % | RESPIRATION RATE: 18 BRPM | DIASTOLIC BLOOD PRESSURE: 62 MMHG

## 2022-10-25 DIAGNOSIS — N18.30 CHRONIC RENAL FAILURE, STAGE 3 (MODERATE), UNSPECIFIED WHETHER STAGE 3A OR 3B CKD (HCC): ICD-10-CM

## 2022-10-25 DIAGNOSIS — D64.9 ANEMIA, UNSPECIFIED TYPE: ICD-10-CM

## 2022-10-25 DIAGNOSIS — Z94.4 LIVER TRANSPLANT RECIPIENT (HCC): ICD-10-CM

## 2022-10-25 DIAGNOSIS — Z09 HOSPITAL DISCHARGE FOLLOW-UP: ICD-10-CM

## 2022-10-25 DIAGNOSIS — I10 ESSENTIAL HYPERTENSION: ICD-10-CM

## 2022-10-25 DIAGNOSIS — J18.9 PNEUMONIA OF BOTH LOWER LOBES DUE TO INFECTIOUS ORGANISM: Primary | ICD-10-CM

## 2022-10-25 DIAGNOSIS — Z87.891 PERSONAL HISTORY OF TOBACCO USE, PRESENTING HAZARDS TO HEALTH: ICD-10-CM

## 2022-10-25 LAB
ALBUMIN SERPL-MCNC: 4.1 G/DL (ref 3.4–4.8)
ALP BLD-CCNC: 106 U/L (ref 25–100)
ALT SERPL-CCNC: 15 U/L (ref 4–36)
ANION GAP SERPL CALCULATED.3IONS-SCNC: 10 MMOL/L (ref 3–16)
AST SERPL-CCNC: 14 U/L (ref 8–33)
BASOPHILS ABSOLUTE: 0 K/UL (ref 0–0.1)
BASOPHILS RELATIVE PERCENT: 0.4 %
BILIRUB SERPL-MCNC: 1.2 MG/DL (ref 0.3–1.2)
BILIRUBIN DIRECT: 0.3 MG/DL (ref 0–0.2)
BILIRUBIN, INDIRECT: 0.9 MG/DL (ref 0.2–0.8)
BUN BLDV-MCNC: 34 MG/DL (ref 6–20)
CALCIUM SERPL-MCNC: 9 MG/DL (ref 8.5–10.5)
CHLORIDE BLD-SCNC: 104 MMOL/L (ref 98–107)
CO2: 27 MMOL/L (ref 20–30)
CREAT SERPL-MCNC: 1.4 MG/DL (ref 0.4–1.2)
EOSINOPHILS ABSOLUTE: 0.2 K/UL (ref 0–0.4)
EOSINOPHILS RELATIVE PERCENT: 2.1 %
GFR SERPL CREATININE-BSD FRML MDRD: 41 ML/MIN/{1.73_M2}
GLUCOSE BLD-MCNC: 147 MG/DL (ref 74–106)
HCT VFR BLD CALC: 32.9 % (ref 37–47)
HEMOGLOBIN: 11.4 G/DL (ref 11.5–16.5)
IMMATURE GRANULOCYTES #: 0.1 K/UL
IMMATURE GRANULOCYTES %: 0.7 % (ref 0–5)
LYMPHOCYTES ABSOLUTE: 0.9 K/UL (ref 1.5–4)
LYMPHOCYTES RELATIVE PERCENT: 12.4 %
MCH RBC QN AUTO: 32.9 PG (ref 27–32)
MCHC RBC AUTO-ENTMCNC: 34.7 G/DL (ref 31–35)
MCV RBC AUTO: 94.8 FL (ref 80–100)
MONOCYTES ABSOLUTE: 0.4 K/UL (ref 0.2–0.8)
MONOCYTES RELATIVE PERCENT: 5.2 %
NEUTROPHILS ABSOLUTE: 5.8 K/UL (ref 2–7.5)
NEUTROPHILS RELATIVE PERCENT: 79.2 %
PDW BLD-RTO: 15 % (ref 11–16)
PHOSPHORUS: 4.2 MG/DL (ref 2.5–4.5)
PLATELET # BLD: 183 K/UL (ref 150–400)
PMV BLD AUTO: 9.5 FL (ref 6–10)
POTASSIUM SERPL-SCNC: 5.1 MMOL/L (ref 3.4–5.1)
RBC # BLD: 3.47 M/UL (ref 3.8–5.8)
SODIUM BLD-SCNC: 141 MMOL/L (ref 136–145)
TOTAL PROTEIN: 6.3 G/DL (ref 6.4–8.3)
VITAMIN D 25-HYDROXY: 35.1 (ref 32–100)
WBC # BLD: 7.3 K/UL (ref 4–11)

## 2022-10-25 PROCEDURE — 80076 HEPATIC FUNCTION PANEL: CPT

## 2022-10-25 PROCEDURE — 82306 VITAMIN D 25 HYDROXY: CPT

## 2022-10-25 PROCEDURE — 36415 COLL VENOUS BLD VENIPUNCTURE: CPT

## 2022-10-25 PROCEDURE — 80158 DRUG ASSAY CYCLOSPORINE: CPT

## 2022-10-25 PROCEDURE — 80069 RENAL FUNCTION PANEL: CPT

## 2022-10-25 PROCEDURE — 85025 COMPLETE CBC W/AUTO DIFF WBC: CPT

## 2022-10-25 RX ORDER — ERGOCALCIFEROL 1.25 MG/1
50000 CAPSULE ORAL WEEKLY
Qty: 12 CAPSULE | Refills: 1 | Status: SHIPPED | OUTPATIENT
Start: 2022-10-25

## 2022-10-25 RX ORDER — AMLODIPINE BESYLATE 10 MG/1
10 TABLET ORAL DAILY
Qty: 30 TABLET | Refills: 0 | Status: SHIPPED | OUTPATIENT
Start: 2022-10-25 | End: 2022-10-25

## 2022-10-25 RX ORDER — AMLODIPINE BESYLATE 10 MG/1
10 TABLET ORAL DAILY
Qty: 90 TABLET | Refills: 1 | Status: SHIPPED | OUTPATIENT
Start: 2022-10-25

## 2022-10-25 RX ORDER — TRAMADOL HYDROCHLORIDE 50 MG/1
50 TABLET ORAL 2 TIMES DAILY PRN
COMMUNITY
Start: 2022-10-20 | End: 2022-11-19

## 2022-10-25 ASSESSMENT — ENCOUNTER SYMPTOMS
WHEEZING: 0
ABDOMINAL PAIN: 0
SHORTNESS OF BREATH: 0
NAUSEA: 0
EYE DISCHARGE: 0
COUGH: 0
VOMITING: 0
SORE THROAT: 0
SINUS PRESSURE: 0

## 2022-10-25 NOTE — PROGRESS NOTES
Post-Discharge Transitional Care Management Services or Hospital Follow Up      Carmina Castillo   YOB: 1955    Date of Office Visit:  10/25/2022  Date of Hospital Admission: 10/14/22  Date of Hospital Discharge: 10/18/22  Readmission Risk Score(high >=14%.  Medium >=10%):Readmission Risk Score: 15.2      Care management risk score Rising risk (score 2-5) and Complex Care (Scores >=6): No Risk Score On File     Non face to face  following discharge, date last encounter closed (first attempt may have been earlier):   10/20/2022 10/20/2022    Call initiated 2 business days of discharge: Yes     Patient Active Problem List   Diagnosis    Type 2 diabetes mellitus, without long-term current use of insulin ([facility] [address]    Sleep apnea    Menopause    General weakness    IIH (idiopathic intracranial hypertension)    Essential hypertension    Generalized abdominal pain    Depression with anxiety    Liver cirrhosis secondary to REYEZ (HCC)    Diarrhea    Other ascites    Chronic bilateral low back pain with bilateral sciatica    Elevated LFTs    Weight loss    Pre-transplant evaluation for liver transplant    Liver transplant recipient [facility])    Acute renal failure superimposed on stage 3 chronic kidney disease (HCC)    Enteritis    Hypomagnesemia    Nausea vomiting and diarrhea    Dysarthria    Hypertensive urgency    Chronic pain    Falls    Leg pain    History of cirrhosis    Oral thrush    Declining functional status    History of atrial fibrillation    Anemia    Stenosis of intracranial portions of right internal carotid artery    Intractable vomiting with nausea    Hypokalemia    TIA (transient ischemic attack)    Anxiety    Pneumonia, unspecified organism    Personal history of nicotine dependence    Bronchiectasis (HCC)    Dysuria    Hyperglycemia       Allergies   Allergen Reactions    Lisinopril Other (See Comments)     Chest pain    Ciprofloxacin Other (See Comments)     AMS    Dye [Iodides]        Medications taking these medications      metFORMIN 500 MG tablet  Commonly known as: GLUCOPHAGE  Stopped by: Shelly Luz MD                Medications marked \"taking\" at this time  Outpatient Medications Marked as Taking for the 10/25/22 encounter (Office Visit) with Shelly Luz MD   Medication Sig Dispense Refill    traMADol (ULTRAM) 50 MG tablet Take 50 mg by mouth 2 times daily as needed. ipratropium-albuterol (DUONEB) 0.5-2.5 (3) MG/3ML SOLN nebulizer solution Inhale 3 mLs into the lungs every 4 hours (while awake) 360 mL 0    albuterol-ipratropium (COMBIVENT RESPIMAT)  MCG/ACT AERS inhaler Inhale 1 puff into the lungs in the morning and 1 puff at noon and 1 puff in the evening and 1 puff before bedtime. 1 each 0    albuterol (PROVENTIL) (5 MG/ML) 0.5% nebulizer solution Take 1 mL by nebulization 4 times daily as needed for Wheezing 120 each 3    benzonatate (TESSALON) 100 MG capsule Take 1 capsule by mouth 3 times daily as needed for Cough 21 capsule 0    silver sulfADIAZINE (SILVADENE) 1 % cream Apply topically daily. 100 g 0    pantoprazole (PROTONIX) 40 MG tablet Take 1 tablet by mouth every morning (before breakfast) 30 tablet 0    gabapentin (NEURONTIN) 300 MG capsule Take 1 capsule by mouth 3 times daily for 14 days. Intended supply: 30 days 42 capsule 0    ALPRAZolam (XANAX) 0.5 MG tablet Take 1 tablet by mouth 2 times daily as needed for Sleep or Anxiety for up to 30 days.  60 tablet 0    promethazine (PHENERGAN) 12.5 MG tablet TAKE 1 TABLET BY MOUTH 3 TIMES DAILY AS NEEDED FOR NAUSEA 20 tablet 0    aspirin 81 MG EC tablet Take 81 mg by mouth daily      cycloSPORINE modified (NEORAL) 25 MG capsule Take 3 capsules every morning and take 4 capsules every night      torsemide (DEMADEX) 20 MG tablet Take 10 mg by mouth daily           Medications patient taking as of now reconciled against medications ordered at time of hospital discharge: Yes    Chief Complaint   Patient presents with    Follow-Up from \[facility] [place] [facility]\""  Patient is here for HFU. She was admitted with shortness of breath. Patient was diagnosed with COPD exacerbation related to underlying pneumonia. Patient was treated with steroid, antibiotics, neb treatment, oxygen and pulmonary toileting. Patient improved and did not require oxygen prior to discharge. Patient reported that she is done with antibiotics. She is doing much better and almost back to baseline. Blood pressure has been stable. Patient is back on immunosuppression with her history of liver transplant. Patient with history of chronic renal failure. She continued to have good urine output. Blood pressure remained stable. Review of Systems   Constitutional:  Positive for fatigue. Negative for chills and fever. HENT:  Negative for congestion, sinus pressure and sore throat. Eyes:  Negative for discharge and visual disturbance. Respiratory:  Negative for cough, shortness of breath and wheezing. Cardiovascular:  Negative for chest pain and palpitations. Gastrointestinal:  Negative for abdominal pain, nausea and vomiting. Endocrine: Negative for cold intolerance and heat intolerance. Genitourinary:  Negative for dysuria, frequency and urgency. Musculoskeletal:  Positive for arthralgias, back pain and gait problem. Skin:  Negative for rash and wound. Neurological:  Negative for syncope, numbness and headaches. Hematological: Negative. Psychiatric/Behavioral:  Negative for agitation and sleep disturbance. The patient is not nervous/anxious. Vitals:    10/25/22 1255 10/25/22 1300   BP: (!) 156/70 (!) 142/62   Pulse: 77    Resp: 18    SpO2: 97%    Weight: 154 lb 3.2 oz (69.9 kg)      Body mass index is 29.14 kg/m².    Wt Readings from Last 3 Encounters:   10/25/22 154 lb 3.2 oz (69.9 kg)   10/17/22 140 lb 1.6 oz (63.5 kg)   09/15/22 144 lb 12.8 oz (65.7 kg)     BP Readings from Last 3 Encounters:   10/25/22 (!) 142/62   10/18/22 134/62   09/15/22 136/80 Physical Exam  Vitals and nursing note reviewed. Constitutional:       Appearance: Normal appearance. She is well-developed. HENT:      Head: Normocephalic and atraumatic. Right Ear: External ear normal.      Left Ear: External ear normal.      Nose: Nose normal.      Mouth/Throat:      Mouth: Mucous membranes are moist.      Pharynx: Oropharynx is clear. Eyes:      Conjunctiva/sclera: Conjunctivae normal.      Pupils: Pupils are equal, round, and reactive to light. Neck:      Thyroid: No thyromegaly. Vascular: No JVD. Cardiovascular:      Rate and Rhythm: Normal rate and regular rhythm. Heart sounds: Normal heart sounds. Pulmonary:      Effort: Pulmonary effort is normal.      Breath sounds: Normal breath sounds. No wheezing or rales. Abdominal:      General: Bowel sounds are normal. There is no distension. Palpations: Abdomen is soft. Tenderness: There is no abdominal tenderness. Musculoskeletal:         General: No tenderness. Cervical back: Neck supple. No rigidity. No muscular tenderness. Lumbar back: Decreased range of motion. Right lower leg: No edema. Left lower leg: No edema. Skin:     Findings: No erythema or rash. Neurological:      General: No focal deficit present. Mental Status: She is alert and oriented to person, place, and time.    Psychiatric:         Behavior: Behavior normal.         Judgment: Judgment normal.       Lab Results   Component Value Date/Time     10/25/2022 09:50 AM    K 5.1 10/25/2022 09:50 AM    K 4.4 03/22/2022 04:32 AM     10/25/2022 09:50 AM    CL 99.0 06/01/2012 08:10 AM    CO2 27 10/25/2022 09:50 AM    GLUCOSE 147 10/25/2022 09:50 AM    BUN 34 10/25/2022 09:50 AM    CREATININE 1.4 10/25/2022 09:50 AM    CREATININE 0.9 06/01/2012 08:10 AM    CALCIUM 9.0 10/25/2022 09:50 AM    PROT 6.3 10/25/2022 09:50 AM    LABALBU 4.1 10/25/2022 09:50 AM    LABALBU 3.9 06/01/2012 08:10 AM    BILITOT 1.2 10/25/2022 09:50 AM    ALT 15 10/25/2022 09:50 AM    AST 14 10/25/2022 09:50 AM       Hemoglobin A1C (%)   Date Value   10/16/2022 4.5     Microscopic Examination (no units)   Date Value   10/15/2022 YES     LDL Calculated (mg/dL)   Date Value   03/14/2022 87         Lab Results   Component Value Date/Time    WBC 7.3 10/25/2022 09:50 AM    NEUTROABS 5.8 10/25/2022 09:50 AM    HGB 11.4 10/25/2022 09:50 AM    HCT 32.9 10/25/2022 09:50 AM    HCT 44.3 06/01/2012 08:10 AM    MCV 94.8 10/25/2022 09:50 AM     10/25/2022 09:50 AM     06/01/2012 08:10 AM       Lab Results   Component Value Date    TSH 2.59 03/13/2022       Assessment/Plan:  1. Pneumonia of both lower lobes due to infectious organism  Patient is significantly better and almost back to baseline. I had long conversation with the patient regarding the risk from infection and potential complication especially with being on immunosuppressant with her history of liver transplant. Discussed the importance of notify me or go to the emergency room with any issues on that part. Discussed the importance of smoking cessation.    - [place] TOBACCO USE CESSATION INTERMEDIATE 3-10 MINUTES    2. Personal history of tobacco use, presenting hazards to health  I had a long discussion with her regarding the risk of smoking especialy with her other medical problems. I have discussed with patient several treatment options (program, nicotine product and other meds like Chantix, Wellbutrin ect) to help her quit smoking. Patient is not interested at this time  Spent 5 minutes in counseling regarding smoking cessation.    - [place] TOBACCO USE CESSATION INTERMEDIATE 3-10 MINUTES    3. Essential hypertension  BP is stable. I have advised her on low-sodium diet, exercise and weight control. I am going to continue current medication. Will monitor her renal function every few months, have advised her to check blood pressure frequently and to keep a record of this.      4. Liver transplant recipient [facility])  Seems to be doing well. Continue current regimen. Continue to follow-up with transplant team.  Discussed the importance of monitoring levels/labs periodically and being compliant with transplant team.    5. Anemia, unspecified type  Monitor hemoglobin level periodically. Anemia work-up periodically. 6. Chronic renal failure, stage 3 (moderate), unspecified whether stage 3a or 3b CKD (HCC)  Slight fluctuating/worsening. I have advised her to avoid any nephrotoxic agents. I am going to monitor renal function periodically. I will make sure that the blood pressure and other medical problems are under good control. Will refer to nephrology if the renal function should begin to deteriorate. Creatinine will be checked today. Last Creatinine is   Lab Results   Component Value Date    CREATININE 1.4 (H) 10/25/2022     7. Hospital discharge follow-up  Reviewed records/imaging studies and note with patient's. - MD DISCHARGE MEDS RECONCILED W/ CURRENT OUTPATIENT MED LIST      Medical Decision Making: moderate complexity    Inpatient course: Discharge summary reviewed- see chart. Lucas Dotson MA am scribing for and in the presence of Velia Morris MD on this date of 10/25/22 at 1:36 PM    I, Dr. Velia Morris, personally performed the services described in the documentation as scribed by Kumar Calderón MA, in my presence and it is both accurate and complete.

## 2022-10-25 NOTE — PROGRESS NOTES
Chief Complaint   Patient presents with    Follow-Up from Hospital       Have you seen any other physician or provider since your last visit yes - rome , and jeri for fu     Have you had any other diagnostic tests since your last visit? yes -     Have you changed or stopped any medications since your last visit? yes -  liver  put her on tramadol.

## 2022-10-27 LAB — CYCLOSPORINE A: 82.2 NG/ML

## 2022-11-01 ENCOUNTER — HOSPITAL ENCOUNTER (OUTPATIENT)
Dept: MAMMOGRAPHY | Facility: HOSPITAL | Age: 67
Discharge: HOME | End: 2022-11-01
Payer: MEDICARE

## 2022-11-01 DIAGNOSIS — Z12.31 ENCOUNTER FOR SCREENING MAMMOGRAM FOR BREAST CANCER: ICD-10-CM

## 2022-11-01 PROCEDURE — 77067 SCR MAMMO BI INCL CAD: CPT

## 2022-11-15 DIAGNOSIS — M79.2 NEUROPATHIC PAIN: ICD-10-CM

## 2022-11-16 RX ORDER — GABAPENTIN 300 MG/1
300 CAPSULE ORAL 3 TIMES DAILY
Qty: 42 CAPSULE | Refills: 0 | Status: SHIPPED | OUTPATIENT
Start: 2022-11-16 | End: 2022-11-18

## 2022-11-18 ENCOUNTER — TELEPHONE (OUTPATIENT)
Dept: PRIMARY CARE CLINIC | Age: 67
End: 2022-11-18

## 2022-11-18 DIAGNOSIS — M79.2 NEUROPATHIC PAIN: ICD-10-CM

## 2022-11-18 RX ORDER — GABAPENTIN 300 MG/1
300 CAPSULE ORAL 4 TIMES DAILY
Qty: 120 CAPSULE | Refills: 0 | Status: SHIPPED | OUTPATIENT
Start: 2022-11-18 | End: 2022-12-18

## 2022-11-18 NOTE — TELEPHONE ENCOUNTER
Pt called asking why she is only getting quantity of 42 of Gabapentin.  States she normally gets #120 monthly

## 2022-11-24 ASSESSMENT — ENCOUNTER SYMPTOMS: BACK PAIN: 1

## 2022-11-28 ENCOUNTER — HOSPITAL ENCOUNTER (OUTPATIENT)
Facility: HOSPITAL | Age: 67
Discharge: HOME | End: 2022-11-28
Payer: MEDICARE

## 2022-11-28 LAB
ALBUMIN SERPL-MCNC: 4 G/DL (ref 3.4–4.8)
ALP BLD-CCNC: 103 U/L (ref 25–100)
ALT SERPL-CCNC: 10 U/L (ref 4–36)
ANION GAP SERPL CALCULATED.3IONS-SCNC: 9 MMOL/L (ref 3–16)
AST SERPL-CCNC: 17 U/L (ref 8–33)
BASOPHILS ABSOLUTE: 0 K/UL (ref 0–0.1)
BASOPHILS RELATIVE PERCENT: 0.4 %
BILIRUB SERPL-MCNC: 0.8 MG/DL (ref 0.3–1.2)
BILIRUBIN DIRECT: <0.2 MG/DL (ref 0–0.2)
BILIRUBIN, INDIRECT: ABNORMAL MG/DL (ref 0.2–0.8)
BUN BLDV-MCNC: 26 MG/DL (ref 6–20)
CALCIUM SERPL-MCNC: 9 MG/DL (ref 8.5–10.5)
CHLORIDE BLD-SCNC: 105 MMOL/L (ref 98–107)
CO2: 24 MMOL/L (ref 20–30)
CREAT SERPL-MCNC: 1.4 MG/DL (ref 0.4–1.2)
EOSINOPHILS ABSOLUTE: 0 K/UL (ref 0–0.4)
EOSINOPHILS RELATIVE PERCENT: 1.3 %
GFR SERPL CREATININE-BSD FRML MDRD: 41 ML/MIN/{1.73_M2}
GLUCOSE BLD-MCNC: 105 MG/DL (ref 74–106)
HCT VFR BLD CALC: 33.1 % (ref 37–47)
HEMOGLOBIN: 11.2 G/DL (ref 11.5–16.5)
IMMATURE GRANULOCYTES #: 0 K/UL
IMMATURE GRANULOCYTES %: 0.4 % (ref 0–5)
LYMPHOCYTES ABSOLUTE: 0.6 K/UL (ref 1.5–4)
LYMPHOCYTES RELATIVE PERCENT: 24.5 %
MCH RBC QN AUTO: 31.6 PG (ref 27–32)
MCHC RBC AUTO-ENTMCNC: 33.8 G/DL (ref 31–35)
MCV RBC AUTO: 93.5 FL (ref 80–100)
MONOCYTES ABSOLUTE: 0.2 K/UL (ref 0.2–0.8)
MONOCYTES RELATIVE PERCENT: 6.8 %
NEUTROPHILS ABSOLUTE: 1.6 K/UL (ref 2–7.5)
NEUTROPHILS RELATIVE PERCENT: 66.6 %
PDW BLD-RTO: 15.6 % (ref 11–16)
PHOSPHORUS: 3.3 MG/DL (ref 2.5–4.5)
PLATELET # BLD: 111 K/UL (ref 150–400)
PMV BLD AUTO: 10.4 FL (ref 6–10)
POTASSIUM SERPL-SCNC: 5 MMOL/L (ref 3.4–5.1)
RBC # BLD: 3.54 M/UL (ref 3.8–5.8)
SODIUM BLD-SCNC: 138 MMOL/L (ref 136–145)
TOTAL PROTEIN: 6.7 G/DL (ref 6.4–8.3)
WBC # BLD: 2.4 K/UL (ref 4–11)

## 2022-11-28 PROCEDURE — 80076 HEPATIC FUNCTION PANEL: CPT

## 2022-11-28 PROCEDURE — 80069 RENAL FUNCTION PANEL: CPT

## 2022-11-28 PROCEDURE — 80158 DRUG ASSAY CYCLOSPORINE: CPT

## 2022-11-28 PROCEDURE — 85025 COMPLETE CBC W/AUTO DIFF WBC: CPT

## 2022-11-28 PROCEDURE — 36415 COLL VENOUS BLD VENIPUNCTURE: CPT

## 2022-11-30 LAB — CYCLOSPORINE A: 79.5 NG/ML

## 2022-12-15 ENCOUNTER — OFFICE VISIT (OUTPATIENT)
Dept: PRIMARY CARE CLINIC | Age: 67
End: 2022-12-15
Payer: MEDICARE

## 2022-12-15 VITALS
OXYGEN SATURATION: 92 % | WEIGHT: 151 LBS | RESPIRATION RATE: 18 BRPM | BODY MASS INDEX: 28.53 KG/M2 | HEART RATE: 80 BPM | DIASTOLIC BLOOD PRESSURE: 74 MMHG | SYSTOLIC BLOOD PRESSURE: 136 MMHG

## 2022-12-15 DIAGNOSIS — Z94.4 LIVER TRANSPLANT RECIPIENT (HCC): ICD-10-CM

## 2022-12-15 DIAGNOSIS — R05.9 COUGH, UNSPECIFIED TYPE: Primary | ICD-10-CM

## 2022-12-15 DIAGNOSIS — M79.2 NEUROPATHIC PAIN: ICD-10-CM

## 2022-12-15 DIAGNOSIS — R11.0 NAUSEA: ICD-10-CM

## 2022-12-15 DIAGNOSIS — J20.9 ACUTE BRONCHITIS, UNSPECIFIED ORGANISM: ICD-10-CM

## 2022-12-15 DIAGNOSIS — F41.9 ANXIETY: ICD-10-CM

## 2022-12-15 DIAGNOSIS — D61.818 PANCYTOPENIA (HCC): ICD-10-CM

## 2022-12-15 LAB
INFLUENZA A ANTIGEN, POC: NORMAL
INFLUENZA B ANTIGEN, POC: NORMAL

## 2022-12-15 PROCEDURE — 1090F PRES/ABSN URINE INCON ASSESS: CPT | Performed by: INTERNAL MEDICINE

## 2022-12-15 PROCEDURE — 87635 SARS-COV-2 COVID-19 AMP PRB: CPT | Performed by: INTERNAL MEDICINE

## 2022-12-15 PROCEDURE — 87804 INFLUENZA ASSAY W/OPTIC: CPT | Performed by: INTERNAL MEDICINE

## 2022-12-15 PROCEDURE — G8484 FLU IMMUNIZE NO ADMIN: HCPCS | Performed by: INTERNAL MEDICINE

## 2022-12-15 PROCEDURE — 3074F SYST BP LT 130 MM HG: CPT | Performed by: INTERNAL MEDICINE

## 2022-12-15 PROCEDURE — 4004F PT TOBACCO SCREEN RCVD TLK: CPT | Performed by: INTERNAL MEDICINE

## 2022-12-15 PROCEDURE — 1123F ACP DISCUSS/DSCN MKR DOCD: CPT | Performed by: INTERNAL MEDICINE

## 2022-12-15 PROCEDURE — G8427 DOCREV CUR MEDS BY ELIG CLIN: HCPCS | Performed by: INTERNAL MEDICINE

## 2022-12-15 PROCEDURE — 3078F DIAST BP <80 MM HG: CPT | Performed by: INTERNAL MEDICINE

## 2022-12-15 PROCEDURE — G8399 PT W/DXA RESULTS DOCUMENT: HCPCS | Performed by: INTERNAL MEDICINE

## 2022-12-15 PROCEDURE — 3017F COLORECTAL CA SCREEN DOC REV: CPT | Performed by: INTERNAL MEDICINE

## 2022-12-15 PROCEDURE — G8417 CALC BMI ABV UP PARAM F/U: HCPCS | Performed by: INTERNAL MEDICINE

## 2022-12-15 PROCEDURE — 99214 OFFICE O/P EST MOD 30 MIN: CPT | Performed by: INTERNAL MEDICINE

## 2022-12-15 RX ORDER — ALPRAZOLAM 0.5 MG/1
0.5 TABLET ORAL 3 TIMES DAILY PRN
Qty: 90 TABLET | Refills: 0 | Status: SHIPPED | OUTPATIENT
Start: 2022-12-15 | End: 2023-01-14

## 2022-12-15 RX ORDER — PROMETHAZINE HYDROCHLORIDE 25 MG/1
25 TABLET ORAL 2 TIMES DAILY PRN
Qty: 60 TABLET | Refills: 0 | Status: SHIPPED | OUTPATIENT
Start: 2022-12-15

## 2022-12-15 RX ORDER — SERTRALINE HYDROCHLORIDE 25 MG/1
25 TABLET, FILM COATED ORAL DAILY
Qty: 30 TABLET | Refills: 3 | Status: SHIPPED | OUTPATIENT
Start: 2022-12-15

## 2022-12-15 RX ORDER — TRAMADOL HYDROCHLORIDE 50 MG/1
50 TABLET ORAL 2 TIMES DAILY PRN
COMMUNITY
End: 2022-12-15 | Stop reason: SDUPTHER

## 2022-12-15 RX ORDER — TRAMADOL HYDROCHLORIDE 50 MG/1
50 TABLET ORAL 2 TIMES DAILY PRN
Qty: 60 TABLET | Refills: 0 | Status: SHIPPED | OUTPATIENT
Start: 2022-12-15 | End: 2023-01-14

## 2022-12-15 RX ORDER — ALPRAZOLAM 0.5 MG/1
0.5 TABLET ORAL 3 TIMES DAILY PRN
COMMUNITY
End: 2022-12-15 | Stop reason: SDUPTHER

## 2022-12-15 RX ORDER — PROMETHAZINE HYDROCHLORIDE 25 MG/1
25 TABLET ORAL EVERY 6 HOURS PRN
COMMUNITY
Start: 2022-11-09 | End: 2022-12-15 | Stop reason: SDUPTHER

## 2022-12-15 RX ORDER — BENZONATATE 200 MG/1
200 CAPSULE ORAL 3 TIMES DAILY PRN
Qty: 30 CAPSULE | Refills: 0 | Status: SHIPPED | OUTPATIENT
Start: 2022-12-15 | End: 2022-12-22

## 2022-12-15 RX ORDER — DOXYCYCLINE HYCLATE 100 MG
100 TABLET ORAL 2 TIMES DAILY
Qty: 14 TABLET | Refills: 0 | Status: SHIPPED | OUTPATIENT
Start: 2022-12-15 | End: 2022-12-22

## 2022-12-15 ASSESSMENT — ENCOUNTER SYMPTOMS
COUGH: 1
WHEEZING: 0
VOMITING: 0
SINUS PRESSURE: 0
SHORTNESS OF BREATH: 0
EYE DISCHARGE: 0
ABDOMINAL PAIN: 0
SORE THROAT: 0

## 2022-12-15 NOTE — PROGRESS NOTES
SUBJECTIVE:    Patient ID: Carmina Castillo is a 78 y. o.female. Chief Complaint   Patient presents with    Cirrhosis    Atrial Fibrillation                HPI:  Pt complains of cough. Started as dry and became productive with yellowish sputum. Denies fever/chills. Sx for the past few weeks. Sx getting worse. No sick contact. She reported no fever or chills. Patient has had a nerve problem for long time. Her sx have recently gotten worse. She easily get agitated and nervous. She has some difficulties falling and maintaining sleep at time. She denies any suicidal ideation. She has supportive family. She did have doctor in [place] increase her Xanax to TID. States taking it twice a day was not helping. Patient's medications, allergies, past medical, surgical, social and family histories were reviewed and updated as appropriate in electronic medical record. Outpatient Medications Marked as Taking for the 12/15/22 encounter (Office Visit) with Maria C Robbins MD   Medication Sig Dispense Refill    promethazine (PHENERGAN) 25 MG tablet Take 25 mg by mouth every 6 hours as needed for Nausea      ALPRAZolam (XANAX) 0.5 MG tablet Take 0.5 mg by mouth 3 times daily as needed for Sleep. 90 last filled 11/09/2022 from liver transplant      traMADol (ULTRAM) 50 MG tablet Take 50 mg by mouth 2 times daily as needed for Pain.      gabapentin (NEURONTIN) 300 MG capsule Take 1 capsule by mouth 4 times daily for 30 days. Intended supply: 30 days 120 capsule 0    amLODIPine (NORVASC) 10 MG tablet TAKE 1 TABLET BY MOUTH DAILY 90 tablet 1    ipratropium-albuterol (DUONEB) 0.5-2.5 (3) MG/3ML SOLN nebulizer solution Inhale 3 mLs into the lungs every 4 hours (while awake) 360 mL 0    albuterol-ipratropium (COMBIVENT RESPIMAT)  MCG/ACT AERS inhaler Inhale 1 puff into the lungs in the morning and 1 puff at noon and 1 puff in the evening and 1 puff before bedtime.  1 each 0    albuterol (PROVENTIL) (5 MG/ML) 0.5% nebulizer solution Take 1 mL by nebulization 4 times daily as needed for Wheezing 120 each 3    silver sulfADIAZINE (SILVADENE) 1 % cream Apply topically daily. 100 g 0    pantoprazole (PROTONIX) 40 MG tablet Take 1 tablet by mouth every morning (before breakfast) 30 tablet 0    aspirin 81 MG EC tablet Take 81 mg by mouth daily      cycloSPORINE modified (NEORAL) 25 MG capsule Take 3 capsules every morning and take 4 capsules every night      torsemide (DEMADEX) 20 MG tablet Take 10 mg by mouth daily           Review of Systems   Constitutional:  Positive for fatigue. Negative for chills and fever. HENT:  Negative for congestion, sinus pressure and sore throat. Eyes:  Negative for discharge and visual disturbance. Respiratory:  Positive for cough. Negative for shortness of breath and wheezing. Cardiovascular:  Negative for chest pain and palpitations. Gastrointestinal:  Positive for nausea. Negative for abdominal pain and vomiting. Endocrine: Negative for cold intolerance and heat intolerance. Genitourinary:  Negative for dysuria, frequency and urgency. Musculoskeletal:  Positive for arthralgias, back pain and gait problem. Skin:  Negative for rash and wound. Neurological:  Positive for numbness. Negative for syncope and headaches. Hematological: Negative. Psychiatric/Behavioral:  Negative for agitation and sleep disturbance. The patient is nervous/anxious.       Past Medical History:   Diagnosis Date    Arthritis, rheumatoid (HCC)     Ascites     Bronchiectasis (HCC)     CAD (coronary artery disease)     Chronic pain     Cirrhosis (HCC)     Colitis     COPD (chronic obstructive pulmonary disease) (HCC)     Esophageal varices (HCC)     Hypertension     IIH (idiopathic intracranial hypertension)     Liver cirrhosis (HCC)     Osteoarthritis     Portal hypertensive gastropathy (HCC)     Sciatica     Splenomegaly      Past Surgical History:   Procedure Laterality Date    BONE MARROW BIOPSY      DILATION AND CURETTAGE OF UTERUS      DILATION AND CURETTAGE OF UTERUS  1998 & 2000    LIVER BIOPSY      LIVER TRANSPLANT  10/24/2020    TUBAL LIGATION  1982    UPPER GASTROINTESTINAL ENDOSCOPY  10/14/2020    with banding after varices     Family History   Problem Relation Age of Onset    High Blood Pressure Mother     Alzheimer's Disease Mother     Heart Disease Father     High Blood Pressure Father     Heart Disease Maternal Grandmother     Heart Disease Maternal Grandfather     Stroke Maternal Grandfather     Diabetes Paternal Grandmother     High Blood Pressure Brother       Social History     Tobacco Use   Smoking Status Light Smoker    Packs/day: 0.10    Years: 1.00    Pack years: 0.10    Types: Cigarettes    Last attempt to quit: 5/11/2019    Years since quitting: 3.6   Smokeless Tobacco Never       OBJECTIVE:   Wt Readings from Last 3 Encounters:   12/15/22 151 lb (68.5 kg)   10/25/22 154 lb 3.2 oz (69.9 kg)   10/17/22 140 lb 1.6 oz (63.5 kg)     BP Readings from Last 3 Encounters:   12/15/22 136/74   10/25/22 (!) 142/62   10/18/22 134/62       /74   Pulse 80   Resp 18   Wt 151 lb (68.5 kg)   SpO2 92%   BMI 28.53 kg/m²      Physical Exam  Vitals and nursing note reviewed. Constitutional:       Appearance: Normal appearance. She is well-developed. HENT:      Head: Normocephalic and atraumatic. Right Ear: External ear normal.      Left Ear: External ear normal.      Nose: Nose normal.      Mouth/Throat:      Mouth: Mucous membranes are moist.      Pharynx: Oropharynx is clear. Eyes:      Conjunctiva/sclera: Conjunctivae normal.      Pupils: Pupils are equal, round, and reactive to light. Neck:      Thyroid: No thyromegaly. Vascular: No JVD. Cardiovascular:      Rate and Rhythm: Normal rate and regular rhythm. Heart sounds: Normal heart sounds. Pulmonary:      Effort: Pulmonary effort is normal.      Breath sounds: Normal breath sounds. No wheezing or rales.    Abdominal: General: Bowel sounds are normal. There is no distension. Palpations: Abdomen is soft. Tenderness: There is no abdominal tenderness. Musculoskeletal:         General: No tenderness. Cervical back: Neck supple. No rigidity. No muscular tenderness. Right lower leg: No edema. Left lower leg: No edema. Skin:     Findings: No erythema or rash. Neurological:      General: No focal deficit present. Mental Status: She is alert and oriented to person, place, and time. Psychiatric:         Mood and Affect: Mood normal.         Behavior: Behavior normal.         Thought Content: Thought content normal.         Judgment: Judgment normal.       Lab Results   Component Value Date/Time     11/28/2022 09:28 AM    K 5.0 11/28/2022 09:28 AM    K 4.4 03/22/2022 04:32 AM     11/28/2022 09:28 AM    CL 99.0 06/01/2012 08:10 AM    CO2 24 11/28/2022 09:28 AM    GLUCOSE 105 11/28/2022 09:28 AM    BUN 26 11/28/2022 09:28 AM    CREATININE 1.4 11/28/2022 09:28 AM    CREATININE 0.9 06/01/2012 08:10 AM    CALCIUM 9.0 11/28/2022 09:28 AM    PROT 6.7 11/28/2022 09:28 AM    LABALBU 4.0 11/28/2022 09:28 AM    LABALBU 3.9 06/01/2012 08:10 AM    BILITOT 0.8 11/28/2022 09:28 AM    ALT 10 11/28/2022 09:28 AM    AST 17 11/28/2022 09:28 AM       Hemoglobin A1C (%)   Date Value   10/16/2022 4.5     Microscopic Examination (no units)   Date Value   10/15/2022 YES     LDL Calculated (mg/dL)   Date Value   03/14/2022 87         Lab Results   Component Value Date/Time    WBC 2.4 11/28/2022 09:28 AM    NEUTROABS 1.6 11/28/2022 09:28 AM    HGB 11.2 11/28/2022 09:28 AM    HCT 33.1 11/28/2022 09:28 AM    HCT 44.3 06/01/2012 08:10 AM    MCV 93.5 11/28/2022 09:28 AM     11/28/2022 09:28 AM     06/01/2012 08:10 AM       Lab Results   Component Value Date    TSH 2.59 03/13/2022         ASSESSMENT/PLAN:     1. Cough, unspecified type  Flu and COVID screen came back negative.   I am going to treat for possible acute bronchitis given her immunosuppressed status with doxycycline. Monitor response. Advised the patient to contact me with any worsening especially with her immunosuppressed status. - POCT Influenza A/B Antigen ([facility])  - POCT COVID-19 Rapid, NAAT    2. Acute bronchitis, unspecified organism  I am going to treat her with doxycycline and Tessalon Perle. In addition to Mucinex, Tylenol PRN, rest, and increase fluid intake. RTC if sx no better. 3. Liver transplant recipient [facility])  Continue immunosuppressant and labs per transplant team.  Discussed the importance of being very compliant with treatment plan and following up labs on a regular basis. Discussed the importance to seek medical attention immediately with any signs of infection. 4. Nausea  Treat her acute illness and monitor for now. GI prophylaxis. 5. Pancytopenia ([address]  Platelet count and white count usually within normal limit but down on last blood work. I am going to repeat blood work. Treat her acute illness. Monitor closely. Further recommendation based on test result. 6. Anxiety  I am going to continue current medication. I advised the patient to seek counseling. I will reassess current condition every few months. Patient to call or RTC if experienced any deterioration of Sx.    1. Goal is to alleviate symptoms to a degree that the patient can participate in own ADLS social activity and improve function. 2. Risk and benefits were reviewed at length, including risk for addiction and possible side effects and interactions. Alternative therapy was discussed and will be a part of the patients overall care. Including but not limited to, other medications as well as behavioral and activity modification and the use of other modalities. 3. This patient does not exhibit a potential for abuse or addiction at this time. Will monitor closely. 4. Will randomly check drug screen.    5. Promise Chappell completed and compliant, will check every 3 months. 6. Advised her  that UDS and possible pill counts and frequent monitoring will be a part of her care. 7. Patient has medication agreement and consent to treat with this office. Patient has been informed not to seek or obtain medication from other practitioners and to notify our office ASAP if this happened on emergent basis. - ALPRAZolam (XANAX) 0.5 MG tablet; Take 1 tablet by mouth 3 times daily as needed for Sleep for up to 30 days. 90 last filled 11/09/2022 from liver transplant  Dispense: 90 tablet; Refill: 0    7. Neuropathic pain  Treat with gabapentin in addition to Ultram on a as needed basis. - traMADol (ULTRAM) 50 MG tablet; Take 1 tablet by mouth 2 times daily as needed for Pain for up to 30 days. Dispense: 60 tablet; Refill: 0    I had lengthy conversation regarding not getting any controlled medicine from any other providers unless during urgent/emergent situation and to notify me with that if happen. Patient reported will be compliant with that in the future. Orders Placed This Encounter   Medications    doxycycline hyclate (VIBRA-TABS) 100 MG tablet     Sig: Take 1 tablet by mouth 2 times daily for 7 days     Dispense:  14 tablet     Refill:  0    benzonatate (TESSALON) 200 MG capsule     Sig: Take 1 capsule by mouth 3 times daily as needed for Cough     Dispense:  30 capsule     Refill:  0    sertraline (ZOLOFT) 25 MG tablet     Sig: Take 1 tablet by mouth daily     Dispense:  30 tablet     Refill:  3    ALPRAZolam (XANAX) 0.5 MG tablet     Sig: Take 1 tablet by mouth 3 times daily as needed for Sleep for up to 30 days. 90 last filled 11/09/2022 from liver transplant     Dispense:  90 tablet     Refill:  0    traMADol (ULTRAM) 50 MG tablet     Sig: Take 1 tablet by mouth 2 times daily as needed for Pain for up to 30 days.      Dispense:  60 tablet     Refill:  0     Reduce doses taken as pain becomes manageable    promethazine (PHENERGAN) 25 MG tablet     Sig: Take 1 tablet by mouth 2 times daily as needed for Nausea     Dispense:  60 tablet     Refill:  0          I, Natali Blake MA am scribing for and in the presence of Shelly Luz MD on this date of 12/15/22 at 3:16 PM    I, Dr. Shelly Luz, personally performed the services described in the documentation as scribed by Natali Blake MA, in my presence and it is both accurate and complete.

## 2022-12-15 NOTE — PROGRESS NOTES
Chief Complaint   Patient presents with    Cirrhosis    Atrial Fibrillation              Have you seen any other physician or provider since your last visit yes - liver transplant team In [facility] for fu     Have you had any other diagnostic tests since your last visit? no    Have you changed or stopped any medications since your last visit? no       Diabetic retinal exam completed this year?  No will schedule                        * If yes please have patient sign a records release to obtain record to update Health Maintenance                       * If no, please order referral for patient to be scheduled

## 2022-12-16 ASSESSMENT — ENCOUNTER SYMPTOMS
NAUSEA: 1
BACK PAIN: 1

## 2023-01-10 ENCOUNTER — HOSPITAL ENCOUNTER (OUTPATIENT)
Facility: HOSPITAL | Age: 68
Discharge: HOME | End: 2023-01-10
Payer: MEDICARE

## 2023-01-10 LAB
ALBUMIN SERPL-MCNC: 4 G/DL (ref 3.4–4.8)
ALP BLD-CCNC: 121 U/L (ref 25–100)
ALT SERPL-CCNC: 9 U/L (ref 4–36)
ANION GAP SERPL CALCULATED.3IONS-SCNC: 14 MMOL/L (ref 3–16)
AST SERPL-CCNC: 12 U/L (ref 8–33)
BASOPHILS ABSOLUTE: 0 K/UL (ref 0–0.1)
BASOPHILS RELATIVE PERCENT: 0.4 %
BILIRUB SERPL-MCNC: 0.8 MG/DL (ref 0.3–1.2)
BILIRUBIN DIRECT: <0.2 MG/DL (ref 0–0.2)
BILIRUBIN, INDIRECT: ABNORMAL MG/DL (ref 0.2–0.8)
BUN BLDV-MCNC: 31 MG/DL (ref 6–20)
CALCIUM SERPL-MCNC: 9.4 MG/DL (ref 8.5–10.5)
CHLORIDE BLD-SCNC: 100 MMOL/L (ref 98–107)
CO2: 26 MMOL/L (ref 20–30)
CREAT SERPL-MCNC: 1.8 MG/DL (ref 0.4–1.2)
EOSINOPHILS ABSOLUTE: 0.2 K/UL (ref 0–0.4)
EOSINOPHILS RELATIVE PERCENT: 3.2 %
GFR SERPL CREATININE-BSD FRML MDRD: 31 ML/MIN/{1.73_M2}
GLUCOSE BLD-MCNC: 244 MG/DL (ref 74–106)
HCT VFR BLD CALC: 26.8 % (ref 37–47)
HEMOGLOBIN: 12.7 G/DL (ref 11.5–16.5)
IMMATURE GRANULOCYTES #: 0 K/UL
IMMATURE GRANULOCYTES %: 0.4 % (ref 0–5)
LYMPHOCYTES ABSOLUTE: 0.8 K/UL (ref 1.5–4)
LYMPHOCYTES RELATIVE PERCENT: 13.7 %
MCH RBC QN AUTO: 45.8 PG (ref 27–32)
MCHC RBC AUTO-ENTMCNC: 47.4 G/DL (ref 31–35)
MCV RBC AUTO: 96.8 FL (ref 80–100)
MONOCYTES ABSOLUTE: 0.3 K/UL (ref 0.2–0.8)
MONOCYTES RELATIVE PERCENT: 5.7 %
NEUTROPHILS ABSOLUTE: 4.3 K/UL (ref 2–7.5)
NEUTROPHILS RELATIVE PERCENT: 76.6 %
PDW BLD-RTO: 19.8 % (ref 11–16)
PHOSPHORUS: 3.8 MG/DL (ref 2.5–4.5)
PLATELET # BLD: 169 K/UL (ref 150–400)
PMV BLD AUTO: 11 FL (ref 6–10)
POTASSIUM SERPL-SCNC: 4 MMOL/L (ref 3.4–5.1)
RBC # BLD: 2.77 M/UL (ref 3.8–5.8)
SODIUM BLD-SCNC: 140 MMOL/L (ref 136–145)
TOTAL PROTEIN: 6.8 G/DL (ref 6.4–8.3)
WBC # BLD: 5.6 K/UL (ref 4–11)

## 2023-01-10 PROCEDURE — 85025 COMPLETE CBC W/AUTO DIFF WBC: CPT

## 2023-01-10 PROCEDURE — 80069 RENAL FUNCTION PANEL: CPT

## 2023-01-10 PROCEDURE — 80158 DRUG ASSAY CYCLOSPORINE: CPT

## 2023-01-10 PROCEDURE — 36415 COLL VENOUS BLD VENIPUNCTURE: CPT

## 2023-01-10 PROCEDURE — 80076 HEPATIC FUNCTION PANEL: CPT

## 2023-01-12 LAB — CYCLOSPORINE A: 88.5 NG/ML

## 2023-01-17 DIAGNOSIS — F41.9 ANXIETY: ICD-10-CM

## 2023-01-17 DIAGNOSIS — M79.2 NEUROPATHIC PAIN: ICD-10-CM

## 2023-01-17 RX ORDER — GABAPENTIN 300 MG/1
300 CAPSULE ORAL 4 TIMES DAILY
Qty: 120 CAPSULE | Refills: 0 | Status: SHIPPED | OUTPATIENT
Start: 2023-01-17 | End: 2023-02-16

## 2023-01-17 RX ORDER — PROMETHAZINE HYDROCHLORIDE 25 MG/1
25 TABLET ORAL 2 TIMES DAILY PRN
Qty: 60 TABLET | Refills: 0 | Status: SHIPPED | OUTPATIENT
Start: 2023-01-17

## 2023-01-17 RX ORDER — ALPRAZOLAM 0.5 MG/1
0.5 TABLET ORAL 3 TIMES DAILY PRN
Qty: 90 TABLET | Refills: 0 | Status: SHIPPED | OUTPATIENT
Start: 2023-01-17 | End: 2023-02-16

## 2023-02-16 ENCOUNTER — OFFICE VISIT (OUTPATIENT)
Dept: PRIMARY CARE CLINIC | Age: 68
End: 2023-02-16
Payer: MEDICARE

## 2023-02-16 VITALS
RESPIRATION RATE: 18 BRPM | OXYGEN SATURATION: 98 % | SYSTOLIC BLOOD PRESSURE: 144 MMHG | WEIGHT: 160 LBS | DIASTOLIC BLOOD PRESSURE: 62 MMHG | HEART RATE: 66 BPM | BODY MASS INDEX: 30.23 KG/M2

## 2023-02-16 DIAGNOSIS — D61.818 PANCYTOPENIA (HCC): ICD-10-CM

## 2023-02-16 DIAGNOSIS — N18.9 ACUTE RENAL FAILURE SUPERIMPOSED ON CHRONIC KIDNEY DISEASE, UNSPECIFIED CKD STAGE, UNSPECIFIED ACUTE RENAL FAILURE TYPE (HCC): Primary | ICD-10-CM

## 2023-02-16 DIAGNOSIS — Z94.4 LIVER TRANSPLANT RECIPIENT (HCC): ICD-10-CM

## 2023-02-16 DIAGNOSIS — F41.9 ANXIETY: ICD-10-CM

## 2023-02-16 DIAGNOSIS — R73.9 HYPERGLYCEMIA: ICD-10-CM

## 2023-02-16 DIAGNOSIS — M79.2 NEUROPATHIC PAIN: ICD-10-CM

## 2023-02-16 DIAGNOSIS — N17.9 ACUTE RENAL FAILURE SUPERIMPOSED ON CHRONIC KIDNEY DISEASE, UNSPECIFIED CKD STAGE, UNSPECIFIED ACUTE RENAL FAILURE TYPE (HCC): Primary | ICD-10-CM

## 2023-02-16 DIAGNOSIS — R63.5 WEIGHT GAIN: ICD-10-CM

## 2023-02-16 LAB
CHP ED QC CHECK: NORMAL
GLUCOSE BLD-MCNC: 157 MG/DL
HBA1C MFR BLD: 5.4 %

## 2023-02-16 PROCEDURE — 3017F COLORECTAL CA SCREEN DOC REV: CPT | Performed by: INTERNAL MEDICINE

## 2023-02-16 PROCEDURE — G8399 PT W/DXA RESULTS DOCUMENT: HCPCS | Performed by: INTERNAL MEDICINE

## 2023-02-16 PROCEDURE — 1123F ACP DISCUSS/DSCN MKR DOCD: CPT | Performed by: INTERNAL MEDICINE

## 2023-02-16 PROCEDURE — 1090F PRES/ABSN URINE INCON ASSESS: CPT | Performed by: INTERNAL MEDICINE

## 2023-02-16 PROCEDURE — G8417 CALC BMI ABV UP PARAM F/U: HCPCS | Performed by: INTERNAL MEDICINE

## 2023-02-16 PROCEDURE — G8427 DOCREV CUR MEDS BY ELIG CLIN: HCPCS | Performed by: INTERNAL MEDICINE

## 2023-02-16 PROCEDURE — 4004F PT TOBACCO SCREEN RCVD TLK: CPT | Performed by: INTERNAL MEDICINE

## 2023-02-16 PROCEDURE — G8484 FLU IMMUNIZE NO ADMIN: HCPCS | Performed by: INTERNAL MEDICINE

## 2023-02-16 PROCEDURE — 83036 HEMOGLOBIN GLYCOSYLATED A1C: CPT | Performed by: INTERNAL MEDICINE

## 2023-02-16 PROCEDURE — 82962 GLUCOSE BLOOD TEST: CPT | Performed by: INTERNAL MEDICINE

## 2023-02-16 PROCEDURE — 3074F SYST BP LT 130 MM HG: CPT | Performed by: INTERNAL MEDICINE

## 2023-02-16 PROCEDURE — 3078F DIAST BP <80 MM HG: CPT | Performed by: INTERNAL MEDICINE

## 2023-02-16 PROCEDURE — 99214 OFFICE O/P EST MOD 30 MIN: CPT | Performed by: INTERNAL MEDICINE

## 2023-02-16 RX ORDER — ALPRAZOLAM 0.5 MG/1
0.5 TABLET ORAL 3 TIMES DAILY PRN
Qty: 90 TABLET | Refills: 0 | Status: SHIPPED | OUTPATIENT
Start: 2023-02-16 | End: 2023-03-18

## 2023-02-16 RX ORDER — GABAPENTIN 300 MG/1
300 CAPSULE ORAL 4 TIMES DAILY
Qty: 120 CAPSULE | Refills: 0 | Status: SHIPPED | OUTPATIENT
Start: 2023-02-16 | End: 2023-03-18

## 2023-02-16 RX ORDER — TORSEMIDE 20 MG/1
10 TABLET ORAL DAILY
Qty: 30 TABLET | Refills: 2 | Status: SHIPPED | OUTPATIENT
Start: 2023-02-16

## 2023-02-16 RX ORDER — PROMETHAZINE HYDROCHLORIDE 25 MG/1
25 TABLET ORAL 2 TIMES DAILY PRN
Qty: 60 TABLET | Refills: 0 | Status: SHIPPED | OUTPATIENT
Start: 2023-02-16

## 2023-02-16 RX ORDER — AMLODIPINE BESYLATE 10 MG/1
10 TABLET ORAL DAILY
Qty: 90 TABLET | Refills: 1 | Status: SHIPPED | OUTPATIENT
Start: 2023-02-16

## 2023-02-16 RX ORDER — SERTRALINE HYDROCHLORIDE 25 MG/1
25 TABLET, FILM COATED ORAL DAILY
Qty: 30 TABLET | Refills: 3 | Status: SHIPPED | OUTPATIENT
Start: 2023-02-16

## 2023-02-16 SDOH — ECONOMIC STABILITY: FOOD INSECURITY: WITHIN THE PAST 12 MONTHS, YOU WORRIED THAT YOUR FOOD WOULD RUN OUT BEFORE YOU GOT MONEY TO BUY MORE.: SOMETIMES TRUE

## 2023-02-16 SDOH — ECONOMIC STABILITY: HOUSING INSECURITY
IN THE LAST 12 MONTHS, WAS THERE A TIME WHEN YOU DID NOT HAVE A STEADY PLACE TO SLEEP OR SLEPT IN A SHELTER (INCLUDING NOW)?: NO

## 2023-02-16 SDOH — ECONOMIC STABILITY: FOOD INSECURITY: WITHIN THE PAST 12 MONTHS, THE FOOD YOU BOUGHT JUST DIDN'T LAST AND YOU DIDN'T HAVE MONEY TO GET MORE.: SOMETIMES TRUE

## 2023-02-16 SDOH — ECONOMIC STABILITY: INCOME INSECURITY: HOW HARD IS IT FOR YOU TO PAY FOR THE VERY BASICS LIKE FOOD, HOUSING, MEDICAL CARE, AND HEATING?: VERY HARD

## 2023-02-16 ASSESSMENT — PATIENT HEALTH QUESTIONNAIRE - PHQ9
2. FEELING DOWN, DEPRESSED OR HOPELESS: 1
8. MOVING OR SPEAKING SO SLOWLY THAT OTHER PEOPLE COULD HAVE NOTICED. OR THE OPPOSITE, BEING SO FIGETY OR RESTLESS THAT YOU HAVE BEEN MOVING AROUND A LOT MORE THAN USUAL: 1
SUM OF ALL RESPONSES TO PHQ9 QUESTIONS 1 & 2: 2
7. TROUBLE CONCENTRATING ON THINGS, SUCH AS READING THE NEWSPAPER OR WATCHING TELEVISION: 0
3. TROUBLE FALLING OR STAYING ASLEEP: 0
4. FEELING TIRED OR HAVING LITTLE ENERGY: 1
SUM OF ALL RESPONSES TO PHQ QUESTIONS 1-9: 4
1. LITTLE INTEREST OR PLEASURE IN DOING THINGS: 1
6. FEELING BAD ABOUT YOURSELF - OR THAT YOU ARE A FAILURE OR HAVE LET YOURSELF OR YOUR FAMILY DOWN: 0
SUM OF ALL RESPONSES TO PHQ QUESTIONS 1-9: 4
5. POOR APPETITE OR OVEREATING: 0
9. THOUGHTS THAT YOU WOULD BE BETTER OFF DEAD, OR OF HURTING YOURSELF: 0
10. IF YOU CHECKED OFF ANY PROBLEMS, HOW DIFFICULT HAVE THESE PROBLEMS MADE IT FOR YOU TO DO YOUR WORK, TAKE CARE OF THINGS AT HOME, OR GET ALONG WITH OTHER PEOPLE: 1

## 2023-02-16 ASSESSMENT — ENCOUNTER SYMPTOMS
ABDOMINAL PAIN: 0
EYE DISCHARGE: 0
COUGH: 0
SHORTNESS OF BREATH: 0
NAUSEA: 0
VOMITING: 0
BACK PAIN: 0
SINUS PRESSURE: 0
WHEEZING: 0
SORE THROAT: 0

## 2023-02-16 NOTE — PROGRESS NOTES
SUBJECTIVE:    Patient ID: Darnell Hernandez is a 78 y. o.female. Chief Complaint   Patient presents with    Cirrhosis    Atrial Fibrillation                HPI:  Patient presents to the office today for follow-up. She has recently noticed an increase in her weight. States she has noticed some swelling in her legs. She continues to have good urinary output. She is not taking anything, but what was prescribed for her. BP is elvevated today. Weight is up 9 pounds. She was noticed to have worsening renal function on last blood work. She reported she has not been taking her diuretics as she was supposed to. She has had a nerve problem for long time. Her sx have been stable. She occasionally has some difficulties falling and maintaining sleep. She denies any suicidal ideation. She has been compliant with taking medication without side effects. She has supportive family. Patient's medications, allergies, past medical, surgical, social and family histories were reviewed and updated as appropriate in electronic medical record. Outpatient Medications Marked as Taking for the 2/16/23 encounter (Office Visit) with Daphne Montana MD   Medication Sig Dispense Refill    promethazine (PHENERGAN) 25 MG tablet Take 1 tablet by mouth 2 times daily as needed for Nausea 60 tablet 0    ALPRAZolam (XANAX) 0.5 MG tablet Take 1 tablet by mouth 3 times daily as needed for Sleep for up to 30 days. 90 last filled 11/09/2022 from liver transplant 90 tablet 0    gabapentin (NEURONTIN) 300 MG capsule Take 1 capsule by mouth 4 times daily for 30 days.  Intended supply: 30 days 120 capsule 0    sertraline (ZOLOFT) 25 MG tablet Take 1 tablet by mouth daily 30 tablet 3    amLODIPine (NORVASC) 10 MG tablet TAKE 1 TABLET BY MOUTH DAILY 90 tablet 1    ipratropium-albuterol (DUONEB) 0.5-2.5 (3) MG/3ML SOLN nebulizer solution Inhale 3 mLs into the lungs every 4 hours (while awake) 360 mL 0    albuterol-ipratropium (COMBIVENT RESPIMAT)  MCG/ACT AERS inhaler Inhale 1 puff into the lungs in the morning and 1 puff at noon and 1 puff in the evening and 1 puff before bedtime. 1 each 0    albuterol (PROVENTIL) (5 MG/ML) 0.5% nebulizer solution Take 1 mL by nebulization 4 times daily as needed for Wheezing 120 each 3    silver sulfADIAZINE (SILVADENE) 1 % cream Apply topically daily. 100 g 0    aspirin 81 MG EC tablet Take 81 mg by mouth daily      cycloSPORINE modified (NEORAL) 25 MG capsule Take 3 capsules every morning and take 4 capsules every night      torsemide (DEMADEX) 20 MG tablet Take 10 mg by mouth daily           Review of Systems   Constitutional:  Positive for fatigue and unexpected weight change (Gain). Negative for chills and fever. HENT:  Negative for congestion, sinus pressure and sore throat. Eyes:  Negative for discharge and visual disturbance. Respiratory:  Negative for cough, shortness of breath and wheezing. Cardiovascular:  Positive for leg swelling. Negative for chest pain and palpitations. Gastrointestinal:  Negative for abdominal pain, nausea and vomiting. Endocrine: Negative for cold intolerance and heat intolerance. Genitourinary:  Negative for dysuria, frequency and urgency. Musculoskeletal:  Negative for arthralgias and back pain. Skin:  Negative for rash and wound. Neurological:  Positive for numbness. Negative for syncope and headaches. Hematological: Negative. Psychiatric/Behavioral:  Negative for agitation and sleep disturbance. The patient is nervous/anxious.       Past Medical History:   Diagnosis Date    Arthritis, rheumatoid (HCC)     Ascites     Bronchiectasis (HCC)     CAD (coronary artery disease)     Chronic pain     Cirrhosis (HCC)     Colitis     COPD (chronic obstructive pulmonary disease) (HCC)     Esophageal varices (HCC)     Hypertension     IIH (idiopathic intracranial hypertension)     Liver cirrhosis (HCC)     Osteoarthritis     Portal hypertensive gastropathy ([facility] [address] Sciatica     Splenomegaly      Past Surgical History:   Procedure Laterality Date    BONE MARROW BIOPSY      DILATION AND CURETTAGE OF UTERUS      DILATION AND CURETTAGE OF UTERUS  1998 & 2000    LIVER BIOPSY      LIVER TRANSPLANT  10/24/2020    TUBAL LIGATION  1982    UPPER GASTROINTESTINAL ENDOSCOPY  10/14/2020    with banding after varices     Family History   Problem Relation Age of Onset    High Blood Pressure Mother     Alzheimer's Disease Mother     Heart Disease Father     High Blood Pressure Father     Heart Disease Maternal Grandmother     Heart Disease Maternal Grandfather     Stroke Maternal Grandfather     Diabetes Paternal Grandmother     High Blood Pressure Brother       Social History     Tobacco Use   Smoking Status Light Smoker    Packs/day: 0.10    Years: 1.00    Pack years: 0.10    Types: Cigarettes    Last attempt to quit: 5/11/2019    Years since quitting: 3.7   Smokeless Tobacco Never       OBJECTIVE:   Wt Readings from Last 3 Encounters:   02/16/23 160 lb (72.6 kg)   12/15/22 151 lb (68.5 kg)   10/25/22 154 lb 3.2 oz (69.9 kg)     BP Readings from Last 3 Encounters:   02/16/23 (!) 144/62   12/15/22 136/74   10/25/22 (!) 142/62       BP (!) 144/62   Pulse 66   Resp 18   Wt 160 lb (72.6 kg)   SpO2 98%   BMI 30.23 kg/m²      Physical Exam  Vitals and nursing note reviewed. Constitutional:       Appearance: Normal appearance. She is well-developed. She is obese. HENT:      Head: Normocephalic and atraumatic. Right Ear: External ear normal.      Left Ear: External ear normal.      Nose: Nose normal.      Mouth/Throat:      Mouth: Mucous membranes are moist.      Pharynx: Oropharynx is clear. Eyes:      Conjunctiva/sclera: Conjunctivae normal.      Pupils: Pupils are equal, round, and reactive to light. Neck:      Thyroid: No thyromegaly. Vascular: No JVD. Cardiovascular:      Rate and Rhythm: Normal rate and regular rhythm. Heart sounds: Normal heart sounds. Pulmonary:      Effort: Pulmonary effort is normal.      Breath sounds: Normal breath sounds. No wheezing or rales. Abdominal:      General: Bowel sounds are normal. There is no distension. Palpations: Abdomen is soft. Tenderness: There is no abdominal tenderness. Musculoskeletal:         General: No tenderness. Cervical back: Neck supple. No rigidity. No muscular tenderness. Right lower leg: No edema. Left lower leg: No edema. Skin:     Findings: No erythema or rash. Neurological:      General: No focal deficit present. Mental Status: She is alert and oriented to person, place, and time. Psychiatric:         Behavior: Behavior normal.         Judgment: Judgment normal.       Lab Results   Component Value Date/Time     01/10/2023 08:55 AM    K 4.0 01/10/2023 08:55 AM    K 4.4 03/22/2022 04:32 AM     01/10/2023 08:55 AM    CL 99.0 06/01/2012 08:10 AM    CO2 26 01/10/2023 08:55 AM    GLUCOSE 244 01/10/2023 08:55 AM    BUN 31 01/10/2023 08:55 AM    CREATININE 1.8 01/10/2023 08:55 AM    CREATININE 0.9 06/01/2012 08:10 AM    CALCIUM 9.4 01/10/2023 08:55 AM    PROT 6.8 01/10/2023 08:55 AM    LABALBU 4.0 01/10/2023 08:55 AM    LABALBU 3.9 06/01/2012 08:10 AM    BILITOT 0.8 01/10/2023 08:55 AM    ALT 9 01/10/2023 08:55 AM    AST 12 01/10/2023 08:55 AM       Hemoglobin A1C (%)   Date Value   10/16/2022 4.5     Microscopic Examination (no units)   Date Value   10/15/2022 YES     LDL Calculated (mg/dL)   Date Value   03/14/2022 87         Lab Results   Component Value Date/Time    WBC 5.6 01/10/2023 08:55 AM    NEUTROABS 4.3 01/10/2023 08:55 AM    HGB 12.7 01/10/2023 08:55 AM    HCT 26.8 01/10/2023 08:55 AM    HCT 44.3 06/01/2012 08:10 AM    MCV 96.8 01/10/2023 08:55 AM     01/10/2023 08:55 AM     06/01/2012 08:10 AM       Lab Results   Component Value Date    TSH 2.59 03/13/2022         ASSESSMENT/PLAN:     1.  Acute renal failure superimposed on chronic kidney disease, unspecified CKD stage, unspecified acute renal failure type ([facility] [address]  Worsening renal function on last BMP with creatinine of 1.8. Discussed avoiding any nephrotoxic agent. Make sure blood pressure under good control. Monitor urine output. I am going to proceed with blood work today to check her renal function specially with the medication that she is on related to her liver transplant. Further recommendation based on test result.    - TSH; Future    2. Anxiety  I am going to continue current medication. I advised the patient to seek counseling. I will reassess current condition every few months. Patient to call or RTC if experienced any deterioration of Sx.    1. Goal is to alleviate symptoms to a degree that the patient can participate in own ADLS social activity and improve function. 2. Risk and benefits were reviewed at length, including risk for addiction and possible side effects and interactions. Alternative therapy was discussed and will be a part of the patients overall care. Including but not limited to, other medications as well as behavioral and activity modification and the use of other modalities. 3. This patient does not exhibit a potential for abuse or addiction at this time. Will monitor closely. 4. Will randomly check drug screen. 5. Jeffery Florian completed and compliant, will check every 3 months. 6. Advised her  that UDS and possible pill counts and frequent monitoring will be a part of her care. 7. Patient has medication agreement and consent to treat with this office. Patient has been informed not to seek or obtain medication from other practitioners and to notify our office ASAP if this happened on emergent basis. - ALPRAZolam (XANAX) 0.5 MG tablet; Take 1 tablet by mouth 3 times daily as needed for Sleep for up to 30 days. 90 last filled 11/09/2022 from liver transplant  Dispense: 90 tablet; Refill: 0  - DRUG SCREEN MULTI URINE; Future    3.  Pancytopenia ([facility] [address]  Fluctuating WBC, hemoglobin and platelet count around the low was normal limit. Likely related to her liver transplant and immunosuppressive medication. Proceed with blood work today. Further recommendation based on test result.    - TSH; Future    4. Liver transplant recipient [facility])  Continue immunosuppressant regimen per transplant team.  Discussed the importance of being compliant with follow-up blood work and monitoring level as recommended. Try to avoid any hepatotoxic agent.    - TSH; Future    5. Neuropathic pain  Will continue to treat with gabapentin PRN. - gabapentin (NEURONTIN) 300 MG capsule; Take 1 capsule by mouth 4 times daily for 30 days. Intended supply: 30 days  Dispense: 120 capsule; Refill: 0  - DRUG SCREEN MULTI URINE; Future  - TSH; Future    6. Hyperglycemia  I have advised her on diet, exercise and weight control. I have also, advised her that this condition could possibly progress into diabetes. I will monitor her A1c periodically. - POCT glycosylated hemoglobin (Hb A1C)  - POCT Glucose  - TSH; Future    7. Weight gain  Discussed the importance of appropriate diet and weight control. Monitor closely specially with her elevated glucose level. May need to resume her diuretics at a higher frequency since she did run out of her diuretics several days ago. Monitor urine output specially with worsening renal function recently. Further recommendation based on test result.    - TSH; Future      Orders Placed This Encounter   Medications    ALPRAZolam (XANAX) 0.5 MG tablet     Sig: Take 1 tablet by mouth 3 times daily as needed for Sleep for up to 30 days. 90 last filled 11/09/2022 from liver transplant     Dispense:  90 tablet     Refill:  0    promethazine (PHENERGAN) 25 MG tablet     Sig: Take 1 tablet by mouth 2 times daily as needed for Nausea     Dispense:  60 tablet     Refill:  0    silver sulfADIAZINE (SILVADENE) 1 % cream     Sig: Apply topically daily.      Dispense:  100 g     Refill:  0    torsemide (DEMADEX) 20 MG tablet     Sig: Take 0.5 tablets by mouth daily     Dispense:  30 tablet     Refill:  2    sertraline (ZOLOFT) 25 MG tablet     Sig: Take 1 tablet by mouth daily     Dispense:  30 tablet     Refill:  3    gabapentin (NEURONTIN) 300 MG capsule     Sig: Take 1 capsule by mouth 4 times daily for 30 days. Intended supply: 30 days     Dispense:  120 capsule     Refill:  0    amLODIPine (NORVASC) 10 MG tablet     Sig: Take 1 tablet by mouth daily     Dispense:  90 tablet     Refill:  1     **Patient requests 90 days supply**        Elle WOLFE MA am scribing for and in the presence of Faith Mcfarlane MD on this date of 02/16/23 at 3:45 PM    I, Dr. Faith Mcfarlane, personally performed the services described in the documentation as scribed by Elle Alvarenga MA, in my presence and it is both accurate and complete.

## 2023-02-16 NOTE — PROGRESS NOTES
Chief Complaint   Patient presents with    Cirrhosis    Atrial Fibrillation              Have you seen any other physician or provider since your last visit no    Have you had any other diagnostic tests since your last visit?  yes - labs    Have you changed or stopped any medications since your last visit? no

## 2023-02-17 ENCOUNTER — HOSPITAL ENCOUNTER (OUTPATIENT)
Facility: HOSPITAL | Age: 68
Discharge: HOME | End: 2023-02-17
Payer: MEDICARE

## 2023-02-17 DIAGNOSIS — Z94.4 LIVER TRANSPLANT RECIPIENT (HCC): ICD-10-CM

## 2023-02-17 DIAGNOSIS — N17.9 ACUTE RENAL FAILURE SUPERIMPOSED ON CHRONIC KIDNEY DISEASE, UNSPECIFIED CKD STAGE, UNSPECIFIED ACUTE RENAL FAILURE TYPE (HCC): ICD-10-CM

## 2023-02-17 DIAGNOSIS — R73.9 HYPERGLYCEMIA: ICD-10-CM

## 2023-02-17 DIAGNOSIS — M79.2 NEUROPATHIC PAIN: ICD-10-CM

## 2023-02-17 DIAGNOSIS — D61.818 PANCYTOPENIA (HCC): ICD-10-CM

## 2023-02-17 DIAGNOSIS — N18.9 ACUTE RENAL FAILURE SUPERIMPOSED ON CHRONIC KIDNEY DISEASE, UNSPECIFIED CKD STAGE, UNSPECIFIED ACUTE RENAL FAILURE TYPE (HCC): ICD-10-CM

## 2023-02-17 DIAGNOSIS — R63.5 WEIGHT GAIN: ICD-10-CM

## 2023-02-17 LAB
ALBUMIN SERPL-MCNC: 4 G/DL (ref 3.4–4.8)
ALP BLD-CCNC: 110 U/L (ref 25–100)
ALT SERPL-CCNC: 10 U/L (ref 4–36)
ANION GAP SERPL CALCULATED.3IONS-SCNC: 12 MMOL/L (ref 3–16)
AST SERPL-CCNC: 13 U/L (ref 8–33)
BASOPHILS ABSOLUTE: 0 K/UL (ref 0–0.1)
BASOPHILS RELATIVE PERCENT: 0.9 %
BILIRUB SERPL-MCNC: 0.8 MG/DL (ref 0.3–1.2)
BILIRUBIN DIRECT: 0.3 MG/DL (ref 0–0.2)
BILIRUBIN, INDIRECT: 0.5 MG/DL (ref 0.2–0.8)
BUN BLDV-MCNC: 18 MG/DL (ref 6–20)
CALCIUM SERPL-MCNC: 9.4 MG/DL (ref 8.5–10.5)
CHLORIDE BLD-SCNC: 105 MMOL/L (ref 98–107)
CO2: 26 MMOL/L (ref 20–30)
CREAT SERPL-MCNC: 1.2 MG/DL (ref 0.4–1.2)
EOSINOPHILS ABSOLUTE: 0.2 K/UL (ref 0–0.4)
EOSINOPHILS RELATIVE PERCENT: 4.9 %
GFR SERPL CREATININE-BSD FRML MDRD: 50 ML/MIN/{1.73_M2}
GLUCOSE BLD-MCNC: 123 MG/DL (ref 74–106)
HCT VFR BLD CALC: 30.2 % (ref 37–47)
HEMOGLOBIN: 13.6 G/DL (ref 11.5–16.5)
IMMATURE GRANULOCYTES #: 0 K/UL
IMMATURE GRANULOCYTES %: 0.4 % (ref 0–5)
LYMPHOCYTES ABSOLUTE: 0.8 K/UL (ref 1.5–4)
LYMPHOCYTES RELATIVE PERCENT: 18.2 %
MCH RBC QN AUTO: 41.8 PG (ref 27–32)
MCHC RBC AUTO-ENTMCNC: 45 G/DL (ref 31–35)
MCV RBC AUTO: 92.9 FL (ref 80–100)
MONOCYTES ABSOLUTE: 0.3 K/UL (ref 0.2–0.8)
MONOCYTES RELATIVE PERCENT: 5.6 %
NEUTROPHILS ABSOLUTE: 3.2 K/UL (ref 2–7.5)
NEUTROPHILS RELATIVE PERCENT: 70 %
PDW BLD-RTO: 17.7 % (ref 11–16)
PHOSPHORUS: 3.9 MG/DL (ref 2.5–4.5)
PLATELET # BLD: 124 K/UL (ref 150–400)
PMV BLD AUTO: 11.2 FL (ref 6–10)
POTASSIUM SERPL-SCNC: 5.1 MMOL/L (ref 3.4–5.1)
RBC # BLD: 3.25 M/UL (ref 3.8–5.8)
SODIUM BLD-SCNC: 143 MMOL/L (ref 136–145)
TOTAL PROTEIN: 6.8 G/DL (ref 6.4–8.3)
TSH SERPL DL<=0.05 MIU/L-ACNC: 0.86 UIU/ML (ref 0.27–4.2)
WBC # BLD: 4.5 K/UL (ref 4–11)

## 2023-02-17 PROCEDURE — 80069 RENAL FUNCTION PANEL: CPT

## 2023-02-17 PROCEDURE — 84443 ASSAY THYROID STIM HORMONE: CPT

## 2023-02-17 PROCEDURE — 80158 DRUG ASSAY CYCLOSPORINE: CPT

## 2023-02-17 PROCEDURE — 85025 COMPLETE CBC W/AUTO DIFF WBC: CPT

## 2023-02-17 PROCEDURE — 80076 HEPATIC FUNCTION PANEL: CPT

## 2023-02-17 PROCEDURE — 36415 COLL VENOUS BLD VENIPUNCTURE: CPT

## 2023-03-09 ENCOUNTER — APPOINTMENT (OUTPATIENT)
Dept: GENERAL RADIOLOGY | Facility: HOSPITAL | Age: 68
End: 2023-03-09
Payer: MEDICARE

## 2023-03-09 ENCOUNTER — HOSPITAL ENCOUNTER (EMERGENCY)
Facility: HOSPITAL | Age: 68
Discharge: HOME | End: 2023-03-09
Attending: EMERGENCY MEDICINE
Payer: MEDICARE

## 2023-03-09 VITALS
DIASTOLIC BLOOD PRESSURE: 78 MMHG | SYSTOLIC BLOOD PRESSURE: 131 MMHG | WEIGHT: 160 LBS | BODY MASS INDEX: 30.21 KG/M2 | HEIGHT: 61 IN | HEART RATE: 77 BPM | TEMPERATURE: 100.1 F | OXYGEN SATURATION: 100 %

## 2023-03-09 DIAGNOSIS — J44.1 COPD EXACERBATION (HCC): Primary | ICD-10-CM

## 2023-03-09 LAB
A/G RATIO: 1.2 (ref 0.8–2)
ALBUMIN SERPL-MCNC: 3.5 G/DL (ref 3.4–4.8)
ALP BLD-CCNC: 102 U/L (ref 25–100)
ALT SERPL-CCNC: 7 U/L (ref 4–36)
ANION GAP SERPL CALCULATED.3IONS-SCNC: 7 MMOL/L (ref 3–16)
AST SERPL-CCNC: 11 U/L (ref 8–33)
BACTERIA: ABNORMAL /HPF
BASOPHILS ABSOLUTE: 0 K/UL (ref 0–0.1)
BASOPHILS RELATIVE PERCENT: 0.5 %
BILIRUB SERPL-MCNC: 1.2 MG/DL (ref 0.3–1.2)
BILIRUBIN URINE: ABNORMAL
BLOOD, URINE: NEGATIVE
BUN BLDV-MCNC: 26 MG/DL (ref 6–20)
CALCIUM SERPL-MCNC: 8.7 MG/DL (ref 8.5–10.5)
CHLORIDE BLD-SCNC: 103 MMOL/L (ref 98–107)
CLARITY: CLEAR
CO2: 25 MMOL/L (ref 20–30)
COLOR: YELLOW
CREAT SERPL-MCNC: 1.4 MG/DL (ref 0.4–1.2)
EOSINOPHILS ABSOLUTE: 0.1 K/UL (ref 0–0.4)
EOSINOPHILS RELATIVE PERCENT: 3 %
EPITHELIAL CELLS, UA: ABNORMAL /HPF (ref 0–5)
GFR SERPL CREATININE-BSD FRML MDRD: 41 ML/MIN/{1.73_M2}
GLOBULIN: 2.9 G/DL
GLUCOSE BLD-MCNC: 147 MG/DL (ref 74–106)
GLUCOSE URINE: NEGATIVE MG/DL
HCT VFR BLD CALC: 29 % (ref 37–47)
HEMOGLOBIN: 11 G/DL (ref 11.5–16.5)
IMMATURE GRANULOCYTES #: 0 K/UL
IMMATURE GRANULOCYTES %: 0.3 % (ref 0–5)
KETONES, URINE: ABNORMAL MG/DL
LEUKOCYTE ESTERASE, URINE: ABNORMAL
LYMPHOCYTES ABSOLUTE: 0.5 K/UL (ref 1.5–4)
LYMPHOCYTES RELATIVE PERCENT: 13.1 %
MCH RBC QN AUTO: 35.8 PG (ref 27–32)
MCHC RBC AUTO-ENTMCNC: 37.9 G/DL (ref 31–35)
MCV RBC AUTO: 94.5 FL (ref 80–100)
MICROSCOPIC EXAMINATION: YES
MONOCYTES ABSOLUTE: 0.3 K/UL (ref 0.2–0.8)
MONOCYTES RELATIVE PERCENT: 8.1 %
NEUTROPHILS ABSOLUTE: 3 K/UL (ref 2–7.5)
NEUTROPHILS RELATIVE PERCENT: 75 %
NITRITE, URINE: NEGATIVE
PDW BLD-RTO: 19.5 % (ref 11–16)
PH UA: 5.5 (ref 5–8)
PLATELET # BLD: 154 K/UL (ref 150–400)
PMV BLD AUTO: 10.2 FL (ref 6–10)
POTASSIUM SERPL-SCNC: 4.9 MMOL/L (ref 3.4–5.1)
PRO-BNP: 1197 PG/ML (ref 0–1800)
PROTEIN UA: 100 MG/DL
RAPID INFLUENZA  B AGN: NEGATIVE
RAPID INFLUENZA A AGN: NEGATIVE
RBC # BLD: 3.07 M/UL (ref 3.8–5.8)
RBC UA: ABNORMAL /HPF (ref 0–4)
SARS-COV-2, NAAT: NOT DETECTED
SODIUM BLD-SCNC: 135 MMOL/L (ref 136–145)
SPECIFIC GRAVITY UA: 1.02 (ref 1–1.03)
TOTAL PROTEIN: 6.4 G/DL (ref 6.4–8.3)
TROPONIN: <0.3 NG/ML
URINE REFLEX TO CULTURE: ABNORMAL
URINE TYPE: ABNORMAL
UROBILINOGEN, URINE: 1 E.U./DL
WBC # BLD: 4 K/UL (ref 4–11)
WBC UA: ABNORMAL /HPF (ref 0–5)

## 2023-03-09 PROCEDURE — 85025 COMPLETE CBC W/AUTO DIFF WBC: CPT

## 2023-03-09 PROCEDURE — 6370000000 HC RX 637 (ALT 250 FOR IP): Performed by: EMERGENCY MEDICINE

## 2023-03-09 PROCEDURE — 71045 X-RAY EXAM CHEST 1 VIEW: CPT

## 2023-03-09 PROCEDURE — 87635 SARS-COV-2 COVID-19 AMP PRB: CPT

## 2023-03-09 PROCEDURE — 83880 ASSAY OF NATRIURETIC PEPTIDE: CPT

## 2023-03-09 PROCEDURE — 80053 COMPREHEN METABOLIC PANEL: CPT

## 2023-03-09 PROCEDURE — 36415 COLL VENOUS BLD VENIPUNCTURE: CPT

## 2023-03-09 PROCEDURE — 87804 INFLUENZA ASSAY W/OPTIC: CPT

## 2023-03-09 PROCEDURE — 99285 EMERGENCY DEPT VISIT HI MDM: CPT

## 2023-03-09 PROCEDURE — 81001 URINALYSIS AUTO W/SCOPE: CPT

## 2023-03-09 PROCEDURE — 84484 ASSAY OF TROPONIN QUANT: CPT

## 2023-03-09 PROCEDURE — 93005 ELECTROCARDIOGRAM TRACING: CPT

## 2023-03-09 RX ORDER — AZITHROMYCIN 250 MG/1
250 TABLET, FILM COATED ORAL DAILY
Qty: 4 TABLET | Refills: 0 | Status: SHIPPED | OUTPATIENT
Start: 2023-03-09 | End: 2023-03-13

## 2023-03-09 RX ORDER — CODEINE PHOSPHATE AND GUAIFENESIN 10; 100 MG/5ML; MG/5ML
10 SOLUTION ORAL ONCE
Status: COMPLETED | OUTPATIENT
Start: 2023-03-09 | End: 2023-03-09

## 2023-03-09 RX ORDER — BENZONATATE 100 MG/1
100 CAPSULE ORAL 3 TIMES DAILY PRN
Qty: 21 CAPSULE | Refills: 0 | Status: SHIPPED | OUTPATIENT
Start: 2023-03-09 | End: 2023-03-16

## 2023-03-09 RX ORDER — AZITHROMYCIN 250 MG/1
500 TABLET, FILM COATED ORAL ONCE
Status: COMPLETED | OUTPATIENT
Start: 2023-03-09 | End: 2023-03-09

## 2023-03-09 RX ORDER — PREDNISONE 10 MG/1
60 TABLET ORAL DAILY
Qty: 24 TABLET | Refills: 0 | Status: SHIPPED | OUTPATIENT
Start: 2023-03-09 | End: 2023-03-13

## 2023-03-09 RX ADMIN — PREDNISONE 60 MG: 50 TABLET ORAL at 04:02

## 2023-03-09 RX ADMIN — AZITHROMYCIN MONOHYDRATE 500 MG: 250 TABLET ORAL at 04:02

## 2023-03-09 RX ADMIN — GUAIFENESIN AND CODEINE PHOSPHATE 10 ML: 100; 10 SOLUTION ORAL at 04:02

## 2023-03-09 ASSESSMENT — PAIN - FUNCTIONAL ASSESSMENT: PAIN_FUNCTIONAL_ASSESSMENT: 0-10

## 2023-03-09 ASSESSMENT — LIFESTYLE VARIABLES
HOW OFTEN DO YOU HAVE A DRINK CONTAINING ALCOHOL: NEVER
HOW MANY STANDARD DRINKS CONTAINING ALCOHOL DO YOU HAVE ON A TYPICAL DAY: PATIENT DOES NOT DRINK

## 2023-03-09 ASSESSMENT — PAIN DESCRIPTION - LOCATION: LOCATION: CHEST

## 2023-03-09 ASSESSMENT — PAIN SCALES - GENERAL: PAINLEVEL_OUTOF10: 9

## 2023-03-09 NOTE — ED PROVIDER NOTES
Emergency Physician Note  [address]    Pt Name: Bulmaro Jeff  MRN: [id number]  [place] 1955  Date of evaluation: 3/9/2023  Provider: Obed Riley MD  PCP: Xiomara Gomez MD    Note Open Time: 3:56 AM EST 3/9/23    Chief Complaint  Shortness of Breath and Cough       History of Present Illness  Bulmaro Jeff is a 78 y.o. female who presents to the ED for shortness of breath and cough. Patient reports she has had a shortness of breath and cough for the last few days has been getting worse. Cough is productive of clear sputum. She denies any fevers or Chest pain or vomiting. No known sick contacts. Patient does have history of prior liver transplant. Patient has a history of COPD. History from : Patient    Limitations to history : None    REVIEW OF SYSTEMS :      Review of Systems    Positives and Pertinent negatives as per HPI. Medications/allergies/medical/social/family history  I have reviewed the following from the nursing documentation:      Prior to Admission medications    Medication Sig Start Date End Date Taking? Authorizing Provider   ALPRAZolam [facility] Balboa) 0.5 MG tablet Take 1 tablet by mouth 3 times daily as needed for Sleep for up to 30 days. 90 last filled 11/09/2022 from liver transplant 2/16/23 3/18/23  Xiomara Gomez MD   promethazine (PHENERGAN) 25 MG tablet Take 1 tablet by mouth 2 times daily as needed for Nausea 2/16/23   Xiomara Gomez MD   silver sulfADIAZINE (SILVADENE) 1 % cream Apply topically daily. 2/16/23   Xiomara Gomez MD   torsemide [facility]) 20 MG tablet Take 0.5 tablets by mouth daily 2/16/23   Xiomara Gomez MD   sertraline (ZOLOFT) 25 MG tablet Take 1 tablet by mouth daily 2/16/23   Xiomara Gomez MD   gabapentin (NEURONTIN) 300 MG capsule Take 1 capsule by mouth 4 times daily for 30 days.  Intended supply: 30 days 2/16/23 3/18/23  Xiomara Gomez MD   amLODIPine [facility]) 10 MG tablet Take 1 tablet by mouth daily 2/16/23   Xiomara Gomez MD ipratropium-albuterol (DUONEB) 0.5-2.5 (3) MG/3ML SOLN nebulizer solution Inhale 3 mLs into the lungs every 4 hours (while awake) 10/18/22   LINCOLN Tomas   albuterol-ipratropium (COMBIVENT RESPIMAT)  MCG/ACT AERS inhaler Inhale 1 puff into the lungs in the morning and 1 puff at noon and 1 puff in the evening and 1 puff before bedtime.  10/18/22 2/16/23  LINCOLN Elizabeth   albuterol (PROVENTIL) (5 MG/ML) 0.5% nebulizer solution Take 1 mL by nebulization 4 times daily as needed for Wheezing 10/18/22   LINCOLN Tomas   pantoprazole (PROTONIX) 40 MG tablet Take 1 tablet by mouth every morning (before breakfast)  Patient not taking: Reported on 2/16/2023 10/19/22   LINCOLN Vance   aspirin 81 MG EC tablet Take 81 mg by mouth daily    Historical Provider, MD   cycloSPORINE modified (NEORAL) 25 MG capsule Take 3 capsules every morning and take 4 capsules every night 3/30/21   Historical Provider, MD       Allergies as of 03/09/2023 - Fully Reviewed 03/09/2023   Allergen Reaction Noted    Lisinopril Other (See Comments) 03/13/2014    Ciprofloxacin Other (See Comments) 09/16/2019    Dye [iodides]  11/24/2017       Past Medical History:   Diagnosis Date    Arthritis, rheumatoid (HCC)     Ascites     Bronchiectasis (HCC)     CAD (coronary artery disease)     Chronic pain     Cirrhosis ([address]     Colitis     COPD (chronic obstructive pulmonary disease) ([address]     Esophageal varices (HCC)     Hypertension     IIH (idiopathic intracranial hypertension)     Liver cirrhosis ([address]     Osteoarthritis     Portal hypertensive gastropathy ([address]     Sciatica     Splenomegaly         Surgical History:   Past Surgical History:   Procedure Laterality Date    BONE MARROW BIOPSY      DILATION AND CURETTAGE OF UTERUS      DILATION AND CURETTAGE OF UTERUS  1998 & 2000    LIVER BIOPSY      LIVER TRANSPLANT  10/24/2020    TUBAL LIGATION  1982    UPPER GASTROINTESTINAL ENDOSCOPY  10/14/2020    with banding after varices        Family History:    Family History   Problem Relation Age of Onset    High Blood Pressure Mother     Alzheimer's Disease Mother     Heart Disease Father     High Blood Pressure Father     Heart Disease Maternal Grandmother     Heart Disease Maternal Grandfather     Stroke Maternal Grandfather     Diabetes Paternal Grandmother     High Blood Pressure Brother        Social History     Socioeconomic History    Marital status:      Spouse name: Not on file    Number of children: Not on file    Years of education: Not on file    Highest education level: Not on file   Occupational History    Not on file   Tobacco Use    Smoking status: Light Smoker     Packs/day: 0.50     Years: 1.00     Pack years: 0.50     Types: Cigarettes     Last attempt to quit: 5/11/2019     Years since quitting: 3.8    Smokeless tobacco: Never   Substance and Sexual Activity    Alcohol use: No    Drug use: No    Sexual activity: Not on file   Other Topics Concern    Not on file   Social History Narrative    Not on file     Social Determinants of Health     Financial Resource Strain: High Risk    Difficulty of Paying Living Expenses: Very hard   Food Insecurity: Food Insecurity Present    Worried About Running Out of Food in the Last Year: Sometimes true    Ran Out of Food in the Last Year: Sometimes true   Transportation Needs: Unknown    Lack of Transportation (Medical): Not on file    Lack of Transportation (Non-Medical): No   Physical Activity: Insufficiently Active    Days of Exercise per Week: 1 day    Minutes of Exercise per Session: 20 min   Stress: Not on file   Social Connections: Not on file   Intimate Partner Violence: Not on file   Housing Stability: Unknown    Unable to Pay for Housing in the Last Year: Not on file    Number of Places Lived in the Last Year: Not on file    Unstable Housing in the Last Year: No       Nursing notes reviewed.     Triage VS:  ED Triage Vitals [03/09/23 0215]   Enc Vitals Group      /78      Heart Rate 77      Resp       Temp 100.1 °F (37.8 °C)      Temp Source Oral      SpO2 100 %      Weight 160 lb (72.6 kg)      Height 5' 1\" (1.549 m)      Head Circumference       Peak Flow       Pain Score       Pain Loc       Pain Edu? Excl. in [address]? GENERAL:  Awake, alert. Well developed, well nourished with no apparent distress. HENT:  Normocephalic, Atraumatic, moist mucous membranes. Posterior oropharynx without erythema, edema or exudate. EYES:  Pupils equal round and reactive to light, Conjunctiva normal, extraocular movements normal.  NECK:  No meningeal signs, Supple. CHEST:  Regular rate and rhythm, chest wall non-tender. LUNGS: Scattered rhonchi but otherwise clear to auscultation bilaterally. ABDOMEN:  Soft, non-tender, no rebound, rigidity or guarding, non-distended, normal bowel sounds. No costovertebral angle tenderness to palpation. BACK:  No tenderness. EXTREMITIES:  Normal range of motion, no edema, no bony tenderness, no deformity, distal pulses present. SKIN: Warm, dry and intact. NEUROLOGIC: Normal mental status. Moving all extremities to command.        DIAGNOSTIC RESULTS   LABS:  Labs Reviewed   CBC WITH AUTO DIFFERENTIAL - Abnormal; Notable for the following components:       Result Value    RBC 3.07 (*)     Hemoglobin 11.0 (*)     Hematocrit 29.0 (*)     MCH 35.8 (*)     MCHC 37.9 (*)     RDW 19.5 (*)     MPV 10.2 (*)     Lymphocytes Absolute 0.5 (*)     All other components within normal limits   COMPREHENSIVE METABOLIC PANEL - Abnormal; Notable for the following components:    Sodium 135 (*)     Glucose 147 (*)     BUN 26 (*)     Creatinine 1.4 (*)     Est, Glom Filt Rate 41 (*)     Alkaline Phosphatase 102 (*)     All other components within normal limits   URINALYSIS WITH REFLEX TO CULTURE - Abnormal; Notable for the following components:    Bilirubin Urine SMALL (*)     Ketones, Urine TRACE (*)     Protein,  (*)     Leukocyte Esterase, Urine SMALL (*)     All other components within normal limits   MICROSCOPIC URINALYSIS - Abnormal; Notable for the following components:    WBC, UA 6-9 (*)     Epithelial Cells, UA 11-20 (*)     Bacteria, UA Rare (*)     All other components within normal limits   COVID-19, RAPID   RAPID INFLUENZA A/B ANTIGENS   TROPONIN   BRAIN NATRIURETIC PEPTIDE     When ordered only abnormal lab results are displayed. All other labs were within normal range or not returned as of this dictation. EKG: The Ekg interpreted by me shows  normal sinus rhythm with a rate of 68  Axis is   Normal  QTc is  normal  Intervals and Durations are unremarkable. ST Segments: no acute change  No significant change from prior EKG dated 10/14/22    RADIOLOGY:   Interpretation per the Radiologist below, if available at the time of this note:  XR CHEST PORTABLE   Final Result     Small right pleural effusion with associated right basilar    atelectasis. Superimposed pneumonia not excluded.                 SCREENINGS        [place] Coma Scale  Eye Opening: Spontaneous  Best Verbal Response: Oriented  Best Motor Response: Obeys commands  [place] Coma Scale Score: 15                  CIWA Assessment  BP: 131/78  Heart Rate: 77           EMERGENCY DEPARTMENT COURSE and DIFFERENTIAL DIAGNOSIS/MDM:   ED medications:   ED Medication Orders (From admission, onward)      Start Ordered     Status Ordering Provider    03/09/23 0400 03/09/23 0352  guaiFENesin-codeine (GUAIFENESIN AC) 100-10 MG/5ML liquid 10 mL  ONCE         Last MAR action: Given - by Charity Bruce on 03/09/23 at [facility]    03/09/23 0400 03/09/23 0352  predniSONE (DELTASONE) tablet 60 mg  ONCE         Last MAR action: Given - by Charity Bruce on 03/09/23 at [address] [facility]    03/09/23 0400 03/09/23 0352  azithromycin (ZITHROMAX) tablet 500 mg  ONCE        Question:  Antimicrobial Indications  Answer:  COPD Exacerbation    Last MAR action: Given - by Charity Bruce on 03/09/23 at [address]            PAST MEDICAL HISTORY    has a past medical history of Arthritis, rheumatoid ([facility] Ascites, Bronchiectasis ([address] CAD (coronary artery disease), Chronic pain, Cirrhosis ([facility] Colitis, COPD (chronic obstructive pulmonary disease) ([facility] Esophageal varices ([facility] Hypertension, IIH (idiopathic intracranial hypertension), Liver cirrhosis ([facility] Osteoarthritis, Portal hypertensive gastropathy ([facility] Sciatica, and Splenomegaly. Vitals:    Vitals:    03/09/23 0215   BP: 131/78   Pulse: 77   Temp: 100.1 °F (37.8 °C)   TempSrc: Oral   SpO2: 100%   Weight: 160 lb (72.6 kg)   Height: 5' 1\" (1.549 m)       Chronic Conditions: COPD, CAD, hypertension    Is this patient to be included in the SEP-1 Core Measure due to severe sepsis or septic shock? No   Exclusion criteria - the patient is NOT to be included for SEP-1 Core Measure due to:  May have criteria for sepsis, but does not meet criteria for severe sepsis or septic shock    CONSULTS: (Who and What was discussed)  None  Discussion with Other Profesionals : None  Social Determinants : None  Records Reviewed : Other prior x-rays    CC/HPI Summary, ED Course, and Reassessment: Patient appears to have a COPD exacerbation and maybe a very small superimposed pneumonia. Pleural effusion does not appear to be significantly different from prior x-ray a year ago. Patient is stable from a respiratory standpoint and I feel can be safely discharged. I am treating her with steroids and antibiotics for COPD exacerbation. Differential Diagnosis: Pneumonia, COPD, CHF, MI, PE      I advised the patient to return to the emergency department immediately for any new or worsening symptoms, such as chest pain, shortness of breath vomiting. The patient voiced agreement and understanding of the treatment plan. I am the Primary Clinician of Record.     FINAL IMPRESSION      1. COPD exacerbation (HCC) Stable         DISPOSITION/PLAN     Pt is in good condition upon Discharge to home. PATIENT REFERRED TO:  Marina Mendoza MD  [address] [address]  [phone]    Schedule an appointment as soon as possible for a visit in 2 days      DISCHARGE MEDICATIONS:  Discharge Medication List as of 3/9/2023  3:57 AM        START taking these medications    Details   predniSONE (DELTASONE) 10 MG tablet Take 6 tablets by mouth daily for 4 days, Disp-24 tablet, R-0Normal      azithromycin (ZITHROMAX) 250 MG tablet Take 1 tablet by mouth daily for 4 days, Disp-4 tablet, R-0Normal      benzonatate (TESSALON) 100 MG capsule Take 1 capsule by mouth 3 times daily as needed for Cough, Disp-21 capsule, R-0Normal             DISCONTINUED MEDICATIONS:  Discharge Medication List as of 3/9/2023  3:57 AM                 (Please note that portions of this note were completed with a voice recognition program.  Efforts were made to edit the dictations but occasionally words are mis-transcribed.)    Osiel Gomez MD (electronically signed)          This chart was generated using the [address] [place] [address] dictation system. I created this record but it may contain dictation errors.           Osiel Gomez MD  03/09/23 7871

## 2023-03-13 DIAGNOSIS — F41.9 ANXIETY: ICD-10-CM

## 2023-03-13 DIAGNOSIS — M79.2 NEUROPATHIC PAIN: ICD-10-CM

## 2023-03-13 RX ORDER — GABAPENTIN 300 MG/1
300 CAPSULE ORAL 4 TIMES DAILY
Qty: 120 CAPSULE | Refills: 0 | Status: SHIPPED | OUTPATIENT
Start: 2023-03-13 | End: 2023-04-12

## 2023-03-13 RX ORDER — ALPRAZOLAM 0.5 MG/1
0.5 TABLET ORAL 3 TIMES DAILY PRN
Qty: 90 TABLET | Refills: 0 | Status: SHIPPED | OUTPATIENT
Start: 2023-03-13 | End: 2023-04-12

## 2023-03-13 RX ORDER — PROMETHAZINE HYDROCHLORIDE 25 MG/1
25 TABLET ORAL 2 TIMES DAILY PRN
Qty: 60 TABLET | Refills: 0 | Status: SHIPPED | OUTPATIENT
Start: 2023-03-13

## 2023-04-06 RX ORDER — IPRATROPIUM BROMIDE AND ALBUTEROL SULFATE 2.5; .5 MG/3ML; MG/3ML
3 SOLUTION RESPIRATORY (INHALATION)
Qty: 360 ML | Refills: 0 | Status: SHIPPED | OUTPATIENT
Start: 2023-04-06

## 2023-04-20 ENCOUNTER — TELEPHONE (OUTPATIENT)
Dept: PRIMARY CARE CLINIC | Age: 68
End: 2023-04-20

## 2023-04-20 DIAGNOSIS — R06.2 WHEEZING: ICD-10-CM

## 2023-04-20 NOTE — TELEPHONE ENCOUNTER
Patient came to the office today for more samples of Trelegy 100/62.5/25 mcg. This was prescribed by ana Gaspar. Patient was given 2 box/boxes. , Lot # Sharon Quinn, Exp. Date 09/24.

## 2023-04-21 RX ORDER — PROMETHAZINE HYDROCHLORIDE 12.5 MG/1
TABLET ORAL
Qty: 20 TABLET | Refills: 0 | Status: SHIPPED | OUTPATIENT
Start: 2023-04-21

## 2023-04-21 RX ORDER — FLUTICASONE FUROATE, UMECLIDINIUM BROMIDE AND VILANTEROL TRIFENATATE 100; 62.5; 25 UG/1; UG/1; UG/1
1 POWDER RESPIRATORY (INHALATION) DAILY
Qty: 2 EACH | Refills: 0 | COMMUNITY
Start: 2023-04-21

## 2023-05-10 ENCOUNTER — OFFICE VISIT (OUTPATIENT)
Dept: PRIMARY CARE CLINIC | Age: 68
End: 2023-05-10
Payer: MEDICARE

## 2023-05-10 VITALS
OXYGEN SATURATION: 96 % | DIASTOLIC BLOOD PRESSURE: 62 MMHG | HEART RATE: 65 BPM | WEIGHT: 167 LBS | SYSTOLIC BLOOD PRESSURE: 106 MMHG | BODY MASS INDEX: 31.53 KG/M2 | TEMPERATURE: 97.6 F | RESPIRATION RATE: 16 BRPM | HEIGHT: 61 IN

## 2023-05-10 DIAGNOSIS — F41.9 ANXIETY AND DEPRESSION: Primary | ICD-10-CM

## 2023-05-10 DIAGNOSIS — F32.A ANXIETY AND DEPRESSION: Primary | ICD-10-CM

## 2023-05-10 DIAGNOSIS — M79.2 NEUROPATHIC PAIN: ICD-10-CM

## 2023-05-10 DIAGNOSIS — Z94.4 LIVER TRANSPLANT RECIPIENT (HCC): ICD-10-CM

## 2023-05-10 DIAGNOSIS — I10 ESSENTIAL HYPERTENSION: ICD-10-CM

## 2023-05-10 DIAGNOSIS — F41.9 ANXIETY: ICD-10-CM

## 2023-05-10 DIAGNOSIS — G89.29 CHRONIC IDIOPATHIC PAIN SYNDROME: ICD-10-CM

## 2023-05-10 PROCEDURE — G8427 DOCREV CUR MEDS BY ELIG CLIN: HCPCS | Performed by: NURSE PRACTITIONER

## 2023-05-10 PROCEDURE — 3074F SYST BP LT 130 MM HG: CPT | Performed by: NURSE PRACTITIONER

## 2023-05-10 PROCEDURE — 1090F PRES/ABSN URINE INCON ASSESS: CPT | Performed by: NURSE PRACTITIONER

## 2023-05-10 PROCEDURE — 99214 OFFICE O/P EST MOD 30 MIN: CPT | Performed by: NURSE PRACTITIONER

## 2023-05-10 PROCEDURE — 1123F ACP DISCUSS/DSCN MKR DOCD: CPT | Performed by: NURSE PRACTITIONER

## 2023-05-10 PROCEDURE — 3017F COLORECTAL CA SCREEN DOC REV: CPT | Performed by: NURSE PRACTITIONER

## 2023-05-10 PROCEDURE — 4004F PT TOBACCO SCREEN RCVD TLK: CPT | Performed by: NURSE PRACTITIONER

## 2023-05-10 PROCEDURE — G8399 PT W/DXA RESULTS DOCUMENT: HCPCS | Performed by: NURSE PRACTITIONER

## 2023-05-10 PROCEDURE — G8417 CALC BMI ABV UP PARAM F/U: HCPCS | Performed by: NURSE PRACTITIONER

## 2023-05-10 PROCEDURE — 3078F DIAST BP <80 MM HG: CPT | Performed by: NURSE PRACTITIONER

## 2023-05-10 RX ORDER — BUSPIRONE HYDROCHLORIDE 7.5 MG/1
7.5 TABLET ORAL 2 TIMES DAILY
Qty: 60 TABLET | Refills: 0 | Status: SHIPPED | OUTPATIENT
Start: 2023-05-10 | End: 2023-06-09

## 2023-05-10 RX ORDER — GABAPENTIN 300 MG/1
300 CAPSULE ORAL 4 TIMES DAILY
Qty: 120 CAPSULE | Refills: 2 | Status: SHIPPED | OUTPATIENT
Start: 2023-05-22 | End: 2023-06-21

## 2023-05-10 RX ORDER — ALPRAZOLAM 0.5 MG/1
0.5 TABLET ORAL 3 TIMES DAILY PRN
Qty: 90 TABLET | Refills: 2 | Status: SHIPPED | OUTPATIENT
Start: 2023-05-11 | End: 2023-06-10

## 2023-05-10 RX ORDER — TRAMADOL HYDROCHLORIDE 50 MG/1
50 TABLET ORAL EVERY 4 HOURS PRN
Qty: 42 TABLET | Refills: 0 | Status: SHIPPED | OUTPATIENT
Start: 2023-05-10 | End: 2023-06-09

## 2023-05-10 ASSESSMENT — ENCOUNTER SYMPTOMS
BACK PAIN: 1
RESPIRATORY NEGATIVE: 1
GASTROINTESTINAL NEGATIVE: 1
EYES NEGATIVE: 1
ALLERGIC/IMMUNOLOGIC NEGATIVE: 1

## 2023-05-11 ENCOUNTER — HOSPITAL ENCOUNTER (OUTPATIENT)
Facility: HOSPITAL | Age: 68
Discharge: HOME | End: 2023-05-11
Payer: MEDICARE

## 2023-05-11 LAB
BACTERIA URNS QL MICRO: ABNORMAL /HPF
BILIRUB UR QL STRIP.AUTO: NEGATIVE
CLARITY UR: ABNORMAL
COLOR UR: YELLOW
CREAT UR-MCNC: 107 MG/DL (ref 1.5–300)
EPI CELLS #/AREA URNS HPF: ABNORMAL /HPF (ref 0–5)
GLUCOSE UR STRIP.AUTO-MCNC: NEGATIVE MG/DL
HGB UR QL STRIP.AUTO: NEGATIVE
KETONES UR STRIP.AUTO-MCNC: NEGATIVE MG/DL
LEUKOCYTE ESTERASE UR QL STRIP.AUTO: NEGATIVE
MICROALBUMIN UR DL<=1MG/L-MCNC: 2.7 MG/DL (ref 0–22)
MICROALBUMIN/CREAT UR: 25.2 MG/G (ref 0–30)
MUCOUS THREADS URNS QL MICRO: ABNORMAL /LPF
NITRITE UR QL STRIP.AUTO: NEGATIVE
PH UR STRIP.AUTO: 5.5 [PH] (ref 5–8)
PROT UR STRIP.AUTO-MCNC: NEGATIVE MG/DL
RBC #/AREA URNS HPF: ABNORMAL /HPF (ref 0–4)
SODIUM UR-SCNC: 22 MMOL/L (ref 40–220)
SP GR UR STRIP.AUTO: 1.02 (ref 1–1.03)
UA DIPSTICK W REFLEX MICRO PNL UR: YES
URN SPEC COLLECT METH UR: ABNORMAL
UROBILINOGEN UR STRIP-ACNC: 0.2 E.U./DL
UUN UR-MCNC: 352 MG/DL (ref 800–1666)
WBC #/AREA URNS HPF: ABNORMAL /HPF (ref 0–5)

## 2023-05-11 PROCEDURE — 84300 ASSAY OF URINE SODIUM: CPT

## 2023-05-11 PROCEDURE — 84540 ASSAY OF URINE/UREA-N: CPT

## 2023-05-11 PROCEDURE — 82043 UR ALBUMIN QUANTITATIVE: CPT

## 2023-05-11 PROCEDURE — 81001 URINALYSIS AUTO W/SCOPE: CPT

## 2023-05-11 PROCEDURE — 82570 ASSAY OF URINE CREATININE: CPT

## 2023-05-23 DIAGNOSIS — G89.29 CHRONIC IDIOPATHIC PAIN SYNDROME: ICD-10-CM

## 2023-05-23 RX ORDER — PROMETHAZINE HYDROCHLORIDE 25 MG/1
25 TABLET ORAL 2 TIMES DAILY PRN
Qty: 60 TABLET | Refills: 0 | Status: SHIPPED | OUTPATIENT
Start: 2023-05-23

## 2023-05-23 RX ORDER — TRAMADOL HYDROCHLORIDE 50 MG/1
50 TABLET ORAL EVERY 4 HOURS PRN
Qty: 42 TABLET | Refills: 0 | Status: SHIPPED | OUTPATIENT
Start: 2023-05-23 | End: 2023-06-22

## 2023-06-21 DIAGNOSIS — G89.29 CHRONIC IDIOPATHIC PAIN SYNDROME: ICD-10-CM

## 2023-06-22 RX ORDER — TRAMADOL HYDROCHLORIDE 50 MG/1
TABLET ORAL
Qty: 42 TABLET | Refills: 0 | OUTPATIENT
Start: 2023-06-22

## 2023-06-22 RX ORDER — TRAMADOL HYDROCHLORIDE 50 MG/1
50 TABLET ORAL EVERY 4 HOURS PRN
Qty: 42 TABLET | Refills: 0 | Status: SHIPPED | OUTPATIENT
Start: 2023-06-22 | End: 2023-07-22

## 2023-07-05 RX ORDER — TORSEMIDE 20 MG/1
TABLET ORAL
Qty: 15 TABLET | Refills: 0 | Status: SHIPPED | OUTPATIENT
Start: 2023-07-05
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
## 2023-07-11 DIAGNOSIS — S30.861A TICK BITE OF ABDOMINAL WALL, INITIAL ENCOUNTER: Primary | ICD-10-CM

## 2023-07-11 DIAGNOSIS — R60.9 SWELLING: ICD-10-CM

## 2023-07-11 RX ORDER — DOXYCYCLINE HYCLATE 100 MG
100 TABLET ORAL 2 TIMES DAILY
Qty: 20 TABLET | Refills: 0 | Status: SHIPPED | OUTPATIENT
Start: 2023-07-11 | End: 2023-07-21

## 2023-07-11 RX ORDER — TORSEMIDE 20 MG/1
20 TABLET ORAL DAILY
Qty: 30 TABLET | Refills: 3 | Status: SHIPPED | OUTPATIENT
Start: 2023-07-11

## 2023-07-22 DIAGNOSIS — M79.2 NEUROPATHIC PAIN: ICD-10-CM

## 2023-07-24 RX ORDER — GABAPENTIN 300 MG/1
300 CAPSULE ORAL 4 TIMES DAILY
Qty: 120 CAPSULE | Refills: 2 | OUTPATIENT
Start: 2023-07-24 | End: 2023-08-23

## 2023-08-02 DIAGNOSIS — G89.29 CHRONIC IDIOPATHIC PAIN SYNDROME: ICD-10-CM

## 2023-08-02 DIAGNOSIS — F41.9 ANXIETY: ICD-10-CM

## 2023-08-02 RX ORDER — TRAMADOL HYDROCHLORIDE 50 MG/1
TABLET ORAL
Qty: 42 TABLET | Refills: 0 | Status: SHIPPED | OUTPATIENT
Start: 2023-08-02 | End: 2023-09-01

## 2023-08-02 RX ORDER — ALPRAZOLAM 0.5 MG/1
0.5 TABLET ORAL 3 TIMES DAILY PRN
Qty: 90 TABLET | Refills: 2 | Status: SHIPPED | OUTPATIENT
Start: 2023-08-02 | End: 2023-09-01

## 2023-08-19 DIAGNOSIS — M79.2 NEUROPATHIC PAIN: ICD-10-CM

## 2023-08-21 RX ORDER — AMLODIPINE BESYLATE 10 MG/1
TABLET ORAL
Qty: 90 TABLET | Refills: 0 | Status: SHIPPED | OUTPATIENT
Start: 2023-08-21

## 2023-08-21 RX ORDER — PROMETHAZINE HYDROCHLORIDE 25 MG/1
25 TABLET ORAL 2 TIMES DAILY PRN
Qty: 60 TABLET | Refills: 0 | Status: SHIPPED | OUTPATIENT
Start: 2023-08-21

## 2023-08-21 RX ORDER — GABAPENTIN 300 MG/1
300 CAPSULE ORAL 4 TIMES DAILY
Qty: 120 CAPSULE | Refills: 2 | Status: SHIPPED | OUTPATIENT
Start: 2023-08-21 | End: 2023-09-20

## 2023-08-31 DIAGNOSIS — G89.29 CHRONIC IDIOPATHIC PAIN SYNDROME: ICD-10-CM

## 2023-09-01 RX ORDER — TRAMADOL HYDROCHLORIDE 50 MG/1
TABLET ORAL
Qty: 42 TABLET | Refills: 0 | Status: SHIPPED | OUTPATIENT
Start: 2023-09-01 | End: 2023-10-01

## 2023-09-05 ENCOUNTER — TELEPHONE (OUTPATIENT)
Dept: PRIMARY CARE CLINIC | Age: 68
End: 2023-09-05

## 2023-09-05 NOTE — TELEPHONE ENCOUNTER
Pt called requesting Rx for Magic Mouthwash sent to [facility] r/t raw gums and mouth from dentures not fitting well

## 2023-09-15 NOTE — TELEPHONE ENCOUNTER
Patient informed that [facility] went out of business and they were the only compound pharmacy in [place]. She did not want to go to [facility]. Will send Viscous Lidocaine to [facility] pharmacy.

## 2023-09-28 DIAGNOSIS — F41.9 ANXIETY: ICD-10-CM

## 2023-09-28 DIAGNOSIS — G89.29 CHRONIC IDIOPATHIC PAIN SYNDROME: ICD-10-CM

## 2023-09-28 RX ORDER — ALPRAZOLAM 0.5 MG/1
TABLET ORAL
Qty: 90 TABLET | Refills: 2 | OUTPATIENT
Start: 2023-09-28

## 2023-09-29 RX ORDER — TRAMADOL HYDROCHLORIDE 50 MG/1
TABLET ORAL
Qty: 42 TABLET | Refills: 0 | Status: SHIPPED | OUTPATIENT
Start: 2023-09-29 | End: 2023-10-29

## 2023-10-11 ENCOUNTER — HOSPITAL ENCOUNTER (OUTPATIENT)
Facility: HOSPITAL | Age: 68
Discharge: HOME | End: 2023-10-11
Payer: MEDICARE

## 2023-10-11 ENCOUNTER — OFFICE VISIT (OUTPATIENT)
Dept: PRIMARY CARE CLINIC | Age: 68
End: 2023-10-11
Payer: MEDICARE

## 2023-10-11 VITALS
DIASTOLIC BLOOD PRESSURE: 81 MMHG | OXYGEN SATURATION: 95 % | HEART RATE: 80 BPM | SYSTOLIC BLOOD PRESSURE: 159 MMHG | WEIGHT: 172.8 LBS | HEIGHT: 61 IN | RESPIRATION RATE: 16 BRPM | BODY MASS INDEX: 32.62 KG/M2 | TEMPERATURE: 97.8 F

## 2023-10-11 DIAGNOSIS — E55.9 VITAMIN D DEFICIENCY: ICD-10-CM

## 2023-10-11 DIAGNOSIS — N18.31 TYPE 2 DIABETES MELLITUS WITH STAGE 3A CHRONIC KIDNEY DISEASE, WITHOUT LONG-TERM CURRENT USE OF INSULIN (HCC): Primary | ICD-10-CM

## 2023-10-11 DIAGNOSIS — Z13.29 THYROID DISORDER SCREEN: ICD-10-CM

## 2023-10-11 DIAGNOSIS — E11.22 TYPE 2 DIABETES MELLITUS WITH STAGE 3A CHRONIC KIDNEY DISEASE, WITHOUT LONG-TERM CURRENT USE OF INSULIN (HCC): Primary | ICD-10-CM

## 2023-10-11 DIAGNOSIS — Z23 NEED FOR INFLUENZA VACCINATION: ICD-10-CM

## 2023-10-11 DIAGNOSIS — I10 ESSENTIAL HYPERTENSION: ICD-10-CM

## 2023-10-11 DIAGNOSIS — Z23 NEED FOR PNEUMOCOCCAL VACCINATION: ICD-10-CM

## 2023-10-11 DIAGNOSIS — Z12.31 ENCOUNTER FOR SCREENING MAMMOGRAM FOR BREAST CANCER: ICD-10-CM

## 2023-10-11 DIAGNOSIS — M79.2 NEUROPATHIC PAIN: ICD-10-CM

## 2023-10-11 DIAGNOSIS — Z94.4 LIVER TRANSPLANT RECIPIENT (HCC): ICD-10-CM

## 2023-10-11 DIAGNOSIS — F51.01 PRIMARY INSOMNIA: ICD-10-CM

## 2023-10-11 PROCEDURE — G8399 PT W/DXA RESULTS DOCUMENT: HCPCS | Performed by: NURSE PRACTITIONER

## 2023-10-11 PROCEDURE — 3078F DIAST BP <80 MM HG: CPT | Performed by: NURSE PRACTITIONER

## 2023-10-11 PROCEDURE — 90694 VACC AIIV4 NO PRSRV 0.5ML IM: CPT | Performed by: NURSE PRACTITIONER

## 2023-10-11 PROCEDURE — 4004F PT TOBACCO SCREEN RCVD TLK: CPT | Performed by: NURSE PRACTITIONER

## 2023-10-11 PROCEDURE — 3044F HG A1C LEVEL LT 7.0%: CPT | Performed by: NURSE PRACTITIONER

## 2023-10-11 PROCEDURE — G8417 CALC BMI ABV UP PARAM F/U: HCPCS | Performed by: NURSE PRACTITIONER

## 2023-10-11 PROCEDURE — 82746 ASSAY OF FOLIC ACID SERUM: CPT

## 2023-10-11 PROCEDURE — 1090F PRES/ABSN URINE INCON ASSESS: CPT | Performed by: NURSE PRACTITIONER

## 2023-10-11 PROCEDURE — 85027 COMPLETE CBC AUTOMATED: CPT

## 2023-10-11 PROCEDURE — 80061 LIPID PANEL: CPT

## 2023-10-11 PROCEDURE — 1123F ACP DISCUSS/DSCN MKR DOCD: CPT | Performed by: NURSE PRACTITIONER

## 2023-10-11 PROCEDURE — 90677 PCV20 VACCINE IM: CPT | Performed by: NURSE PRACTITIONER

## 2023-10-11 PROCEDURE — G0008 ADMIN INFLUENZA VIRUS VAC: HCPCS | Performed by: NURSE PRACTITIONER

## 2023-10-11 PROCEDURE — 99214 OFFICE O/P EST MOD 30 MIN: CPT | Performed by: NURSE PRACTITIONER

## 2023-10-11 PROCEDURE — G0009 ADMIN PNEUMOCOCCAL VACCINE: HCPCS | Performed by: NURSE PRACTITIONER

## 2023-10-11 PROCEDURE — 2022F DILAT RTA XM EVC RTNOPTHY: CPT | Performed by: NURSE PRACTITIONER

## 2023-10-11 PROCEDURE — G8484 FLU IMMUNIZE NO ADMIN: HCPCS | Performed by: NURSE PRACTITIONER

## 2023-10-11 PROCEDURE — 3017F COLORECTAL CA SCREEN DOC REV: CPT | Performed by: NURSE PRACTITIONER

## 2023-10-11 PROCEDURE — G8427 DOCREV CUR MEDS BY ELIG CLIN: HCPCS | Performed by: NURSE PRACTITIONER

## 2023-10-11 PROCEDURE — 84443 ASSAY THYROID STIM HORMONE: CPT

## 2023-10-11 PROCEDURE — 82607 VITAMIN B-12: CPT

## 2023-10-11 PROCEDURE — 82306 VITAMIN D 25 HYDROXY: CPT

## 2023-10-11 PROCEDURE — 3074F SYST BP LT 130 MM HG: CPT | Performed by: NURSE PRACTITIONER

## 2023-10-11 PROCEDURE — 80053 COMPREHEN METABOLIC PANEL: CPT

## 2023-10-11 RX ORDER — GABAPENTIN 400 MG/1
400 CAPSULE ORAL 4 TIMES DAILY
Qty: 120 CAPSULE | Refills: 3 | Status: SHIPPED | OUTPATIENT
Start: 2023-10-11 | End: 2023-11-10

## 2023-10-11 RX ORDER — TRAZODONE HYDROCHLORIDE 50 MG/1
50 TABLET ORAL NIGHTLY PRN
Qty: 30 TABLET | Refills: 3 | Status: SHIPPED | OUTPATIENT
Start: 2023-10-11

## 2023-10-11 RX ORDER — ALPRAZOLAM 0.5 MG/1
TABLET ORAL
COMMUNITY
Start: 2023-09-28 | End: 2023-10-25

## 2023-10-11 ASSESSMENT — ENCOUNTER SYMPTOMS
GASTROINTESTINAL NEGATIVE: 1
RESPIRATORY NEGATIVE: 1
EYES NEGATIVE: 1
ALLERGIC/IMMUNOLOGIC NEGATIVE: 1

## 2023-10-11 NOTE — PROGRESS NOTES
symptoms of rejection. 4. Neuropathic pain    - gabapentin (NEURONTIN) 400 MG capsule; Take 1 capsule by mouth 4 times daily for 30 days. Max Daily Amount: 1,600 mg  Dispense: 120 capsule; Refill: 3    5. Primary insomnia    - traZODone (DESYREL) 50 MG tablet; Take 1 tablet by mouth nightly as needed for Sleep  Dispense: 30 tablet; Refill: 3    6. Vitamin D deficiency    - Vitamin B12 & Folate; Future  - Vitamin D 25 Hydroxy; Future    7. Thyroid disorder screen    - TSH; Future    8. Need for influenza vaccination    - Influenza, FLUAD, (age 72 y+), IM, Preservative Free, 0.5 mL    9. Need for pneumococcal vaccination    - Pneumococcal, PCV20, PREVNAR 20, (age 6w+), IM, PF    10.  Encounter for screening mammogram for breast cancer    - KELLY Digital Screen Bilateral; Future       Written by Wendy JUSTICE, acting as a scribe for Jessica Caban on 10/11/2023 at 10:41 PM.

## 2023-10-11 NOTE — PATIENT INSTRUCTIONS
seven cigarettes a day, it's time to set your target quit date, and get ready to stick to it. Don't Smoke \"Automatically\"   Smoke only those cigarettes you really want. Catch yourself before you light up a cigarette out of pure habit. Don't empty your ashtrays. This will remind you of how many cigarettes you've smoked each day, and the sight and the smell of stale cigarettes butts will be very unpleasant. Make yourself aware of each cigarette by using the opposite hand or putting cigarettes in an unfamiliar location or a different pocket to break the automatic reach. If you light up many times during the day without even thinking about it, try to look in a mirror each time you put a match to your cigarette. You may decide you don't need it. Make Smoking Inconvenient   Stop buying cigarettes by the carton. Wait until one pack is empty before you buy another. Stop carrying cigarettes with you at home or at work. Make them difficult to get to. Make Smoking Unpleasant   Smoke only under circumstances that aren't especially pleasurable for you. If you like to smoke with others, smoke alone. Turn your chair to an empty corner and focus only on the cigarette you are smoking and all its many negative effects. Collect all your cigarette butts in one large glass container as a visual reminder of the filth made by smoking. Just Before Quitting   Practice going without cigarettes. Don't think of never smoking again. Think of quitting in terms of one day at a time . Tell yourself you won't smoke today, and then don't. Clean your clothes to rid them of the cigarette smell, which can linger a long time. On the Day You Quit   Throw away all your cigarettes and matches. Hide your lighters and ashtrays. Visit the dentist and have your teeth cleaned to get rid of tobacco stains. Notice how nice they look and resolve to keep them that way.    Make a list of things you'd like to buy for yourself or someone

## 2023-10-12 LAB
25(OH)D3 SERPL-MCNC: 22.7 NG/ML (ref 32–100)
ALBUMIN SERPL-MCNC: 4.6 G/DL (ref 3.4–4.8)
ALBUMIN/GLOB SERPL: 1.8 {RATIO} (ref 0.8–2)
ALP SERPL-CCNC: 131 U/L (ref 25–100)
ALT SERPL-CCNC: 11 U/L (ref 4–36)
ANION GAP SERPL CALCULATED.3IONS-SCNC: 13 MMOL/L (ref 3–16)
AST SERPL-CCNC: 13 U/L (ref 8–33)
BILIRUB SERPL-MCNC: 1.4 MG/DL (ref 0.3–1.2)
BUN SERPL-MCNC: 21 MG/DL (ref 6–20)
CALCIUM SERPL-MCNC: 9.9 MG/DL (ref 8.5–10.5)
CHLORIDE SERPL-SCNC: 108 MMOL/L (ref 98–107)
CHOLEST SERPL-MCNC: 252 MG/DL (ref 0–200)
CO2 SERPL-SCNC: 24 MMOL/L (ref 20–30)
CREAT SERPL-MCNC: 1.4 MG/DL (ref 0.4–1.2)
ERYTHROCYTE [DISTWIDTH] IN BLOOD BY AUTOMATED COUNT: 18.5 % (ref 11–16)
FOLATE SERPL-MCNC: 14.86 NG/ML
GFR SERPLBLD CREATININE-BSD FMLA CKD-EPI: 41 ML/MIN/{1.73_M2}
GLOBULIN SER CALC-MCNC: 2.6 G/DL
GLUCOSE SERPL-MCNC: 170 MG/DL (ref 74–106)
HCT VFR BLD AUTO: 22.9 % (ref 37–47)
HDLC SERPL-MCNC: 42 MG/DL (ref 40–60)
HGB BLD-MCNC: 14.6 G/DL (ref 11.5–16.5)
LDLC SERPL CALC-MCNC: 150 MG/DL
MCH RBC QN AUTO: 63.5 PG (ref 27–32)
MCHC RBC AUTO-ENTMCNC: 63.8 G/DL (ref 31–35)
MCV RBC AUTO: 99.6 FL (ref 80–100)
PLATELET # BLD AUTO: 217 K/UL (ref 150–400)
PMV BLD AUTO: 11 FL (ref 6–10)
POTASSIUM SERPL-SCNC: 4.9 MMOL/L (ref 3.4–5.1)
PROT SERPL-MCNC: 7.2 G/DL (ref 6.4–8.3)
RBC # BLD AUTO: 2.3 M/UL (ref 3.8–5.8)
SODIUM SERPL-SCNC: 145 MMOL/L (ref 136–145)
TRIGL SERPL-MCNC: 298 MG/DL (ref 0–249)
TSH SERPL DL<=0.005 MIU/L-ACNC: 2.82 UIU/ML (ref 0.27–4.2)
VIT B12 SERPL-MCNC: 382 PG/ML (ref 211–911)
VLDLC SERPL CALC-MCNC: 60 MG/DL
WBC # BLD AUTO: 7.3 K/UL (ref 4–11)

## 2023-10-23 DIAGNOSIS — F41.9 ANXIETY: Primary | ICD-10-CM

## 2023-10-25 RX ORDER — ALPRAZOLAM 0.5 MG/1
TABLET ORAL
Qty: 90 TABLET | Refills: 2 | Status: SHIPPED | OUTPATIENT
Start: 2023-10-25 | End: 2023-11-24

## 2023-10-25 RX ORDER — AMLODIPINE BESYLATE 10 MG/1
TABLET ORAL
Qty: 90 TABLET | Refills: 1 | Status: SHIPPED | OUTPATIENT
Start: 2023-10-25

## 2023-11-08 DIAGNOSIS — G89.29 CHRONIC IDIOPATHIC PAIN SYNDROME: ICD-10-CM

## 2023-11-13 RX ORDER — TRAMADOL HYDROCHLORIDE 50 MG/1
TABLET ORAL
Qty: 42 TABLET | Refills: 0 | Status: SHIPPED | OUTPATIENT
Start: 2023-11-13 | End: 2023-12-13

## 2023-11-13 RX ORDER — PROMETHAZINE HYDROCHLORIDE 25 MG/1
25 TABLET ORAL 2 TIMES DAILY PRN
Qty: 60 TABLET | Refills: 0 | Status: SHIPPED | OUTPATIENT
Start: 2023-11-13

## 2023-11-17 DIAGNOSIS — K14.0 GLOSSITIS: Primary | ICD-10-CM

## 2023-11-17 RX ORDER — LIDOCAINE HYDROCHLORIDE 20 MG/ML
5 SOLUTION OROPHARYNGEAL PRN
Qty: 100 ML | Refills: 0 | Status: SHIPPED | OUTPATIENT
Start: 2023-11-17

## 2023-11-24 DIAGNOSIS — R60.9 SWELLING: ICD-10-CM

## 2023-11-27 RX ORDER — TORSEMIDE 20 MG/1
20 TABLET ORAL DAILY
Qty: 30 TABLET | Refills: 3 | Status: SHIPPED | OUTPATIENT
Start: 2023-11-27

## 2023-11-27 RX ORDER — AMLODIPINE BESYLATE 10 MG/1
TABLET ORAL
Qty: 90 TABLET | Refills: 1 | OUTPATIENT
Start: 2023-11-27

## 2023-11-30 DIAGNOSIS — G89.29 CHRONIC IDIOPATHIC PAIN SYNDROME: ICD-10-CM

## 2023-12-01 RX ORDER — TRAMADOL HYDROCHLORIDE 50 MG/1
TABLET ORAL
Qty: 42 TABLET | Refills: 0 | Status: SHIPPED | OUTPATIENT
Start: 2023-12-01 | End: 2023-12-31

## 2023-12-22 DIAGNOSIS — F41.9 ANXIETY: ICD-10-CM

## 2023-12-27 RX ORDER — ALPRAZOLAM 0.5 MG/1
TABLET ORAL
Qty: 90 TABLET | Refills: 2 | Status: SHIPPED | OUTPATIENT
Start: 2023-12-27 | End: 2024-01-26

## 2024-01-02 DIAGNOSIS — G89.29 CHRONIC IDIOPATHIC PAIN SYNDROME: ICD-10-CM

## 2024-01-02 RX ORDER — TRAMADOL HYDROCHLORIDE 50 MG/1
50 TABLET ORAL EVERY 4 HOURS PRN
Qty: 42 TABLET | Refills: 0 | Status: SHIPPED | OUTPATIENT
Start: 2024-01-02 | End: 2024-02-01

## 2024-01-02 NOTE — TELEPHONE ENCOUNTER
Azael last reviewed by you 10/23/23  Needs f/u appt (last OV 10/11/23) I let Yehuda know for pt and

## 2024-01-03 DIAGNOSIS — M79.2 NEUROPATHIC PAIN: ICD-10-CM

## 2024-01-03 DIAGNOSIS — F51.01 PRIMARY INSOMNIA: ICD-10-CM

## 2024-01-03 RX ORDER — GABAPENTIN 400 MG/1
CAPSULE ORAL
Qty: 120 CAPSULE | Refills: 3 | OUTPATIENT
Start: 2024-01-03

## 2024-01-03 RX ORDER — TRAZODONE HYDROCHLORIDE 50 MG/1
TABLET ORAL
Qty: 30 TABLET | Refills: 3 | OUTPATIENT
Start: 2024-01-03

## 2024-01-11 DIAGNOSIS — F51.01 PRIMARY INSOMNIA: ICD-10-CM

## 2024-01-11 RX ORDER — TRAZODONE HYDROCHLORIDE 50 MG/1
TABLET ORAL
Qty: 30 TABLET | Refills: 3 | Status: SHIPPED | OUTPATIENT
Start: 2024-01-11

## 2024-01-17 DIAGNOSIS — G89.29 CHRONIC IDIOPATHIC PAIN SYNDROME: ICD-10-CM

## 2024-01-17 RX ORDER — TRAMADOL HYDROCHLORIDE 50 MG/1
50 TABLET ORAL EVERY 4 HOURS PRN
Qty: 42 TABLET | Refills: 0 | Status: SHIPPED | OUTPATIENT
Start: 2024-01-17 | End: 2024-02-16

## 2024-01-22 RX ORDER — AMLODIPINE BESYLATE 10 MG/1
TABLET ORAL
Qty: 90 TABLET | Refills: 1 | Status: SHIPPED | OUTPATIENT
Start: 2024-01-22

## 2024-01-22 RX ORDER — PROMETHAZINE HYDROCHLORIDE 25 MG/1
25 TABLET ORAL 2 TIMES DAILY PRN
Qty: 60 TABLET | Refills: 0 | Status: SHIPPED | OUTPATIENT
Start: 2024-01-22

## 2024-01-30 ENCOUNTER — APPOINTMENT (OUTPATIENT)
Dept: GENERAL RADIOLOGY | Facility: HOSPITAL | Age: 69
End: 2024-01-30
Attending: HOSPITALIST
Payer: MEDICARE

## 2024-01-30 ENCOUNTER — APPOINTMENT (OUTPATIENT)
Dept: ULTRASOUND IMAGING | Facility: HOSPITAL | Age: 69
End: 2024-01-30
Payer: MEDICARE

## 2024-01-30 ENCOUNTER — APPOINTMENT (OUTPATIENT)
Dept: CT IMAGING | Facility: HOSPITAL | Age: 69
End: 2024-01-30
Attending: HOSPITALIST
Payer: MEDICARE

## 2024-01-30 ENCOUNTER — HOSPITAL ENCOUNTER (EMERGENCY)
Facility: HOSPITAL | Age: 69
Discharge: HOME | End: 2024-01-30
Attending: HOSPITALIST
Payer: MEDICARE

## 2024-01-30 VITALS
OXYGEN SATURATION: 99 % | BODY MASS INDEX: 33.04 KG/M2 | HEIGHT: 61 IN | WEIGHT: 175 LBS | SYSTOLIC BLOOD PRESSURE: 175 MMHG | RESPIRATION RATE: 19 BRPM | HEART RATE: 84 BPM | TEMPERATURE: 98 F | DIASTOLIC BLOOD PRESSURE: 82 MMHG

## 2024-01-30 DIAGNOSIS — M79.89 SWELLING OF LIMB: Primary | ICD-10-CM

## 2024-01-30 DIAGNOSIS — R22.0 FACIAL SWELLING: ICD-10-CM

## 2024-01-30 LAB
ALBUMIN SERPL-MCNC: 3.7 G/DL (ref 3.4–4.8)
ALBUMIN/GLOB SERPL: 1.5 {RATIO} (ref 0.8–2)
ALP SERPL-CCNC: 115 U/L (ref 25–100)
ALT SERPL-CCNC: 8 U/L (ref 4–36)
AMORPH SED URNS QL MICRO: ABNORMAL /HPF
ANION GAP SERPL CALCULATED.3IONS-SCNC: 10 MMOL/L (ref 3–16)
AST SERPL-CCNC: 12 U/L (ref 8–33)
BACTERIA URNS QL MICRO: ABNORMAL /HPF
BASOPHILS # BLD: 0 K/UL (ref 0–0.1)
BASOPHILS NFR BLD: 0.2 %
BILIRUB SERPL-MCNC: 2.3 MG/DL (ref 0.3–1.2)
BILIRUB UR QL STRIP.AUTO: ABNORMAL
BUN SERPL-MCNC: 16 MG/DL (ref 6–20)
CALCIUM SERPL-MCNC: 8.5 MG/DL (ref 8.5–10.5)
CHLORIDE SERPL-SCNC: 101 MMOL/L (ref 98–107)
CLARITY UR: CLEAR
CO2 SERPL-SCNC: 23 MMOL/L (ref 20–30)
COARSE GRAN CASTS #/AREA URNS LPF: ABNORMAL /LPF (ref 0–2)
COLOR UR: YELLOW
CREAT SERPL-MCNC: 1.5 MG/DL (ref 0.4–1.2)
EOSINOPHIL # BLD: 0.1 K/UL (ref 0–0.4)
EOSINOPHIL NFR BLD: 1.4 %
EPI CELLS #/AREA URNS HPF: ABNORMAL /HPF (ref 0–5)
ERYTHROCYTE [DISTWIDTH] IN BLOOD BY AUTOMATED COUNT: 16.1 % (ref 11–16)
FLUAV AG NPH QL: NEGATIVE
FLUBV AG NPH QL: NEGATIVE
GFR SERPLBLD CREATININE-BSD FMLA CKD-EPI: 38 ML/MIN/{1.73_M2}
GLOBULIN SER CALC-MCNC: 2.5 G/DL
GLUCOSE SERPL-MCNC: 334 MG/DL (ref 74–106)
GLUCOSE UR STRIP.AUTO-MCNC: 250 MG/DL
HCT VFR BLD AUTO: 30.9 % (ref 37–47)
HGB BLD-MCNC: 13.6 G/DL (ref 11.5–16.5)
HGB UR QL STRIP.AUTO: ABNORMAL
IMM GRANULOCYTES # BLD: 0 K/UL
IMM GRANULOCYTES NFR BLD: 0.3 % (ref 0–5)
KETONES UR STRIP.AUTO-MCNC: ABNORMAL MG/DL
LEUKOCYTE ESTERASE UR QL STRIP.AUTO: NEGATIVE
LYMPHOCYTES # BLD: 0.7 K/UL (ref 1.5–4)
LYMPHOCYTES NFR BLD: 12.1 %
MANUAL DIFFERENTIAL PERFORMED BLD QL: ABNORMAL
MCH RBC QN AUTO: 40.7 PG (ref 27–32)
MCHC RBC AUTO-ENTMCNC: 44 G/DL (ref 31–35)
MCV RBC AUTO: 92.5 FL (ref 80–100)
MONOCYTES # BLD: 0.3 K/UL (ref 0.2–0.8)
MONOCYTES NFR BLD: 5.4 %
MUCOUS THREADS URNS QL MICRO: ABNORMAL /LPF
NEUTROPHILS # BLD: 4.6 K/UL (ref 2–7.5)
NEUTS SEG NFR BLD: 80.6 %
NITRITE UR QL STRIP.AUTO: NEGATIVE
NT-PROBNP SERPL-MCNC: 744 PG/ML (ref 0–1800)
PH UR STRIP.AUTO: 5.5 [PH] (ref 5–8)
PLATELET # BLD AUTO: 175 K/UL (ref 150–400)
PMV BLD AUTO: 10.1 FL (ref 6–10)
POTASSIUM SERPL-SCNC: 4.1 MMOL/L (ref 3.4–5.1)
PROT SERPL-MCNC: 6.2 G/DL (ref 6.4–8.3)
PROT UR STRIP.AUTO-MCNC: >=300 MG/DL
RBC # BLD AUTO: 3.34 M/UL (ref 3.8–5.8)
RBC #/AREA URNS HPF: ABNORMAL /HPF (ref 0–4)
SARS-COV-2 RDRP RESP QL NAA+PROBE: NOT DETECTED
SODIUM SERPL-SCNC: 134 MMOL/L (ref 136–145)
SP GR UR STRIP.AUTO: 1.02 (ref 1–1.03)
UA COMPLETE W REFLEX CULTURE PNL UR: ABNORMAL
UA DIPSTICK W REFLEX MICRO PNL UR: YES
URN SPEC COLLECT METH UR: ABNORMAL
UROBILINOGEN UR STRIP-ACNC: 2 E.U./DL
WBC # BLD AUTO: 5.7 K/UL (ref 4–11)
WBC #/AREA URNS HPF: ABNORMAL /HPF (ref 0–5)

## 2024-01-30 PROCEDURE — 93971 EXTREMITY STUDY: CPT

## 2024-01-30 PROCEDURE — 36415 COLL VENOUS BLD VENIPUNCTURE: CPT

## 2024-01-30 PROCEDURE — 85025 COMPLETE CBC W/AUTO DIFF WBC: CPT

## 2024-01-30 PROCEDURE — 81001 URINALYSIS AUTO W/SCOPE: CPT

## 2024-01-30 PROCEDURE — 87635 SARS-COV-2 COVID-19 AMP PRB: CPT

## 2024-01-30 PROCEDURE — 74176 CT ABD & PELVIS W/O CONTRAST: CPT

## 2024-01-30 PROCEDURE — 80053 COMPREHEN METABOLIC PANEL: CPT

## 2024-01-30 PROCEDURE — 6360000002 HC RX W HCPCS: Performed by: HOSPITALIST

## 2024-01-30 PROCEDURE — 87804 INFLUENZA ASSAY W/OPTIC: CPT

## 2024-01-30 PROCEDURE — 83880 ASSAY OF NATRIURETIC PEPTIDE: CPT

## 2024-01-30 PROCEDURE — 96374 THER/PROPH/DIAG INJ IV PUSH: CPT

## 2024-01-30 PROCEDURE — 99285 EMERGENCY DEPT VISIT HI MDM: CPT

## 2024-01-30 PROCEDURE — 71045 X-RAY EXAM CHEST 1 VIEW: CPT

## 2024-01-30 RX ORDER — FUROSEMIDE 10 MG/ML
20 INJECTION INTRAMUSCULAR; INTRAVENOUS ONCE
Status: COMPLETED | OUTPATIENT
Start: 2024-01-30 | End: 2024-01-30

## 2024-01-30 RX ADMIN — FUROSEMIDE 20 MG: 10 INJECTION, SOLUTION INTRAMUSCULAR; INTRAVENOUS at 16:59

## 2024-01-30 ASSESSMENT — LIFESTYLE VARIABLES
HOW MANY STANDARD DRINKS CONTAINING ALCOHOL DO YOU HAVE ON A TYPICAL DAY: PATIENT DOES NOT DRINK
HOW OFTEN DO YOU HAVE A DRINK CONTAINING ALCOHOL: NEVER

## 2024-01-30 NOTE — ED NOTES
Discharge instructions reviewed with verbalized understanding from patient. Patient had no further questions or concerns.

## 2024-01-30 NOTE — ED PROVIDER NOTES
visit (ifapplicable):  Results for orders placed or performed during the hospital encounter of 01/30/24   COVID-19, Rapid    Specimen: Nasopharyngeal Swab   Result Value Ref Range    SARS-CoV-2, NAAT Not Detected Not Detected   Rapid Influenza A/B Antigens    Specimen: Nasopharyngeal   Result Value Ref Range    Rapid Influenza A Ag Negative Negative    Rapid Influenza B Ag Negative Negative   CBC with Auto Differential   Result Value Ref Range    WBC 5.7 4.0 - 11.0 K/uL    RBC 3.34 (L) 3.80 - 5.80 M/uL    Hemoglobin 13.6 11.5 - 16.5 g/dL    Hematocrit 30.9 (L) 37.0 - 47.0 %    MCV 92.5 80.0 - 100.0 fL    MCH 40.7 (H) 27.0 - 32.0 pg    MCHC 44.0 (H) 31.0 - 35.0 g/dL    RDW 16.1 (H) 11.0 - 16.0 %    Platelets 175 150 - 400 K/uL    MPV 10.1 (H) 6.0 - 10.0 fL    Neutrophils % 80.6 %    Immature Granulocytes % 0.3 0.0 - 5.0 %    Lymphocytes % 12.1 %    Monocytes % 5.4 %    Eosinophils % 1.4 %    Basophils % 0.2 %    Neutrophils Absolute 4.6 2.0 - 7.5 K/uL    Immature Granulocytes # 0.0 K/uL    Lymphocytes Absolute 0.7 (L) 1.5 - 4.0 K/uL    Monocytes Absolute 0.3 0.2 - 0.8 K/uL    Eosinophils Absolute 0.1 0.0 - 0.4 K/uL    Basophils Absolute 0.0 0.0 - 0.1 K/uL   Comprehensive Metabolic Panel w/ Reflex to MG   Result Value Ref Range    Sodium 134 (L) 136 - 145 mmol/L    Potassium reflex Magnesium 4.1 3.4 - 5.1 mmol/L    Chloride 101 98 - 107 mmol/L    CO2 23 20 - 30 mmol/L    Anion Gap 10 3 - 16    Glucose 334 (H) 74 - 106 mg/dL    BUN 16 6 - 20 mg/dL    Creatinine 1.5 (H) 0.4 - 1.2 mg/dL    Est, Glom Filt Rate 38 (L) >59    Calcium 8.5 8.5 - 10.5 mg/dL    Total Protein 6.2 (L) 6.4 - 8.3 g/dL    Albumin 3.7 3.4 - 4.8 g/dL    Albumin/Globulin Ratio 1.5 0.8 - 2.0    Total Bilirubin 2.3 (H) 0.3 - 1.2 mg/dL    Alkaline Phosphatase 115 (H) 25 - 100 U/L    ALT 8 4 - 36 U/L    AST 12 8 - 33 U/L    Globulin 2.5 Not Established g/dL   Brain Natriuretic Peptide   Result Value Ref Range    Pro- 0 - 1,800 pg/mL   Urinalysis

## 2024-02-01 DIAGNOSIS — M79.2 NEUROPATHIC PAIN: ICD-10-CM

## 2024-02-01 DIAGNOSIS — G89.29 CHRONIC IDIOPATHIC PAIN SYNDROME: ICD-10-CM

## 2024-02-02 RX ORDER — GABAPENTIN 400 MG/1
CAPSULE ORAL
Qty: 120 CAPSULE | Refills: 3 | OUTPATIENT
Start: 2024-02-02

## 2024-02-02 RX ORDER — TRAMADOL HYDROCHLORIDE 50 MG/1
50 TABLET ORAL EVERY 4 HOURS PRN
Qty: 42 TABLET | Refills: 0 | OUTPATIENT
Start: 2024-02-02 | End: 2024-03-03

## 2024-02-09 DIAGNOSIS — G89.29 CHRONIC IDIOPATHIC PAIN SYNDROME: ICD-10-CM

## 2024-02-09 DIAGNOSIS — M79.2 NEUROPATHIC PAIN: ICD-10-CM

## 2024-02-09 NOTE — TELEPHONE ENCOUNTER
Pt called requesting her pain/Gabapentin. Scheduled f/u for 3/5/24  Azael reviewed by celine 1/2/24

## 2024-02-13 RX ORDER — TRAMADOL HYDROCHLORIDE 50 MG/1
50 TABLET ORAL EVERY 4 HOURS PRN
Qty: 42 TABLET | Refills: 0 | OUTPATIENT
Start: 2024-02-13 | End: 2024-03-14

## 2024-02-13 RX ORDER — GABAPENTIN 400 MG/1
400 CAPSULE ORAL 4 TIMES DAILY
Qty: 120 CAPSULE | Refills: 3 | OUTPATIENT
Start: 2024-02-13 | End: 2024-03-14

## 2024-02-14 DIAGNOSIS — G89.29 CHRONIC IDIOPATHIC PAIN SYNDROME: ICD-10-CM

## 2024-02-15 RX ORDER — TRAMADOL HYDROCHLORIDE 50 MG/1
50 TABLET ORAL EVERY 4 HOURS PRN
Qty: 42 TABLET | Refills: 0 | OUTPATIENT
Start: 2024-02-15 | End: 2024-03-16

## 2024-03-05 ENCOUNTER — OFFICE VISIT (OUTPATIENT)
Dept: PRIMARY CARE CLINIC | Age: 69
End: 2024-03-05

## 2024-03-05 VITALS
HEIGHT: 61 IN | TEMPERATURE: 98 F | HEART RATE: 99 BPM | OXYGEN SATURATION: 96 % | BODY MASS INDEX: 33.79 KG/M2 | SYSTOLIC BLOOD PRESSURE: 138 MMHG | DIASTOLIC BLOOD PRESSURE: 60 MMHG | RESPIRATION RATE: 20 BRPM | WEIGHT: 179 LBS

## 2024-03-05 DIAGNOSIS — Z13.29 THYROID DISORDER SCREEN: ICD-10-CM

## 2024-03-05 DIAGNOSIS — E11.22 TYPE 2 DIABETES MELLITUS WITH STAGE 3A CHRONIC KIDNEY DISEASE, WITHOUT LONG-TERM CURRENT USE OF INSULIN (HCC): Primary | ICD-10-CM

## 2024-03-05 DIAGNOSIS — Z94.4 LIVER TRANSPLANT RECIPIENT (HCC): ICD-10-CM

## 2024-03-05 DIAGNOSIS — R60.9 SWELLING: ICD-10-CM

## 2024-03-05 DIAGNOSIS — E55.9 VITAMIN D DEFICIENCY: ICD-10-CM

## 2024-03-05 DIAGNOSIS — N18.31 TYPE 2 DIABETES MELLITUS WITH STAGE 3A CHRONIC KIDNEY DISEASE, WITHOUT LONG-TERM CURRENT USE OF INSULIN (HCC): Primary | ICD-10-CM

## 2024-03-05 DIAGNOSIS — J43.2 CENTRILOBULAR EMPHYSEMA (HCC): ICD-10-CM

## 2024-03-05 DIAGNOSIS — I10 ESSENTIAL HYPERTENSION: ICD-10-CM

## 2024-03-05 DIAGNOSIS — Z91.81 AT HIGH RISK FOR FALLS: ICD-10-CM

## 2024-03-05 DIAGNOSIS — F41.9 ANXIETY: ICD-10-CM

## 2024-03-05 DIAGNOSIS — K21.9 GASTROESOPHAGEAL REFLUX DISEASE, UNSPECIFIED WHETHER ESOPHAGITIS PRESENT: ICD-10-CM

## 2024-03-05 DIAGNOSIS — M79.2 NEUROPATHIC PAIN: ICD-10-CM

## 2024-03-05 LAB
CHP ED QC CHECK: ABNORMAL
GLUCOSE BLD-MCNC: 345 MG/DL
HBA1C MFR BLD: 6.9 %

## 2024-03-05 RX ORDER — ALPRAZOLAM 0.5 MG/1
0.5 TABLET ORAL 3 TIMES DAILY PRN
COMMUNITY
Start: 2024-02-16 | End: 2024-03-05 | Stop reason: SDUPTHER

## 2024-03-05 RX ORDER — TRAMADOL HYDROCHLORIDE 50 MG/1
50 TABLET ORAL EVERY 6 HOURS PRN
Qty: 60 TABLET | Refills: 2 | Status: SHIPPED | OUTPATIENT
Start: 2024-03-05 | End: 2024-04-04

## 2024-03-05 RX ORDER — GABAPENTIN 400 MG/1
400 CAPSULE ORAL 4 TIMES DAILY
Qty: 120 CAPSULE | Refills: 2 | Status: SHIPPED | OUTPATIENT
Start: 2024-03-05 | End: 2024-04-04

## 2024-03-05 RX ORDER — AMLODIPINE BESYLATE 10 MG/1
10 TABLET ORAL DAILY
Qty: 90 TABLET | Refills: 1 | Status: SHIPPED | OUTPATIENT
Start: 2024-03-05

## 2024-03-05 RX ORDER — DAPAGLIFLOZIN 5 MG/1
5 TABLET, FILM COATED ORAL EVERY MORNING
Qty: 90 TABLET | Refills: 0 | Status: SHIPPED | OUTPATIENT
Start: 2024-03-05

## 2024-03-05 RX ORDER — TORSEMIDE 20 MG/1
20 TABLET ORAL DAILY
Qty: 30 TABLET | Refills: 3 | Status: SHIPPED | OUTPATIENT
Start: 2024-03-05

## 2024-03-05 RX ORDER — ALPRAZOLAM 0.5 MG/1
0.5 TABLET ORAL 3 TIMES DAILY PRN
Qty: 90 TABLET | Refills: 3 | Status: SHIPPED | OUTPATIENT
Start: 2024-03-05 | End: 2024-04-04

## 2024-03-05 RX ORDER — TRAMADOL HYDROCHLORIDE 50 MG/1
50 TABLET ORAL EVERY 6 HOURS PRN
COMMUNITY
End: 2024-03-05 | Stop reason: SDUPTHER

## 2024-03-05 RX ORDER — PROMETHAZINE HYDROCHLORIDE 25 MG/1
25 TABLET ORAL 2 TIMES DAILY PRN
Qty: 60 TABLET | Refills: 0 | Status: SHIPPED | OUTPATIENT
Start: 2024-03-05

## 2024-03-05 RX ORDER — ASPIRIN 81 MG/1
81 TABLET ORAL DAILY
Qty: 30 TABLET | Refills: 3 | Status: SHIPPED | OUTPATIENT
Start: 2024-03-05

## 2024-03-05 RX ORDER — PANTOPRAZOLE SODIUM 40 MG/1
40 TABLET, DELAYED RELEASE ORAL
Qty: 30 TABLET | Refills: 0 | Status: SHIPPED | OUTPATIENT
Start: 2024-03-05

## 2024-03-05 SDOH — ECONOMIC STABILITY: FOOD INSECURITY: WITHIN THE PAST 12 MONTHS, THE FOOD YOU BOUGHT JUST DIDN'T LAST AND YOU DIDN'T HAVE MONEY TO GET MORE.: OFTEN TRUE

## 2024-03-05 SDOH — ECONOMIC STABILITY: FOOD INSECURITY: WITHIN THE PAST 12 MONTHS, YOU WORRIED THAT YOUR FOOD WOULD RUN OUT BEFORE YOU GOT MONEY TO BUY MORE.: OFTEN TRUE

## 2024-03-05 SDOH — ECONOMIC STABILITY: INCOME INSECURITY: HOW HARD IS IT FOR YOU TO PAY FOR THE VERY BASICS LIKE FOOD, HOUSING, MEDICAL CARE, AND HEATING?: VERY HARD

## 2024-03-05 ASSESSMENT — PATIENT HEALTH QUESTIONNAIRE - PHQ9
6. FEELING BAD ABOUT YOURSELF - OR THAT YOU ARE A FAILURE OR HAVE LET YOURSELF OR YOUR FAMILY DOWN: 2
10. IF YOU CHECKED OFF ANY PROBLEMS, HOW DIFFICULT HAVE THESE PROBLEMS MADE IT FOR YOU TO DO YOUR WORK, TAKE CARE OF THINGS AT HOME, OR GET ALONG WITH OTHER PEOPLE: 3
7. TROUBLE CONCENTRATING ON THINGS, SUCH AS READING THE NEWSPAPER OR WATCHING TELEVISION: 0
1. LITTLE INTEREST OR PLEASURE IN DOING THINGS: 3
3. TROUBLE FALLING OR STAYING ASLEEP: 3
5. POOR APPETITE OR OVEREATING: 0
SUM OF ALL RESPONSES TO PHQ QUESTIONS 1-9: 14
8. MOVING OR SPEAKING SO SLOWLY THAT OTHER PEOPLE COULD HAVE NOTICED. OR THE OPPOSITE, BEING SO FIGETY OR RESTLESS THAT YOU HAVE BEEN MOVING AROUND A LOT MORE THAN USUAL: 0
2. FEELING DOWN, DEPRESSED OR HOPELESS: 3
SUM OF ALL RESPONSES TO PHQ QUESTIONS 1-9: 15
4. FEELING TIRED OR HAVING LITTLE ENERGY: 3
SUM OF ALL RESPONSES TO PHQ9 QUESTIONS 1 & 2: 6
9. THOUGHTS THAT YOU WOULD BE BETTER OFF DEAD, OR OF HURTING YOURSELF: 1

## 2024-03-05 ASSESSMENT — COLUMBIA-SUICIDE SEVERITY RATING SCALE - C-SSRS
6. HAVE YOU EVER DONE ANYTHING, STARTED TO DO ANYTHING, OR PREPARED TO DO ANYTHING TO END YOUR LIFE?: NO
1. WITHIN THE PAST MONTH, HAVE YOU WISHED YOU WERE DEAD OR WISHED YOU COULD GO TO SLEEP AND NOT WAKE UP?: NO
2. HAVE YOU ACTUALLY HAD ANY THOUGHTS OF KILLING YOURSELF?: NO

## 2024-03-05 ASSESSMENT — ENCOUNTER SYMPTOMS
EYES NEGATIVE: 1
ALLERGIC/IMMUNOLOGIC NEGATIVE: 1
RESPIRATORY NEGATIVE: 1
GASTROINTESTINAL NEGATIVE: 1

## 2024-03-05 NOTE — PROGRESS NOTES
Chief Complaint   Patient presents with    Diabetes    Anxiety    Hypertension       Have you seen any other physician or provider since your last visit yes - [facility]    Have you had any other diagnostic tests since your last visit? yes - labs in ER and US,CT CXR    Have you changed or stopped any medications since your last visit? yes - stopped Trazodone      I have recommended that this patient have a mammogram but she due to refusal reason: transportation  I have discussed the risks and benefits of this examination with her. The patient verbalizes understanding.  Provider will be informed of refusal.      Diabetic retinal exam completed this year? No                       * If yes please have patient sign a records release to obtain record to update Health Maintenance                       * If no, please order referral for patient to be scheduled   SUBJECTIVE:    Patient ID:Rosie Fair is a 68 y.o. female.    Chief Complaint   Patient presents with    Diabetes    Anxiety    Hypertension     HPI:  Patient has had diabetes for the past few years.  She has been compliant with the medications and denies any side effects from it. She has not been monitoring fingersticks on a daily basis.  Her fingerstick range is unknown. She denies any hypoglycemic symptoms. She has been following a diabetic diet and has been active.  Her last eye exam was more than a year ago.  She has not been on any diabetic medications. .       Patient has had a nerve problem for long time. Her sx have recently gotten worse. She easily get agitated and nervous. She has some difficulties falling and maintaining sleep at time. She denies any suicidal ideation. She has supportive family.    Patient has had hypertension for few years. She has been compliant with taking medications, without side effects from it. She has been following a low-sodium, is active and never exercises.  Weight is fluctuating a bit, compared to last visit. Her blood pressure is

## 2024-03-28 DIAGNOSIS — E11.22 TYPE 2 DIABETES MELLITUS WITH STAGE 3A CHRONIC KIDNEY DISEASE, WITHOUT LONG-TERM CURRENT USE OF INSULIN (HCC): ICD-10-CM

## 2024-03-28 DIAGNOSIS — N18.31 TYPE 2 DIABETES MELLITUS WITH STAGE 3A CHRONIC KIDNEY DISEASE, WITHOUT LONG-TERM CURRENT USE OF INSULIN (HCC): ICD-10-CM

## 2024-03-28 RX ORDER — DAPAGLIFLOZIN 5 MG/1
5 TABLET, FILM COATED ORAL EVERY MORNING
Qty: 90 TABLET | Refills: 0 | Status: SHIPPED | OUTPATIENT
Start: 2024-03-28

## 2024-03-29 DIAGNOSIS — K21.9 GASTROESOPHAGEAL REFLUX DISEASE, UNSPECIFIED WHETHER ESOPHAGITIS PRESENT: ICD-10-CM

## 2024-03-29 DIAGNOSIS — J43.2 CENTRILOBULAR EMPHYSEMA (HCC): ICD-10-CM

## 2024-04-01 RX ORDER — PANTOPRAZOLE SODIUM 40 MG/1
40 TABLET, DELAYED RELEASE ORAL
Qty: 30 TABLET | Refills: 0 | Status: SHIPPED | OUTPATIENT
Start: 2024-04-01

## 2024-04-01 RX ORDER — IPRATROPIUM BROMIDE AND ALBUTEROL 20; 100 UG/1; UG/1
SPRAY, METERED RESPIRATORY (INHALATION)
Qty: 4 G | Refills: 0 | Status: SHIPPED | OUTPATIENT
Start: 2024-04-01

## 2024-05-09 DIAGNOSIS — J43.2 CENTRILOBULAR EMPHYSEMA (HCC): ICD-10-CM

## 2024-05-09 DIAGNOSIS — M79.2 NEUROPATHIC PAIN: ICD-10-CM

## 2024-05-09 DIAGNOSIS — K21.9 GASTROESOPHAGEAL REFLUX DISEASE, UNSPECIFIED WHETHER ESOPHAGITIS PRESENT: ICD-10-CM

## 2024-05-09 RX ORDER — PANTOPRAZOLE SODIUM 40 MG/1
40 TABLET, DELAYED RELEASE ORAL
Qty: 30 TABLET | Refills: 3 | Status: SHIPPED | OUTPATIENT
Start: 2024-05-09

## 2024-05-09 RX ORDER — IPRATROPIUM BROMIDE AND ALBUTEROL 20; 100 UG/1; UG/1
SPRAY, METERED RESPIRATORY (INHALATION)
Qty: 4 G | Refills: 3 | Status: SHIPPED | OUTPATIENT
Start: 2024-05-09

## 2024-05-09 RX ORDER — GABAPENTIN 400 MG/1
400 CAPSULE ORAL 4 TIMES DAILY
Qty: 120 CAPSULE | Refills: 2 | OUTPATIENT
Start: 2024-05-09 | End: 2024-06-08

## 2024-05-10 DIAGNOSIS — K21.9 GASTROESOPHAGEAL REFLUX DISEASE, UNSPECIFIED WHETHER ESOPHAGITIS PRESENT: ICD-10-CM

## 2024-05-10 DIAGNOSIS — M79.2 NEUROPATHIC PAIN: ICD-10-CM

## 2024-05-10 RX ORDER — TRAMADOL HYDROCHLORIDE 50 MG/1
50 TABLET ORAL EVERY 6 HOURS PRN
Qty: 60 TABLET | Refills: 2 | Status: SHIPPED | OUTPATIENT
Start: 2024-05-10 | End: 2024-06-09

## 2024-05-10 RX ORDER — PROMETHAZINE HYDROCHLORIDE 25 MG/1
25 TABLET ORAL 2 TIMES DAILY PRN
Qty: 60 TABLET | Refills: 0 | Status: SHIPPED | OUTPATIENT
Start: 2024-05-10

## 2024-06-04 ENCOUNTER — HOSPITAL ENCOUNTER (OUTPATIENT)
Facility: HOSPITAL | Age: 69
Discharge: HOME | End: 2024-06-04
Payer: MEDICARE

## 2024-06-04 ENCOUNTER — OFFICE VISIT (OUTPATIENT)
Dept: PRIMARY CARE CLINIC | Age: 69
End: 2024-06-04
Payer: MEDICARE

## 2024-06-04 VITALS
HEIGHT: 61 IN | OXYGEN SATURATION: 95 % | HEART RATE: 90 BPM | SYSTOLIC BLOOD PRESSURE: 131 MMHG | WEIGHT: 175 LBS | RESPIRATION RATE: 16 BRPM | TEMPERATURE: 97.6 F | BODY MASS INDEX: 33.04 KG/M2 | DIASTOLIC BLOOD PRESSURE: 71 MMHG

## 2024-06-04 DIAGNOSIS — E55.9 VITAMIN D DEFICIENCY: ICD-10-CM

## 2024-06-04 DIAGNOSIS — R60.9 SWELLING: ICD-10-CM

## 2024-06-04 DIAGNOSIS — F41.9 ANXIETY: ICD-10-CM

## 2024-06-04 DIAGNOSIS — Z94.4 LIVER TRANSPLANT RECIPIENT (HCC): ICD-10-CM

## 2024-06-04 DIAGNOSIS — E11.22 TYPE 2 DIABETES MELLITUS WITH STAGE 3A CHRONIC KIDNEY DISEASE, WITHOUT LONG-TERM CURRENT USE OF INSULIN (HCC): Primary | ICD-10-CM

## 2024-06-04 DIAGNOSIS — E11.22 TYPE 2 DIABETES MELLITUS WITH STAGE 3A CHRONIC KIDNEY DISEASE, WITHOUT LONG-TERM CURRENT USE OF INSULIN (HCC): ICD-10-CM

## 2024-06-04 DIAGNOSIS — I10 ESSENTIAL HYPERTENSION: ICD-10-CM

## 2024-06-04 DIAGNOSIS — N18.31 TYPE 2 DIABETES MELLITUS WITH STAGE 3A CHRONIC KIDNEY DISEASE, WITHOUT LONG-TERM CURRENT USE OF INSULIN (HCC): Primary | ICD-10-CM

## 2024-06-04 DIAGNOSIS — J43.2 CENTRILOBULAR EMPHYSEMA (HCC): ICD-10-CM

## 2024-06-04 DIAGNOSIS — Z13.29 THYROID DISORDER SCREEN: ICD-10-CM

## 2024-06-04 DIAGNOSIS — M79.2 NEUROPATHIC PAIN: ICD-10-CM

## 2024-06-04 DIAGNOSIS — K21.9 GASTROESOPHAGEAL REFLUX DISEASE, UNSPECIFIED WHETHER ESOPHAGITIS PRESENT: ICD-10-CM

## 2024-06-04 DIAGNOSIS — Z12.31 ENCOUNTER FOR SCREENING MAMMOGRAM FOR BREAST CANCER: ICD-10-CM

## 2024-06-04 DIAGNOSIS — N18.31 TYPE 2 DIABETES MELLITUS WITH STAGE 3A CHRONIC KIDNEY DISEASE, WITHOUT LONG-TERM CURRENT USE OF INSULIN (HCC): ICD-10-CM

## 2024-06-04 LAB
25(OH)D3 SERPL-MCNC: 18.4 NG/ML (ref 32–100)
ALBUMIN SERPL-MCNC: 4.3 G/DL (ref 3.4–4.8)
ALBUMIN/GLOB SERPL: 1.7 {RATIO} (ref 0.8–2)
ALP SERPL-CCNC: 134 U/L (ref 25–100)
ALT SERPL-CCNC: 11 U/L (ref 4–36)
ANION GAP SERPL CALCULATED.3IONS-SCNC: 15 MMOL/L (ref 3–16)
AST SERPL-CCNC: 16 U/L (ref 8–33)
BILIRUB SERPL-MCNC: 1.6 MG/DL (ref 0.3–1.2)
BUN SERPL-MCNC: 34 MG/DL (ref 6–20)
CALCIUM SERPL-MCNC: 8.8 MG/DL (ref 8.5–10.5)
CHLORIDE SERPL-SCNC: 102 MMOL/L (ref 98–107)
CHOLEST SERPL-MCNC: 182 MG/DL (ref 0–200)
CO2 SERPL-SCNC: 20 MMOL/L (ref 20–30)
CREAT SERPL-MCNC: 1.7 MG/DL (ref 0.4–1.2)
CREAT UR-MCNC: 90.3 MG/DL (ref 28–259)
ERYTHROCYTE [DISTWIDTH] IN BLOOD BY AUTOMATED COUNT: 18.7 % (ref 11–16)
FOLATE SERPL-MCNC: 9.13 NG/ML
GFR SERPLBLD CREATININE-BSD FMLA CKD-EPI: 33 ML/MIN/{1.73_M2}
GLOBULIN SER CALC-MCNC: 2.5 G/DL
GLUCOSE SERPL-MCNC: 175 MG/DL (ref 74–106)
HCT VFR BLD AUTO: 27.6 % (ref 37–47)
HDLC SERPL-MCNC: 40 MG/DL (ref 40–60)
HGB BLD-MCNC: 13.2 G/DL (ref 11.5–16.5)
LDLC SERPL CALC-MCNC: 93 MG/DL
MCH RBC QN AUTO: 46.2 PG (ref 27–32)
MCHC RBC AUTO-ENTMCNC: 47.8 G/DL (ref 31–35)
MCV RBC AUTO: 96.5 FL (ref 80–100)
MICROALBUMIN UR DL<=1MG/L-MCNC: 85.4 MG/DL (ref 0–22)
MICROALBUMIN/CREAT UR: 945.7 MG/G (ref 0–30)
PLATELET # BLD AUTO: 221 K/UL (ref 150–400)
PMV BLD AUTO: 9.9 FL (ref 6–10)
POTASSIUM SERPL-SCNC: 4.9 MMOL/L (ref 3.4–5.1)
PROT SERPL-MCNC: 6.8 G/DL (ref 6.4–8.3)
RBC # BLD AUTO: 2.86 M/UL (ref 3.8–5.8)
SODIUM SERPL-SCNC: 137 MMOL/L (ref 136–145)
TRIGL SERPL-MCNC: 243 MG/DL (ref 0–249)
TSH SERPL DL<=0.005 MIU/L-ACNC: 1.19 UIU/ML (ref 0.27–4.2)
VIT B12 SERPL-MCNC: 455 PG/ML (ref 211–911)
VLDLC SERPL CALC-MCNC: 49 MG/DL
WBC # BLD AUTO: 8.4 K/UL (ref 4–11)

## 2024-06-04 PROCEDURE — 82043 UR ALBUMIN QUANTITATIVE: CPT

## 2024-06-04 PROCEDURE — 82746 ASSAY OF FOLIC ACID SERUM: CPT

## 2024-06-04 PROCEDURE — 80158 DRUG ASSAY CYCLOSPORINE: CPT

## 2024-06-04 PROCEDURE — 99214 OFFICE O/P EST MOD 30 MIN: CPT | Performed by: NURSE PRACTITIONER

## 2024-06-04 PROCEDURE — 36415 COLL VENOUS BLD VENIPUNCTURE: CPT

## 2024-06-04 PROCEDURE — 82962 GLUCOSE BLOOD TEST: CPT | Performed by: NURSE PRACTITIONER

## 2024-06-04 PROCEDURE — 82570 ASSAY OF URINE CREATININE: CPT

## 2024-06-04 PROCEDURE — 80061 LIPID PANEL: CPT

## 2024-06-04 PROCEDURE — G8427 DOCREV CUR MEDS BY ELIG CLIN: HCPCS | Performed by: NURSE PRACTITIONER

## 2024-06-04 PROCEDURE — 4004F PT TOBACCO SCREEN RCVD TLK: CPT | Performed by: NURSE PRACTITIONER

## 2024-06-04 PROCEDURE — 3017F COLORECTAL CA SCREEN DOC REV: CPT | Performed by: NURSE PRACTITIONER

## 2024-06-04 PROCEDURE — G8399 PT W/DXA RESULTS DOCUMENT: HCPCS | Performed by: NURSE PRACTITIONER

## 2024-06-04 PROCEDURE — 82607 VITAMIN B-12: CPT

## 2024-06-04 PROCEDURE — 1123F ACP DISCUSS/DSCN MKR DOCD: CPT | Performed by: NURSE PRACTITIONER

## 2024-06-04 PROCEDURE — 3078F DIAST BP <80 MM HG: CPT | Performed by: NURSE PRACTITIONER

## 2024-06-04 PROCEDURE — 80053 COMPREHEN METABOLIC PANEL: CPT

## 2024-06-04 PROCEDURE — 2022F DILAT RTA XM EVC RTNOPTHY: CPT | Performed by: NURSE PRACTITIONER

## 2024-06-04 PROCEDURE — 85027 COMPLETE CBC AUTOMATED: CPT

## 2024-06-04 PROCEDURE — 3075F SYST BP GE 130 - 139MM HG: CPT | Performed by: NURSE PRACTITIONER

## 2024-06-04 PROCEDURE — G8417 CALC BMI ABV UP PARAM F/U: HCPCS | Performed by: NURSE PRACTITIONER

## 2024-06-04 PROCEDURE — 1090F PRES/ABSN URINE INCON ASSESS: CPT | Performed by: NURSE PRACTITIONER

## 2024-06-04 PROCEDURE — 3044F HG A1C LEVEL LT 7.0%: CPT | Performed by: NURSE PRACTITIONER

## 2024-06-04 PROCEDURE — 82306 VITAMIN D 25 HYDROXY: CPT

## 2024-06-04 PROCEDURE — 3023F SPIROM DOC REV: CPT | Performed by: NURSE PRACTITIONER

## 2024-06-04 PROCEDURE — 84443 ASSAY THYROID STIM HORMONE: CPT

## 2024-06-04 RX ORDER — ALPRAZOLAM 0.5 MG/1
0.5 TABLET ORAL 3 TIMES DAILY PRN
COMMUNITY
Start: 2024-05-11

## 2024-06-04 RX ORDER — ONDANSETRON 4 MG/1
4 TABLET, FILM COATED ORAL 3 TIMES DAILY PRN
Qty: 30 TABLET | Refills: 0 | Status: SHIPPED | OUTPATIENT
Start: 2024-06-04

## 2024-06-04 NOTE — PROGRESS NOTES
Chief Complaint   Patient presents with    Diabetes    Anxiety    Depression       Have you seen any other physician or provider since your last visit no    Have you had any other diagnostic tests since your last visit? no    Have you changed or stopped any medications since your last visit? Yes not taking Trazodone,Farxiga,Protonix,Phenergan        SUBJECTIVE:    Patient ID:Rosie Fair is a 68 y.o. female.    Chief Complaint   Patient presents with    Diabetes    Anxiety    Depression     HPI:    She isn't getting her labs done due to not having transportation.      Patient has had diabetes for the past few years.  She has been compliant with the medications and denies any side effects from it. She has not been monitoring fingersticks on a daily basis.  Her fingerstick range is unknown. She thinks she has had hypoglycemic symptoms. She has been following a diabetic diet and has been active.  Her last eye exam was less than a year ago.  She is not taking any diabetic medicaiton.      She has had a nerve problem for long time. Her sx have been stable. She occasionally has some difficulties falling and maintaining sleep. She denies any suicidal ideation. She has been compliant with taking medication without side effects. She has supportive family.    She is taking xanax scheduled  she is asking for increase.      She is a liver transplant recipient.  She is still smoking. She hasn't been going to [facility] for follow up.   She hasn't been in over 2 years.     She takes tramadol and Neurontin.  She says she hurts in her right lower back and significant leg pain.  She says the medicaiton doesn't help that much.   Patient is asking to change from Phenergan to Zofran due to making her sleep too much.   She says she belches sulfa smell.  She doesn't eat she says. She hasn't eaten today. She is down 4 pounds from last visit but she is up 6 pounds overall.         Patient's medications, allergies, past medical, surgical,

## 2024-06-05 RX ORDER — TORSEMIDE 20 MG/1
20 TABLET ORAL DAILY
Qty: 30 TABLET | Refills: 3 | Status: SHIPPED | OUTPATIENT
Start: 2024-06-05

## 2024-06-07 LAB — CYCLOSPORINE BLD-MCNC: 211.4 NG/ML

## 2024-06-08 DIAGNOSIS — M79.2 NEUROPATHIC PAIN: ICD-10-CM

## 2024-06-11 RX ORDER — GABAPENTIN 400 MG/1
400 CAPSULE ORAL 4 TIMES DAILY
Qty: 120 CAPSULE | Refills: 2 | Status: SHIPPED | OUTPATIENT
Start: 2024-06-11 | End: 2024-07-11

## 2024-06-12 DIAGNOSIS — R11.0 NAUSEA: ICD-10-CM

## 2024-06-12 DIAGNOSIS — N28.9: ICD-10-CM

## 2024-06-12 DIAGNOSIS — E55.9 VITAMIN D DEFICIENCY: Primary | ICD-10-CM

## 2024-06-12 DIAGNOSIS — Z94.4 LIVER TRANSPLANT RECIPIENT (HCC): Primary | ICD-10-CM

## 2024-06-13 RX ORDER — ONDANSETRON 4 MG/1
4 TABLET, FILM COATED ORAL 3 TIMES DAILY PRN
Qty: 15 TABLET | Refills: 0 | Status: SHIPPED | OUTPATIENT
Start: 2024-06-13

## 2024-06-13 RX ORDER — ACETAMINOPHEN 160 MG
1 TABLET,DISINTEGRATING ORAL DAILY
Qty: 30 CAPSULE | Refills: 5 | Status: SHIPPED | OUTPATIENT
Start: 2024-06-13

## 2024-06-24 DIAGNOSIS — I10 ESSENTIAL HYPERTENSION: ICD-10-CM

## 2024-06-25 RX ORDER — AMLODIPINE BESYLATE 10 MG/1
10 TABLET ORAL DAILY
Qty: 90 TABLET | Refills: 1 | Status: SHIPPED | OUTPATIENT
Start: 2024-06-25

## 2024-07-08 DIAGNOSIS — F41.9 ANXIETY: Primary | ICD-10-CM

## 2024-07-08 DIAGNOSIS — M79.2 NEUROPATHIC PAIN: ICD-10-CM

## 2024-07-08 RX ORDER — TRAMADOL HYDROCHLORIDE 50 MG/1
50 TABLET ORAL EVERY 6 HOURS PRN
Qty: 60 TABLET | Refills: 2 | OUTPATIENT
Start: 2024-07-08 | End: 2024-08-07

## 2024-07-08 RX ORDER — ALPRAZOLAM 0.5 MG/1
TABLET ORAL
Qty: 90 TABLET | Refills: 3 | Status: SHIPPED | OUTPATIENT
Start: 2024-07-08 | End: 2024-08-07

## 2024-07-17 ENCOUNTER — HOSPITAL ENCOUNTER (OUTPATIENT)
Facility: HOSPITAL | Age: 69
Discharge: HOME | End: 2024-07-17
Payer: MEDICARE

## 2024-07-17 DIAGNOSIS — Z94.4 LIVER TRANSPLANT RECIPIENT (HCC): ICD-10-CM

## 2024-07-17 LAB — GLUCOSE SERPL-MCNC: 232 MG/DL (ref 74–106)

## 2024-07-17 PROCEDURE — 36415 COLL VENOUS BLD VENIPUNCTURE: CPT

## 2024-07-17 PROCEDURE — 82947 ASSAY GLUCOSE BLOOD QUANT: CPT

## 2024-07-17 PROCEDURE — 82570 ASSAY OF URINE CREATININE: CPT

## 2024-07-17 PROCEDURE — 82043 UR ALBUMIN QUANTITATIVE: CPT

## 2024-07-17 PROCEDURE — 80158 DRUG ASSAY CYCLOSPORINE: CPT

## 2024-07-18 LAB
CREAT UR-MCNC: 133.2 MG/DL (ref 28–259)
MICROALBUMIN UR DL<=1MG/L-MCNC: 164.6 MG/DL
MICROALBUMIN/CREAT UR: 1235.7 MG/G (ref 0–30)

## 2024-07-19 LAB — CYCLOSPORINE BLD-MCNC: 132.1 NG/ML

## 2024-07-26 LAB
ESTIMATED AVERAGE GLUCOSE: NORMAL
HBA1C MFR BLD: 7.3 %

## 2024-07-30 DIAGNOSIS — M79.2 NEUROPATHIC PAIN: ICD-10-CM

## 2024-07-30 PROBLEM — G45.9 TIA (TRANSIENT ISCHEMIC ATTACK): Status: RESOLVED | Noted: 2022-03-17 | Resolved: 2024-07-30

## 2024-07-31 DIAGNOSIS — J43.2 CENTRILOBULAR EMPHYSEMA (HCC): ICD-10-CM

## 2024-08-01 RX ORDER — IPRATROPIUM BROMIDE AND ALBUTEROL 20; 100 UG/1; UG/1
SPRAY, METERED RESPIRATORY (INHALATION)
Qty: 4 G | Refills: 3 | Status: SHIPPED | OUTPATIENT
Start: 2024-08-01

## 2024-08-01 RX ORDER — TRAMADOL HYDROCHLORIDE 50 MG/1
50 TABLET ORAL EVERY 6 HOURS PRN
Qty: 60 TABLET | Refills: 2 | Status: SHIPPED | OUTPATIENT
Start: 2024-08-01 | End: 2024-08-31

## 2024-08-01 NOTE — TELEPHONE ENCOUNTER
3 mth f/u 9/4/24  Azael reviewed by you 1/2/24   Goal Outcome Evaluation:      Plan of Care Reviewed With: parent    Overall Patient Progress: improvingOverall Patient Progress: improving     [id number]  Vitals: VSS   Feeding: Breastfeeding well - last feed was @0530  Donor milk supplementation: 15ml after each feed  Mother is also pumping  GI/: adequate voids, adequate stools  24 hour cares: done    CCHD: passed    Cord clamp removed    Bath/ footprints: not done   Weight down 5.7%  Birth Certificate: not done  Both parents very attentive to infant needs and displaying excellent teamwork caring for the infant together.   Plan: discharge home today

## 2024-08-27 ENCOUNTER — HOSPITAL ENCOUNTER (OUTPATIENT)
Facility: HOSPITAL | Age: 69
Discharge: HOME | End: 2024-08-27
Payer: MEDICARE

## 2024-08-27 DIAGNOSIS — K14.0 GLOSSITIS: ICD-10-CM

## 2024-08-27 LAB
ALBUMIN SERPL-MCNC: 4.2 G/DL (ref 3.4–4.8)
ALP SERPL-CCNC: 127 U/L (ref 25–100)
ALT SERPL-CCNC: 8 U/L (ref 4–36)
ANION GAP SERPL CALCULATED.3IONS-SCNC: 13 MMOL/L (ref 3–16)
AST SERPL-CCNC: 12 U/L (ref 8–33)
BASOPHILS # BLD: 0 K/UL (ref 0–0.1)
BASOPHILS NFR BLD: 0.6 %
BILIRUB DIRECT SERPL-MCNC: 0.5 MG/DL (ref 0–0.2)
BILIRUB INDIRECT SERPL-MCNC: 1 MG/DL (ref 0.2–0.8)
BILIRUB SERPL-MCNC: 1.5 MG/DL (ref 0.3–1.2)
BUN SERPL-MCNC: 18 MG/DL (ref 6–20)
CALCIUM SERPL-MCNC: 9.3 MG/DL (ref 8.5–10.5)
CHLORIDE SERPL-SCNC: 98 MMOL/L (ref 98–107)
CO2 SERPL-SCNC: 30 MMOL/L (ref 20–30)
CREAT SERPL-MCNC: 1.4 MG/DL (ref 0.4–1.2)
EOSINOPHIL # BLD: 0.1 K/UL (ref 0–0.4)
EOSINOPHIL NFR BLD: 2.8 %
ERYTHROCYTE [DISTWIDTH] IN BLOOD BY AUTOMATED COUNT: 17.8 % (ref 11–16)
GFR SERPLBLD CREATININE-BSD FMLA CKD-EPI: 41 ML/MIN/{1.73_M2}
GLUCOSE SERPL-MCNC: 255 MG/DL (ref 74–106)
HCT VFR BLD AUTO: 27 % (ref 37–47)
HGB BLD-MCNC: 13.2 G/DL (ref 11.5–16.5)
IMM GRANULOCYTES # BLD: 0 K/UL
IMM GRANULOCYTES NFR BLD: 0.2 % (ref 0–5)
LYMPHOCYTES # BLD: 1.2 K/UL (ref 1.5–4)
LYMPHOCYTES NFR BLD: 25.3 %
MCH RBC QN AUTO: 47 PG (ref 27–32)
MCHC RBC AUTO-ENTMCNC: 48.9 G/DL (ref 31–35)
MCV RBC AUTO: 96.1 FL (ref 80–100)
MONOCYTES # BLD: 0.3 K/UL (ref 0.2–0.8)
MONOCYTES NFR BLD: 5.6 %
NEUTROPHILS # BLD: 3 K/UL (ref 2–7.5)
NEUTS SEG NFR BLD: 65.5 %
PHOSPHATE SERPL-MCNC: 3.7 MG/DL (ref 2.5–4.5)
PLATELET # BLD AUTO: 222 K/UL (ref 150–400)
PMV BLD AUTO: 9.6 FL (ref 6–10)
POTASSIUM SERPL-SCNC: 4.5 MMOL/L (ref 3.4–5.1)
PROT SERPL-MCNC: 6.7 G/DL (ref 6.4–8.3)
RBC # BLD AUTO: 2.81 M/UL (ref 3.8–5.8)
SODIUM SERPL-SCNC: 141 MMOL/L (ref 136–145)
WBC # BLD AUTO: 4.6 K/UL (ref 4–11)

## 2024-08-27 PROCEDURE — 80069 RENAL FUNCTION PANEL: CPT

## 2024-08-27 PROCEDURE — 85025 COMPLETE CBC W/AUTO DIFF WBC: CPT

## 2024-08-27 PROCEDURE — 80158 DRUG ASSAY CYCLOSPORINE: CPT

## 2024-08-27 PROCEDURE — 80076 HEPATIC FUNCTION PANEL: CPT

## 2024-08-27 PROCEDURE — 36415 COLL VENOUS BLD VENIPUNCTURE: CPT

## 2024-08-27 RX ORDER — SILVER SULFADIAZINE 10 MG/G
CREAM TOPICAL
Qty: 100 G | Refills: 0 | Status: SHIPPED | OUTPATIENT
Start: 2024-08-27

## 2024-08-27 RX ORDER — LIDOCAINE HYDROCHLORIDE 20 MG/ML
5 SOLUTION OROPHARYNGEAL PRN
Qty: 100 ML | Refills: 0 | Status: SHIPPED | OUTPATIENT
Start: 2024-08-27

## 2024-08-29 LAB — CYCLOSPORINE BLD-MCNC: 126.6 NG/ML

## 2024-08-30 DIAGNOSIS — I10 ESSENTIAL HYPERTENSION: ICD-10-CM

## 2024-08-30 RX ORDER — AMLODIPINE BESYLATE 10 MG/1
10 TABLET ORAL DAILY
Qty: 90 TABLET | Refills: 1 | Status: SHIPPED | OUTPATIENT
Start: 2024-08-30

## 2024-09-04 ENCOUNTER — OFFICE VISIT (OUTPATIENT)
Dept: PRIMARY CARE CLINIC | Age: 69
End: 2024-09-04

## 2024-09-04 VITALS
RESPIRATION RATE: 16 BRPM | HEIGHT: 61 IN | SYSTOLIC BLOOD PRESSURE: 144 MMHG | DIASTOLIC BLOOD PRESSURE: 64 MMHG | TEMPERATURE: 97.1 F | WEIGHT: 174.2 LBS | BODY MASS INDEX: 32.89 KG/M2 | HEART RATE: 78 BPM | OXYGEN SATURATION: 96 %

## 2024-09-04 DIAGNOSIS — I10 ESSENTIAL HYPERTENSION: ICD-10-CM

## 2024-09-04 DIAGNOSIS — M79.2 NEUROPATHIC PAIN: ICD-10-CM

## 2024-09-04 DIAGNOSIS — I10 UNCONTROLLED HYPERTENSION: ICD-10-CM

## 2024-09-04 DIAGNOSIS — Z12.31 ENCOUNTER FOR SCREENING MAMMOGRAM FOR BREAST CANCER: ICD-10-CM

## 2024-09-04 DIAGNOSIS — F11.20 OPIOID DEPENDENCE WITH CURRENT USE (HCC): ICD-10-CM

## 2024-09-04 DIAGNOSIS — N18.31 TYPE 2 DIABETES MELLITUS WITH STAGE 3A CHRONIC KIDNEY DISEASE, WITHOUT LONG-TERM CURRENT USE OF INSULIN (HCC): ICD-10-CM

## 2024-09-04 DIAGNOSIS — Z87.891 PERSONAL HISTORY OF TOBACCO USE: ICD-10-CM

## 2024-09-04 DIAGNOSIS — E11.22 TYPE 2 DIABETES MELLITUS WITH STAGE 3A CHRONIC KIDNEY DISEASE, WITHOUT LONG-TERM CURRENT USE OF INSULIN (HCC): ICD-10-CM

## 2024-09-04 DIAGNOSIS — G89.4 PAIN SYNDROME, CHRONIC: ICD-10-CM

## 2024-09-04 DIAGNOSIS — K21.9 GASTROESOPHAGEAL REFLUX DISEASE, UNSPECIFIED WHETHER ESOPHAGITIS PRESENT: ICD-10-CM

## 2024-09-04 DIAGNOSIS — Z78.0 POST-MENOPAUSAL: ICD-10-CM

## 2024-09-04 DIAGNOSIS — J30.9 ALLERGIC RHINITIS, UNSPECIFIED SEASONALITY, UNSPECIFIED TRIGGER: ICD-10-CM

## 2024-09-04 DIAGNOSIS — J43.2 CENTRILOBULAR EMPHYSEMA (HCC): Primary | ICD-10-CM

## 2024-09-04 DIAGNOSIS — Z13.29 THYROID DISORDER SCREEN: ICD-10-CM

## 2024-09-04 RX ORDER — LORATADINE 10 MG/1
10 TABLET ORAL DAILY
Qty: 30 TABLET | Refills: 0 | Status: SHIPPED | OUTPATIENT
Start: 2024-09-04 | End: 2024-10-04

## 2024-09-04 RX ORDER — GABAPENTIN 400 MG/1
400 CAPSULE ORAL 4 TIMES DAILY
Qty: 120 CAPSULE | Refills: 2 | Status: SHIPPED | OUTPATIENT
Start: 2024-09-04 | End: 2024-10-04

## 2024-09-04 RX ORDER — ALPRAZOLAM 0.5 MG
0.5 TABLET ORAL 3 TIMES DAILY PRN
COMMUNITY
Start: 2024-09-03

## 2024-09-04 RX ORDER — TRAMADOL HYDROCHLORIDE 50 MG/1
50 TABLET ORAL EVERY 6 HOURS PRN
COMMUNITY
Start: 2024-08-23 | End: 2024-09-04 | Stop reason: SDUPTHER

## 2024-09-04 RX ORDER — LOSARTAN POTASSIUM 50 MG/1
50 TABLET ORAL DAILY
Qty: 90 TABLET | Refills: 1 | Status: SHIPPED | OUTPATIENT
Start: 2024-09-04

## 2024-09-04 RX ORDER — TRAMADOL HYDROCHLORIDE 50 MG/1
50 TABLET ORAL EVERY 6 HOURS PRN
Qty: 90 TABLET | Refills: 2 | Status: SHIPPED | OUTPATIENT
Start: 2024-09-04 | End: 2024-10-04

## 2024-09-24 DIAGNOSIS — Z94.4 LIVER TRANSPLANT RECIPIENT (HCC): ICD-10-CM

## 2024-09-24 DIAGNOSIS — R60.9 SWELLING: ICD-10-CM

## 2024-09-24 RX ORDER — TORSEMIDE 20 MG/1
20 TABLET ORAL DAILY
Qty: 30 TABLET | Refills: 3 | Status: SHIPPED | OUTPATIENT
Start: 2024-09-24

## 2024-10-02 ENCOUNTER — HOSPITAL ENCOUNTER (OUTPATIENT)
Dept: GENERAL RADIOLOGY | Facility: HOSPITAL | Age: 69
Discharge: HOME | End: 2024-10-02
Payer: MEDICARE

## 2024-10-02 ENCOUNTER — HOSPITAL ENCOUNTER (OUTPATIENT)
Dept: MAMMOGRAPHY | Facility: HOSPITAL | Age: 69
Discharge: HOME | End: 2024-10-02
Payer: MEDICARE

## 2024-10-02 VITALS — WEIGHT: 174 LBS | HEIGHT: 61 IN | BODY MASS INDEX: 32.85 KG/M2

## 2024-10-02 DIAGNOSIS — Z78.0 POST-MENOPAUSAL: ICD-10-CM

## 2024-10-02 DIAGNOSIS — Z12.31 ENCOUNTER FOR SCREENING MAMMOGRAM FOR BREAST CANCER: ICD-10-CM

## 2024-10-02 PROCEDURE — 77063 BREAST TOMOSYNTHESIS BI: CPT

## 2024-10-02 PROCEDURE — 77080 DXA BONE DENSITY AXIAL: CPT

## 2024-10-23 DIAGNOSIS — E11.22 TYPE 2 DIABETES MELLITUS WITH STAGE 3A CHRONIC KIDNEY DISEASE, WITHOUT LONG-TERM CURRENT USE OF INSULIN (HCC): ICD-10-CM

## 2024-10-23 DIAGNOSIS — N18.31 TYPE 2 DIABETES MELLITUS WITH STAGE 3A CHRONIC KIDNEY DISEASE, WITHOUT LONG-TERM CURRENT USE OF INSULIN (HCC): ICD-10-CM

## 2024-10-24 RX ORDER — DAPAGLIFLOZIN 5 MG/1
5 TABLET, FILM COATED ORAL EVERY MORNING
Qty: 90 TABLET | Refills: 0 | Status: SHIPPED | OUTPATIENT
Start: 2024-10-24

## 2024-10-31 DIAGNOSIS — M79.2 NEUROPATHIC PAIN: ICD-10-CM

## 2024-10-31 DIAGNOSIS — G89.4 PAIN SYNDROME, CHRONIC: ICD-10-CM

## 2024-10-31 DIAGNOSIS — F41.9 ANXIETY: Primary | ICD-10-CM

## 2024-10-31 RX ORDER — GABAPENTIN 400 MG/1
400 CAPSULE ORAL 4 TIMES DAILY
Qty: 120 CAPSULE | Refills: 2 | OUTPATIENT
Start: 2024-10-31 | End: 2024-11-30

## 2024-10-31 RX ORDER — TRAMADOL HYDROCHLORIDE 50 MG/1
50 TABLET ORAL EVERY 6 HOURS PRN
Qty: 90 TABLET | Refills: 2 | OUTPATIENT
Start: 2024-10-31 | End: 2024-11-30

## 2024-11-01 RX ORDER — ALPRAZOLAM 0.5 MG
0.5 TABLET ORAL 3 TIMES DAILY PRN
Qty: 90 TABLET | Refills: 3 | Status: SHIPPED | OUTPATIENT
Start: 2024-11-01 | End: 2024-12-01

## 2024-11-04 ENCOUNTER — OFFICE VISIT (OUTPATIENT)
Dept: PRIMARY CARE CLINIC | Age: 69
End: 2024-11-04

## 2024-11-04 VITALS
OXYGEN SATURATION: 92 % | HEART RATE: 105 BPM | DIASTOLIC BLOOD PRESSURE: 71 MMHG | TEMPERATURE: 97.7 F | BODY MASS INDEX: 31.8 KG/M2 | WEIGHT: 172.8 LBS | RESPIRATION RATE: 20 BRPM | HEIGHT: 62 IN | SYSTOLIC BLOOD PRESSURE: 166 MMHG

## 2024-11-04 DIAGNOSIS — Z23 NEED FOR INFLUENZA VACCINATION: ICD-10-CM

## 2024-11-04 DIAGNOSIS — M79.2 NEUROPATHIC PAIN: ICD-10-CM

## 2024-11-04 DIAGNOSIS — K14.0 GLOSSITIS: ICD-10-CM

## 2024-11-04 DIAGNOSIS — K74.60 LIVER CIRRHOSIS SECONDARY TO NASH (HCC): ICD-10-CM

## 2024-11-04 DIAGNOSIS — Z23 NEED FOR ZOSTER VACCINATION: ICD-10-CM

## 2024-11-04 DIAGNOSIS — K75.81 LIVER CIRRHOSIS SECONDARY TO NASH (HCC): ICD-10-CM

## 2024-11-04 DIAGNOSIS — G89.4 PAIN SYNDROME, CHRONIC: ICD-10-CM

## 2024-11-04 DIAGNOSIS — Z94.4 STATUS POST LIVER TRANSPLANT (HCC): ICD-10-CM

## 2024-11-04 DIAGNOSIS — D61.818 PANCYTOPENIA (HCC): ICD-10-CM

## 2024-11-04 DIAGNOSIS — I10 PRIMARY HYPERTENSION: Primary | ICD-10-CM

## 2024-11-04 RX ORDER — TRAMADOL HYDROCHLORIDE 50 MG/1
50 TABLET ORAL EVERY 6 HOURS PRN
Qty: 120 TABLET | Refills: 2 | Status: SHIPPED | OUTPATIENT
Start: 2024-11-04 | End: 2024-12-04

## 2024-11-04 RX ORDER — LOSARTAN POTASSIUM 100 MG/1
100 TABLET ORAL DAILY
Qty: 90 TABLET | Refills: 1 | Status: SHIPPED | OUTPATIENT
Start: 2024-11-04

## 2024-11-04 RX ORDER — LIDOCAINE HYDROCHLORIDE 20 MG/ML
5 SOLUTION OROPHARYNGEAL PRN
Qty: 100 ML | Refills: 0 | Status: SHIPPED | OUTPATIENT
Start: 2024-11-04

## 2024-11-04 ASSESSMENT — ENCOUNTER SYMPTOMS
ALLERGIC/IMMUNOLOGIC NEGATIVE: 1
GASTROINTESTINAL NEGATIVE: 1
RESPIRATORY NEGATIVE: 1
BACK PAIN: 1
EYES NEGATIVE: 1

## 2024-11-04 NOTE — PROGRESS NOTES
Chief Complaint   Patient presents with    Diabetes    Hypertension    Chronic Pain       Have you seen any other physician or provider since your last visit no    Have you had any other diagnostic tests since your last visit? yes - bone dexa,mammogram    Have you changed or stopped any medications since your last visit? yes - stopped vitamin D and Duo Nebs       SUBJECTIVE:    Patient ID:Rosie Fair is a 69 y.o. female.    Chief Complaint   Patient presents with    Diabetes    Hypertension    Chronic Pain     HPI:  Patient has had diabetes for the past few years.  She has been compliant with the medications and denies any side effects from it. She has not been monitoring fingersticks on a daily basis.  Her fingerstick range is between unknown. She denies any hypoglycemic symptoms. She has been following a diabetic diet and has been active.  Her last eye exam was more than a year ago.  She is using oral meds only.     Patient has had hypertension for few years. She has been compliant with taking medications, without side effects from it. She has been following a low-sodium, is active and never exercises.  Weight is fluctuating a bit, compared to last visit. Her blood pressure is elevated at this time.  She says she can not handle depression medication  she is wanting increase in Xanax.      Sh is wanting increase in her tramadol.    Patient has a history of chronic pain and has been managing with Tramadol. She is wanting to know how much a day she can take.     Patient's medications, allergies, past medical, surgical, social and family histories were reviewed and updated as appropriate.          Review of Systems   Constitutional: Negative.    HENT: Negative.     Eyes: Negative.    Respiratory: Negative.     Cardiovascular: Negative.    Gastrointestinal: Negative.    Endocrine: Negative.    Genitourinary: Negative.    Musculoskeletal:  Positive for arthralgias and back pain.   Skin: Negative.

## 2024-11-05 RX ORDER — SILVER SULFADIAZINE 10 MG/G
CREAM TOPICAL
Qty: 100 G | Refills: 0 | Status: SHIPPED | OUTPATIENT
Start: 2024-11-05

## 2024-11-12 DIAGNOSIS — J43.2 CENTRILOBULAR EMPHYSEMA (HCC): ICD-10-CM

## 2024-11-12 RX ORDER — IPRATROPIUM BROMIDE AND ALBUTEROL 20; 100 UG/1; UG/1
SPRAY, METERED RESPIRATORY (INHALATION)
Qty: 4 G | Refills: 3 | Status: SHIPPED | OUTPATIENT
Start: 2024-11-12

## 2024-11-29 DIAGNOSIS — M79.2 NEUROPATHIC PAIN: ICD-10-CM

## 2024-12-03 RX ORDER — GABAPENTIN 400 MG/1
400 CAPSULE ORAL 4 TIMES DAILY
Qty: 120 CAPSULE | Refills: 2 | Status: SHIPPED | OUTPATIENT
Start: 2024-12-03 | End: 2025-01-02

## 2025-01-20 DIAGNOSIS — N18.31 TYPE 2 DIABETES MELLITUS WITH STAGE 3A CHRONIC KIDNEY DISEASE, WITHOUT LONG-TERM CURRENT USE OF INSULIN (HCC): ICD-10-CM

## 2025-01-20 DIAGNOSIS — E11.22 TYPE 2 DIABETES MELLITUS WITH STAGE 3A CHRONIC KIDNEY DISEASE, WITHOUT LONG-TERM CURRENT USE OF INSULIN (HCC): ICD-10-CM

## 2025-01-20 RX ORDER — DAPAGLIFLOZIN 5 MG/1
5 TABLET, FILM COATED ORAL EVERY MORNING
Qty: 90 TABLET | Refills: 0 | Status: SHIPPED | OUTPATIENT
Start: 2025-01-20

## 2025-01-28 DIAGNOSIS — Z94.4 LIVER TRANSPLANT RECIPIENT (HCC): ICD-10-CM

## 2025-01-28 DIAGNOSIS — R60.9 SWELLING: ICD-10-CM

## 2025-01-28 DIAGNOSIS — G89.4 PAIN SYNDROME, CHRONIC: ICD-10-CM

## 2025-01-28 DIAGNOSIS — M79.2 NEUROPATHIC PAIN: ICD-10-CM

## 2025-01-28 RX ORDER — TRAMADOL HYDROCHLORIDE 50 MG/1
50 TABLET ORAL EVERY 6 HOURS PRN
Qty: 120 TABLET | Refills: 2 | Status: SHIPPED | OUTPATIENT
Start: 2025-01-28 | End: 2025-02-27

## 2025-01-28 RX ORDER — TORSEMIDE 20 MG/1
20 TABLET ORAL DAILY
Qty: 30 TABLET | Refills: 3 | Status: SHIPPED | OUTPATIENT
Start: 2025-01-28

## 2025-01-28 RX ORDER — GABAPENTIN 400 MG/1
400 CAPSULE ORAL 4 TIMES DAILY
Qty: 120 CAPSULE | Refills: 2 | OUTPATIENT
Start: 2025-01-28 | End: 2025-02-27

## 2025-01-30 DIAGNOSIS — M79.2 NEUROPATHIC PAIN: ICD-10-CM

## 2025-01-30 RX ORDER — GABAPENTIN 400 MG/1
400 CAPSULE ORAL 4 TIMES DAILY
Qty: 120 CAPSULE | Refills: 2 | OUTPATIENT
Start: 2025-01-30 | End: 2025-03-01

## 2025-02-03 ENCOUNTER — OFFICE VISIT (OUTPATIENT)
Age: 70
End: 2025-02-03
Payer: MEDICARE

## 2025-02-03 VITALS
HEIGHT: 62 IN | TEMPERATURE: 97.6 F | DIASTOLIC BLOOD PRESSURE: 71 MMHG | RESPIRATION RATE: 20 BRPM | SYSTOLIC BLOOD PRESSURE: 125 MMHG | BODY MASS INDEX: 31.65 KG/M2 | WEIGHT: 172 LBS | HEART RATE: 99 BPM | OXYGEN SATURATION: 95 %

## 2025-02-03 DIAGNOSIS — F11.20 OPIOID DEPENDENCE WITH CURRENT USE (HCC): ICD-10-CM

## 2025-02-03 DIAGNOSIS — N18.31 TYPE 2 DIABETES MELLITUS WITH STAGE 3A CHRONIC KIDNEY DISEASE, WITHOUT LONG-TERM CURRENT USE OF INSULIN (HCC): ICD-10-CM

## 2025-02-03 DIAGNOSIS — F41.9 ANXIETY: ICD-10-CM

## 2025-02-03 DIAGNOSIS — I10 PRIMARY HYPERTENSION: ICD-10-CM

## 2025-02-03 DIAGNOSIS — E11.22 TYPE 2 DIABETES MELLITUS WITH STAGE 3A CHRONIC KIDNEY DISEASE, WITHOUT LONG-TERM CURRENT USE OF INSULIN (HCC): ICD-10-CM

## 2025-02-03 DIAGNOSIS — J43.2 CENTRILOBULAR EMPHYSEMA (HCC): ICD-10-CM

## 2025-02-03 DIAGNOSIS — R11.0 NAUSEA: ICD-10-CM

## 2025-02-03 DIAGNOSIS — K21.9 GASTROESOPHAGEAL REFLUX DISEASE, UNSPECIFIED WHETHER ESOPHAGITIS PRESENT: ICD-10-CM

## 2025-02-03 DIAGNOSIS — D61.818 PANCYTOPENIA (HCC): ICD-10-CM

## 2025-02-03 DIAGNOSIS — G25.0 ESSENTIAL TREMOR: ICD-10-CM

## 2025-02-03 DIAGNOSIS — Z94.4 LIVER TRANSPLANT RECIPIENT (HCC): Primary | ICD-10-CM

## 2025-02-03 DIAGNOSIS — I10 ESSENTIAL HYPERTENSION: ICD-10-CM

## 2025-02-03 PROCEDURE — 1123F ACP DISCUSS/DSCN MKR DOCD: CPT | Performed by: NURSE PRACTITIONER

## 2025-02-03 PROCEDURE — G8399 PT W/DXA RESULTS DOCUMENT: HCPCS | Performed by: NURSE PRACTITIONER

## 2025-02-03 PROCEDURE — 2022F DILAT RTA XM EVC RTNOPTHY: CPT | Performed by: NURSE PRACTITIONER

## 2025-02-03 PROCEDURE — 4004F PT TOBACCO SCREEN RCVD TLK: CPT | Performed by: NURSE PRACTITIONER

## 2025-02-03 PROCEDURE — 1159F MED LIST DOCD IN RCRD: CPT | Performed by: NURSE PRACTITIONER

## 2025-02-03 PROCEDURE — 99214 OFFICE O/P EST MOD 30 MIN: CPT | Performed by: NURSE PRACTITIONER

## 2025-02-03 PROCEDURE — G8427 DOCREV CUR MEDS BY ELIG CLIN: HCPCS | Performed by: NURSE PRACTITIONER

## 2025-02-03 PROCEDURE — 3078F DIAST BP <80 MM HG: CPT | Performed by: NURSE PRACTITIONER

## 2025-02-03 PROCEDURE — G8417 CALC BMI ABV UP PARAM F/U: HCPCS | Performed by: NURSE PRACTITIONER

## 2025-02-03 PROCEDURE — 3074F SYST BP LT 130 MM HG: CPT | Performed by: NURSE PRACTITIONER

## 2025-02-03 PROCEDURE — 1090F PRES/ABSN URINE INCON ASSESS: CPT | Performed by: NURSE PRACTITIONER

## 2025-02-03 PROCEDURE — 3046F HEMOGLOBIN A1C LEVEL >9.0%: CPT | Performed by: NURSE PRACTITIONER

## 2025-02-03 PROCEDURE — 3023F SPIROM DOC REV: CPT | Performed by: NURSE PRACTITIONER

## 2025-02-03 PROCEDURE — 3017F COLORECTAL CA SCREEN DOC REV: CPT | Performed by: NURSE PRACTITIONER

## 2025-02-03 RX ORDER — CARBIDOPA/LEVODOPA 25MG-250MG
1 TABLET ORAL DAILY
Qty: 90 TABLET | Refills: 3 | Status: SHIPPED | OUTPATIENT
Start: 2025-02-03

## 2025-02-03 RX ORDER — HYDROXYZINE PAMOATE 25 MG/1
25 CAPSULE ORAL 2 TIMES DAILY PRN
Qty: 60 CAPSULE | Refills: 2 | Status: SHIPPED | OUTPATIENT
Start: 2025-02-03 | End: 2025-03-05

## 2025-02-03 RX ORDER — ONDANSETRON 4 MG/1
4 TABLET, FILM COATED ORAL 3 TIMES DAILY PRN
Qty: 30 TABLET | Refills: 0 | Status: SHIPPED | OUTPATIENT
Start: 2025-02-03

## 2025-02-03 RX ORDER — ALPRAZOLAM 0.5 MG
0.5 TABLET ORAL 3 TIMES DAILY PRN
COMMUNITY
Start: 2025-01-28

## 2025-02-03 SDOH — ECONOMIC STABILITY: FOOD INSECURITY: WITHIN THE PAST 12 MONTHS, YOU WORRIED THAT YOUR FOOD WOULD RUN OUT BEFORE YOU GOT MONEY TO BUY MORE.: OFTEN TRUE

## 2025-02-03 SDOH — ECONOMIC STABILITY: FOOD INSECURITY: WITHIN THE PAST 12 MONTHS, THE FOOD YOU BOUGHT JUST DIDN'T LAST AND YOU DIDN'T HAVE MONEY TO GET MORE.: OFTEN TRUE

## 2025-02-03 ASSESSMENT — PATIENT HEALTH QUESTIONNAIRE - PHQ9
8. MOVING OR SPEAKING SO SLOWLY THAT OTHER PEOPLE COULD HAVE NOTICED. OR THE OPPOSITE, BEING SO FIGETY OR RESTLESS THAT YOU HAVE BEEN MOVING AROUND A LOT MORE THAN USUAL: SEVERAL DAYS
SUM OF ALL RESPONSES TO PHQ QUESTIONS 1-9: 13
SUM OF ALL RESPONSES TO PHQ9 QUESTIONS 1 & 2: 6
6. FEELING BAD ABOUT YOURSELF - OR THAT YOU ARE A FAILURE OR HAVE LET YOURSELF OR YOUR FAMILY DOWN: SEVERAL DAYS
5. POOR APPETITE OR OVEREATING: SEVERAL DAYS
9. THOUGHTS THAT YOU WOULD BE BETTER OFF DEAD, OR OF HURTING YOURSELF: NOT AT ALL
1. LITTLE INTEREST OR PLEASURE IN DOING THINGS: NEARLY EVERY DAY
3. TROUBLE FALLING OR STAYING ASLEEP: SEVERAL DAYS
SUM OF ALL RESPONSES TO PHQ QUESTIONS 1-9: 13
10. IF YOU CHECKED OFF ANY PROBLEMS, HOW DIFFICULT HAVE THESE PROBLEMS MADE IT FOR YOU TO DO YOUR WORK, TAKE CARE OF THINGS AT HOME, OR GET ALONG WITH OTHER PEOPLE: VERY DIFFICULT
SUM OF ALL RESPONSES TO PHQ QUESTIONS 1-9: 13
7. TROUBLE CONCENTRATING ON THINGS, SUCH AS READING THE NEWSPAPER OR WATCHING TELEVISION: NOT AT ALL
SUM OF ALL RESPONSES TO PHQ QUESTIONS 1-9: 13
2. FEELING DOWN, DEPRESSED OR HOPELESS: NEARLY EVERY DAY
4. FEELING TIRED OR HAVING LITTLE ENERGY: NEARLY EVERY DAY

## 2025-02-03 ASSESSMENT — ENCOUNTER SYMPTOMS
SHORTNESS OF BREATH: 1
EYES NEGATIVE: 1
ALLERGIC/IMMUNOLOGIC NEGATIVE: 1
GASTROINTESTINAL NEGATIVE: 1

## 2025-02-03 NOTE — PROGRESS NOTES
uncomfortable. A medication will be prescribed to help manage her tremors.    4. Hypertension.  She is taking her medication for her blood pressure.    5. Diabetes mellitus.  She has not checked her diabetes for a while. Lab work will be conducted to assess her current status.    Follow-up  The patient will follow up in 1 month.      1. Liver transplant recipient ([facility])  Will have the care coordinator contact [facility] for a continuation of care protocol. Will get a cyclosporine level.   - Cyclosporine Level; Future    2. Type 2 diabetes mellitus with stage 3a chronic kidney disease, without long-term current use of insulin ([facility])  Continue Farxiga 5 mg     3. Primary hypertension    Amlodipine 10 mg   Cozaar 100 mg   Demadex 20 mg   4. Essential hypertension      5. Gastroesophageal reflux disease, unspecified whether esophagitis present      6. Nausea    - ondansetron (ZOFRAN) 4 MG tablet; Take 1 tablet by mouth 3 times daily as needed for Nausea or Vomiting  Dispense: 30 tablet; Refill: 0    7. Pancytopenia ([facility])  Will monitor labs    8. Opioid dependence with current use ([facility])      9. Centrilobular emphysema ([facility])      10. Anxiety    - hydrOXYzine pamoate (VISTARIL) 25 MG capsule; Take 1 capsule by mouth 2 times daily as needed for Anxiety  Dispense: 60 capsule; Refill: 2    11. Essential tremor  Start medication.   - carbidopa-levodopa (SINEMET)  MG per tablet; Take 1 tablet by mouth daily  Dispense: 90 tablet; Refill: 3       Written by Lila JUSTICE, acting as a scribe for Susan Inman on 2/3/2025 at 6:16 PM.

## 2025-02-04 ENCOUNTER — CARE COORDINATION (OUTPATIENT)
Age: 70
End: 2025-02-04

## 2025-02-04 RX ORDER — DAPAGLIFLOZIN 5 MG/1
5 TABLET, FILM COATED ORAL EVERY MORNING
Qty: 90 TABLET | Refills: 0 | Status: SHIPPED | OUTPATIENT
Start: 2025-02-04

## 2025-02-04 RX ORDER — LOSARTAN POTASSIUM 100 MG/1
100 TABLET ORAL DAILY
Qty: 90 TABLET | Refills: 1 | Status: SHIPPED | OUTPATIENT
Start: 2025-02-04

## 2025-02-04 NOTE — CARE COORDINATION
Hilda Martinez, RN  Manager Care Coordination  [phone]     Call placed to [facility] transplant team per PCP request to inquire about fu for meds and labs.  Message was given to transplant team and to have care manager return a call.

## 2025-02-05 NOTE — CARE COORDINATION
Hilda Martinez, RN  Manager Care Coordination  [phone]     I placed a call to Rosie and informed her that I spoke with her transplant team at .  I told her she has a scheduled appointment tomorrow at 1030 that was made in July.  I explained that I found it is not appropriate for a PCP to monitor lab or order her cyclosporine.  This should only be done by a hepatologist or a transplant team.  I told her that the care manager I spoke with would attempt to work out a video visit for tomorrow but this isn't guaranteed.  Rosie has to have her lab work drawn and a visit with the doctor before her medication can be prescribed.  I told Rosie that it is her responsibility to call the  transplant center and communicate with them her needs.  Rosie verbalized understanding of this and told me that she will call them now and that she will go to the hospital in the morning to have her lab work drawn.  I also spoke to Susan SCHROEDER about the information I received.

## 2025-02-05 NOTE — CARE COORDINATION
Hilda Martinez, RN  Manager Care Coordination  [phone]     I spoke to Blossom care manager with  transplant team.  She tells me that the only appropriate action for Rosie's care if she cannot come to them would be to find a hepatologist or another transplant team to follow her.  She is required to have the lab to monitor her medication for them to order it.  At this point they have not heard anything from Rosie and she has an appointment scheduled with them tomorrow.  Blossom is going to communicate with the doctor on their team and see if he can do a video visit with Rosie tomorrow but she will need to have her labs drawn first.  She also needs to contact them herself.  Blossom has also sent a referral to social work to see what can be done to help with transportation.

## 2025-02-06 ENCOUNTER — HOSPITAL ENCOUNTER (OUTPATIENT)
Facility: HOSPITAL | Age: 70
Discharge: HOME | End: 2025-02-06
Payer: MEDICARE

## 2025-02-06 DIAGNOSIS — Z94.4 LIVER TRANSPLANT RECIPIENT (HCC): ICD-10-CM

## 2025-02-06 DIAGNOSIS — N18.31 TYPE 2 DIABETES MELLITUS WITH STAGE 3A CHRONIC KIDNEY DISEASE, WITHOUT LONG-TERM CURRENT USE OF INSULIN (HCC): ICD-10-CM

## 2025-02-06 DIAGNOSIS — Z13.29 THYROID DISORDER SCREEN: ICD-10-CM

## 2025-02-06 DIAGNOSIS — E11.22 TYPE 2 DIABETES MELLITUS WITH STAGE 3A CHRONIC KIDNEY DISEASE, WITHOUT LONG-TERM CURRENT USE OF INSULIN (HCC): ICD-10-CM

## 2025-02-06 LAB
ALBUMIN SERPL-MCNC: 4.2 G/DL (ref 3.4–4.8)
ALBUMIN/GLOB SERPL: 1.8 {RATIO} (ref 0.8–2)
ALP SERPL-CCNC: 137 U/L (ref 25–100)
ALT SERPL-CCNC: 6 U/L (ref 4–36)
ANION GAP SERPL CALCULATED.3IONS-SCNC: 16 MMOL/L (ref 3–16)
AST SERPL-CCNC: 14 U/L (ref 8–33)
BASOPHILS # BLD: 0.1 K/UL (ref 0–0.1)
BASOPHILS NFR BLD: 0.7 %
BILIRUB DIRECT SERPL-MCNC: 0.5 MG/DL (ref 0–0.2)
BILIRUB INDIRECT SERPL-MCNC: 0.6 MG/DL (ref 0.2–0.8)
BILIRUB SERPL-MCNC: 1.1 MG/DL (ref 0.3–1.2)
BUN SERPL-MCNC: 19 MG/DL (ref 6–20)
CALCIUM SERPL-MCNC: 10 MG/DL (ref 8.5–10.5)
CHLORIDE SERPL-SCNC: 100 MMOL/L (ref 98–107)
CO2 SERPL-SCNC: 25 MMOL/L (ref 20–30)
CREAT SERPL-MCNC: 1.7 MG/DL (ref 0.4–1.2)
EOSINOPHIL # BLD: 0.1 K/UL (ref 0–0.4)
EOSINOPHIL NFR BLD: 1.7 %
ERYTHROCYTE [DISTWIDTH] IN BLOOD BY AUTOMATED COUNT: 15 % (ref 11–16)
GFR SERPLBLD CREATININE-BSD FMLA CKD-EPI: 32 ML/MIN/{1.73_M2}
GLOBULIN SER CALC-MCNC: 2.4 G/DL
GLUCOSE SERPL-MCNC: 389 MG/DL (ref 74–106)
HBA1C MFR BLD: 8.3 %
HCT VFR BLD AUTO: 38.8 % (ref 37–47)
HGB BLD-MCNC: 14.1 G/DL (ref 11.5–16.5)
IMM GRANULOCYTES # BLD: 0 K/UL
IMM GRANULOCYTES NFR BLD: 0.3 % (ref 0–5)
LYMPHOCYTES # BLD: 1.6 K/UL (ref 1.5–4)
LYMPHOCYTES NFR BLD: 20.7 %
MCH RBC QN AUTO: 33.4 PG (ref 27–32)
MCHC RBC AUTO-ENTMCNC: 36.3 G/DL (ref 31–35)
MCV RBC AUTO: 91.9 FL (ref 80–100)
MONOCYTES # BLD: 0.4 K/UL (ref 0.2–0.8)
MONOCYTES NFR BLD: 4.8 %
NEUTROPHILS # BLD: 5.4 K/UL (ref 2–7.5)
NEUTS SEG NFR BLD: 71.8 %
PHOSPHATE SERPL-MCNC: 3.6 MG/DL (ref 2.5–4.5)
PLATELET # BLD AUTO: 247 K/UL (ref 150–400)
PMV BLD AUTO: 9.8 FL (ref 6–10)
POTASSIUM SERPL-SCNC: 5.1 MMOL/L (ref 3.4–5.1)
PROT SERPL-MCNC: 6.6 G/DL (ref 6.4–8.3)
RBC # BLD AUTO: 4.22 M/UL (ref 3.8–5.8)
SODIUM SERPL-SCNC: 141 MMOL/L (ref 136–145)
TSH SERPL DL<=0.005 MIU/L-ACNC: 1.2 UIU/ML (ref 0.27–4.2)
WBC # BLD AUTO: 7.5 K/UL (ref 4–11)

## 2025-02-06 PROCEDURE — 82248 BILIRUBIN DIRECT: CPT

## 2025-02-06 PROCEDURE — 80053 COMPREHEN METABOLIC PANEL: CPT

## 2025-02-06 PROCEDURE — 83036 HEMOGLOBIN GLYCOSYLATED A1C: CPT

## 2025-02-06 PROCEDURE — 80158 DRUG ASSAY CYCLOSPORINE: CPT

## 2025-02-06 PROCEDURE — 85025 COMPLETE CBC W/AUTO DIFF WBC: CPT

## 2025-02-06 PROCEDURE — 36415 COLL VENOUS BLD VENIPUNCTURE: CPT

## 2025-02-06 PROCEDURE — 84100 ASSAY OF PHOSPHORUS: CPT

## 2025-02-06 PROCEDURE — 84443 ASSAY THYROID STIM HORMONE: CPT

## 2025-02-08 LAB — CYCLOSPORINE BLD-MCNC: 75.2 NG/ML

## 2025-02-27 DIAGNOSIS — M79.2 NEUROPATHIC PAIN: ICD-10-CM

## 2025-02-28 RX ORDER — GABAPENTIN 400 MG/1
400 CAPSULE ORAL 4 TIMES DAILY
Qty: 120 CAPSULE | Refills: 2 | Status: SHIPPED | OUTPATIENT
Start: 2025-02-28 | End: 2025-03-30

## 2025-02-28 RX ORDER — ALPRAZOLAM 0.5 MG
TABLET ORAL
Qty: 90 TABLET | Refills: 3 | Status: SHIPPED | OUTPATIENT
Start: 2025-02-28 | End: 2025-03-30

## 2025-03-03 ENCOUNTER — OFFICE VISIT (OUTPATIENT)
Age: 70
End: 2025-03-03
Payer: MEDICARE

## 2025-03-03 VITALS
HEIGHT: 62 IN | TEMPERATURE: 97.7 F | HEART RATE: 85 BPM | DIASTOLIC BLOOD PRESSURE: 76 MMHG | RESPIRATION RATE: 16 BRPM | SYSTOLIC BLOOD PRESSURE: 165 MMHG | WEIGHT: 172 LBS | BODY MASS INDEX: 31.65 KG/M2 | OXYGEN SATURATION: 98 %

## 2025-03-03 DIAGNOSIS — Z13.29 THYROID DISORDER SCREEN: ICD-10-CM

## 2025-03-03 DIAGNOSIS — E55.9 VITAMIN D DEFICIENCY: ICD-10-CM

## 2025-03-03 DIAGNOSIS — N18.31 TYPE 2 DIABETES MELLITUS WITH STAGE 3A CHRONIC KIDNEY DISEASE, WITHOUT LONG-TERM CURRENT USE OF INSULIN (HCC): ICD-10-CM

## 2025-03-03 DIAGNOSIS — Z94.4 LIVER TRANSPLANT RECIPIENT (HCC): ICD-10-CM

## 2025-03-03 DIAGNOSIS — E11.22 TYPE 2 DIABETES MELLITUS WITH STAGE 3A CHRONIC KIDNEY DISEASE, WITHOUT LONG-TERM CURRENT USE OF INSULIN (HCC): ICD-10-CM

## 2025-03-03 DIAGNOSIS — I10 PRIMARY HYPERTENSION: ICD-10-CM

## 2025-03-03 DIAGNOSIS — M79.2 NEUROPATHIC PAIN: Primary | ICD-10-CM

## 2025-03-03 DIAGNOSIS — H61.23 BILATERAL IMPACTED CERUMEN: ICD-10-CM

## 2025-03-03 PROCEDURE — 1159F MED LIST DOCD IN RCRD: CPT | Performed by: NURSE PRACTITIONER

## 2025-03-03 PROCEDURE — 3017F COLORECTAL CA SCREEN DOC REV: CPT | Performed by: NURSE PRACTITIONER

## 2025-03-03 PROCEDURE — G8417 CALC BMI ABV UP PARAM F/U: HCPCS | Performed by: NURSE PRACTITIONER

## 2025-03-03 PROCEDURE — G8399 PT W/DXA RESULTS DOCUMENT: HCPCS | Performed by: NURSE PRACTITIONER

## 2025-03-03 PROCEDURE — 1123F ACP DISCUSS/DSCN MKR DOCD: CPT | Performed by: NURSE PRACTITIONER

## 2025-03-03 PROCEDURE — G8427 DOCREV CUR MEDS BY ELIG CLIN: HCPCS | Performed by: NURSE PRACTITIONER

## 2025-03-03 PROCEDURE — 2022F DILAT RTA XM EVC RTNOPTHY: CPT | Performed by: NURSE PRACTITIONER

## 2025-03-03 PROCEDURE — 1090F PRES/ABSN URINE INCON ASSESS: CPT | Performed by: NURSE PRACTITIONER

## 2025-03-03 PROCEDURE — 69210 REMOVE IMPACTED EAR WAX UNI: CPT | Performed by: NURSE PRACTITIONER

## 2025-03-03 PROCEDURE — 99214 OFFICE O/P EST MOD 30 MIN: CPT | Performed by: NURSE PRACTITIONER

## 2025-03-03 PROCEDURE — 4004F PT TOBACCO SCREEN RCVD TLK: CPT | Performed by: NURSE PRACTITIONER

## 2025-03-03 PROCEDURE — 3078F DIAST BP <80 MM HG: CPT | Performed by: NURSE PRACTITIONER

## 2025-03-03 PROCEDURE — 3052F HG A1C>EQUAL 8.0%<EQUAL 9.0%: CPT | Performed by: NURSE PRACTITIONER

## 2025-03-03 PROCEDURE — 3077F SYST BP >= 140 MM HG: CPT | Performed by: NURSE PRACTITIONER

## 2025-03-03 RX ORDER — PROMETHAZINE HYDROCHLORIDE 25 MG/1
25 TABLET ORAL EVERY 6 HOURS PRN
Qty: 20 TABLET | Refills: 0 | Status: SHIPPED | OUTPATIENT
Start: 2025-03-03 | End: 2025-03-13

## 2025-03-03 RX ORDER — SILVER SULFADIAZINE 10 MG/G
CREAM TOPICAL
Qty: 100 G | Refills: 0 | Status: SHIPPED | OUTPATIENT
Start: 2025-03-03

## 2025-03-03 ASSESSMENT — ENCOUNTER SYMPTOMS
RESPIRATORY NEGATIVE: 1
NAUSEA: 1
ALLERGIC/IMMUNOLOGIC NEGATIVE: 1
EYES NEGATIVE: 1

## 2025-03-03 NOTE — PROGRESS NOTES
Hyperglycemia.  Her blood glucose levels are elevated. A re-evaluation of her A1c levels will be conducted prior to her next appointment.    3. Elevated blood pressure.  Her blood pressure is elevated.    4. Nausea.  She reports persistent nausea despite taking Zofran. She will try Phenergan again, although it makes her sleepy.    5. Medication management.  She requested a refill for gabapentin. She is advised to be cautious with the combination of Xanax, gabapentin, tramadol, and Vistaril due to their sedating effects.    PROCEDURE  An ear irrigation procedure was performed today to alleviate the blockage.      1. Neuropathic pain  Refilled Neurontin     2. Type 2 diabetes mellitus with stage 3a chronic kidney disease, without long-term current use of insulin ([facility])  Hemoglobin A1C   Date Value Ref Range Status   02/06/2025 8.3 (H) See comment % Final     Comment:     Comment:  Diagnosis of Diabetes: > or = 6.5%  Increased risk of diabetes (Prediabetes): 5.7-6.4%  Glycemic Control: Nonpregnant Adults: <7.0%                    Pregnant: <6.0%       Farxiga 5 mg daily    - Hemoglobin A1C; Future  - Comprehensive Metabolic Panel; Future  - CBC; Future    3. Primary hypertension  Advised the importance of compliant with medication  Amlodipine 10 mg daily  Losartan 100 mg daily continue the Demadex  - Lipid Panel; Future    4. Liver transplant recipient (HCC)  She is followed by [facility] and is stressed importance of keeping these appointments    5. Vitamin D deficiency    - Vitamin B12 & Folate; Future  - Vitamin D 25 Hydroxy; Future    6. Thyroid disorder screen    - TSH; Future    7. Bilateral impacted cerumen    - REMOVE IMPACTED EAR WAX       Written by Lila JUSTICE, acting as a scribe for Susan Inman on 3/3/2025 at 6:40 PM.

## 2025-03-06 DIAGNOSIS — J43.2 CENTRILOBULAR EMPHYSEMA (HCC): ICD-10-CM

## 2025-03-06 RX ORDER — IPRATROPIUM BROMIDE AND ALBUTEROL 20; 100 UG/1; UG/1
SPRAY, METERED RESPIRATORY (INHALATION)
Qty: 4 G | Refills: 3 | Status: SHIPPED | OUTPATIENT
Start: 2025-03-06

## 2025-03-16 ENCOUNTER — HOSPITAL ENCOUNTER (EMERGENCY)
Facility: HOSPITAL | Age: 70
Discharge: LEFT BEFORE DISCHARGE | End: 2025-03-16
Attending: GENERAL PRACTICE
Payer: MEDICARE

## 2025-03-16 ENCOUNTER — APPOINTMENT (OUTPATIENT)
Dept: GENERAL RADIOLOGY | Facility: HOSPITAL | Age: 70
End: 2025-03-16
Attending: GENERAL PRACTICE
Payer: MEDICARE

## 2025-03-16 ENCOUNTER — APPOINTMENT (OUTPATIENT)
Dept: CT IMAGING | Facility: HOSPITAL | Age: 70
End: 2025-03-16
Attending: GENERAL PRACTICE
Payer: MEDICARE

## 2025-03-16 VITALS
SYSTOLIC BLOOD PRESSURE: 148 MMHG | WEIGHT: 172 LBS | OXYGEN SATURATION: 100 % | BODY MASS INDEX: 32.47 KG/M2 | HEIGHT: 61 IN | TEMPERATURE: 98.2 F | HEART RATE: 73 BPM | DIASTOLIC BLOOD PRESSURE: 75 MMHG | RESPIRATION RATE: 19 BRPM

## 2025-03-16 LAB
ALBUMIN SERPL-MCNC: 4.1 G/DL (ref 3.4–4.8)
ALBUMIN/GLOB SERPL: 1.4 {RATIO} (ref 0.8–2)
ALP SERPL-CCNC: 157 U/L (ref 25–100)
ALT SERPL-CCNC: 14 U/L (ref 4–36)
AMMONIA PLAS-SCNC: 48 MCG/DL (ref 19–87)
AMPHET UR QL SCN: NORMAL
ANION GAP SERPL CALCULATED.3IONS-SCNC: 15 MMOL/L (ref 3–16)
AST SERPL-CCNC: 17 U/L (ref 8–33)
BACTERIA URNS QL MICRO: ABNORMAL /HPF
BARBITURATES UR QL SCN: NORMAL
BASOPHILS # BLD: 0 K/UL (ref 0–0.1)
BASOPHILS NFR BLD: 0.6 %
BENZODIAZ UR QL SCN: NORMAL
BILIRUB SERPL-MCNC: 1.4 MG/DL (ref 0.3–1.2)
BILIRUB UR QL STRIP.AUTO: NEGATIVE
BUN SERPL-MCNC: 25 MG/DL (ref 6–20)
BUPRENORPHINE QUAL, URINE: NORMAL
CALCIUM SERPL-MCNC: 9.6 MG/DL (ref 8.3–10.6)
CANNABINOIDS UR QL SCN: NORMAL
CHLORIDE SERPL-SCNC: 96 MMOL/L (ref 98–107)
CLARITY UR: CLEAR
CO2 SERPL-SCNC: 26 MMOL/L (ref 20–30)
COCAINE UR QL SCN: NORMAL
COLOR UR: YELLOW
CREAT SERPL-MCNC: 1.6 MG/DL (ref 0.4–1.2)
DRUG SCREEN COMMENT UR-IMP: NORMAL
EOSINOPHIL # BLD: 0.2 K/UL (ref 0–0.4)
EOSINOPHIL NFR BLD: 2.1 %
EPI CELLS #/AREA URNS HPF: ABNORMAL /HPF (ref 0–5)
ERYTHROCYTE [DISTWIDTH] IN BLOOD BY AUTOMATED COUNT: 18 % (ref 11–16)
ETHANOLAMINE SERPL-MCNC: 10 MG/DL (ref 0–0.08)
FENTANYL SCREEN, URINE: NORMAL
GFR SERPLBLD CREATININE-BSD FMLA CKD-EPI: 35 ML/MIN/{1.73_M2}
GLOBULIN SER CALC-MCNC: 3 G/DL
GLUCOSE BLD-MCNC: 393 MG/DL
GLUCOSE BLD-MCNC: 435 MG/DL
GLUCOSE SERPL-MCNC: 449 MG/DL (ref 74–106)
GLUCOSE UR STRIP.AUTO-MCNC: 500 MG/DL
HCT VFR BLD AUTO: 40.9 % (ref 37–47)
HGB BLD-MCNC: 15.6 G/DL (ref 11.5–16.5)
HGB UR QL STRIP.AUTO: NEGATIVE
IMM GRANULOCYTES # BLD: 0 K/UL
IMM GRANULOCYTES NFR BLD: 0.3 % (ref 0–5)
INR PPP: 0.95 (ref 0.86–1.14)
KETONES UR STRIP.AUTO-MCNC: NEGATIVE MG/DL
LACTATE BLDV-SCNC: 2.8 MMOL/L (ref 0.4–2)
LEUKOCYTE ESTERASE UR QL STRIP.AUTO: NEGATIVE
LYMPHOCYTES # BLD: 1.6 K/UL (ref 1.5–4)
LYMPHOCYTES NFR BLD: 21.5 %
MAGNESIUM SERPL-MCNC: 1.9 MG/DL (ref 1.7–2.4)
MCH RBC QN AUTO: 36 PG (ref 27–32)
MCHC RBC AUTO-ENTMCNC: 38.6 G/DL (ref 31–35)
MCV RBC AUTO: 93.2 FL (ref 80–100)
METHADONE UR QL SCN: NORMAL
METHAMPHET UR QL SCN: NORMAL
MONOCYTES # BLD: 0.3 K/UL (ref 0.2–0.8)
MONOCYTES NFR BLD: 4.4 %
NEUTROPHILS # BLD: 5.2 K/UL (ref 2–7.5)
NEUTS SEG NFR BLD: 71.1 %
NITRITE UR QL STRIP.AUTO: NEGATIVE
OPIATES UR QL SCN: NORMAL
OXYCODONE UR QL SCN: NORMAL
PCP UR QL SCN: NORMAL
PH UR STRIP.AUTO: 5 [PH] (ref 5–8)
PLATELET # BLD AUTO: 234 K/UL (ref 150–400)
PMV BLD AUTO: 11.2 FL (ref 6–10)
POTASSIUM SERPL-SCNC: 4.6 MMOL/L (ref 3.4–5.1)
PROT SERPL-MCNC: 7.1 G/DL (ref 6.4–8.3)
PROT UR STRIP.AUTO-MCNC: >=300 MG/DL
PROTHROMBIN TIME: 12.9 SEC (ref 12–14.1)
RBC # BLD AUTO: 4.39 M/UL (ref 3.8–5.8)
RBC #/AREA URNS HPF: ABNORMAL /HPF (ref 0–4)
SODIUM SERPL-SCNC: 137 MMOL/L (ref 136–145)
SP GR UR STRIP.AUTO: 1.02 (ref 1–1.03)
TRICYCLICS UR QL SCN: NORMAL
UA COMPLETE W REFLEX CULTURE PNL UR: ABNORMAL
UA DIPSTICK W REFLEX MICRO PNL UR: YES
URN SPEC COLLECT METH UR: ABNORMAL
UROBILINOGEN UR STRIP-ACNC: 0.2 E.U./DL
WBC # BLD AUTO: 7.3 K/UL (ref 4–11)
WBC #/AREA URNS HPF: ABNORMAL /HPF (ref 0–5)

## 2025-03-16 PROCEDURE — 83605 ASSAY OF LACTIC ACID: CPT

## 2025-03-16 PROCEDURE — 85025 COMPLETE CBC W/AUTO DIFF WBC: CPT

## 2025-03-16 PROCEDURE — 99285 EMERGENCY DEPT VISIT HI MDM: CPT

## 2025-03-16 PROCEDURE — 81001 URINALYSIS AUTO W/SCOPE: CPT

## 2025-03-16 PROCEDURE — 71045 X-RAY EXAM CHEST 1 VIEW: CPT

## 2025-03-16 PROCEDURE — G0480 DRUG TEST DEF 1-7 CLASSES: HCPCS

## 2025-03-16 PROCEDURE — 85610 PROTHROMBIN TIME: CPT

## 2025-03-16 PROCEDURE — 83735 ASSAY OF MAGNESIUM: CPT

## 2025-03-16 PROCEDURE — 6370000000 HC RX 637 (ALT 250 FOR IP): Performed by: GENERAL PRACTICE

## 2025-03-16 PROCEDURE — 70450 CT HEAD/BRAIN W/O DYE: CPT

## 2025-03-16 PROCEDURE — 2580000003 HC RX 258: Performed by: GENERAL PRACTICE

## 2025-03-16 PROCEDURE — 80053 COMPREHEN METABOLIC PANEL: CPT

## 2025-03-16 PROCEDURE — 82077 ASSAY SPEC XCP UR&BREATH IA: CPT

## 2025-03-16 PROCEDURE — 82140 ASSAY OF AMMONIA: CPT

## 2025-03-16 PROCEDURE — 87040 BLOOD CULTURE FOR BACTERIA: CPT

## 2025-03-16 PROCEDURE — 36415 COLL VENOUS BLD VENIPUNCTURE: CPT

## 2025-03-16 PROCEDURE — 96375 TX/PRO/DX INJ NEW DRUG ADDON: CPT

## 2025-03-16 PROCEDURE — 96365 THER/PROPH/DIAG IV INF INIT: CPT

## 2025-03-16 PROCEDURE — 80307 DRUG TEST PRSMV CHEM ANLYZR: CPT

## 2025-03-16 PROCEDURE — 6360000002 HC RX W HCPCS: Performed by: GENERAL PRACTICE

## 2025-03-16 RX ORDER — 0.9 % SODIUM CHLORIDE 0.9 %
1000 INTRAVENOUS SOLUTION INTRAVENOUS ONCE
Status: COMPLETED | OUTPATIENT
Start: 2025-03-16 | End: 2025-03-16

## 2025-03-16 RX ADMIN — HUMAN INSULIN 10 UNITS: 100 INJECTION, SOLUTION SUBCUTANEOUS at 13:22

## 2025-03-16 RX ADMIN — SODIUM CHLORIDE 1000 ML: 9 INJECTION, SOLUTION INTRAVENOUS at 13:20

## 2025-03-16 RX ADMIN — CEFTRIAXONE 1000 MG: 1 INJECTION, POWDER, FOR SOLUTION INTRAMUSCULAR; INTRAVENOUS at 13:22

## 2025-03-16 ASSESSMENT — PAIN DESCRIPTION - DESCRIPTORS: DESCRIPTORS: ACHING

## 2025-03-16 ASSESSMENT — PAIN DESCRIPTION - ORIENTATION: ORIENTATION: LEFT;RIGHT

## 2025-03-16 ASSESSMENT — PAIN SCALES - GENERAL: PAINLEVEL_OUTOF10: 7

## 2025-03-16 ASSESSMENT — PAIN DESCRIPTION - LOCATION: LOCATION: RIB CAGE

## 2025-03-16 NOTE — ED NOTES
Pt and  given update, on bed assignment at [facility].  immediately states \"she is going to [place], my daughter is law is the closest thing to a nurse and she is calling them to get her a bed, she has a brand new jeep and she is going to take her\".   Advised  that pt voiced she didn't not want to be transferred to , but was agreeable to transfer to [facility]. Bed is available and ready. Husbands states he doesn't care, that they are taking pt to [facility] when daughter in law arrives. Pt states that she just wants them to take her on to [place], doesn't want the bed at [facility], states \"its nothing to any of you all I would just feel safer there where my doctor is\"  Dr. Sexton made aware that pt is requesting to go to [facility], is signing out AMA.

## 2025-03-16 NOTE — ED NOTES
DIL arrived, taken to room. Pt, , and DIL made aware again that pt has a bed assignment at [facility], if they wish to continues with POC. At a raise volume,  interrupts before RN can finish any statements. Continuously saying how pt is not going to  or anywhere else except [place]. DIL states she wants to go to [place], that she called her doctor, and \"they said you all can talk to them and we could drive her.\" RN advised them that the appropriate process would have to be followed, that providers would have to discuss and be in agreement to accept pt, and that with symptoms and diagnoses pt requires medical monitoring for transport.   At that time  stepped closer to RN, pointing his finger vigorously several times, and in loud tone at RN, states \"you listen to me now\" states that they are leaving and taking pt to [facility], \"that's the end of it\". Advised pt that the decision is up to her. She is in agreement with . She wants to leave and have family take her to [place]. DIL and patient are pleasant and grateful to nursing staff. Pt states \"you all have been to so good\".   AMA form Signed: Yes  Reviewed with pt the nature of diagnosis, advised of recommended services, and risk of declining services, deterioration of condition, and including up to death.  Pt A/O x4  Cheyanne Riley RN

## 2025-03-16 NOTE — ED NOTES
Called [facility] spoke with Eveline KAPOOR, needing to be transferred for stroke per MD here. Trying [facility] first. Waiting on call back now.

## 2025-03-16 NOTE — ED PROVIDER NOTES
[facility]  EMERGENCY DEPARTMENT ENCOUNTER        Pt Name: Rosie Fair  MRN: [id number]  Birthdate 1955  Date of evaluation: 3/16/2025  Provider: Parisa Sexton MD  PCP: Susan Inman APRN  Note Started: 11:09 AM EDT 3/16/25    CHIEF COMPLAINT       Chief Complaint   Patient presents with    Extremity Weakness       HISTORY OF PRESENT ILLNESS: 1 or more Elements     History from : Patient    Limitations to history : None    Rosie Fair is a 69 y.o. female who presents with what she feels is a stroke.  The problem began last night around 8:00 when she felt weak in her legs while getting out of bed and fell to the floor.  Apparently she had some injury to her left side of her chest at that time and felt that perhaps she had injured some ribs.  She had friends and family present and they helped her back into the bed.  When she awoke at 8 AM this morning which is about 3 hours and 15 minutes ago she realized that she was weak specifically on her left side.  Thus she got a ride into the hospital.  This lady does smoke, she does not drink alcohol.  She had a previous liver transplant in 2020 for nonalcoholic liver disease.    Nursing Notes were all reviewed and agreed with or any disagreements were addressed in the HPI.    REVIEW OF SYSTEMS :      Review of Systems     systems reviewed and negative except as in HPI/MDM    PAST MEDICAL HISTORY     Past Medical History:   Diagnosis Date    Arthritis, rheumatoid (HCC)     Ascites     Bronchiectasis (HCC)     CAD (coronary artery disease)     Chronic pain     Cirrhosis (HCC)     Colitis     COPD (chronic obstructive pulmonary disease) (HCC)     Diffuse cystic mastopathy     Esophageal varices (HCC)     Hypertension     IIH (idiopathic intracranial hypertension)     Liver cirrhosis (HCC)     Osteoarthritis     Portal hypertensive gastropathy (HCC)     Sciatica     Splenomegaly     TIA (transient ischemic attack) 03/17/2022  voice recognition program.  Efforts were made to edit the dictations but occasionally words are mis-transcribed.)    Parisa Sexton MD (electronically signed)           Parisa Sexton MD  03/16/25 [id number]       Parisa Sexton MD  03/16/25 [id number]

## 2025-03-21 LAB
BACTERIA BLD CULT ORG #2: NORMAL
BACTERIA BLD CULT: NORMAL

## 2025-04-14 DIAGNOSIS — F41.9 ANXIETY: ICD-10-CM

## 2025-04-15 RX ORDER — HYDROXYZINE PAMOATE 25 MG/1
25 CAPSULE ORAL 2 TIMES DAILY PRN
Qty: 60 CAPSULE | Refills: 3 | Status: SHIPPED | OUTPATIENT
Start: 2025-04-15 | End: 2025-05-15

## 2025-04-21 DIAGNOSIS — N18.31 TYPE 2 DIABETES MELLITUS WITH STAGE 3A CHRONIC KIDNEY DISEASE, WITHOUT LONG-TERM CURRENT USE OF INSULIN (HCC): ICD-10-CM

## 2025-04-21 DIAGNOSIS — E11.22 TYPE 2 DIABETES MELLITUS WITH STAGE 3A CHRONIC KIDNEY DISEASE, WITHOUT LONG-TERM CURRENT USE OF INSULIN (HCC): ICD-10-CM

## 2025-04-22 RX ORDER — DAPAGLIFLOZIN 5 MG/1
5 TABLET, FILM COATED ORAL EVERY MORNING
Qty: 90 TABLET | Refills: 1 | Status: SHIPPED | OUTPATIENT
Start: 2025-04-22

## 2025-04-25 DIAGNOSIS — R11.0 NAUSEA: ICD-10-CM

## 2025-04-25 DIAGNOSIS — G89.4 PAIN SYNDROME, CHRONIC: ICD-10-CM

## 2025-04-25 DIAGNOSIS — M79.2 NEUROPATHIC PAIN: ICD-10-CM

## 2025-04-28 RX ORDER — GABAPENTIN 400 MG/1
400 CAPSULE ORAL 4 TIMES DAILY
Qty: 120 CAPSULE | Refills: 3 | OUTPATIENT
Start: 2025-04-28 | End: 2025-05-28

## 2025-04-29 RX ORDER — ONDANSETRON 4 MG/1
4 TABLET, FILM COATED ORAL 3 TIMES DAILY PRN
Qty: 30 TABLET | Refills: 1 | Status: SHIPPED | OUTPATIENT
Start: 2025-04-29

## 2025-04-29 RX ORDER — TRAMADOL HYDROCHLORIDE 50 MG/1
50 TABLET ORAL EVERY 6 HOURS PRN
Qty: 120 TABLET | Refills: 3 | Status: SHIPPED | OUTPATIENT
Start: 2025-04-29 | End: 2025-05-29

## 2025-05-08 DIAGNOSIS — R11.0 NAUSEA: ICD-10-CM

## 2025-05-08 RX ORDER — ONDANSETRON 4 MG/1
4 TABLET, FILM COATED ORAL 3 TIMES DAILY PRN
Qty: 30 TABLET | Refills: 2 | OUTPATIENT
Start: 2025-05-08

## 2025-05-20 RX ORDER — INSULIN GLARGINE 100 [IU]/ML
INJECTION, SOLUTION SUBCUTANEOUS
Qty: 15 ML | Refills: 2 | Status: SHIPPED | OUTPATIENT
Start: 2025-05-20

## 2025-05-22 DIAGNOSIS — M79.2 NEUROPATHIC PAIN: ICD-10-CM

## 2025-05-22 DIAGNOSIS — R60.9 SWELLING: ICD-10-CM

## 2025-05-22 DIAGNOSIS — Z94.4 LIVER TRANSPLANT RECIPIENT (HCC): ICD-10-CM

## 2025-05-22 RX ORDER — TORSEMIDE 20 MG/1
20 TABLET ORAL DAILY
Qty: 30 TABLET | Refills: 4 | Status: SHIPPED | OUTPATIENT
Start: 2025-05-22

## 2025-05-23 RX ORDER — GABAPENTIN 400 MG/1
400 CAPSULE ORAL 4 TIMES DAILY
Qty: 120 CAPSULE | Refills: 2 | Status: SHIPPED | OUTPATIENT
Start: 2025-05-23 | End: 2025-06-22

## 2025-05-30 DIAGNOSIS — I10 ESSENTIAL HYPERTENSION: ICD-10-CM

## 2025-05-30 RX ORDER — AMLODIPINE BESYLATE 10 MG/1
10 TABLET ORAL DAILY
Qty: 90 TABLET | Refills: 1 | Status: SHIPPED | OUTPATIENT
Start: 2025-05-30

## 2025-06-11 DIAGNOSIS — J43.2 CENTRILOBULAR EMPHYSEMA (HCC): ICD-10-CM

## 2025-06-11 RX ORDER — IPRATROPIUM BROMIDE AND ALBUTEROL 20; 100 UG/1; UG/1
SPRAY, METERED RESPIRATORY (INHALATION)
Qty: 4 G | Refills: 3 | Status: SHIPPED | OUTPATIENT
Start: 2025-06-11

## 2025-06-12 ENCOUNTER — TELEPHONE (OUTPATIENT)
Age: 70
End: 2025-06-12

## 2025-06-20 DIAGNOSIS — K14.0 GLOSSITIS: ICD-10-CM

## 2025-06-23 RX ORDER — LIDOCAINE HYDROCHLORIDE 20 MG/ML
5 SOLUTION OROPHARYNGEAL PRN
Qty: 100 ML | Refills: 0 | Status: SHIPPED | OUTPATIENT
Start: 2025-06-23

## 2025-06-23 RX ORDER — PROMETHAZINE HYDROCHLORIDE 25 MG/1
25 TABLET ORAL EVERY 6 HOURS PRN
Qty: 20 TABLET | Refills: 0 | Status: SHIPPED | OUTPATIENT
Start: 2025-06-23 | End: 2025-07-03

## 2025-07-01 RX ORDER — INSULIN GLARGINE 100 [IU]/ML
INJECTION, SOLUTION SUBCUTANEOUS
Qty: 15 ML | Refills: 0 | Status: SHIPPED | OUTPATIENT
Start: 2025-07-01

## 2025-07-05 DIAGNOSIS — I10 PRIMARY HYPERTENSION: ICD-10-CM

## 2025-07-06 ENCOUNTER — APPOINTMENT (OUTPATIENT)
Dept: GENERAL RADIOLOGY | Facility: HOSPITAL | Age: 70
End: 2025-07-06
Attending: HOSPITALIST
Payer: MEDICARE

## 2025-07-06 ENCOUNTER — HOSPITAL ENCOUNTER (EMERGENCY)
Facility: HOSPITAL | Age: 70
Discharge: HOME | End: 2025-07-06
Attending: HOSPITALIST
Payer: MEDICARE

## 2025-07-06 VITALS
TEMPERATURE: 97.9 F | DIASTOLIC BLOOD PRESSURE: 89 MMHG | RESPIRATION RATE: 19 BRPM | HEART RATE: 65 BPM | OXYGEN SATURATION: 99 % | HEIGHT: 61 IN | WEIGHT: 174 LBS | BODY MASS INDEX: 32.85 KG/M2 | SYSTOLIC BLOOD PRESSURE: 144 MMHG

## 2025-07-06 DIAGNOSIS — R07.9 CHEST PAIN, UNSPECIFIED TYPE: Primary | ICD-10-CM

## 2025-07-06 LAB
ALBUMIN SERPL-MCNC: 4.1 G/DL (ref 3.4–4.8)
ALBUMIN/GLOB SERPL: 1.4 {RATIO} (ref 0.8–2)
ALP SERPL-CCNC: 126 U/L (ref 25–100)
ALT SERPL-CCNC: 27 U/L (ref 4–36)
ANION GAP SERPL CALCULATED.3IONS-SCNC: 15 MMOL/L (ref 3–16)
AST SERPL-CCNC: 19 U/L (ref 8–33)
BASOPHILS # BLD: 0 K/UL (ref 0–0.1)
BASOPHILS NFR BLD: 0.6 %
BILIRUB SERPL-MCNC: 1 MG/DL (ref 0.3–1.2)
BUN SERPL-MCNC: 30 MG/DL (ref 6–20)
CALCIUM SERPL-MCNC: 9.3 MG/DL (ref 8.3–10.6)
CHLORIDE SERPL-SCNC: 107 MMOL/L (ref 98–107)
CO2 SERPL-SCNC: 16 MMOL/L (ref 20–30)
CREAT SERPL-MCNC: 1.9 MG/DL (ref 0.6–1.2)
EOSINOPHIL # BLD: 0.2 K/UL (ref 0–0.4)
EOSINOPHIL NFR BLD: 2.4 %
ERYTHROCYTE [DISTWIDTH] IN BLOOD BY AUTOMATED COUNT: 18.6 % (ref 11–16)
GFR SERPLBLD CREATININE-BSD FMLA CKD-EPI: 28 ML/MIN/{1.73_M2}
GLOBULIN SER CALC-MCNC: 3 G/DL
GLUCOSE SERPL-MCNC: 225 MG/DL (ref 74–106)
HCT VFR BLD AUTO: 18.8 % (ref 37–47)
HGB BLD-MCNC: 13.8 G/DL (ref 11.5–16.5)
IMM GRANULOCYTES # BLD: 0 K/UL
IMM GRANULOCYTES NFR BLD: 0.1 % (ref 0–5)
LYMPHOCYTES # BLD: 1.5 K/UL (ref 1.5–4)
LYMPHOCYTES NFR BLD: 22.2 %
MCH RBC QN AUTO: 73.4 PG (ref 27–32)
MCHC RBC AUTO-ENTMCNC: 73.4 G/DL (ref 31–35)
MCV RBC AUTO: 100 FL (ref 80–100)
MONOCYTES # BLD: 0.4 K/UL (ref 0.2–0.8)
MONOCYTES NFR BLD: 5.4 %
NEUTROPHILS # BLD: 4.7 K/UL (ref 2–7.5)
NEUTS SEG NFR BLD: 69.3 %
NT-PROBNP SERPL-MCNC: 432 PG/ML (ref 0–1800)
PLATELET # BLD AUTO: 224 K/UL (ref 150–400)
PMV BLD AUTO: 10.4 FL (ref 6–10)
POTASSIUM SERPL-SCNC: 4.9 MMOL/L (ref 3.4–5.1)
PROT SERPL-MCNC: 7.1 G/DL (ref 6.4–8.3)
RBC # BLD AUTO: 1.88 M/UL (ref 3.8–5.8)
SODIUM SERPL-SCNC: 138 MMOL/L (ref 136–145)
TROPONIN, HIGH SENSITIVITY: 10 NG/L (ref 0–14)
TROPONIN, HIGH SENSITIVITY: 11 NG/L (ref 0–14)
WBC # BLD AUTO: 6.7 K/UL (ref 4–11)

## 2025-07-06 PROCEDURE — 99285 EMERGENCY DEPT VISIT HI MDM: CPT

## 2025-07-06 PROCEDURE — 80053 COMPREHEN METABOLIC PANEL: CPT

## 2025-07-06 PROCEDURE — 83880 ASSAY OF NATRIURETIC PEPTIDE: CPT

## 2025-07-06 PROCEDURE — 85025 COMPLETE CBC W/AUTO DIFF WBC: CPT

## 2025-07-06 PROCEDURE — 71045 X-RAY EXAM CHEST 1 VIEW: CPT

## 2025-07-06 PROCEDURE — 84484 ASSAY OF TROPONIN QUANT: CPT

## 2025-07-06 PROCEDURE — 6370000000 HC RX 637 (ALT 250 FOR IP): Performed by: HOSPITALIST

## 2025-07-06 PROCEDURE — 36415 COLL VENOUS BLD VENIPUNCTURE: CPT

## 2025-07-06 RX ORDER — ASPIRIN 81 MG/1
324 TABLET, CHEWABLE ORAL ONCE
Status: COMPLETED | OUTPATIENT
Start: 2025-07-06 | End: 2025-07-06

## 2025-07-06 RX ADMIN — ASPIRIN 324 MG: 81 TABLET, CHEWABLE ORAL at 16:11

## 2025-07-06 ASSESSMENT — PAIN SCALES - GENERAL
PAINLEVEL_OUTOF10: 5
PAINLEVEL_OUTOF10: 7

## 2025-07-06 ASSESSMENT — PAIN DESCRIPTION - LOCATION
LOCATION: CHEST
LOCATION: CHEST

## 2025-07-06 ASSESSMENT — PAIN DESCRIPTION - ORIENTATION: ORIENTATION: LEFT;MID

## 2025-07-06 ASSESSMENT — PAIN DESCRIPTION - DESCRIPTORS: DESCRIPTORS: HEAVINESS;PRESSURE

## 2025-07-06 ASSESSMENT — PAIN DESCRIPTION - PAIN TYPE: TYPE: ACUTE PAIN

## 2025-07-06 ASSESSMENT — PAIN DESCRIPTION - DIRECTION: RADIATING_TOWARDS: LEFT ARM.

## 2025-07-06 ASSESSMENT — PAIN - FUNCTIONAL ASSESSMENT: PAIN_FUNCTIONAL_ASSESSMENT: 0-10

## 2025-07-06 ASSESSMENT — HEART SCORE: ECG: NORMAL

## 2025-07-06 NOTE — ED PROVIDER NOTES
hypertensive gastropathy, sciatica, splenomegaly, TIA diabetes mellitus type 2    Nursing Notes were all reviewed and agreed with or any disagreements were addressed in the HPI.    REVIEW OF SYSTEMS :      Review of Systems    Review of systems reviewed and negative except as in HPI/MDM    PAST MEDICAL HISTORY     Past Medical History:   Diagnosis Date    Arthritis, rheumatoid (HCC)     Ascites     Bronchiectasis (HCC)     CAD (coronary artery disease)     Chronic pain     Cirrhosis (HCC)     Colitis     COPD (chronic obstructive pulmonary disease) (HCC)     Diffuse cystic mastopathy     Esophageal varices (HCC)     Hypertension     IIH (idiopathic intracranial hypertension)     Liver cirrhosis (HCC)     Osteoarthritis     Portal hypertensive gastropathy (HCC)     Sciatica     Splenomegaly     TIA (transient ischemic attack) 03/17/2022    Type 2 diabetes mellitus without complication (HCC)        SURGICAL HISTORY     Past Surgical History:   Procedure Laterality Date    BONE MARROW BIOPSY      DILATION AND CURETTAGE OF UTERUS      DILATION AND CURETTAGE OF UTERUS  1998 & 2000    LIVER BIOPSY      LIVER TRANSPLANT  10/24/2020    TUBAL LIGATION  1982    UPPER GASTROINTESTINAL ENDOSCOPY  10/14/2020    with banding after varices       CURRENTMEDICATIONS       Previous Medications    AMLODIPINE (NORVASC) 10 MG TABLET    TAKE 1 TABLET BY MOUTH DAILY    ASPIRIN 81 MG EC TABLET    Take 1 tablet by mouth daily    CARBIDOPA-LEVODOPA (SINEMET)  MG PER TABLET    Take 1 tablet by mouth daily    COMBIVENT RESPIMAT  MCG/ACT AERS INHALER    INHALE 1 PUFF BY MOUTH EVERY 6 HOURS    CYCLOSPORINE MODIFIED (NEORAL) 25 MG CAPSULE    Take 3 capsules every morning and take 4 capsules every night    FARXIGA 5 MG TABLET    TAKE 1 TABLET BY MOUTH EVERY MORNING    FLUTICASONE-UMECLIDIN-VILANT (TRELEGY ELLIPTA) 100-62.5-25 MCG/ACT AEPB INHALER    Inhale 1 puff into the lungs daily    GABAPENTIN (NEURONTIN) 400 MG CAPSULE    Take 1

## 2025-07-06 NOTE — ED NOTES
Reviewed discharge instructions with the patient, verbalized understanding, written instruction and education provided.     Patient denies any further questions or needs at this time.  No distress noted on DC, Pt is Alert, GCS 15.   Pt taken via wheelchair to her vehicle at elevator entrance.

## 2025-07-07 RX ORDER — LOSARTAN POTASSIUM 100 MG/1
100 TABLET ORAL DAILY
Qty: 90 TABLET | Refills: 1 | Status: SHIPPED | OUTPATIENT
Start: 2025-07-07

## 2025-07-07 NOTE — CARE COORDINATION
Attempted to call patient in follow up and got no answer or voicemail box. Unable to leave message at this time.

## 2025-07-15 RX ORDER — PROMETHAZINE HYDROCHLORIDE 25 MG/1
25 TABLET ORAL EVERY 6 HOURS PRN
Qty: 20 TABLET | Refills: 0 | OUTPATIENT
Start: 2025-07-15

## 2025-07-21 ENCOUNTER — OFFICE VISIT (OUTPATIENT)
Age: 70
End: 2025-07-21
Payer: MEDICARE

## 2025-07-21 ENCOUNTER — HOSPITAL ENCOUNTER (OUTPATIENT)
Facility: HOSPITAL | Age: 70
Discharge: HOME | End: 2025-07-21
Payer: MEDICARE

## 2025-07-21 VITALS — HEART RATE: 70 BPM | OXYGEN SATURATION: 95 % | DIASTOLIC BLOOD PRESSURE: 77 MMHG | SYSTOLIC BLOOD PRESSURE: 143 MMHG

## 2025-07-21 DIAGNOSIS — E55.9 VITAMIN D DEFICIENCY: ICD-10-CM

## 2025-07-21 DIAGNOSIS — Z86.73 RECENT CEREBROVASCULAR ACCIDENT (CVA): ICD-10-CM

## 2025-07-21 DIAGNOSIS — G62.9 NEUROPATHY: ICD-10-CM

## 2025-07-21 DIAGNOSIS — F41.9 ANXIETY: ICD-10-CM

## 2025-07-21 DIAGNOSIS — M79.2 NEUROPATHIC PAIN: ICD-10-CM

## 2025-07-21 DIAGNOSIS — J43.1 PANLOBULAR EMPHYSEMA (HCC): ICD-10-CM

## 2025-07-21 DIAGNOSIS — N18.31 TYPE 2 DIABETES MELLITUS WITH STAGE 3A CHRONIC KIDNEY DISEASE, WITHOUT LONG-TERM CURRENT USE OF INSULIN (HCC): ICD-10-CM

## 2025-07-21 DIAGNOSIS — J43.2 CENTRILOBULAR EMPHYSEMA (HCC): ICD-10-CM

## 2025-07-21 DIAGNOSIS — E11.22 TYPE 2 DIABETES MELLITUS WITH STAGE 3A CHRONIC KIDNEY DISEASE, WITHOUT LONG-TERM CURRENT USE OF INSULIN (HCC): ICD-10-CM

## 2025-07-21 DIAGNOSIS — G89.4 PAIN SYNDROME, CHRONIC: ICD-10-CM

## 2025-07-21 DIAGNOSIS — R11.0 NAUSEA: ICD-10-CM

## 2025-07-21 DIAGNOSIS — Z13.29 THYROID DISORDER SCREEN: ICD-10-CM

## 2025-07-21 DIAGNOSIS — I10 PRIMARY HYPERTENSION: Primary | ICD-10-CM

## 2025-07-21 DIAGNOSIS — I10 PRIMARY HYPERTENSION: ICD-10-CM

## 2025-07-21 PROCEDURE — 1159F MED LIST DOCD IN RCRD: CPT | Performed by: NURSE PRACTITIONER

## 2025-07-21 PROCEDURE — 99214 OFFICE O/P EST MOD 30 MIN: CPT | Performed by: NURSE PRACTITIONER

## 2025-07-21 PROCEDURE — 3077F SYST BP >= 140 MM HG: CPT | Performed by: NURSE PRACTITIONER

## 2025-07-21 PROCEDURE — 36415 COLL VENOUS BLD VENIPUNCTURE: CPT

## 2025-07-21 PROCEDURE — 3017F COLORECTAL CA SCREEN DOC REV: CPT | Performed by: NURSE PRACTITIONER

## 2025-07-21 PROCEDURE — 2022F DILAT RTA XM EVC RTNOPTHY: CPT | Performed by: NURSE PRACTITIONER

## 2025-07-21 PROCEDURE — 82607 VITAMIN B-12: CPT

## 2025-07-21 PROCEDURE — G8427 DOCREV CUR MEDS BY ELIG CLIN: HCPCS | Performed by: NURSE PRACTITIONER

## 2025-07-21 PROCEDURE — G8417 CALC BMI ABV UP PARAM F/U: HCPCS | Performed by: NURSE PRACTITIONER

## 2025-07-21 PROCEDURE — 85027 COMPLETE CBC AUTOMATED: CPT

## 2025-07-21 PROCEDURE — 82306 VITAMIN D 25 HYDROXY: CPT

## 2025-07-21 PROCEDURE — G8399 PT W/DXA RESULTS DOCUMENT: HCPCS | Performed by: NURSE PRACTITIONER

## 2025-07-21 PROCEDURE — 80053 COMPREHEN METABOLIC PANEL: CPT

## 2025-07-21 PROCEDURE — 3023F SPIROM DOC REV: CPT | Performed by: NURSE PRACTITIONER

## 2025-07-21 PROCEDURE — 4004F PT TOBACCO SCREEN RCVD TLK: CPT | Performed by: NURSE PRACTITIONER

## 2025-07-21 PROCEDURE — 82746 ASSAY OF FOLIC ACID SERUM: CPT

## 2025-07-21 PROCEDURE — 3078F DIAST BP <80 MM HG: CPT | Performed by: NURSE PRACTITIONER

## 2025-07-21 PROCEDURE — 1123F ACP DISCUSS/DSCN MKR DOCD: CPT | Performed by: NURSE PRACTITIONER

## 2025-07-21 PROCEDURE — 3052F HG A1C>EQUAL 8.0%<EQUAL 9.0%: CPT | Performed by: NURSE PRACTITIONER

## 2025-07-21 PROCEDURE — 80061 LIPID PANEL: CPT

## 2025-07-21 PROCEDURE — 1090F PRES/ABSN URINE INCON ASSESS: CPT | Performed by: NURSE PRACTITIONER

## 2025-07-21 PROCEDURE — 84443 ASSAY THYROID STIM HORMONE: CPT

## 2025-07-21 RX ORDER — ATORVASTATIN CALCIUM 10 MG/1
TABLET, FILM COATED ORAL
COMMUNITY
Start: 2025-07-05

## 2025-07-21 RX ORDER — GABAPENTIN 400 MG/1
400 CAPSULE ORAL 4 TIMES DAILY
Qty: 120 CAPSULE | Refills: 2 | Status: SHIPPED | OUTPATIENT
Start: 2025-07-21 | End: 2025-08-20

## 2025-07-21 RX ORDER — ALPRAZOLAM 0.5 MG
TABLET ORAL
Qty: 90 TABLET | Refills: 2 | Status: SHIPPED | OUTPATIENT
Start: 2025-07-21 | End: 2025-08-21

## 2025-07-21 RX ORDER — VALSARTAN 320 MG/1
320 TABLET ORAL DAILY
COMMUNITY

## 2025-07-21 RX ORDER — ALPRAZOLAM 0.5 MG
TABLET ORAL
COMMUNITY
Start: 2025-06-20 | End: 2025-07-21 | Stop reason: SDUPTHER

## 2025-07-21 RX ORDER — PROMETHAZINE HYDROCHLORIDE 25 MG/1
25 TABLET ORAL 4 TIMES DAILY PRN
Qty: 20 TABLET | Refills: 0 | Status: SHIPPED | OUTPATIENT
Start: 2025-07-21 | End: 2025-07-28

## 2025-07-22 LAB
25(OH)D3 SERPL-MCNC: 13.7 NG/ML (ref 32–100)
ALBUMIN SERPL-MCNC: 3.7 G/DL (ref 3.4–4.8)
ALBUMIN/GLOB SERPL: 1.3 {RATIO} (ref 0.8–2)
ALP SERPL-CCNC: 140 U/L (ref 25–100)
ALT SERPL-CCNC: 25 U/L (ref 4–36)
ANION GAP SERPL CALCULATED.3IONS-SCNC: 11 MMOL/L (ref 3–16)
AST SERPL-CCNC: 24 U/L (ref 8–33)
BILIRUB SERPL-MCNC: 0.7 MG/DL (ref 0.3–1.2)
BUN SERPL-MCNC: 27 MG/DL (ref 6–20)
CALCIUM SERPL-MCNC: 9 MG/DL (ref 8.3–10.6)
CHLORIDE SERPL-SCNC: 106 MMOL/L (ref 98–107)
CHOLEST SERPL-MCNC: 173 MG/DL (ref 0–200)
CO2 SERPL-SCNC: 20 MMOL/L (ref 20–30)
CREAT SERPL-MCNC: 1.7 MG/DL (ref 0.6–1.2)
ERYTHROCYTE [DISTWIDTH] IN BLOOD BY AUTOMATED COUNT: 16 % (ref 11–16)
FOLATE SERPL-MCNC: 9.65 NG/ML
GFR SERPLBLD CREATININE-BSD FMLA CKD-EPI: 32 ML/MIN/{1.73_M2}
GLOBULIN SER CALC-MCNC: 2.9 G/DL
GLUCOSE SERPL-MCNC: 375 MG/DL (ref 74–106)
HCT VFR BLD AUTO: 29 % (ref 37–47)
HDLC SERPL-MCNC: 27 MG/DL (ref 40–60)
HGB BLD-MCNC: 13.1 G/DL (ref 11.5–16.5)
LDLC SERPL CALC-MCNC: ABNORMAL MG/DL
LDLC SERPL-MCNC: 70 MG/DL
MCH RBC QN AUTO: 46.5 PG (ref 27–32)
MCHC RBC AUTO-ENTMCNC: 45.2 G/DL (ref 31–35)
MCV RBC AUTO: 102.8 FL (ref 80–100)
PLATELET # BLD AUTO: 169 K/UL (ref 150–400)
PMV BLD AUTO: 11.3 FL (ref 6–10)
POTASSIUM SERPL-SCNC: 6 MMOL/L (ref 3.4–5.1)
PROT SERPL-MCNC: 6.6 G/DL (ref 6.4–8.3)
RBC # BLD AUTO: 2.82 M/UL (ref 3.8–5.8)
SODIUM SERPL-SCNC: 137 MMOL/L (ref 136–145)
TRIGL SERPL-MCNC: 599 MG/DL (ref 0–249)
TSH SERPL DL<=0.005 MIU/L-ACNC: 2.93 UIU/ML (ref 0.27–4.2)
VIT B12 SERPL-MCNC: 509 PG/ML (ref 211–911)
VLDLC SERPL CALC-MCNC: ABNORMAL MG/DL
WBC # BLD AUTO: 4.5 K/UL (ref 4–11)

## 2025-08-18 RX ORDER — VALSARTAN 320 MG/1
320 TABLET ORAL DAILY
Qty: 30 TABLET | Refills: 3 | Status: SHIPPED | OUTPATIENT
Start: 2025-08-18 | End: 2025-08-19 | Stop reason: ALTCHOICE

## 2025-08-22 RX ORDER — INSULIN GLARGINE 100 [IU]/ML
INJECTION, SOLUTION SUBCUTANEOUS
Qty: 15 ML | Refills: 0 | Status: SHIPPED | OUTPATIENT
Start: 2025-08-22

## 2025-08-22 RX ORDER — PEN NEEDLE, DIABETIC 31 GX3/16"
NEEDLE, DISPOSABLE MISCELLANEOUS
Qty: 100 EACH | Refills: 1 | Status: SHIPPED | OUTPATIENT
Start: 2025-08-22